# Patient Record
Sex: FEMALE | Race: WHITE | NOT HISPANIC OR LATINO | Employment: OTHER | URBAN - METROPOLITAN AREA
[De-identification: names, ages, dates, MRNs, and addresses within clinical notes are randomized per-mention and may not be internally consistent; named-entity substitution may affect disease eponyms.]

---

## 2019-08-09 RX ORDER — BENZONATATE 100 MG/1
200 CAPSULE ORAL
COMMUNITY
Start: 2016-11-16 | End: 2021-03-22

## 2019-08-09 RX ORDER — PANTOPRAZOLE SODIUM 40 MG/1
40 TABLET, DELAYED RELEASE ORAL DAILY
COMMUNITY
End: 2021-03-22

## 2019-08-09 RX ORDER — AMIODARONE HYDROCHLORIDE 200 MG/1
200 TABLET ORAL DAILY
COMMUNITY
End: 2020-01-03

## 2019-08-09 RX ORDER — LISINOPRIL AND HYDROCHLOROTHIAZIDE 20; 12.5 MG/1; MG/1
1 TABLET ORAL DAILY
COMMUNITY
End: 2021-03-22

## 2019-08-09 RX ORDER — DILTIAZEM HYDROCHLORIDE 120 MG/1
120 CAPSULE, COATED, EXTENDED RELEASE ORAL DAILY
COMMUNITY
End: 2020-01-03 | Stop reason: SDUPTHER

## 2019-08-09 RX ORDER — ALBUTEROL SULFATE 90 UG/1
2 AEROSOL, METERED RESPIRATORY (INHALATION)
COMMUNITY
Start: 2016-11-16 | End: 2021-03-22

## 2019-08-12 ENCOUNTER — OFFICE VISIT (OUTPATIENT)
Dept: CARDIOLOGY | Facility: CLINIC | Age: 79
End: 2019-08-12

## 2019-08-12 VITALS
HEIGHT: 63 IN | HEART RATE: 124 BPM | SYSTOLIC BLOOD PRESSURE: 129 MMHG | DIASTOLIC BLOOD PRESSURE: 85 MMHG | WEIGHT: 215 LBS | BODY MASS INDEX: 38.09 KG/M2

## 2019-08-12 DIAGNOSIS — I10 ESSENTIAL HYPERTENSION: ICD-10-CM

## 2019-08-12 DIAGNOSIS — I48.4 ATYPICAL ATRIAL FLUTTER (HCC): Primary | ICD-10-CM

## 2019-08-12 PROCEDURE — 93000 ELECTROCARDIOGRAM COMPLETE: CPT | Performed by: INTERNAL MEDICINE

## 2019-08-12 PROCEDURE — 99203 OFFICE O/P NEW LOW 30 MIN: CPT | Performed by: INTERNAL MEDICINE

## 2019-08-12 RX ORDER — FUROSEMIDE 20 MG/1
TABLET ORAL
COMMUNITY
Start: 2019-08-05 | End: 2021-03-22

## 2019-08-14 PROBLEM — I48.4 ATYPICAL ATRIAL FLUTTER: Status: ACTIVE | Noted: 2019-08-14

## 2019-08-14 PROBLEM — I10 ESSENTIAL HYPERTENSION: Status: ACTIVE | Noted: 2019-08-14

## 2019-08-14 NOTE — PROGRESS NOTES
Subjective:     Encounter Date:08/12/2019      Patient ID: Laure Leung is a 79 y.o. female.    Chief Complaint:  Chief Complaint   Patient presents with   • Follow-up   • Atrial Fibrillation       HPI:  Patient presents for evaluation of atypical atrial flutter.    Ms Leung is a 78 yo with a past medical history significant for HTN.  There was no history of HLD or previous heart disease.  She presented to [facility] in 6/19 with complaints of dyspnea which had been ongoing for several weeks.  The also noted that her stamina and exercise tolerance had diminished.  She denied any chest pain, PND, orthopnea or palpitatins.  In the ER she was found to be in atrial flutter with 2:1 conduction and had an elevated BNP of 1100.  She was admitted and placed on metoprolol, amiodarone and apixoban.  She was diuresed and an echo showed an EF of 55%, mild-moderate RV enlargement, mild-moderate MR with severely dilated LA, moderate TR and mild AoV sclerosis.    After discharge she developed ankle edema which she attributed to the metoprolol which she stopped..  Her edema improved but she continues to have marked fatigue and dyspnea daily.      The following portions of the patient's history were reviewed and updated as appropriate: allergies, current medications, past family history, past medical history, past social history, past surgical history and problem list.    Problem List:  Patient Active Problem List   Diagnosis   • Atypical atrial flutter (CMS/HCC)   • Essential hypertension       Past Medical History:  Past Medical History:   Diagnosis Date   • Atrial fibrillation (CMS/HCC)    • CHF (congestive heart failure) (CMS/HCC)    • Hypertension        Past Surgical History:  Past Surgical History:   Procedure Laterality Date   • CATARACT EXTRACTION         Social History:  Social History     Socioeconomic History   • Marital status:      Spouse name: Not on file   • Number of children: Not on file   •  "Years of education: Not on file   • Highest education level: Not on file   Tobacco Use   • Smoking status: Former Smoker   • Smokeless tobacco: Never Used   Substance and Sexual Activity   • Alcohol use: No     Frequency: Never       Allergies:  No Known Allergies    Immunizations:    There is no immunization history on file for this patient.    ROS:  Review of Systems   Constitution: Positive for weakness and malaise/fatigue.   HENT: Negative.    Eyes: Negative.    Cardiovascular: Positive for dyspnea on exertion and leg swelling. Negative for chest pain, orthopnea, palpitations and syncope.   Respiratory: Positive for shortness of breath.    Endocrine: Negative.    Hematologic/Lymphatic: Negative.    Skin: Negative.    Musculoskeletal: Negative.    Gastrointestinal: Negative.    Genitourinary: Negative.    Neurological: Negative for focal weakness.   Psychiatric/Behavioral: Negative.    Allergic/Immunologic: Negative.           Objective:         /85   Pulse (!) 124   Ht 160 cm (63\")   Wt 97.5 kg (215 lb)   BMI 38.09 kg/m²     Physical Exam   Constitutional: She is oriented to person, place, and time. She appears well-developed and well-nourished. No distress.   HENT:   Head: Normocephalic and atraumatic.   Eyes: Conjunctivae and EOM are normal. Pupils are equal, round, and reactive to light. No scleral icterus.   Neck: Normal range of motion. No thyromegaly present.   Cardiovascular: Regular rhythm.   Murmur (1/6 PIO) heard.  tachycardic   Pulmonary/Chest: Effort normal and breath sounds normal.   Abdominal: Soft. Bowel sounds are normal.   Musculoskeletal: Normal range of motion.   Neurological: She is alert and oriented to person, place, and time.   Skin: Skin is warm and dry.   Psychiatric: She has a normal mood and affect.       In-Office Procedure(s):    ECG 12 Lead  Date/Time: 8/12/2019 2:10 PM  Performed by: Antonio Senior MD  Authorized by: Antonio Senior MD   Comparison: compared " with previous ECG from 6/3/2019  Comparison to previous ECG: Atrial flutter with 2:1 conduction              ASCVD RIsk Score::  The ASCVD Risk score (Buckymarga RUBIO Jr., et al., 2013) failed to calculate for the following reasons:    Cannot find a previous HDL lab    Cannot find a previous total cholesterol lab    Recent Radiology:  Imaging Results (most recent)     None          Lab Review:   No results found for any previous visit.                Assessment:          Diagnosis Plan   1. Atypical atrial flutter (CMS/HCC)     2. Essential hypertension            Plan:      1. Atypical atrial flutter - rate uncontrolled, no attempt at restoration of NSR, at risk for developing heart failure.  Discussed doing cardioversion and then ablation to include pulmonary vein isolation and ablation of her atypical left atrial flutter with the associated risks and benefits.      Level of Care:                 Antonio Senior MD  08/14/19  .

## 2019-08-15 ENCOUNTER — TELEPHONE (OUTPATIENT)
Dept: CARDIOLOGY | Facility: CLINIC | Age: 79
End: 2019-08-15

## 2019-09-09 ENCOUNTER — OFFICE VISIT (OUTPATIENT)
Dept: CARDIOLOGY | Facility: CLINIC | Age: 79
End: 2019-09-09

## 2019-09-09 VITALS
BODY MASS INDEX: 38.27 KG/M2 | DIASTOLIC BLOOD PRESSURE: 79 MMHG | SYSTOLIC BLOOD PRESSURE: 155 MMHG | HEIGHT: 63 IN | WEIGHT: 216 LBS | HEART RATE: 74 BPM

## 2019-09-09 DIAGNOSIS — I48.4 ATYPICAL ATRIAL FLUTTER (HCC): Primary | ICD-10-CM

## 2019-09-09 DIAGNOSIS — I10 ESSENTIAL HYPERTENSION: ICD-10-CM

## 2019-09-09 PROCEDURE — 99213 OFFICE O/P EST LOW 20 MIN: CPT | Performed by: INTERNAL MEDICINE

## 2019-09-09 PROCEDURE — 93000 ELECTROCARDIOGRAM COMPLETE: CPT | Performed by: INTERNAL MEDICINE

## 2019-09-10 NOTE — PROGRESS NOTES
Subjective:     Encounter Date:09/09/2019      Patient ID: Laure Leung is a 79 y.o. female.    Chief Complaint:  Chief Complaint   Patient presents with   • Follow-up       HPI:  Patient presents for followup after cardioversion.    Ms Leung is a 78 yo with a past medical history significant for HTN who developed atypical atrial flutter.    She underwent cardioversion a few weeks ago and has felt well since.  She denies any symptoms of palpitations, chest pain, PND, orthopnea or peripheral edema.    The following portions of the patient's history were reviewed and updated as appropriate: allergies, current medications, past family history, past medical history, past social history, past surgical history and problem list.    Problem List:  Patient Active Problem List   Diagnosis   • Atypical atrial flutter (CMS/HCC)   • Essential hypertension       Past Medical History:  Past Medical History:   Diagnosis Date   • Atrial fibrillation (CMS/HCC)    • CHF (congestive heart failure) (CMS/HCC)    • Hypertension        Past Surgical History:  Past Surgical History:   Procedure Laterality Date   • CATARACT EXTRACTION         Social History:  Social History     Socioeconomic History   • Marital status:      Spouse name: Not on file   • Number of children: Not on file   • Years of education: Not on file   • Highest education level: Not on file   Tobacco Use   • Smoking status: Former Smoker   • Smokeless tobacco: Never Used   Substance and Sexual Activity   • Alcohol use: No     Frequency: Never       Allergies:  No Known Allergies    Immunizations:    There is no immunization history on file for this patient.    ROS:  Review of Systems   Constitution: Negative for weakness and malaise/fatigue.   HENT: Negative.    Eyes: Negative.    Cardiovascular: Negative for chest pain, dyspnea on exertion, irregular heartbeat, leg swelling, near-syncope, orthopnea, palpitations and paroxysmal nocturnal dyspnea.   Respiratory:  "Negative for shortness of breath.    Endocrine: Negative.    Hematologic/Lymphatic: Negative.    Skin: Negative.    Musculoskeletal: Negative.    Gastrointestinal: Negative.    Genitourinary: Negative.    Neurological: Negative.    Psychiatric/Behavioral: Negative.    Allergic/Immunologic: Negative.           Objective:         /79   Pulse 74   Ht 160 cm (63\")   Wt 98 kg (216 lb)   BMI 38.26 kg/m²     Physical Exam   Constitutional: She is oriented to person, place, and time. She appears well-developed and well-nourished. No distress.   HENT:   Head: Normocephalic and atraumatic.   Eyes: Conjunctivae and EOM are normal. Pupils are equal, round, and reactive to light. No scleral icterus.   Neck: Normal range of motion. No thyromegaly present.   Cardiovascular: Normal rate, regular rhythm and normal heart sounds.   Pulmonary/Chest: Effort normal and breath sounds normal.   Abdominal: Soft. Bowel sounds are normal.   Musculoskeletal: Normal range of motion.   Neurological: She is alert and oriented to person, place, and time.   Skin: Skin is warm and dry.   Psychiatric: She has a normal mood and affect.       In-Office Procedure(s):    ECG 12 Lead  Date/Time: 9/9/2019 12:51 PM  Performed by: Antonio Senior MD  Authorized by: Antonio Senior MD   Comparison: compared with previous ECG   Comparison to previous ECG: Atrial flutter with 2:1 conduction, LAD  Rhythm: sinus rhythm  Rate: normal  Conduction: 1st degree AV block  QRS axis: normal    Clinical impression: abnormal EKG            ASCVD RIsk Score::  The ASCVD Risk score (Bucky RAMIRO Jr., et al., 2013) failed to calculate for the following reasons:    Cannot find a previous HDL lab    Cannot find a previous total cholesterol lab    Recent Radiology:  Imaging Results (most recent)     None          Lab Review:   No results found for any previous visit.                Assessment:          Diagnosis Plan   1. Atypical atrial flutter (CMS/HCC)     2. " Essential hypertension            Plan:      1. Atypical atrial flutter - doing well post cardioversion in NSR, will continue apixaban and diltiazem.  Will defer ablation for now, consider ablation if it recurs.  2. HTN - reasonable control    RTC 6 months      Level of Care:                 Antonio Senior MD  09/10/19  .

## 2020-01-03 DIAGNOSIS — I48.4 ATYPICAL ATRIAL FLUTTER (HCC): Primary | ICD-10-CM

## 2020-01-03 RX ORDER — DILTIAZEM HYDROCHLORIDE 120 MG/1
120 CAPSULE, COATED, EXTENDED RELEASE ORAL DAILY
Qty: 90 CAPSULE | Refills: 3 | Status: SHIPPED | OUTPATIENT
Start: 2020-01-03 | End: 2021-01-05 | Stop reason: SDUPTHER

## 2020-03-09 ENCOUNTER — OFFICE VISIT (OUTPATIENT)
Dept: CARDIOLOGY | Facility: CLINIC | Age: 80
End: 2020-03-09

## 2020-03-09 DIAGNOSIS — I48.4 ATYPICAL ATRIAL FLUTTER (HCC): Primary | ICD-10-CM

## 2020-03-09 DIAGNOSIS — I10 ESSENTIAL HYPERTENSION: ICD-10-CM

## 2020-03-09 PROCEDURE — 99212 OFFICE O/P EST SF 10 MIN: CPT | Performed by: INTERNAL MEDICINE

## 2020-03-09 NOTE — PROGRESS NOTES
Subjective:     Encounter Date:03/09/2020      Patient ID: Laure Leung is a 80 y.o. female.    Chief Complaint:  Followup atrial flutter    HPI:  Patient presents for routine followup of her atypical atrial flutter.  Ms Leung is a 80 yo with a past medical history significant for HTN who developed atypical atrial flutter.  She presented to [facility] in 6/19 with complaints of dyspnea which had been ongoing for several weeks.  The also noted that her stamina and exercise tolerance had diminished.  She denied any chest pain, PND, orthopnea or palpitatins.  In the ER she was found to be in atrial flutter with 2:1 conduction and had an elevated BNP of 1100.  She was admitted and placed on metoprolol, amiodarone and apixoban.  She was diuresed and an echo showed an EF of 55%, mild-moderate RV enlargement, mild-moderate MR with severely dilated LA, moderate TR and mild AoV sclerosis.     She underwent cardioversion in 2019 and was taken off of amiodarone. Since then she has felt well.  She denies any symptoms of palpitations, chest pain, PND, orthopnea or peripheral edema.      The following portions of the patient's history were reviewed and updated as appropriate: allergies, current medications, past family history, past medical history, past social history, past surgical history and problem list.    Problem List:  Patient Active Problem List   Diagnosis   • Atypical atrial flutter (CMS/HCC)   • Essential hypertension       Past Medical History:  Past Medical History:   Diagnosis Date   • Atrial fibrillation (CMS/HCC)    • CHF (congestive heart failure) (CMS/HCC)    • Hypertension        Past Surgical History:  Past Surgical History:   Procedure Laterality Date   • CATARACT EXTRACTION         Social History:  Social History     Socioeconomic History   • Marital status:      Spouse name: Not on file   • Number of children: Not on file   • Years of education: Not on file   • Highest education  level: Not on file   Tobacco Use   • Smoking status: Former Smoker   • Smokeless tobacco: Never Used   Substance and Sexual Activity   • Alcohol use: No     Frequency: Never       Allergies:  No Known Allergies    Immunizations:    There is no immunization history on file for this patient.    ROS:  Review of Systems   Constitution: Negative for malaise/fatigue.   Cardiovascular: Negative for chest pain, dyspnea on exertion, irregular heartbeat, leg swelling, near-syncope, orthopnea, palpitations, paroxysmal nocturnal dyspnea and syncope.   Respiratory: Negative for shortness of breath.    Musculoskeletal: Positive for arthritis.   All other systems reviewed and are negative.         Objective:         There were no vitals taken for this visit.    Physical Exam   Constitutional: She is oriented to person, place, and time. She appears well-developed and well-nourished. No distress.   HENT:   Head: Normocephalic and atraumatic.   Eyes: Pupils are equal, round, and reactive to light. Conjunctivae and EOM are normal. No scleral icterus.   Neck: Normal range of motion. No thyromegaly present.   Cardiovascular: Normal rate, regular rhythm and normal heart sounds.   Pulmonary/Chest: Effort normal and breath sounds normal.   Abdominal: Soft. Bowel sounds are normal.   Musculoskeletal: Normal range of motion.   Neurological: She is alert and oriented to person, place, and time.   Skin: Skin is warm and dry.   Psychiatric: She has a normal mood and affect.       In-Office Procedure(s):  Procedures    ASCVD RIsk Score::  The ASCVD Risk score (Bucky RAMIRO Jr., et al., 2013) failed to calculate for the following reasons:    Cannot find a previous HDL lab    Cannot find a previous total cholesterol lab    Recent Radiology:  Imaging Results (Most Recent)     None          Lab Review:   No visits with results within 2 Month(s) from this visit.   Latest known visit with results is:   No results found for any previous visit.                 Assessment:          Diagnosis Plan   1. Atypical atrial flutter (CMS/HCC)     2. Essential hypertension            Plan:      1. Atypical atrial flutter - no recurrence since cardioversion, will continue apixaban  2. HTN - controlled  3. Osteoarthritis of the knees - scheduled to receive injections, ok to stop apixaban for injections    RTC 1 year      Level of Care:                 Antonio Senior MD  03/09/20  .

## 2020-03-10 VITALS
DIASTOLIC BLOOD PRESSURE: 66 MMHG | SYSTOLIC BLOOD PRESSURE: 138 MMHG | WEIGHT: 221 LBS | BODY MASS INDEX: 39.16 KG/M2 | HEIGHT: 63 IN | HEART RATE: 80 BPM

## 2020-04-23 ENCOUNTER — LAB REQUISITION (OUTPATIENT)
Dept: LAB | Facility: HOSPITAL | Age: 80
End: 2020-04-23

## 2020-04-23 DIAGNOSIS — Z00.00 ENCOUNTER FOR GENERAL ADULT MEDICAL EXAMINATION WITHOUT ABNORMAL FINDINGS: ICD-10-CM

## 2020-04-23 LAB
ALBUMIN SERPL-MCNC: 3.1 G/DL (ref 3.5–5.2)
ALBUMIN/GLOB SERPL: 0.9 G/DL
ALP SERPL-CCNC: 80 U/L (ref 39–117)
ALT SERPL W P-5'-P-CCNC: 35 U/L (ref 1–33)
ANION GAP SERPL CALCULATED.3IONS-SCNC: 12 MMOL/L (ref 5–15)
AST SERPL-CCNC: 42 U/L (ref 1–32)
BILIRUB SERPL-MCNC: 0.6 MG/DL (ref 0.2–1.2)
BUN BLD-MCNC: 13 MG/DL (ref 8–23)
BUN/CREAT SERPL: 14.3 (ref 7–25)
CALCIUM SPEC-SCNC: 9.9 MG/DL (ref 8.6–10.5)
CHLORIDE SERPL-SCNC: 102 MMOL/L (ref 98–107)
CO2 SERPL-SCNC: 27 MMOL/L (ref 22–29)
CREAT BLD-MCNC: 0.91 MG/DL (ref 0.57–1)
DEPRECATED RDW RBC AUTO: 47.7 FL (ref 37–54)
ERYTHROCYTE [DISTWIDTH] IN BLOOD BY AUTOMATED COUNT: 14.4 % (ref 12.3–15.4)
GFR SERPL CREATININE-BSD FRML MDRD: 60 ML/MIN/1.73
GLOBULIN UR ELPH-MCNC: 3.5 GM/DL
GLUCOSE BLD-MCNC: 146 MG/DL (ref 65–99)
HCT VFR BLD AUTO: 29.4 % (ref 34–46.6)
HGB BLD-MCNC: 10.1 G/DL (ref 12–15.9)
MAGNESIUM SERPL-MCNC: 2.1 MG/DL (ref 1.6–2.4)
MCH RBC QN AUTO: 32.1 PG (ref 26.6–33)
MCHC RBC AUTO-ENTMCNC: 34.3 G/DL (ref 31.5–35.7)
MCV RBC AUTO: 93.6 FL (ref 79–97)
PHOSPHATE SERPL-MCNC: 4.6 MG/DL (ref 2.5–4.5)
PLATELET # BLD AUTO: 453 10*3/MM3 (ref 140–450)
PMV BLD AUTO: 7.6 FL (ref 6–12)
POTASSIUM BLD-SCNC: 4.4 MMOL/L (ref 3.5–5.2)
PROT SERPL-MCNC: 6.6 G/DL (ref 6–8.5)
RBC # BLD AUTO: 3.15 10*6/MM3 (ref 3.77–5.28)
SODIUM BLD-SCNC: 141 MMOL/L (ref 136–145)
WBC NRBC COR # BLD: 4.5 10*3/MM3 (ref 3.4–10.8)

## 2020-04-23 PROCEDURE — 80053 COMPREHEN METABOLIC PANEL: CPT | Performed by: PHYSICAL MEDICINE & REHABILITATION

## 2020-04-23 PROCEDURE — 84100 ASSAY OF PHOSPHORUS: CPT | Performed by: PHYSICAL MEDICINE & REHABILITATION

## 2020-04-23 PROCEDURE — 85027 COMPLETE CBC AUTOMATED: CPT | Performed by: PHYSICAL MEDICINE & REHABILITATION

## 2020-04-23 PROCEDURE — 83735 ASSAY OF MAGNESIUM: CPT | Performed by: PHYSICAL MEDICINE & REHABILITATION

## 2020-04-27 ENCOUNTER — LAB REQUISITION (OUTPATIENT)
Dept: LAB | Facility: HOSPITAL | Age: 80
End: 2020-04-27

## 2020-04-27 DIAGNOSIS — Z00.00 ENCOUNTER FOR GENERAL ADULT MEDICAL EXAMINATION WITHOUT ABNORMAL FINDINGS: ICD-10-CM

## 2020-04-27 LAB
ANION GAP SERPL CALCULATED.3IONS-SCNC: 10 MMOL/L (ref 5–15)
BUN BLD-MCNC: 15 MG/DL (ref 8–23)
BUN/CREAT SERPL: 17.2 (ref 7–25)
CALCIUM SPEC-SCNC: 10 MG/DL (ref 8.6–10.5)
CHLORIDE SERPL-SCNC: 103 MMOL/L (ref 98–107)
CO2 SERPL-SCNC: 27 MMOL/L (ref 22–29)
CREAT BLD-MCNC: 0.87 MG/DL (ref 0.57–1)
DEPRECATED RDW RBC AUTO: 49.4 FL (ref 37–54)
ERYTHROCYTE [DISTWIDTH] IN BLOOD BY AUTOMATED COUNT: 14.8 % (ref 12.3–15.4)
GFR SERPL CREATININE-BSD FRML MDRD: 63 ML/MIN/1.73
GLUCOSE BLD-MCNC: 158 MG/DL (ref 65–99)
HCT VFR BLD AUTO: 29.4 % (ref 34–46.6)
HGB BLD-MCNC: 10.3 G/DL (ref 12–15.9)
MCH RBC QN AUTO: 33.4 PG (ref 26.6–33)
MCHC RBC AUTO-ENTMCNC: 35.2 G/DL (ref 31.5–35.7)
MCV RBC AUTO: 94.7 FL (ref 79–97)
PLATELET # BLD AUTO: 413 10*3/MM3 (ref 140–450)
PMV BLD AUTO: 8 FL (ref 6–12)
POTASSIUM BLD-SCNC: 3.8 MMOL/L (ref 3.5–5.2)
RBC # BLD AUTO: 3.1 10*6/MM3 (ref 3.77–5.28)
SODIUM BLD-SCNC: 140 MMOL/L (ref 136–145)
WBC NRBC COR # BLD: 4.8 10*3/MM3 (ref 3.4–10.8)

## 2020-04-27 PROCEDURE — 80048 BASIC METABOLIC PNL TOTAL CA: CPT | Performed by: PHYSICAL MEDICINE & REHABILITATION

## 2020-04-27 PROCEDURE — 85027 COMPLETE CBC AUTOMATED: CPT | Performed by: PHYSICAL MEDICINE & REHABILITATION

## 2020-04-30 ENCOUNTER — LAB REQUISITION (OUTPATIENT)
Dept: LAB | Facility: HOSPITAL | Age: 80
End: 2020-04-30

## 2020-04-30 DIAGNOSIS — Z00.00 ENCOUNTER FOR GENERAL ADULT MEDICAL EXAMINATION WITHOUT ABNORMAL FINDINGS: ICD-10-CM

## 2020-04-30 LAB
ANION GAP SERPL CALCULATED.3IONS-SCNC: 13 MMOL/L (ref 5–15)
BASOPHILS # BLD MANUAL: 0.08 10*3/MM3 (ref 0–0.2)
BASOPHILS NFR BLD AUTO: 2 % (ref 0–1.5)
BUN BLD-MCNC: 13 MG/DL (ref 8–23)
BUN/CREAT SERPL: 14.6 (ref 7–25)
CALCIUM SPEC-SCNC: 9.9 MG/DL (ref 8.6–10.5)
CHLORIDE SERPL-SCNC: 102 MMOL/L (ref 98–107)
CO2 SERPL-SCNC: 26 MMOL/L (ref 22–29)
CREAT BLD-MCNC: 0.89 MG/DL (ref 0.57–1)
DEPRECATED RDW RBC AUTO: 51.6 FL (ref 37–54)
EOSINOPHIL # BLD MANUAL: 0.16 10*3/MM3 (ref 0–0.4)
EOSINOPHIL NFR BLD MANUAL: 4 % (ref 0.3–6.2)
ERYTHROCYTE [DISTWIDTH] IN BLOOD BY AUTOMATED COUNT: 15.7 % (ref 12.3–15.4)
GFR SERPL CREATININE-BSD FRML MDRD: 61 ML/MIN/1.73
GLUCOSE BLD-MCNC: 157 MG/DL (ref 65–99)
HCT VFR BLD AUTO: 30.3 % (ref 34–46.6)
HGB BLD-MCNC: 10.9 G/DL (ref 12–15.9)
LYMPHOCYTES # BLD MANUAL: 1.44 10*3/MM3 (ref 0.7–3.1)
LYMPHOCYTES NFR BLD MANUAL: 36 % (ref 19.6–45.3)
LYMPHOCYTES NFR BLD MANUAL: 7 % (ref 5–12)
MCH RBC QN AUTO: 33.8 PG (ref 26.6–33)
MCHC RBC AUTO-ENTMCNC: 36.1 G/DL (ref 31.5–35.7)
MCV RBC AUTO: 93.8 FL (ref 79–97)
MONOCYTES # BLD AUTO: 0.28 10*3/MM3 (ref 0.1–0.9)
NEUTROPHILS # BLD AUTO: 2.04 10*3/MM3 (ref 1.7–7)
NEUTROPHILS NFR BLD MANUAL: 51 % (ref 42.7–76)
PLAT MORPH BLD: NORMAL
PLATELET # BLD AUTO: 409 10*3/MM3 (ref 140–450)
PMV BLD AUTO: 8 FL (ref 6–12)
POTASSIUM BLD-SCNC: 4.2 MMOL/L (ref 3.5–5.2)
RBC # BLD AUTO: 3.23 10*6/MM3 (ref 3.77–5.28)
RBC MORPH BLD: NORMAL
SCAN SLIDE: NORMAL
SODIUM BLD-SCNC: 141 MMOL/L (ref 136–145)
TSH SERPL DL<=0.05 MIU/L-ACNC: 0.03 UIU/ML (ref 0.27–4.2)
WBC MORPH BLD: NORMAL
WBC NRBC COR # BLD: 4 10*3/MM3 (ref 3.4–10.8)

## 2020-04-30 PROCEDURE — 84443 ASSAY THYROID STIM HORMONE: CPT | Performed by: PHYSICAL MEDICINE & REHABILITATION

## 2020-04-30 PROCEDURE — 80048 BASIC METABOLIC PNL TOTAL CA: CPT | Performed by: PHYSICAL MEDICINE & REHABILITATION

## 2020-04-30 PROCEDURE — 85025 COMPLETE CBC W/AUTO DIFF WBC: CPT | Performed by: PHYSICAL MEDICINE & REHABILITATION

## 2020-05-04 ENCOUNTER — LAB REQUISITION (OUTPATIENT)
Dept: LAB | Facility: HOSPITAL | Age: 80
End: 2020-05-04

## 2020-05-04 DIAGNOSIS — Z00.00 ENCOUNTER FOR GENERAL ADULT MEDICAL EXAMINATION WITHOUT ABNORMAL FINDINGS: ICD-10-CM

## 2020-05-04 LAB
ANION GAP SERPL CALCULATED.3IONS-SCNC: 14 MMOL/L (ref 5–15)
BUN BLD-MCNC: 15 MG/DL (ref 8–23)
BUN/CREAT SERPL: 15.8 (ref 7–25)
CALCIUM SPEC-SCNC: 9.9 MG/DL (ref 8.6–10.5)
CHLORIDE SERPL-SCNC: 101 MMOL/L (ref 98–107)
CO2 SERPL-SCNC: 27 MMOL/L (ref 22–29)
CREAT BLD-MCNC: 0.95 MG/DL (ref 0.57–1)
DEPRECATED RDW RBC AUTO: 52.9 FL (ref 37–54)
ERYTHROCYTE [DISTWIDTH] IN BLOOD BY AUTOMATED COUNT: 16.1 % (ref 12.3–15.4)
GFR SERPL CREATININE-BSD FRML MDRD: 57 ML/MIN/1.73
GLUCOSE BLD-MCNC: 132 MG/DL (ref 65–99)
HCT VFR BLD AUTO: 29.7 % (ref 34–46.6)
HGB BLD-MCNC: 10.7 G/DL (ref 12–15.9)
MCH RBC QN AUTO: 33.8 PG (ref 26.6–33)
MCHC RBC AUTO-ENTMCNC: 36 G/DL (ref 31.5–35.7)
MCV RBC AUTO: 93.9 FL (ref 79–97)
PLATELET # BLD AUTO: 396 10*3/MM3 (ref 140–450)
PMV BLD AUTO: 8 FL (ref 6–12)
POTASSIUM BLD-SCNC: 3.8 MMOL/L (ref 3.5–5.2)
RBC # BLD AUTO: 3.16 10*6/MM3 (ref 3.77–5.28)
SODIUM BLD-SCNC: 142 MMOL/L (ref 136–145)
WBC NRBC COR # BLD: 4.3 10*3/MM3 (ref 3.4–10.8)

## 2020-05-04 PROCEDURE — 85027 COMPLETE CBC AUTOMATED: CPT

## 2020-05-04 PROCEDURE — 80048 BASIC METABOLIC PNL TOTAL CA: CPT

## 2020-06-10 ENCOUNTER — TELEPHONE (OUTPATIENT)
Dept: CARDIOLOGY | Facility: CLINIC | Age: 80
End: 2020-06-10

## 2020-06-10 NOTE — TELEPHONE ENCOUNTER
I spoke with pt. She was at [facility] April 2020 then went to [facility], she is currently home. I informed her I would request records then f/u with her.   Records req'd from [facility]. RTRN

## 2020-06-10 NOTE — TELEPHONE ENCOUNTER
----- Message from Fozia Cristin sent at 6/10/2020 11:11 AM EDT -----  Regarding: RUDDY PT  Contact: [phone]  PT HAD CV-19 AND WAS ADMITTED TO [facility] AND THEN TRANSFERRED TO [facility]. SHE HAS BEEN D/C'd. SHE WAS DX WITH HYPERTHYROIDISM DURING ADMISSION. SHE WAS DISCHARGED ON 12.5MG QD ATENOLOL. SHE HAS AN APPT WITH ENDO DR FOWLER ON 6/29. SHE STATES HER HR HAS BEEN IN THE 70s - 90s. THEY DIDN'T GIVE HER MUCH DIRECTION ON DISCHARGE ABOUT MEDICATIONS, OR INTERACTIONS WITH HER DILTIAZEM. ALSO, DILTIAZEM WENT FROM 180MG TO 120MG, SO SHE WASN'T SURE ABOUT THIS EITHER. I ASKED HER TO PREPARE A GOOD D/C MED LIST FOR WHEN YOU CALL HER, SO YOU CAN DOCUMENT ANY CHANGES. ALSO, SHE FELT LIKE MAYBE SHE SHOULD SEE DR. FOX SOONER THAN MARCH JUST TO MAKE SURE SHE IS DOING WELL, BUT SHE WANTED YOUR ADVICE. THANK YOU!

## 2020-06-18 NOTE — TELEPHONE ENCOUNTER
Reviewed records from [facility]. Medication changes made due to low BP & abn thyroid function. She is following up with PCP & Endocrinology within the 1-2 weeks. I recommended she monitor BP & HR over the next 1-2 weeks and communicate that to the office. She voiced understanding & feels her BP & HR have been doing well. RTRN

## 2020-08-03 ENCOUNTER — TELEPHONE (OUTPATIENT)
Dept: CARDIOLOGY | Facility: CLINIC | Age: 80
End: 2020-08-03

## 2020-08-03 NOTE — TELEPHONE ENCOUNTER
I spoke with pt. She currently has diltiazem which is the generic. She pays $19/90 day which sound right for this med.   She voiced understanding. She mentioned that Eliquis may be too expensive with her hitting the donut hole. I advised to her contact the office if we need to discuss alternatives. RTRN

## 2020-08-03 NOTE — TELEPHONE ENCOUNTER
DR Senior PT    Can you please call PT.  She wants a generic called in for diltiazem sent to her mail war[facility].    She said when she first was prescribe this from the hospital it was a generic with a different mg now DR Senior changed the mg and it cost her $18/month and she wants it to be the generic so it will be cheaper.

## 2021-01-05 DIAGNOSIS — I48.4 ATYPICAL ATRIAL FLUTTER (HCC): ICD-10-CM

## 2021-01-05 NOTE — TELEPHONE ENCOUNTER
DR Senior PT    PT Needs refill on   Eliquis 5 mg take 1 tab BID for 90 days  Diltiazem 120 mg 1 tab daily for 90 days sent to [facility]

## 2021-01-06 RX ORDER — DILTIAZEM HYDROCHLORIDE 120 MG/1
120 CAPSULE, COATED, EXTENDED RELEASE ORAL DAILY
Qty: 90 CAPSULE | Refills: 1 | Status: ON HOLD | OUTPATIENT
Start: 2021-01-06 | End: 2021-06-23

## 2021-03-22 ENCOUNTER — OFFICE VISIT (OUTPATIENT)
Dept: CARDIOLOGY | Facility: CLINIC | Age: 81
End: 2021-03-22

## 2021-03-22 DIAGNOSIS — I48.4 ATYPICAL ATRIAL FLUTTER (HCC): ICD-10-CM

## 2021-03-22 DIAGNOSIS — I10 ESSENTIAL HYPERTENSION: ICD-10-CM

## 2021-03-22 DIAGNOSIS — I48.92 ATRIAL FLUTTER WITH RAPID VENTRICULAR RESPONSE (HCC): Primary | ICD-10-CM

## 2021-03-22 PROCEDURE — 93000 ELECTROCARDIOGRAM COMPLETE: CPT | Performed by: INTERNAL MEDICINE

## 2021-03-22 PROCEDURE — 99213 OFFICE O/P EST LOW 20 MIN: CPT | Performed by: INTERNAL MEDICINE

## 2021-03-22 NOTE — PROGRESS NOTES
Subjective:     Encounter Date:03/22/2021      Patient ID: Laure Leung is a 81 y.o. female.    Chief Complaint:  Followup atrial flutter    HPI:  Patient presents for routine followup of her atypical atrial flutter.  Ms Leung is a 80 yo with a past medical history significant for HTN who developed atypical atrial flutter.  She presented to [facility] in 6/19 with complaints of dyspnea which had been ongoing for several weeks.  The also noted that her stamina and exercise tolerance had diminished.  She denied any chest pain, PND, orthopnea or palpitatins.  In the ER she was found to be in atrial flutter with 2:1 conduction and had an elevated BNP of 1100.  She was admitted and placed on metoprolol, amiodarone and apixoban.  She was diuresed and an echo showed an EF of 55%, mild-moderate RV enlargement, mild-moderate MR with severely dilated LA, moderate TR and mild AoV sclerosis.     She underwent cardioversion in 2019 and was taken off of amiodarone.     Since she was last seen she has been doing well without symptoms of palpitations, dizziness, chest pain or dyspnea.      The following portions of the patient's history were reviewed and updated as appropriate: allergies, current medications, past family history, past medical history, past social history, past surgical history and problem list.    Problem List:  Patient Active Problem List   Diagnosis   • Atypical atrial flutter (CMS/HCC)   • Essential hypertension       Past Medical History:  Past Medical History:   Diagnosis Date   • Atrial fibrillation (CMS/HCC)    • CHF (congestive heart failure) (CMS/HCC)    • Hypertension        Past Surgical History:  Past Surgical History:   Procedure Laterality Date   • CATARACT EXTRACTION         Social History:  Social History     Socioeconomic History   • Marital status:      Spouse name: Not on file   • Number of children: Not on file   • Years of education: Not on file   • Highest education  level: Not on file   Tobacco Use   • Smoking status: Former Smoker   • Smokeless tobacco: Never Used   Substance and Sexual Activity   • Alcohol use: No       Allergies:  No Known Allergies    Immunizations:    There is no immunization history on file for this patient.    ROS:  Review of Systems   Constitutional: Negative for malaise/fatigue.   Cardiovascular: Negative for chest pain, dyspnea on exertion, irregular heartbeat, leg swelling, near-syncope, orthopnea, palpitations, paroxysmal nocturnal dyspnea and syncope.   Respiratory: Negative for shortness of breath.    All other systems reviewed and are negative.         Objective:         There were no vitals taken for this visit.    Constitutional:       General: Not in acute distress.     Appearance: Well-developed.   Eyes:      General: No scleral icterus.     Conjunctiva/sclera: Conjunctivae normal.      Pupils: Pupils are equal, round, and reactive to light.   HENT:      Head: Normocephalic and atraumatic.   Neck:      Thyroid: No thyromegaly.   Pulmonary:      Effort: Pulmonary effort is normal.      Breath sounds: Normal breath sounds.   Cardiovascular:      Normal rate. Regular rhythm.   Abdominal:      General: Bowel sounds are normal.      Palpations: Abdomen is soft.   Musculoskeletal: Normal range of motion.      Cervical back: Normal range of motion. Skin:     General: Skin is warm and dry.   Neurological:      Mental Status: Alert and oriented to person, place, and time.         In-Office Procedure(s):    ECG 12 Lead    Date/Time: 3/22/2021 10:50 AM  Performed by: Antonio Senior MD  Authorized by: Antonio Senior MD   Comparison: compared with previous ECG from 4/13/2020  Comparison to previous ECG: NSR with 1st degree AVB  Rhythm: sinus rhythm  Conduction: 1st degree AV block  Other findings: left ventricular hypertrophy            ASCVD RIsk Score::  The ASCVD Risk score ([place] DC Jr., et al., 2013) failed to calculate for the following  reasons:    The 2013 ASCVD risk score is only valid for ages 40 to 79    Recent Radiology:  Imaging Results (Most Recent)     None          Lab Review:   No visits with results within 2 Month(s) from this visit.   Latest known visit with results is:   Lab Requisition on 05/04/2020   Component Date Value   • Glucose 05/04/2020 132*   • BUN 05/04/2020 15    • Creatinine 05/04/2020 0.95    • Sodium 05/04/2020 142    • Potassium 05/04/2020 3.8    • Chloride 05/04/2020 101    • CO2 05/04/2020 27.0    • Calcium 05/04/2020 9.9    • eGFR Non African Amer 05/04/2020 57*   • BUN/Creatinine Ratio 05/04/2020 15.8    • Anion Gap 05/04/2020 14.0    • WBC 05/04/2020 4.30    • RBC 05/04/2020 3.16*   • Hemoglobin 05/04/2020 10.7*   • Hematocrit 05/04/2020 29.7*   • MCV 05/04/2020 93.9    • MCH 05/04/2020 33.8*   • MCHC 05/04/2020 36.0*   • RDW 05/04/2020 16.1*   • RDW-SD 05/04/2020 52.9    • MPV 05/04/2020 8.0    • Platelets 05/04/2020 396                 Assessment:          Diagnosis Plan   1. Atrial flutter with rapid ventricular response (CMS/HCC)  ECG 12 Lead   2. Atypical atrial flutter (CMS/HCC)     3. Essential hypertension            Plan:      1. Atypical atrial flutter - no recurrence since cardioversion, off of amio, continue apixaban  2. HTN - controlled on diltiazem, continue same    RTC 1 year      Level of Care:                 Antonio Senior MD  03/22/21  .

## 2021-05-24 ENCOUNTER — TELEPHONE (OUTPATIENT)
Dept: CARDIOLOGY | Facility: CLINIC | Age: 81
End: 2021-05-24

## 2021-05-24 NOTE — TELEPHONE ENCOUNTER
AGD  Pt called stating that today she feels her Ht rate has been elevated 100 and 135. There haven't been any med changes. I instructed pt to keep a log BP and give us a call tmrw afternoon     Pt CB# [phone]

## 2021-05-25 NOTE — TELEPHONE ENCOUNTER
Spoke to the patient, she is taking her heart rate by means of electronic devices, she does have afib. Asked if she has been keeping herself hydrated in this heat. Pt admits that she does not, she has been outside working in her garden the past couple of days. Asked the patient to drink lots of water & get herself hydrated and call me in the morning with her heart rate & to stay inside tonight and rest. If I have not heard back from Dr. Senior then I will try to get a hold of him by phone.

## 2021-05-25 NOTE — TELEPHONE ENCOUNTER
Patient took her BP before and after eating this morning. Below are the results    Before eatin/97 - Pulse: 132  After eatin/89 - Pulse: 136    Patient was very anxious to get an answer about what to do, being that she called yesterday and has still received no response. I told the patient I will send the message over with a high priority and somebody will be following up with her sometime today. I also informed the patient that Frannie and Dr. Kolb are currently in clinic.     Thanks,   Rob

## 2021-05-26 NOTE — TELEPHONE ENCOUNTER
Patient called back this morning, she hydrated herself yesterday after speaking with me, she drank 4 bottles of water & her heart rate this morning is still elevated, upon waking it was 125 & an hour later it was 135. She is only taking Eliquis bid and Diltiazem  mg qd. Please advise.

## 2021-05-27 ENCOUNTER — HOSPITAL ENCOUNTER (EMERGENCY)
Facility: HOSPITAL | Age: 81
Discharge: HOME | End: 2021-05-27
Attending: EMERGENCY MEDICINE | Admitting: EMERGENCY MEDICINE

## 2021-05-27 ENCOUNTER — OFFICE VISIT (OUTPATIENT)
Dept: CARDIOLOGY | Facility: CLINIC | Age: 81
End: 2021-05-27

## 2021-05-27 ENCOUNTER — APPOINTMENT (OUTPATIENT)
Dept: GENERAL RADIOLOGY | Facility: HOSPITAL | Age: 81
End: 2021-05-27

## 2021-05-27 VITALS
HEART RATE: 140 BPM | DIASTOLIC BLOOD PRESSURE: 80 MMHG | WEIGHT: 202 LBS | BODY MASS INDEX: 35.79 KG/M2 | HEIGHT: 63 IN | SYSTOLIC BLOOD PRESSURE: 148 MMHG

## 2021-05-27 VITALS
WEIGHT: 201.72 LBS | TEMPERATURE: 97.9 F | HEIGHT: 63 IN | BODY MASS INDEX: 35.74 KG/M2 | DIASTOLIC BLOOD PRESSURE: 72 MMHG | OXYGEN SATURATION: 98 % | SYSTOLIC BLOOD PRESSURE: 154 MMHG | RESPIRATION RATE: 13 BRPM | HEART RATE: 93 BPM

## 2021-05-27 DIAGNOSIS — I48.92 ATRIAL FLUTTER, UNSPECIFIED TYPE (HCC): Primary | ICD-10-CM

## 2021-05-27 DIAGNOSIS — I48.4 ATYPICAL ATRIAL FLUTTER (HCC): Primary | ICD-10-CM

## 2021-05-27 DIAGNOSIS — I10 ESSENTIAL HYPERTENSION: ICD-10-CM

## 2021-05-27 LAB
ALBUMIN SERPL-MCNC: 4.3 G/DL (ref 3.5–5.2)
ALBUMIN/GLOB SERPL: 2.3 G/DL
ALP SERPL-CCNC: 94 U/L (ref 39–117)
ALT SERPL W P-5'-P-CCNC: 21 U/L (ref 1–33)
ANION GAP SERPL CALCULATED.3IONS-SCNC: 13 MMOL/L (ref 5–15)
APTT PPP: 27.1 SECONDS (ref 24–31)
AST SERPL-CCNC: 20 U/L (ref 1–32)
BACTERIA UR QL AUTO: ABNORMAL /HPF
BASOPHILS # BLD AUTO: 0 10*3/MM3 (ref 0–0.2)
BASOPHILS NFR BLD AUTO: 0.5 % (ref 0–1.5)
BILIRUB SERPL-MCNC: 0.7 MG/DL (ref 0–1.2)
BILIRUB UR QL STRIP: NEGATIVE
BUN SERPL-MCNC: 22 MG/DL (ref 8–23)
BUN/CREAT SERPL: 30.6 (ref 7–25)
CALCIUM SPEC-SCNC: 9.7 MG/DL (ref 8.6–10.5)
CHLORIDE SERPL-SCNC: 106 MMOL/L (ref 98–107)
CLARITY UR: CLEAR
CO2 SERPL-SCNC: 22 MMOL/L (ref 22–29)
COLOR UR: YELLOW
CREAT SERPL-MCNC: 0.72 MG/DL (ref 0.57–1)
DEPRECATED RDW RBC AUTO: 47.3 FL (ref 37–54)
EOSINOPHIL # BLD AUTO: 0.1 10*3/MM3 (ref 0–0.4)
EOSINOPHIL NFR BLD AUTO: 1.1 % (ref 0.3–6.2)
ERYTHROCYTE [DISTWIDTH] IN BLOOD BY AUTOMATED COUNT: 14.6 % (ref 12.3–15.4)
GFR SERPL CREATININE-BSD FRML MDRD: 78 ML/MIN/1.73
GLOBULIN UR ELPH-MCNC: 1.9 GM/DL
GLUCOSE SERPL-MCNC: 175 MG/DL (ref 65–99)
GLUCOSE UR STRIP-MCNC: NEGATIVE MG/DL
HCT VFR BLD AUTO: 37.8 % (ref 34–46.6)
HGB BLD-MCNC: 12.7 G/DL (ref 12–15.9)
HGB UR QL STRIP.AUTO: NEGATIVE
HOLD SPECIMEN: NORMAL
HOLD SPECIMEN: NORMAL
HYALINE CASTS UR QL AUTO: ABNORMAL /LPF
INR PPP: 1.04 (ref 0.93–1.1)
KETONES UR QL STRIP: NEGATIVE
LEUKOCYTE ESTERASE UR QL STRIP.AUTO: ABNORMAL
LYMPHOCYTES # BLD AUTO: 1.5 10*3/MM3 (ref 0.7–3.1)
LYMPHOCYTES NFR BLD AUTO: 22.7 % (ref 19.6–45.3)
MAGNESIUM SERPL-MCNC: 1.8 MG/DL (ref 1.6–2.4)
MCH RBC QN AUTO: 31.3 PG (ref 26.6–33)
MCHC RBC AUTO-ENTMCNC: 33.5 G/DL (ref 31.5–35.7)
MCV RBC AUTO: 93.7 FL (ref 79–97)
MONOCYTES # BLD AUTO: 0.7 10*3/MM3 (ref 0.1–0.9)
MONOCYTES NFR BLD AUTO: 10.5 % (ref 5–12)
NEUTROPHILS NFR BLD AUTO: 4.4 10*3/MM3 (ref 1.7–7)
NEUTROPHILS NFR BLD AUTO: 65.2 % (ref 42.7–76)
NITRITE UR QL STRIP: NEGATIVE
NRBC BLD AUTO-RTO: 0 /100 WBC (ref 0–0.2)
NT-PROBNP SERPL-MCNC: 2764 PG/ML (ref 0–1800)
PH UR STRIP.AUTO: <=5 [PH] (ref 5–8)
PLATELET # BLD AUTO: 363 10*3/MM3 (ref 140–450)
PMV BLD AUTO: 8.8 FL (ref 6–12)
POTASSIUM SERPL-SCNC: 4.6 MMOL/L (ref 3.5–5.2)
PROT SERPL-MCNC: 6.2 G/DL (ref 6–8.5)
PROT UR QL STRIP: NEGATIVE
PROTHROMBIN TIME: 11.4 SECONDS (ref 9.6–11.7)
RBC # BLD AUTO: 4.04 10*6/MM3 (ref 3.77–5.28)
RBC # UR: ABNORMAL /HPF
REF LAB TEST METHOD: ABNORMAL
SODIUM SERPL-SCNC: 141 MMOL/L (ref 136–145)
SP GR UR STRIP: 1.02 (ref 1–1.03)
SQUAMOUS #/AREA URNS HPF: ABNORMAL /HPF
TROPONIN T SERPL-MCNC: <0.01 NG/ML (ref 0–0.03)
TROPONIN T SERPL-MCNC: <0.01 NG/ML (ref 0–0.03)
TSH SERPL DL<=0.05 MIU/L-ACNC: 0.75 UIU/ML (ref 0.27–4.2)
UROBILINOGEN UR QL STRIP: ABNORMAL
WBC # BLD AUTO: 6.7 10*3/MM3 (ref 3.4–10.8)
WBC UR QL AUTO: ABNORMAL /HPF
WHOLE BLOOD HOLD SPECIMEN: NORMAL

## 2021-05-27 PROCEDURE — 99283 EMERGENCY DEPT VISIT LOW MDM: CPT

## 2021-05-27 PROCEDURE — 93005 ELECTROCARDIOGRAM TRACING: CPT | Performed by: NURSE PRACTITIONER

## 2021-05-27 PROCEDURE — 99213 OFFICE O/P EST LOW 20 MIN: CPT | Performed by: INTERNAL MEDICINE

## 2021-05-27 PROCEDURE — 83880 ASSAY OF NATRIURETIC PEPTIDE: CPT | Performed by: EMERGENCY MEDICINE

## 2021-05-27 PROCEDURE — 83735 ASSAY OF MAGNESIUM: CPT | Performed by: EMERGENCY MEDICINE

## 2021-05-27 PROCEDURE — 93005 ELECTROCARDIOGRAM TRACING: CPT | Performed by: EMERGENCY MEDICINE

## 2021-05-27 PROCEDURE — 81001 URINALYSIS AUTO W/SCOPE: CPT | Performed by: EMERGENCY MEDICINE

## 2021-05-27 PROCEDURE — 84443 ASSAY THYROID STIM HORMONE: CPT | Performed by: EMERGENCY MEDICINE

## 2021-05-27 PROCEDURE — 71045 X-RAY EXAM CHEST 1 VIEW: CPT

## 2021-05-27 PROCEDURE — 93000 ELECTROCARDIOGRAM COMPLETE: CPT | Performed by: INTERNAL MEDICINE

## 2021-05-27 PROCEDURE — 85730 THROMBOPLASTIN TIME PARTIAL: CPT | Performed by: EMERGENCY MEDICINE

## 2021-05-27 PROCEDURE — 85025 COMPLETE CBC W/AUTO DIFF WBC: CPT | Performed by: EMERGENCY MEDICINE

## 2021-05-27 PROCEDURE — 85610 PROTHROMBIN TIME: CPT | Performed by: EMERGENCY MEDICINE

## 2021-05-27 PROCEDURE — 96374 THER/PROPH/DIAG INJ IV PUSH: CPT

## 2021-05-27 PROCEDURE — 80053 COMPREHEN METABOLIC PANEL: CPT | Performed by: EMERGENCY MEDICINE

## 2021-05-27 PROCEDURE — 84484 ASSAY OF TROPONIN QUANT: CPT | Performed by: EMERGENCY MEDICINE

## 2021-05-27 RX ORDER — SODIUM CHLORIDE 0.9 % (FLUSH) 0.9 %
10 SYRINGE (ML) INJECTION AS NEEDED
Status: DISCONTINUED | OUTPATIENT
Start: 2021-05-27 | End: 2021-05-27 | Stop reason: HOSPADM

## 2021-05-27 RX ORDER — FUROSEMIDE 40 MG/1
40 TABLET ORAL DAILY
Qty: 30 TABLET | Refills: 0 | Status: SHIPPED | OUTPATIENT
Start: 2021-05-27 | End: 2021-06-14 | Stop reason: SDUPTHER

## 2021-05-27 RX ORDER — MULTIVIT WITH MINERALS/LUTEIN
1000 TABLET ORAL DAILY
COMMUNITY
End: 2022-09-28

## 2021-05-27 RX ORDER — METOPROLOL TARTRATE 5 MG/5ML
2.5 INJECTION INTRAVENOUS ONCE
Status: COMPLETED | OUTPATIENT
Start: 2021-05-27 | End: 2021-05-27

## 2021-05-27 RX ORDER — POTASSIUM CHLORIDE 750 MG/1
20 TABLET, FILM COATED, EXTENDED RELEASE ORAL DAILY
Qty: 30 TABLET | Refills: 0 | Status: SHIPPED | OUTPATIENT
Start: 2021-05-27 | End: 2021-06-14 | Stop reason: SDUPTHER

## 2021-05-27 RX ADMIN — METOPROLOL TARTRATE 12.5 MG: 25 TABLET, FILM COATED ORAL at 16:24

## 2021-05-27 RX ADMIN — METOPROLOL TARTRATE 2.5 MG: 5 INJECTION INTRAVENOUS at 13:48

## 2021-05-27 NOTE — PROGRESS NOTES
Subjective:     Encounter Date:05/27/2021      Patient ID: Laure Leung is a 81 y.o. female.    Chief Complaint:  Chief Complaint   Patient presents with   • Rapid Heart Rate       HPI:   Ms Leung is a 80 yo with a past medical history significant for HTN who developed atypical atrial flutter.  She presented to [facility] in 6/19 with complaints of dyspnea which had been ongoing for several weeks.  The also noted that her stamina and exercise tolerance had diminished.  She denied any chest pain, PND, orthopnea or palpitatins.  In the ER she was found to be in atrial flutter with 2:1 conduction and had an elevated BNP of 1100.  She was admitted and placed on metoprolol, amiodarone and apixoban.  She was diuresed and an echo showed an EF of 55%, mild-moderate RV enlargement, mild-moderate MR with severely dilated LA, moderate TR and mild AoV sclerosis.     She underwent cardioversion in 2019 and was taken off of amiodarone.      She presents today with complaints of a rapid pulse rate around 140bpm for the past several days.  She has not had any chest pain, dyspnea, dizziness or syncope.         The following portions of the patient's history were reviewed and updated as appropriate: allergies, current medications, past family history, past medical history, past social history, past surgical history and problem list.    Problem List:  Patient Active Problem List   Diagnosis   • Atypical atrial flutter (CMS/HCC)   • Essential hypertension       Past Medical History:  Past Medical History:   Diagnosis Date   • Atrial fibrillation (CMS/HCC)    • CHF (congestive heart failure) (CMS/HCC)    • Hypertension        Past Surgical History:  Past Surgical History:   Procedure Laterality Date   • CATARACT EXTRACTION         Social History:  Social History     Socioeconomic History   • Marital status:      Spouse name: Not on file   • Number of children: Not on file   • Years of education: Not on file   •  "Highest education level: Not on file   Tobacco Use   • Smoking status: Former Smoker   • Smokeless tobacco: Never Used   Substance and Sexual Activity   • Alcohol use: No       Allergies:  No Known Allergies    Immunizations:    There is no immunization history on file for this patient.    ROS:  Review of Systems   Constitutional: Negative for malaise/fatigue.   Cardiovascular: Positive for palpitations. Negative for chest pain, dyspnea on exertion, irregular heartbeat, leg swelling, near-syncope, orthopnea, paroxysmal nocturnal dyspnea and syncope.   Respiratory: Negative for shortness of breath.    All other systems reviewed and are negative.         Objective:         /80   Pulse (!) 140   Ht 160 cm (63\")   Wt 91.6 kg (202 lb)   BMI 35.78 kg/m²     Constitutional:       General: Not in acute distress.     Appearance: Well-developed.   Eyes:      General: No scleral icterus.     Conjunctiva/sclera: Conjunctivae normal.      Pupils: Pupils are equal, round, and reactive to light.   HENT:      Head: Normocephalic and atraumatic.   Neck:      Thyroid: No thyromegaly.   Pulmonary:      Effort: Pulmonary effort is normal.      Breath sounds: Normal breath sounds.   Cardiovascular:      Tachycardia present. Regular rhythm.   Abdominal:      General: Bowel sounds are normal.      Palpations: Abdomen is soft.   Musculoskeletal: Normal range of motion.      Cervical back: Normal range of motion. Skin:     General: Skin is warm and dry.   Neurological:      Mental Status: Alert and oriented to person, place, and time.         In-Office Procedure(s):    ECG 12 Lead    Date/Time: 5/27/2021 10:45 AM  Performed by: Antonio Senior MD  Authorized by: Antonio Senior MD   Comparison: compared with previous ECG from 3/22/2021  Comparison to previous ECG: NSR with 1st degree aVB, LVHvoltage  Rhythm: supraventricular tachycardia    Clinical impression: abnormal EKG            ASCVD RIsk Score::  The ASCVD Risk " score (Bucky RUBIO Jr., et al., 2013) failed to calculate for the following reasons:    The 2013 ASCVD risk score is only valid for ages 40 to 79    Recent Radiology:  Imaging Results (Most Recent)     None          Lab Review:   No visits with results within 2 Month(s) from this visit.   Latest known visit with results is:   Lab Requisition on 05/04/2020   Component Date Value   • Glucose 05/04/2020 132*   • BUN 05/04/2020 15    • Creatinine 05/04/2020 0.95    • Sodium 05/04/2020 142    • Potassium 05/04/2020 3.8    • Chloride 05/04/2020 101    • CO2 05/04/2020 27.0    • Calcium 05/04/2020 9.9    • eGFR Non African Amer 05/04/2020 57*   • BUN/Creatinine Ratio 05/04/2020 15.8    • Anion Gap 05/04/2020 14.0    • WBC 05/04/2020 4.30    • RBC 05/04/2020 3.16*   • Hemoglobin 05/04/2020 10.7*   • Hematocrit 05/04/2020 29.7*   • MCV 05/04/2020 93.9    • MCH 05/04/2020 33.8*   • MCHC 05/04/2020 36.0*   • RDW 05/04/2020 16.1*   • RDW-SD 05/04/2020 52.9    • MPV 05/04/2020 8.0    • Platelets 05/04/2020 396                 Assessment:          Diagnosis Plan   1. Atypical atrial flutter (CMS/HCC)     2. Essential hypertension            Plan:      1. SVT - difficult to be certain of the mechanism, ectopic atrial tachycardia vs atypical flutter.  She is being sent to the ER.  2. HTN - controlled on diltiazem, continue same                 Level of Care:                 Antonio Senior MD  05/27/21  .

## 2021-05-30 LAB
QT INTERVAL: 339 MS
QT INTERVAL: 376 MS

## 2021-06-03 ENCOUNTER — OFFICE VISIT (OUTPATIENT)
Dept: CARDIOLOGY | Facility: CLINIC | Age: 81
End: 2021-06-03

## 2021-06-03 VITALS
HEIGHT: 63 IN | DIASTOLIC BLOOD PRESSURE: 72 MMHG | HEART RATE: 64 BPM | BODY MASS INDEX: 35.26 KG/M2 | SYSTOLIC BLOOD PRESSURE: 168 MMHG | WEIGHT: 199 LBS

## 2021-06-03 DIAGNOSIS — I48.92 ATRIAL FLUTTER WITH CONTROLLED RESPONSE (HCC): Primary | ICD-10-CM

## 2021-06-03 DIAGNOSIS — I10 ESSENTIAL HYPERTENSION: ICD-10-CM

## 2021-06-03 DIAGNOSIS — I48.4 ATYPICAL ATRIAL FLUTTER (HCC): ICD-10-CM

## 2021-06-03 PROCEDURE — 93000 ELECTROCARDIOGRAM COMPLETE: CPT | Performed by: INTERNAL MEDICINE

## 2021-06-03 PROCEDURE — 99213 OFFICE O/P EST LOW 20 MIN: CPT | Performed by: INTERNAL MEDICINE

## 2021-06-03 NOTE — PROGRESS NOTES
Subjective:     Encounter Date:06/03/2021      Patient ID: Laure Leung is a 81 y.o. female.    Chief Complaint:  Chief Complaint   Patient presents with   • Follow-up       HPI:   Ms Leung is a 80 yo with a past medical history significant for HTN who developed atypical atrial flutter in 6/19. At that time she presented to [facility] with complaints of dyspnea which had been ongoing for several weeks.  She also noted that her stamina and exercise tolerance had diminished.  She denied any chest pain, PND, orthopnea or palpitatins.  In the ER she was found to be in atrial flutter with 2:1 conduction and had an elevated BNP of 1100.  She was admitted and placed on metoprolol, amiodarone and apixoban.  She was diuresed and an echo showed an EF of 55%, mild-moderate RV enlargement, mild-moderate MR with severely dilated LA, moderate TR and mild AoV sclerosis.     She underwent cardioversion in 2019 and was taken off of amiodarone.      On 5/27 she presented with a several day history of palpitations and dyspnea and was found to be back in flutter.  She was seen in the ER where she was given metoprolol to control her rate and discharged.  Since then, she has done well without any palpitations or recurrent tachycardia.      The following portions of the patient's history were reviewed and updated as appropriate: allergies, current medications, past family history, past medical history, past social history, past surgical history and problem list.    Problem List:  Patient Active Problem List   Diagnosis   • Atypical atrial flutter (CMS/HCC)   • Essential hypertension   • Palpitations       Past Medical History:  Past Medical History:   Diagnosis Date   • Atrial fibrillation (CMS/HCC)    • CHF (congestive heart failure) (CMS/HCC)    • Hypertension        Past Surgical History:  Past Surgical History:   Procedure Laterality Date   • CATARACT EXTRACTION         Social History:  Social History     Socioeconomic  "History   • Marital status:      Spouse name: Not on file   • Number of children: Not on file   • Years of education: Not on file   • Highest education level: Not on file   Tobacco Use   • Smoking status: Former Smoker   • Smokeless tobacco: Never Used   Substance and Sexual Activity   • Alcohol use: No       Allergies:  No Known Allergies    Immunizations:    There is no immunization history on file for this patient.    ROS:  Review of Systems   Constitutional: Negative for malaise/fatigue.   Cardiovascular: Positive for dyspnea on exertion and irregular heartbeat. Negative for chest pain, leg swelling, near-syncope, orthopnea, palpitations, paroxysmal nocturnal dyspnea and syncope.   Respiratory: Negative for shortness of breath.    All other systems reviewed and are negative.         Objective:         /72   Pulse 64   Ht 160 cm (63\")   Wt 90.3 kg (199 lb)   BMI 35.25 kg/m²     Constitutional:       General: Not in acute distress.     Appearance: Well-developed.   Eyes:      General: No scleral icterus.     Conjunctiva/sclera: Conjunctivae normal.      Pupils: Pupils are equal, round, and reactive to light.   HENT:      Head: Normocephalic and atraumatic.   Neck:      Thyroid: No thyromegaly.   Pulmonary:      Effort: Pulmonary effort is normal.      Breath sounds: Normal breath sounds.   Cardiovascular:      Normal rate. Regular rhythm.   Abdominal:      General: Bowel sounds are normal.      Palpations: Abdomen is soft.   Musculoskeletal: Normal range of motion.      Cervical back: Normal range of motion. Skin:     General: Skin is warm and dry.   Neurological:      Mental Status: Alert and oriented to person, place, and time.         In-Office Procedure(s):    ECG 12 Lead    Date/Time: 6/3/2021 10:08 AM  Performed by: Antonio Senior MD  Authorized by: Antonio Senior MD   Comparison: compared with previous ECG from 5/27/2021  Comparison to previous ECG: Atrial flutter with " RVR  Rhythm: atrial flutter    Clinical impression: abnormal EKG            ASCVD RIsk Score::  The ASCVD Risk score ([place] RUBIO Jr., et al., 2013) failed to calculate for the following reasons:    The 2013 ASCVD risk score is only valid for ages 40 to 79    Recent Radiology:  Imaging Results (Most Recent)     None          Lab Review:   Admission on 05/27/2021, Discharged on 05/27/2021   Component Date Value   • QT Interval 05/27/2021 339    • Extra Tube 05/27/2021 hold for add-on    • Extra Tube 05/27/2021 done    • Extra Tube 05/27/2021 Hold for add-ons.    • Glucose 05/27/2021 175*   • BUN 05/27/2021 22    • Creatinine 05/27/2021 0.72    • Sodium 05/27/2021 141    • Potassium 05/27/2021 4.6    • Chloride 05/27/2021 106    • CO2 05/27/2021 22.0    • Calcium 05/27/2021 9.7    • Total Protein 05/27/2021 6.2    • Albumin 05/27/2021 4.30    • ALT (SGPT) 05/27/2021 21    • AST (SGOT) 05/27/2021 20    • Alkaline Phosphatase 05/27/2021 94    • Total Bilirubin 05/27/2021 0.7    • eGFR Non  Amer 05/27/2021 78    • Globulin 05/27/2021 1.9    • A/G Ratio 05/27/2021 2.3    • BUN/Creatinine Ratio 05/27/2021 30.6*   • Anion Gap 05/27/2021 13.0    • Protime 05/27/2021 11.4    • INR 05/27/2021 1.04    • PTT 05/27/2021 27.1    • Color, UA 05/27/2021 Yellow    • Appearance, UA 05/27/2021 Clear    • pH, UA 05/27/2021 <=5.0    • Specific Gravity, UA 05/27/2021 1.017    • Glucose, UA 05/27/2021 Negative    • Ketones, UA 05/27/2021 Negative    • Bilirubin, UA 05/27/2021 Negative    • Blood, UA 05/27/2021 Negative    • Protein, UA 05/27/2021 Negative    • Leuk Esterase, UA 05/27/2021 Trace*   • Nitrite, UA 05/27/2021 Negative    • Urobilinogen, UA 05/27/2021 0.2 E.U./dL    • Troponin T 05/27/2021 <0.010    • Troponin T 05/27/2021 <0.010    • Magnesium 05/27/2021 1.8    • TSH 05/27/2021 0.750    • proBNP 05/27/2021 2,764.0*   • WBC 05/27/2021 6.70    • RBC 05/27/2021 4.04    • Hemoglobin 05/27/2021 12.7    • Hematocrit 05/27/2021  37.8    • MCV 05/27/2021 93.7    • MCH 05/27/2021 31.3    • MCHC 05/27/2021 33.5    • RDW 05/27/2021 14.6    • RDW-SD 05/27/2021 47.3    • MPV 05/27/2021 8.8    • Platelets 05/27/2021 363    • Neutrophil % 05/27/2021 65.2    • Lymphocyte % 05/27/2021 22.7    • Monocyte % 05/27/2021 10.5    • Eosinophil % 05/27/2021 1.1    • Basophil % 05/27/2021 0.5    • Neutrophils, Absolute 05/27/2021 4.40    • Lymphocytes, Absolute 05/27/2021 1.50    • Monocytes, Absolute 05/27/2021 0.70    • Eosinophils, Absolute 05/27/2021 0.10    • Basophils, Absolute 05/27/2021 0.00    • nRBC 05/27/2021 0.0    • RBC, UA 05/27/2021 0-2*   • WBC, UA 05/27/2021 0-2*   • Bacteria, UA 05/27/2021 None Seen    • Squamous Epithelial Cell* 05/27/2021 None Seen    • Hyaline Casts, UA 05/27/2021 None Seen    • Methodology 05/27/2021 Automated Microscopy    • QT Interval 05/27/2021 376                 Assessment:          Diagnosis Plan   1. Atrial flutter with controlled response (CMS/HCC)  ECG 12 Lead   2. Atypical atrial flutter (CMS/HCC)     3. Essential hypertension            Plan:      1. Atypical atrial flutter - rate controlled on apixaban.  Discussed indications, risks and benefits of ablation  2. HTN - controlled on diltiazem and metoprolol,  continue same         Level of Care:                 Antonio Senior MD  06/03/21  .

## 2021-06-07 ENCOUNTER — TELEPHONE (OUTPATIENT)
Dept: CARDIOLOGY | Facility: CLINIC | Age: 81
End: 2021-06-07

## 2021-06-07 NOTE — TELEPHONE ENCOUNTER
Patient called in on 06/07 to request an update on when she is having her heart cath done.     I could not locate an order for any upcoming procedure so I did not give the patient the scheduling number for the hospital.     Please adviseRob

## 2021-06-09 DIAGNOSIS — R00.2 PALPITATIONS: ICD-10-CM

## 2021-06-09 DIAGNOSIS — I48.4 ATYPICAL ATRIAL FLUTTER (HCC): Primary | ICD-10-CM

## 2021-06-09 DIAGNOSIS — I48.4 ATYPICAL ATRIAL FLUTTER (HCC): ICD-10-CM

## 2021-06-09 DIAGNOSIS — Z01.818 OTHER SPECIFIED PRE-OPERATIVE EXAMINATION: Primary | ICD-10-CM

## 2021-06-09 DIAGNOSIS — Z01.818 PREOP TESTING: Primary | ICD-10-CM

## 2021-06-09 NOTE — TELEPHONE ENCOUNTER
Spoke with patient, aflutter ablation ordered by Dr. Senior has been scheduled on 06/23/2021 at [facility].

## 2021-06-14 DIAGNOSIS — I48.4 ATYPICAL ATRIAL FLUTTER (HCC): Primary | ICD-10-CM

## 2021-06-14 DIAGNOSIS — I10 ESSENTIAL HYPERTENSION: Primary | ICD-10-CM

## 2021-06-14 RX ORDER — FUROSEMIDE 40 MG/1
40 TABLET ORAL DAILY
Qty: 30 TABLET | Refills: 5 | Status: SHIPPED | OUTPATIENT
Start: 2021-06-14 | End: 2022-01-03 | Stop reason: SDUPTHER

## 2021-06-14 RX ORDER — POTASSIUM CHLORIDE 750 MG/1
20 TABLET, FILM COATED, EXTENDED RELEASE ORAL DAILY
Qty: 60 TABLET | Refills: 5 | Status: SHIPPED | OUTPATIENT
Start: 2021-06-14 | End: 2021-07-19 | Stop reason: SDUPTHER

## 2021-06-14 NOTE — TELEPHONE ENCOUNTER
Spoke with the patient, she is out of Potassium not Furosemide, will send in new rx's for both to [facility] pharmacy. Dr. Degroot has agreed to sign off on these rxs due to Dr. Senior being out today.

## 2021-06-14 NOTE — TELEPHONE ENCOUNTER
Patient is out of water pills and has 14 potassium pills left. She wants to know if she finish the potassium without the water pills or not.     Please adviseRob

## 2021-06-21 ENCOUNTER — LAB (OUTPATIENT)
Dept: LAB | Facility: HOSPITAL | Age: 81
End: 2021-06-21

## 2021-06-21 DIAGNOSIS — Z01.818 PREOP TESTING: ICD-10-CM

## 2021-06-21 DIAGNOSIS — I48.4 ATYPICAL ATRIAL FLUTTER (HCC): ICD-10-CM

## 2021-06-21 DIAGNOSIS — Z01.818 OTHER SPECIFIED PRE-OPERATIVE EXAMINATION: ICD-10-CM

## 2021-06-21 LAB
ALBUMIN SERPL-MCNC: 4.3 G/DL (ref 3.5–5.2)
ALBUMIN/GLOB SERPL: 2.4 G/DL
ALP SERPL-CCNC: 89 U/L (ref 39–117)
ALT SERPL W P-5'-P-CCNC: 21 U/L (ref 1–33)
ANION GAP SERPL CALCULATED.3IONS-SCNC: 12.4 MMOL/L (ref 5–15)
AST SERPL-CCNC: 17 U/L (ref 1–32)
BASOPHILS # BLD AUTO: 0.11 10*3/MM3 (ref 0–0.2)
BASOPHILS NFR BLD AUTO: 1.9 % (ref 0–1.5)
BILIRUB SERPL-MCNC: 1 MG/DL (ref 0–1.2)
BUN SERPL-MCNC: 23 MG/DL (ref 8–23)
BUN/CREAT SERPL: 25.8 (ref 7–25)
CALCIUM SPEC-SCNC: 9.3 MG/DL (ref 8.6–10.5)
CHLORIDE SERPL-SCNC: 104 MMOL/L (ref 98–107)
CO2 SERPL-SCNC: 22.6 MMOL/L (ref 22–29)
CREAT SERPL-MCNC: 0.89 MG/DL (ref 0.57–1)
DEPRECATED RDW RBC AUTO: 46.4 FL (ref 37–54)
EOSINOPHIL # BLD AUTO: 0.11 10*3/MM3 (ref 0–0.4)
EOSINOPHIL NFR BLD AUTO: 1.9 % (ref 0.3–6.2)
ERYTHROCYTE [DISTWIDTH] IN BLOOD BY AUTOMATED COUNT: 13.1 % (ref 12.3–15.4)
GFR SERPL CREATININE-BSD FRML MDRD: 61 ML/MIN/1.73
GLOBULIN UR ELPH-MCNC: 1.8 GM/DL
GLUCOSE SERPL-MCNC: 250 MG/DL (ref 65–99)
HCT VFR BLD AUTO: 37.7 % (ref 34–46.6)
HGB BLD-MCNC: 12.5 G/DL (ref 12–15.9)
IMM GRANULOCYTES # BLD AUTO: 0.02 10*3/MM3 (ref 0–0.05)
IMM GRANULOCYTES NFR BLD AUTO: 0.4 % (ref 0–0.5)
INR PPP: 1.05 (ref 0.93–1.1)
LYMPHOCYTES # BLD AUTO: 1.3 10*3/MM3 (ref 0.7–3.1)
LYMPHOCYTES NFR BLD AUTO: 22.8 % (ref 19.6–45.3)
MCH RBC QN AUTO: 32.1 PG (ref 26.6–33)
MCHC RBC AUTO-ENTMCNC: 33.2 G/DL (ref 31.5–35.7)
MCV RBC AUTO: 96.9 FL (ref 79–97)
MONOCYTES # BLD AUTO: 0.76 10*3/MM3 (ref 0.1–0.9)
MONOCYTES NFR BLD AUTO: 13.4 % (ref 5–12)
NEUTROPHILS NFR BLD AUTO: 3.39 10*3/MM3 (ref 1.7–7)
NEUTROPHILS NFR BLD AUTO: 59.6 % (ref 42.7–76)
NRBC BLD AUTO-RTO: 0.2 /100 WBC (ref 0–0.2)
PLATELET # BLD AUTO: 382 10*3/MM3 (ref 140–450)
PMV BLD AUTO: 10.6 FL (ref 6–12)
POTASSIUM SERPL-SCNC: 4.9 MMOL/L (ref 3.5–5.2)
PROT SERPL-MCNC: 6.1 G/DL (ref 6–8.5)
PROTHROMBIN TIME: 11.6 SECONDS (ref 9.6–11.7)
RBC # BLD AUTO: 3.89 10*6/MM3 (ref 3.77–5.28)
SARS-COV-2 ORF1AB RESP QL NAA+PROBE: NOT DETECTED
SODIUM SERPL-SCNC: 139 MMOL/L (ref 136–145)
WBC # BLD AUTO: 5.69 10*3/MM3 (ref 3.4–10.8)

## 2021-06-21 PROCEDURE — U0004 COV-19 TEST NON-CDC HGH THRU: HCPCS

## 2021-06-21 PROCEDURE — 85610 PROTHROMBIN TIME: CPT

## 2021-06-21 PROCEDURE — 85025 COMPLETE CBC W/AUTO DIFF WBC: CPT

## 2021-06-21 PROCEDURE — 36415 COLL VENOUS BLD VENIPUNCTURE: CPT

## 2021-06-21 PROCEDURE — C9803 HOPD COVID-19 SPEC COLLECT: HCPCS

## 2021-06-21 PROCEDURE — 80053 COMPREHEN METABOLIC PANEL: CPT

## 2021-06-22 ENCOUNTER — ANESTHESIA EVENT (OUTPATIENT)
Dept: CARDIOLOGY | Facility: HOSPITAL | Age: 81
End: 2021-06-22

## 2021-06-23 ENCOUNTER — HOSPITAL ENCOUNTER (OUTPATIENT)
Dept: CARDIOLOGY | Facility: HOSPITAL | Age: 81
Discharge: HOME | End: 2021-06-23

## 2021-06-23 ENCOUNTER — ANESTHESIA (OUTPATIENT)
Dept: CARDIOLOGY | Facility: HOSPITAL | Age: 81
End: 2021-06-23

## 2021-06-23 ENCOUNTER — HOSPITAL ENCOUNTER (OUTPATIENT)
Facility: HOSPITAL | Age: 81
Discharge: HOME | End: 2021-06-24
Attending: INTERNAL MEDICINE | Admitting: INTERNAL MEDICINE

## 2021-06-23 VITALS
SYSTOLIC BLOOD PRESSURE: 174 MMHG | BODY MASS INDEX: 34.55 KG/M2 | HEIGHT: 63 IN | WEIGHT: 195 LBS | DIASTOLIC BLOOD PRESSURE: 73 MMHG | HEART RATE: 98 BPM

## 2021-06-23 DIAGNOSIS — R00.2 PALPITATIONS: ICD-10-CM

## 2021-06-23 DIAGNOSIS — I48.4 ATYPICAL ATRIAL FLUTTER (HCC): ICD-10-CM

## 2021-06-23 LAB
BH CV ECHO MEAS - BSA(HAYCOCK): 2 M^2
BH CV ECHO MEAS - BSA: 1.9 M^2
BH CV ECHO MEAS - BZI_BMI: 34.5 KILOGRAMS/M^2
BH CV ECHO MEAS - BZI_METRIC_HEIGHT: 160 CM
BH CV ECHO MEAS - BZI_METRIC_WEIGHT: 88.5 KG
GLUCOSE BLDC GLUCOMTR-MCNC: 201 MG/DL (ref 70–130)
MAXIMAL PREDICTED HEART RATE: 140 BPM
QT INTERVAL: 394 MS
QT INTERVAL: 413 MS
STRESS TARGET HR: 119 BPM

## 2021-06-23 PROCEDURE — C1894 INTRO/SHEATH, NON-LASER: HCPCS | Performed by: INTERNAL MEDICINE

## 2021-06-23 PROCEDURE — C1766 INTRO/SHEATH,STRBLE,NON-PEEL: HCPCS | Performed by: INTERNAL MEDICINE

## 2021-06-23 PROCEDURE — C1893 INTRO/SHEATH, FIXED,NON-PEEL: HCPCS | Performed by: INTERNAL MEDICINE

## 2021-06-23 PROCEDURE — C1759 CATH, INTRA ECHOCARDIOGRAPHY: HCPCS | Performed by: INTERNAL MEDICINE

## 2021-06-23 PROCEDURE — 93655 ICAR CATH ABLTJ DSCRT ARRHYT: CPT | Performed by: INTERNAL MEDICINE

## 2021-06-23 PROCEDURE — A9270 NON-COVERED ITEM OR SERVICE: HCPCS | Performed by: INTERNAL MEDICINE

## 2021-06-23 PROCEDURE — G0378 HOSPITAL OBSERVATION PER HR: HCPCS

## 2021-06-23 PROCEDURE — 93005 ELECTROCARDIOGRAM TRACING: CPT | Performed by: INTERNAL MEDICINE

## 2021-06-23 PROCEDURE — 93325 DOPPLER ECHO COLOR FLOW MAPG: CPT | Performed by: INTERNAL MEDICINE

## 2021-06-23 PROCEDURE — 63710000001 FUROSEMIDE 40 MG TABLET: Performed by: INTERNAL MEDICINE

## 2021-06-23 PROCEDURE — 63710000001 POTASSIUM CHLORIDE 10 MEQ TABLET CONTROLLED-RELEASE: Performed by: INTERNAL MEDICINE

## 2021-06-23 PROCEDURE — 25010000002 DEXAMETHASONE PER 1 MG: Performed by: NURSE ANESTHETIST, CERTIFIED REGISTERED

## 2021-06-23 PROCEDURE — 63710000001 APIXABAN 5 MG TABLET: Performed by: INTERNAL MEDICINE

## 2021-06-23 PROCEDURE — 25010000002 HEPARIN (PORCINE) PER 1000 UNITS: Performed by: INTERNAL MEDICINE

## 2021-06-23 PROCEDURE — 63710000001 METOPROLOL TARTRATE 25 MG TABLET: Performed by: INTERNAL MEDICINE

## 2021-06-23 PROCEDURE — 63710000001 HYDROCODONE-ACETAMINOPHEN 7.5-325 MG TABLET: Performed by: NURSE ANESTHETIST, CERTIFIED REGISTERED

## 2021-06-23 PROCEDURE — 25010000002 PROPOFOL 10 MG/ML EMULSION: Performed by: NURSE ANESTHETIST, CERTIFIED REGISTERED

## 2021-06-23 PROCEDURE — A9270 NON-COVERED ITEM OR SERVICE: HCPCS | Performed by: NURSE ANESTHETIST, CERTIFIED REGISTERED

## 2021-06-23 PROCEDURE — 93613 INTRACARDIAC EPHYS 3D MAPG: CPT | Performed by: INTERNAL MEDICINE

## 2021-06-23 PROCEDURE — 93312 ECHO TRANSESOPHAGEAL: CPT

## 2021-06-23 PROCEDURE — 25010000002 ONDANSETRON PER 1 MG: Performed by: ANESTHESIOLOGY

## 2021-06-23 PROCEDURE — C1731 CATH, EP, 20 OR MORE ELEC: HCPCS | Performed by: INTERNAL MEDICINE

## 2021-06-23 PROCEDURE — 93653 COMPRE EP EVAL TX SVT: CPT | Performed by: INTERNAL MEDICINE

## 2021-06-23 PROCEDURE — 25010000002 FENTANYL CITRATE (PF) 50 MCG/ML SOLUTION: Performed by: NURSE ANESTHETIST, CERTIFIED REGISTERED

## 2021-06-23 PROCEDURE — C1730 CATH, EP, 19 OR FEW ELECT: HCPCS | Performed by: INTERNAL MEDICINE

## 2021-06-23 PROCEDURE — 93312 ECHO TRANSESOPHAGEAL: CPT | Performed by: INTERNAL MEDICINE

## 2021-06-23 PROCEDURE — C1732 CATH, EP, DIAG/ABL, 3D/VECT: HCPCS | Performed by: INTERNAL MEDICINE

## 2021-06-23 PROCEDURE — 93325 DOPPLER ECHO COLOR FLOW MAPG: CPT

## 2021-06-23 PROCEDURE — 93662 INTRACARDIAC ECG (ICE): CPT | Performed by: INTERNAL MEDICINE

## 2021-06-23 PROCEDURE — 25010000002 NEOSTIGMINE 5 MG/10ML SOLUTION: Performed by: ANESTHESIOLOGY

## 2021-06-23 PROCEDURE — 93010 ELECTROCARDIOGRAM REPORT: CPT | Performed by: INTERNAL MEDICINE

## 2021-06-23 PROCEDURE — 93320 DOPPLER ECHO COMPLETE: CPT

## 2021-06-23 PROCEDURE — 82962 GLUCOSE BLOOD TEST: CPT

## 2021-06-23 PROCEDURE — C2630 CATH, EP, COOL-TIP: HCPCS | Performed by: INTERNAL MEDICINE

## 2021-06-23 PROCEDURE — 25010000002 PROTAMINE SULFATE PER 10 MG: Performed by: INTERNAL MEDICINE

## 2021-06-23 PROCEDURE — 85347 COAGULATION TIME ACTIVATED: CPT

## 2021-06-23 PROCEDURE — 93320 DOPPLER ECHO COMPLETE: CPT | Performed by: INTERNAL MEDICINE

## 2021-06-23 PROCEDURE — 25010000002 DIPHENHYDRAMINE PER 50 MG: Performed by: NURSE ANESTHETIST, CERTIFIED REGISTERED

## 2021-06-23 RX ORDER — IBUPROFEN 600 MG/1
600 TABLET ORAL ONCE AS NEEDED
Status: DISCONTINUED | OUTPATIENT
Start: 2021-06-23 | End: 2021-06-24 | Stop reason: HOSPADM

## 2021-06-23 RX ORDER — SODIUM CHLORIDE 0.9 % (FLUSH) 0.9 %
3 SYRINGE (ML) INJECTION EVERY 12 HOURS SCHEDULED
Status: DISCONTINUED | OUTPATIENT
Start: 2021-06-23 | End: 2021-06-24 | Stop reason: HOSPADM

## 2021-06-23 RX ORDER — MIDAZOLAM HYDROCHLORIDE 1 MG/ML
0.5 INJECTION INTRAMUSCULAR; INTRAVENOUS
Status: DISCONTINUED | OUTPATIENT
Start: 2021-06-23 | End: 2021-06-23

## 2021-06-23 RX ORDER — FENTANYL CITRATE 50 UG/ML
50 INJECTION, SOLUTION INTRAMUSCULAR; INTRAVENOUS
Status: DISCONTINUED | OUTPATIENT
Start: 2021-06-23 | End: 2021-06-23

## 2021-06-23 RX ORDER — OXYCODONE HYDROCHLORIDE AND ACETAMINOPHEN 5; 325 MG/1; MG/1
1 TABLET ORAL EVERY 4 HOURS PRN
Status: DISCONTINUED | OUTPATIENT
Start: 2021-06-23 | End: 2021-06-24 | Stop reason: HOSPADM

## 2021-06-23 RX ORDER — DIPHENHYDRAMINE HYDROCHLORIDE 50 MG/ML
12.5 INJECTION INTRAMUSCULAR; INTRAVENOUS
Status: DISCONTINUED | OUTPATIENT
Start: 2021-06-23 | End: 2021-06-24 | Stop reason: HOSPADM

## 2021-06-23 RX ORDER — DEXAMETHASONE SODIUM PHOSPHATE 10 MG/ML
INJECTION INTRAMUSCULAR; INTRAVENOUS AS NEEDED
Status: DISCONTINUED | OUTPATIENT
Start: 2021-06-23 | End: 2021-06-23 | Stop reason: SURG

## 2021-06-23 RX ORDER — DIPHENHYDRAMINE HCL 25 MG
25 CAPSULE ORAL
Status: DISCONTINUED | OUTPATIENT
Start: 2021-06-23 | End: 2021-06-24 | Stop reason: HOSPADM

## 2021-06-23 RX ORDER — LABETALOL HYDROCHLORIDE 5 MG/ML
5 INJECTION, SOLUTION INTRAVENOUS
Status: DISCONTINUED | OUTPATIENT
Start: 2021-06-23 | End: 2021-06-24 | Stop reason: HOSPADM

## 2021-06-23 RX ORDER — ONDANSETRON 2 MG/ML
4 INJECTION INTRAMUSCULAR; INTRAVENOUS ONCE AS NEEDED
Status: DISCONTINUED | OUTPATIENT
Start: 2021-06-23 | End: 2021-06-24 | Stop reason: HOSPADM

## 2021-06-23 RX ORDER — SODIUM CHLORIDE 0.9 % (FLUSH) 0.9 %
10 SYRINGE (ML) INJECTION AS NEEDED
Status: DISCONTINUED | OUTPATIENT
Start: 2021-06-23 | End: 2021-06-23 | Stop reason: HOSPADM

## 2021-06-23 RX ORDER — EPHEDRINE SULFATE 50 MG/ML
5 INJECTION, SOLUTION INTRAVENOUS ONCE AS NEEDED
Status: DISCONTINUED | OUTPATIENT
Start: 2021-06-23 | End: 2021-06-24 | Stop reason: HOSPADM

## 2021-06-23 RX ORDER — ONDANSETRON 2 MG/ML
INJECTION INTRAMUSCULAR; INTRAVENOUS AS NEEDED
Status: DISCONTINUED | OUTPATIENT
Start: 2021-06-23 | End: 2021-06-23 | Stop reason: SURG

## 2021-06-23 RX ORDER — SODIUM CHLORIDE, SODIUM LACTATE, POTASSIUM CHLORIDE, CALCIUM CHLORIDE 600; 310; 30; 20 MG/100ML; MG/100ML; MG/100ML; MG/100ML
9 INJECTION, SOLUTION INTRAVENOUS CONTINUOUS
Status: DISCONTINUED | OUTPATIENT
Start: 2021-06-23 | End: 2021-06-23

## 2021-06-23 RX ORDER — SODIUM CHLORIDE 0.9 % (FLUSH) 0.9 %
10 SYRINGE (ML) INJECTION AS NEEDED
Status: DISCONTINUED | OUTPATIENT
Start: 2021-06-23 | End: 2021-06-24 | Stop reason: HOSPADM

## 2021-06-23 RX ORDER — NALOXONE HCL 0.4 MG/ML
0.2 VIAL (ML) INJECTION AS NEEDED
Status: DISCONTINUED | OUTPATIENT
Start: 2021-06-23 | End: 2021-06-24 | Stop reason: HOSPADM

## 2021-06-23 RX ORDER — ACETAMINOPHEN 650 MG/1
650 SUPPOSITORY RECTAL EVERY 4 HOURS PRN
Status: DISCONTINUED | OUTPATIENT
Start: 2021-06-23 | End: 2021-06-24 | Stop reason: HOSPADM

## 2021-06-23 RX ORDER — ONDANSETRON 2 MG/ML
4 INJECTION INTRAMUSCULAR; INTRAVENOUS EVERY 6 HOURS PRN
Status: DISCONTINUED | OUTPATIENT
Start: 2021-06-23 | End: 2021-06-24 | Stop reason: HOSPADM

## 2021-06-23 RX ORDER — GLYCOPYRROLATE 0.2 MG/ML
INJECTION INTRAMUSCULAR; INTRAVENOUS AS NEEDED
Status: DISCONTINUED | OUTPATIENT
Start: 2021-06-23 | End: 2021-06-23 | Stop reason: SURG

## 2021-06-23 RX ORDER — POTASSIUM CHLORIDE 750 MG/1
20 TABLET, FILM COATED, EXTENDED RELEASE ORAL DAILY
Refills: 5 | Status: DISCONTINUED | OUTPATIENT
Start: 2021-06-23 | End: 2021-06-24 | Stop reason: HOSPADM

## 2021-06-23 RX ORDER — ROCURONIUM BROMIDE 10 MG/ML
INJECTION, SOLUTION INTRAVENOUS AS NEEDED
Status: DISCONTINUED | OUTPATIENT
Start: 2021-06-23 | End: 2021-06-23 | Stop reason: SURG

## 2021-06-23 RX ORDER — HYDROCODONE BITARTRATE AND ACETAMINOPHEN 7.5; 325 MG/1; MG/1
1 TABLET ORAL ONCE AS NEEDED
Status: COMPLETED | OUTPATIENT
Start: 2021-06-23 | End: 2021-06-23

## 2021-06-23 RX ORDER — HEPARIN SODIUM 1000 [USP'U]/ML
INJECTION, SOLUTION INTRAVENOUS; SUBCUTANEOUS AS NEEDED
Status: DISCONTINUED | OUTPATIENT
Start: 2021-06-23 | End: 2021-06-23 | Stop reason: HOSPADM

## 2021-06-23 RX ORDER — ACETAMINOPHEN 325 MG/1
650 TABLET ORAL EVERY 4 HOURS PRN
Status: DISCONTINUED | OUTPATIENT
Start: 2021-06-23 | End: 2021-06-24 | Stop reason: HOSPADM

## 2021-06-23 RX ORDER — FENTANYL CITRATE 50 UG/ML
INJECTION, SOLUTION INTRAMUSCULAR; INTRAVENOUS AS NEEDED
Status: DISCONTINUED | OUTPATIENT
Start: 2021-06-23 | End: 2021-06-23 | Stop reason: SURG

## 2021-06-23 RX ORDER — PROPOFOL 10 MG/ML
VIAL (ML) INTRAVENOUS AS NEEDED
Status: DISCONTINUED | OUTPATIENT
Start: 2021-06-23 | End: 2021-06-23 | Stop reason: SURG

## 2021-06-23 RX ORDER — SODIUM CHLORIDE 0.9 % (FLUSH) 0.9 %
3-10 SYRINGE (ML) INJECTION AS NEEDED
Status: DISCONTINUED | OUTPATIENT
Start: 2021-06-23 | End: 2021-06-24 | Stop reason: HOSPADM

## 2021-06-23 RX ORDER — HYDRALAZINE HYDROCHLORIDE 20 MG/ML
5 INJECTION INTRAMUSCULAR; INTRAVENOUS
Status: DISCONTINUED | OUTPATIENT
Start: 2021-06-23 | End: 2021-06-24 | Stop reason: HOSPADM

## 2021-06-23 RX ORDER — FUROSEMIDE 40 MG/1
40 TABLET ORAL DAILY
Status: DISCONTINUED | OUTPATIENT
Start: 2021-06-23 | End: 2021-06-24 | Stop reason: HOSPADM

## 2021-06-23 RX ORDER — NEOSTIGMINE METHYLSULFATE 0.5 MG/ML
INJECTION, SOLUTION INTRAVENOUS AS NEEDED
Status: DISCONTINUED | OUTPATIENT
Start: 2021-06-23 | End: 2021-06-23 | Stop reason: SURG

## 2021-06-23 RX ORDER — FLUMAZENIL 0.1 MG/ML
0.2 INJECTION INTRAVENOUS AS NEEDED
Status: DISCONTINUED | OUTPATIENT
Start: 2021-06-23 | End: 2021-06-24 | Stop reason: HOSPADM

## 2021-06-23 RX ORDER — FAMOTIDINE 10 MG/ML
20 INJECTION, SOLUTION INTRAVENOUS ONCE
Status: COMPLETED | OUTPATIENT
Start: 2021-06-23 | End: 2021-06-23

## 2021-06-23 RX ORDER — HEPARIN SODIUM 10000 [USP'U]/100ML
INJECTION, SOLUTION INTRAVENOUS CONTINUOUS PRN
Status: DISCONTINUED | OUTPATIENT
Start: 2021-06-23 | End: 2021-06-23 | Stop reason: HOSPADM

## 2021-06-23 RX ORDER — SODIUM CHLORIDE 9 MG/ML
75 INJECTION, SOLUTION INTRAVENOUS CONTINUOUS
Status: DISCONTINUED | OUTPATIENT
Start: 2021-06-23 | End: 2021-06-23

## 2021-06-23 RX ORDER — LIDOCAINE HYDROCHLORIDE 10 MG/ML
0.5 INJECTION, SOLUTION EPIDURAL; INFILTRATION; INTRACAUDAL; PERINEURAL ONCE AS NEEDED
Status: DISCONTINUED | OUTPATIENT
Start: 2021-06-23 | End: 2021-06-23

## 2021-06-23 RX ORDER — PROTAMINE SULFATE 10 MG/ML
INJECTION, SOLUTION INTRAVENOUS AS NEEDED
Status: DISCONTINUED | OUTPATIENT
Start: 2021-06-23 | End: 2021-06-23 | Stop reason: HOSPADM

## 2021-06-23 RX ADMIN — POTASSIUM CHLORIDE 20 MEQ: 750 TABLET, EXTENDED RELEASE ORAL at 17:50

## 2021-06-23 RX ADMIN — PROPOFOL 150 MG: 10 INJECTION, EMULSION INTRAVENOUS at 14:13

## 2021-06-23 RX ADMIN — NEOSTIGMINE METHYLSULFATE 4.5 MG: 0.5 INJECTION INTRAVENOUS at 15:56

## 2021-06-23 RX ADMIN — HYDROCODONE BITARTRATE AND ACETAMINOPHEN 1 TABLET: 7.5; 325 TABLET ORAL at 17:50

## 2021-06-23 RX ADMIN — FUROSEMIDE 40 MG: 40 TABLET ORAL at 17:49

## 2021-06-23 RX ADMIN — FENTANYL CITRATE 100 MCG: 50 INJECTION INTRAMUSCULAR; INTRAVENOUS at 14:11

## 2021-06-23 RX ADMIN — SODIUM CHLORIDE, PRESERVATIVE FREE 3 ML: 5 INJECTION INTRAVENOUS at 19:43

## 2021-06-23 RX ADMIN — SODIUM CHLORIDE 75 ML/HR: 9 INJECTION, SOLUTION INTRAVENOUS at 11:02

## 2021-06-23 RX ADMIN — METOPROLOL TARTRATE 12.5 MG: 25 TABLET, FILM COATED ORAL at 21:33

## 2021-06-23 RX ADMIN — DEXAMETHASONE SODIUM PHOSPHATE 8 MG: 10 INJECTION INTRAMUSCULAR; INTRAVENOUS at 14:20

## 2021-06-23 RX ADMIN — FAMOTIDINE 20 MG: 10 INJECTION INTRAVENOUS at 11:15

## 2021-06-23 RX ADMIN — ROCURONIUM BROMIDE 40 MG: 50 INJECTION INTRAVENOUS at 14:13

## 2021-06-23 RX ADMIN — APIXABAN 5 MG: 5 TABLET, FILM COATED ORAL at 21:33

## 2021-06-23 RX ADMIN — DIPHENHYDRAMINE HYDROCHLORIDE 12.5 MG: 50 INJECTION, SOLUTION INTRAMUSCULAR; INTRAVENOUS at 21:55

## 2021-06-23 RX ADMIN — GLYCOPYRROLATE 0.8 MG: 0.2 INJECTION INTRAMUSCULAR; INTRAVENOUS at 15:56

## 2021-06-23 RX ADMIN — ONDANSETRON 4 MG: 2 INJECTION INTRAMUSCULAR; INTRAVENOUS at 15:56

## 2021-06-23 NOTE — ANESTHESIA PREPROCEDURE EVALUATION
Anesthesia Evaluation     Patient summary reviewed and Nursing notes reviewed   no history of anesthetic complications:  NPO Solid Status: > 8 hours  NPO Liquid Status: > 8 hours           Airway   Mallampati: II  Dental    (+) edentulous    Pulmonary - normal exam   (+) a smoker Former,   Cardiovascular - normal exam    (+) hypertension 2 medications or greater, dysrhythmias Atrial Fib, Atrial Flutter, CHF ,       Neuro/Psych- negative ROS  GI/Hepatic/Renal/Endo    (+) obesity,       Musculoskeletal     Abdominal    Substance History      OB/GYN          Other                        Anesthesia Plan    ASA 3     general     intravenous induction     Anesthetic plan, all risks, benefits, and alternatives have been provided, discussed and informed consent has been obtained with: patient.

## 2021-06-23 NOTE — PLAN OF CARE
Goal Outcome Evaluation:  Plan of Care Reviewed With: patient           Outcome Summary: S/p ablation. SR on tele, all other VSS, on RA. Bilat groin sites with stopcocks in place, d/c & bedrest per orders. Pain treated per MAR. Ambulate when able. WCTM.

## 2021-06-23 NOTE — H&P
Patient Care Team:  Jr Martell MD as PCP - General (Family Medicine)    Chief complaint Presents for flutter ablation    Subjective     History of Present Illness   Ms Leung is a 82 yo with a past medical history significant for HTN who developed atypical atrial flutter in 6/19. At that time she presented to [facility] with complaints of dyspnea which had been ongoing for several weeks.  She also noted that her stamina and exercise tolerance had diminished.  She denied any chest pain, PND, orthopnea or palpitatins.  In the ER she was found to be in atrial flutter with 2:1 conduction and had an elevated BNP of 1100.  She was admitted and placed on metoprolol, amiodarone and apixoban.  She was diuresed and an echo showed an EF of 55%, mild-moderate RV enlargement, mild-moderate MR with severely dilated LA, moderate TR and mild AoV sclerosis.     She underwent cardioversion in 2019 and was taken off of amiodarone.      On 5/27 she presented with a several day history of palpitations and dyspnea and was found to be back in flutter.  She was seen in the ER where she was given metoprolol to control her rate and discharged.  Since then, she has done well without any palpitations or recurrent tachycardia.          Review of Systems   Constitutional: Positive for fatigue.   Respiratory: Positive for shortness of breath.    Cardiovascular: Positive for palpitations. Negative for chest pain and leg swelling.   All other systems reviewed and are negative.         Past Medical History:   Diagnosis Date   • Atrial fibrillation (CMS/HCC)    • CHF (congestive heart failure) (CMS/HCC)    • Hypertension        Objective      Vital Signs       Physical Exam  Constitutional:       Appearance: Normal appearance.   HENT:      Head: Normocephalic and atraumatic.      Nose: Nose normal.      Mouth/Throat:      Mouth: Mucous membranes are moist.   Eyes:      Extraocular Movements: Extraocular movements intact.       Pupils: Pupils are equal, round, and reactive to light.   Cardiovascular:      Rate and Rhythm: Regular rhythm.   Pulmonary:      Effort: Pulmonary effort is normal.      Breath sounds: Normal breath sounds.   Abdominal:      Palpations: Abdomen is soft.   Musculoskeletal:         General: Normal range of motion.      Cervical back: Neck supple.   Skin:     General: Skin is warm and dry.   Neurological:      General: No focal deficit present.      Mental Status: She is alert and oriented to person, place, and time.   Psychiatric:         Mood and Affect: Mood normal.         Results Review:    I reviewed the patient's new clinical results.      Assessment/Plan       Atypical atrial flutter (CMS/HCC)    Palpitations      Assessment & Plan   1. Atypical atrial flutter - rate controlled on apixaban.  Discussed indications, risks and benefits of ablation  2. HTN - controlled on diltiazem and metoprolol,  continue same          I discussed the patients findings and my recommendations with patient    Antonio Senior MD  06/23/21  05:24 EDT

## 2021-06-23 NOTE — ANESTHESIA PROCEDURE NOTES
Airway  Urgency: elective    Date/Time: 6/23/2021 2:18 PM  Airway not difficult    General Information and Staff    Patient location during procedure: OR  Anesthesiologist: Krish Pineda MD  CRNA: Tomas Florian CRNA    Indications and Patient Condition  Indications for airway management: airway protection    Preoxygenated: yes  Mask difficulty assessment: 1 - vent by mask    Final Airway Details  Final airway type: endotracheal airway      Successful airway: ETT  Cuffed: yes   Successful intubation technique: direct laryngoscopy  Endotracheal tube insertion site: oral  Blade: Seals  Blade size: 2  ETT size (mm): 7.0  Cormack-Lehane Classification: grade I - full view of glottis  Placement verified by: chest auscultation and capnometry   Measured from: lips  ETT/EBT  to teeth (cm): 22  Number of attempts at approach: 1  Assessment: lips, teeth, and gum same as pre-op and atraumatic intubation    Additional Comments  Pre 02 100%, SIVI, DL x1, atraumatic intubation, BLBS, Positive ETC02.

## 2021-06-24 VITALS
TEMPERATURE: 97.7 F | SYSTOLIC BLOOD PRESSURE: 141 MMHG | HEART RATE: 78 BPM | WEIGHT: 195 LBS | BODY MASS INDEX: 34.55 KG/M2 | OXYGEN SATURATION: 98 % | DIASTOLIC BLOOD PRESSURE: 71 MMHG | RESPIRATION RATE: 16 BRPM | HEIGHT: 63 IN

## 2021-06-24 LAB
ACT BLD: 224 SECONDS (ref 82–152)
ACT BLD: 246 SECONDS (ref 82–152)
ANION GAP SERPL CALCULATED.3IONS-SCNC: 11.5 MMOL/L (ref 5–15)
BUN SERPL-MCNC: 24 MG/DL (ref 8–23)
BUN/CREAT SERPL: 24.5 (ref 7–25)
CALCIUM SPEC-SCNC: 8.5 MG/DL (ref 8.6–10.5)
CHLORIDE SERPL-SCNC: 104 MMOL/L (ref 98–107)
CO2 SERPL-SCNC: 24.5 MMOL/L (ref 22–29)
CREAT SERPL-MCNC: 0.98 MG/DL (ref 0.57–1)
DEPRECATED RDW RBC AUTO: 47.9 FL (ref 37–54)
ERYTHROCYTE [DISTWIDTH] IN BLOOD BY AUTOMATED COUNT: 13.3 % (ref 12.3–15.4)
GFR SERPL CREATININE-BSD FRML MDRD: 55 ML/MIN/1.73
GLUCOSE BLDC GLUCOMTR-MCNC: 188 MG/DL (ref 70–130)
GLUCOSE BLDC GLUCOMTR-MCNC: 265 MG/DL (ref 70–130)
GLUCOSE SERPL-MCNC: 213 MG/DL (ref 65–99)
HCT VFR BLD AUTO: 34 % (ref 34–46.6)
HGB BLD-MCNC: 11.3 G/DL (ref 12–15.9)
MCH RBC QN AUTO: 32.6 PG (ref 26.6–33)
MCHC RBC AUTO-ENTMCNC: 33.2 G/DL (ref 31.5–35.7)
MCV RBC AUTO: 98 FL (ref 79–97)
PLATELET # BLD AUTO: 304 10*3/MM3 (ref 140–450)
PMV BLD AUTO: 10.2 FL (ref 6–12)
POTASSIUM SERPL-SCNC: 4.6 MMOL/L (ref 3.5–5.2)
QT INTERVAL: 397 MS
RBC # BLD AUTO: 3.47 10*6/MM3 (ref 3.77–5.28)
SODIUM SERPL-SCNC: 140 MMOL/L (ref 136–145)
WBC # BLD AUTO: 7.6 10*3/MM3 (ref 3.4–10.8)

## 2021-06-24 PROCEDURE — 63710000001 FUROSEMIDE 40 MG TABLET: Performed by: INTERNAL MEDICINE

## 2021-06-24 PROCEDURE — 82962 GLUCOSE BLOOD TEST: CPT

## 2021-06-24 PROCEDURE — A9270 NON-COVERED ITEM OR SERVICE: HCPCS | Performed by: INTERNAL MEDICINE

## 2021-06-24 PROCEDURE — 63710000001 METOPROLOL TARTRATE 25 MG TABLET: Performed by: INTERNAL MEDICINE

## 2021-06-24 PROCEDURE — 80048 BASIC METABOLIC PNL TOTAL CA: CPT | Performed by: INTERNAL MEDICINE

## 2021-06-24 PROCEDURE — 63710000001 POTASSIUM CHLORIDE 10 MEQ TABLET CONTROLLED-RELEASE: Performed by: INTERNAL MEDICINE

## 2021-06-24 PROCEDURE — 63710000001 APIXABAN 5 MG TABLET: Performed by: INTERNAL MEDICINE

## 2021-06-24 PROCEDURE — 99217 PR OBSERVATION CARE DISCHARGE MANAGEMENT: CPT | Performed by: INTERNAL MEDICINE

## 2021-06-24 PROCEDURE — 85027 COMPLETE CBC AUTOMATED: CPT | Performed by: INTERNAL MEDICINE

## 2021-06-24 PROCEDURE — 93005 ELECTROCARDIOGRAM TRACING: CPT | Performed by: INTERNAL MEDICINE

## 2021-06-24 PROCEDURE — G0378 HOSPITAL OBSERVATION PER HR: HCPCS

## 2021-06-24 RX ORDER — DILTIAZEM HYDROCHLORIDE 120 MG/1
120 TABLET, FILM COATED ORAL DAILY
COMMUNITY
End: 2021-06-25

## 2021-06-24 RX ADMIN — APIXABAN 5 MG: 5 TABLET, FILM COATED ORAL at 09:13

## 2021-06-24 RX ADMIN — POTASSIUM CHLORIDE 20 MEQ: 750 TABLET, EXTENDED RELEASE ORAL at 09:13

## 2021-06-24 RX ADMIN — METOPROLOL TARTRATE 12.5 MG: 25 TABLET, FILM COATED ORAL at 09:12

## 2021-06-24 RX ADMIN — FUROSEMIDE 40 MG: 40 TABLET ORAL at 09:13

## 2021-06-24 RX ADMIN — SODIUM CHLORIDE, PRESERVATIVE FREE 3 ML: 5 INJECTION INTRAVENOUS at 09:13

## 2021-06-24 NOTE — DISCHARGE SUMMARY
Laure Leung  1940  [id number]        Cardiology discharge Summary    Date of Admission: 6/23/2021  Date of Discharge:  6/24/2021    Primary Discharge Diagnoses: Atypical flutter        PCP  Patient Care Team:  Jr Martell MD as PCP - General (Family Medicine)    Consults:   Consults     No orders found for last 30 day(s).          Operations and Procedures Performed:  Procedure(s):  Ablation atrial flutter  3D MAPPING INSYTE EP     Adult Transesophageal Echo (SEBASTIAN) W/ Cont if Necessary Per Protocol    Result Date: 6/23/2021  Narrative: · Left ventricular ejection fraction appears to be 56 - 60%. · The left atrial cavity is severely dilated. · The left atrial appendage was visualized through multiple planes. No evidence of a left atrial appendage thrombus was present. · Moderate mitral valve regurgitation is present. · Mild tricuspid valve regurgitation is present. Estimated right ventricular systolic pressure from tricuspid regurgitation is normal (<35 mmHg). · There is moderate plaque in the descending aorta present.      XR Chest 1 View    Result Date: 5/27/2021  Narrative: DATE OF EXAM: 5/27/2021 2:16 PM  PROCEDURE: XR CHEST 1 VW-  INDICATIONS: soa  COMPARISON: No Comparisons Available  TECHNIQUE: Single radiographic AP view of the chest was obtained.  FINDINGS: The heart looks enlarged. The lungs seem relatively clear. There are no pleural effusions.      Impression: 1.Cardiomegaly is suggested.  Electronically Signed By-Dom Goodwin MD On:5/27/2021 2:27 PM This report was finalized on [id number] by  Dom Goodwin MD.    EP/CRM Study    Result Date: 6/23/2021  Narrative: Preop Dx: Atypical atrial flutter Post Op Dx: Alia-mitral flutter        Typical right atrial flutter Procedure: 1. Comprehensive EP study with ablation of atypical atrial flutter 3. Ablation of discrete mechanism of arrhythmia distinct from primary arrhythmia 4. Intracardiac echo 5. 3-D electroanatomic mapping         Type of Anesthesia - general Description: After informed consent was obtained the patient was brought to the EP lab in the fasting state.  After being put to sleep a transesophageal echo was done which showed no left atrial thrombus.  He was then prepped and draped in the usual sterile fashion.  One 9Fr and one 7Fr sheath were placed in the right femoral vein.  A steerable decapolar catheter was placed in the coronary sinus.  An intracardiac echo catheter was advanced into the right heart.  Two short 8Fr sheaths were placed in the right femoral vein.  The patient was given a 10,000 unit bolus of heparin intravenously.  The short 8Fr sheath was exchanged over an 035 guide wire for a medium curl Agilis sheath.  A BRK1 transeptal needle was advanced into the sheath and under echo and fluoroscopic guidance a trans-septal puncture was performed without difficulty.  The trans-septal sheath was connected to a slow continuous infusion of heparinized saline. The patient was in atrial flutter at the onset of the procedure with a cycle length of 260ms.  Entrainment pacing was performed confirming that the flutter was rosy-mitral. The grid catheter was advanced into the left atrium.  The Ensite Precision 3-Dimensional electroanatomic mapping system was utilized to create a geometry of the left atrium, left atrial appendage and all 4 pulmonary veins.  An area of complex fractionated electrograms was identified in the anterior left atrium near the mitral anulus.  The activation map was also consistent with rosy-mitral flutter. A tacti-cath irrigated ablation catheter was positioned at the site of complex fractionated electrograms and RF delivered at 25W which terminated the flutter.  A line was drawn between the anteroseptal mitral anulus to a scar in the anterior left atrium near the right superior pulmonary vein.  Burst atrial pacing and atrial extra-stimulus testing was done which failed to initiate atrial flutter. Because of  the patient had a second documented atrial flutter consistent with typical atrial flutter, we proceeded to ablate the CTI.  The catheters were pulled back into the right atrium and we ablated the cavotricuspid isthmus.  Ablation lesion were delivered between the tricuspid anulus and the inferior vena cava until bidirectional conduction block was achieved and confirmed using differential pacing. The diagnostic EP study was then performed.  The sinus cycle length was 900ms, AH 157ms, HV 43ms, QRS 90ms and QT 420ms.  The corrected sinus node recovery time was 400ms and the AV block cycle length was 440ms. The patient remained hemodynamically stable throughout the procedure.  Heparin was administered throughout the procedure to maintain an ACT of approximately 350 seconds. EBL- 5cc Complications - none Conclusions: 1. Alia-mitral atrial flutter ablated with a line between the anterior mitral anulus and an anterior scar in the LA 2. Successful ablation of the cavotricuspid isthmus 3. Normal SA node function 4. Normal AV node conduction 5. Normal His Purkinje conduction      Allergies:  has No Known Allergies.  Discharge Medications:    apixaban, 5 mg, Oral, Q12H  furosemide, 40 mg, Oral, Daily  metoprolol tartrate, 12.5 mg, Oral, BID  potassium chloride, 20 mEq, Oral, Daily  sodium chloride, 3 mL, Intravenous, Q12H  sodium chloride, 3 mL, Intravenous, Q12H  sodium chloride, 3 mL, Intravenous, Q12H        History of Present Illness:  See HPI in H&P    Hospital Course  Patient underwent uncomplicated flutter ablation, doing well sitting up, room air today, no antiarrhythmics started by EP.  Otherwise no chest pain shortness of breath presyncope, telemetry demonstrates sinus rhythm overnight with first-degree AV block, no heart failure signs or symptoms, no angina, stable for discharge today, groin stable and soft.    Last Lab Results:   Lab Results (most recent)     Procedure Component Value Units Date/Time    Basic  Metabolic Panel [[id number]]  (Abnormal) Collected: 06/24/21 0353    Specimen: Blood Updated: 06/24/21 0554     Glucose 213 mg/dL      BUN 24 mg/dL      Creatinine 0.98 mg/dL      Sodium 140 mmol/L      Potassium 4.6 mmol/L      Chloride 104 mmol/L      CO2 24.5 mmol/L      Calcium 8.5 mg/dL      eGFR Non African Amer 55 mL/min/1.73      BUN/Creatinine Ratio 24.5     Anion Gap 11.5 mmol/L     Narrative:      GFR Normal >60  Chronic Kidney Disease <60  Kidney Failure <15      POC Glucose Once [[id number]]  (Abnormal) Collected: 06/24/21 0541    Specimen: Blood Updated: 06/24/21 0542     Glucose 188 mg/dL     CBC (No Diff) [[id number]]  (Abnormal) Collected: 06/24/21 0353    Specimen: Blood Updated: 06/24/21 0507     WBC 7.60 10*3/mm3      RBC 3.47 10*6/mm3      Hemoglobin 11.3 g/dL      Hematocrit 34.0 %      MCV 98.0 fL      MCH 32.6 pg      MCHC 33.2 g/dL      RDW 13.3 %      RDW-SD 47.9 fl      MPV 10.2 fL      Platelets 304 10*3/mm3     POC Glucose Once [[id number]]  (Abnormal) Collected: 06/23/21 2005    Specimen: Blood Updated: 06/23/21 2006     Glucose 201 mg/dL         Imaging Results (Most Recent)     None          PROCEDURES  Procedure(s):  Ablation atrial flutter  3D MAPPING INSYTE EP    Condition on Discharge: Stable and ambulatory    Physical Exam at Discharge  Vital Signs  Temp:  [97.4 °F (36.3 °C)-98.3 °F (36.8 °C)] 98.3 °F (36.8 °C)  Heart Rate:  [75-98] 85  Resp:  [14-20] 16  BP: (123-174)/(56-78) 136/56    Physical Exam:  Physical Exam   Constitutional: Patient appears well-developed and well-nourished and in no acute distress   HEENT:   Head: Normocephalic and atraumatic.   Eyes:  Pupils are equal, round, and reactive to light. EOM are intact. Sclerae are anicteric and noninjected.  Mouth and Throat: Patient has moist mucous membranes. Oropharynx is clear of any erythema or exudate.     Neck: Neck supple. No JVD present. No thyromegaly present. No lymphadenopathy present.  Cardiovascular: Regular rate,  regular rhythm, S1 normal and S2 normal.  Exam reveals no gallop and no friction rub.  No murmur heard.  Pulmonary/Chest: Lungs are clear to auscultation bilaterally. No respiratory distress. No wheezes. No rhonchi. No rales.   Abdominal: Soft. Bowel sounds are normal. No distension and no mass. There is no hepatosplenomegaly. There is no tenderness.   Musculoskeletal: Normal muscle tone  Extremities: No edema. Pulses are palpable in all 4 extremities.  Neurological: Patient is alert and oriented to person, place, and time. Cranial nerves II-XII are grossly intact with no focal deficits.  Skin: Skin is warm. No rash noted. Nails show no clubbing.  No cyanosis or erythema.    Discharge Disposition  Home with appropriate, follow-up to EP    Discharge Diet:      Dietary Orders (From admission, onward)     Start     Ordered    06/23/21 1725  Diet Regular  Diet Effective Now     Question:  Diet Texture / Consistency  Answer:  Regular    06/23/21 1724                Activity at Discharge:  No heavy lifting for 10 days  Otherwise as tolerated    Pre-discharge education  Restrictions, medicines, follow-up      Follow-up Appointments  Future Appointments   Date Time Provider Department Center   7/26/2021 10:15 AM DeamAntonio MD [facility]         Test Results Pending at Discharge       Ketan Alonso MD  06/24/21  09:44 EDT    Greater than 30 minutes on the discharge on totality of care, patient encounter, nursing coordination of care, med rec

## 2021-06-25 DIAGNOSIS — I48.4 ATYPICAL ATRIAL FLUTTER (HCC): Primary | ICD-10-CM

## 2021-06-25 NOTE — TELEPHONE ENCOUNTER
Per Dr. Senior, she is to d/c the Diltiazem and continue the Metoprolol, rx sent in for the patient.

## 2021-06-25 NOTE — TELEPHONE ENCOUNTER
SUDHIR  Pt called BC she was DC'd from [facility] yesterday and needs clarification on continued medications please    1. metoprolol tartrate (LOPRESSOR) 25 MG     2. dilTIAZem (CARDIZEM) 120 MG tablet    If so she needs refills of metoprolol asap sent to [facility] # [id number] please     Pt CB# [phone]

## 2021-06-25 NOTE — ANESTHESIA POSTPROCEDURE EVALUATION
Patient: Laure Leung    Procedure Summary     Date: 06/23/21 Room / Location: [facility] /  [facility]    Anesthesia Start: 1408 Anesthesia Stop: 1623    Procedures:       Ablation atrial flutter (N/A )      3D MAPPING INSYTE EP (N/A ) Diagnosis:       Atypical atrial flutter (CMS/HCC)      Palpitations    Providers: Antonio Senior MD Provider: Nahomy Johnson MD    Anesthesia Type: general ASA Status: 3          Anesthesia Type: general    Vitals  Vitals Value Taken Time   /60 06/23/21 1645   Temp     Pulse 77 06/23/21 1645   Resp 16 06/23/21 1645   SpO2 99 % 06/23/21 1645           Anesthesia Post Evaluation

## 2021-07-19 DIAGNOSIS — I10 ESSENTIAL HYPERTENSION: ICD-10-CM

## 2021-07-19 DIAGNOSIS — I48.4 ATYPICAL ATRIAL FLUTTER (HCC): ICD-10-CM

## 2021-07-19 RX ORDER — POTASSIUM CHLORIDE 750 MG/1
20 TABLET, FILM COATED, EXTENDED RELEASE ORAL DAILY
Qty: 60 TABLET | Refills: 5 | Status: SHIPPED | OUTPATIENT
Start: 2021-07-19 | End: 2022-01-03 | Stop reason: SDUPTHER

## 2021-07-19 NOTE — TELEPHONE ENCOUNTER
Done   Cheek Interpolation Flap Text: A decision was made to reconstruct the defect utilizing an interpolation axial flap and a staged reconstruction.  A telfa template was made of the defect.  This telfa template was then used to outline the Cheek Interpolation flap.  The donor area for the pedicle flap was then injected with anesthesia.  The flap was excised through the skin and subcutaneous tissue down to the layer of the underlying musculature.  The interpolation flap was carefully excised within this deep plane to maintain its blood supply.  The edges of the donor site were undermined.   The donor site was closed in a primary fashion.  The pedicle was then rotated into position and sutured.  Once the tube was sutured into place, adequate blood supply was confirmed with blanching and refill.  The pedicle was then wrapped with xeroform gauze and dressed appropriately with a telfa and gauze bandage to ensure continued blood supply and protect the attached pedicle.

## 2021-07-19 NOTE — TELEPHONE ENCOUNTER
CPA  PT CALLED ASKING FOR REFILLS SENT TO [facility] #[id number] PLEASE   *metoprolol tartrate (LOPRESSOR) 25 MG tablet  *potassium chloride 10 MEQ CR tablet    FYI PT HAS BEEN HAVING TERRIBLE LEG CRAMPS       PT CB# [phone]

## 2021-07-26 ENCOUNTER — OFFICE VISIT (OUTPATIENT)
Dept: CARDIOLOGY | Facility: CLINIC | Age: 81
End: 2021-07-26

## 2021-07-26 VITALS
HEART RATE: 67 BPM | WEIGHT: 205.8 LBS | SYSTOLIC BLOOD PRESSURE: 162 MMHG | HEIGHT: 63 IN | DIASTOLIC BLOOD PRESSURE: 78 MMHG | RESPIRATION RATE: 18 BRPM | BODY MASS INDEX: 36.46 KG/M2

## 2021-07-26 DIAGNOSIS — I48.4 ATYPICAL ATRIAL FLUTTER (HCC): ICD-10-CM

## 2021-07-26 PROCEDURE — 99212 OFFICE O/P EST SF 10 MIN: CPT | Performed by: INTERNAL MEDICINE

## 2021-07-26 PROCEDURE — 93000 ELECTROCARDIOGRAM COMPLETE: CPT | Performed by: INTERNAL MEDICINE

## 2021-07-26 NOTE — PROGRESS NOTES
Subjective:     Encounter Date:2021      Patient ID: Laure Leung is a 81 y.o. female.    Chief Complaint:  No chief complaint on file.      HPI:   Ms Leung is a 80 yo with a past medical history significant for HTN who developed atypical atrial flutter in . At that time she presented to [facility] with complaints of dyspnea which had been ongoing for several weeks.  She also noted that her stamina and exercise tolerance had diminished.  She denied any chest pain, PND, orthopnea or palpitatins.  In the ER she was found to be in atrial flutter with 2:1 conduction and had an elevated BNP of 1100.  She was admitted and placed on metoprolol, amiodarone and apixoban.  She was diuresed and an echo showed an EF of 55%, mild-moderate RV enlargement, mild-moderate MR with severely dilated LA, moderate TR and mild AoV sclerosis.     She underwent cardioversion in  and was taken off of amiodarone.      On  she presented with a several day history of palpitations and dyspnea and was found to be back in flutter.  She was seen in the ER where she was given metoprolol to control her rate and discharged.  Since then, she has done well without any palpitations or recurrent tachycardia.    She was taken for ablation  and had the followin. Alia-mitral atrial flutter ablated with a line between the anterior mitral anulus and an anterior scar in the LA  2. Successful ablation of the cavotricuspid isthmus  3. Normal SA node function  4. Normal AV node conduction  5. Normal His Purkinje conduction    Her pre-ablation SEBASTIAN sh owed:  · Left ventricular ejection fraction appears to be 56 - 60%.  · The left atrial cavity is severely dilated.  · The left atrial appendage was visualized through multiple planes. No evidence of a left atrial appendage thrombus was present.  · Moderate mitral valve regurgitation is present.  · Mild tricuspid valve regurgitation is present. Estimated right ventricular  systolic pressure from tricuspid regurgitation is normal (<35 mmHg).  There is moderate plaque in the descending aorta present.     Since then, she has done well with the exception of having one episode of tachy palpitations lasting about 10-15 seconds.      The following portions of the patient's history were reviewed and updated as appropriate: allergies, current medications, past family history, past medical history, past social history, past surgical history and problem list.    Problem List:  Patient Active Problem List   Diagnosis   • Atypical atrial flutter (CMS/HCC)   • Essential hypertension   • Palpitations       Past Medical History:  Past Medical History:   Diagnosis Date   • Atrial fibrillation (CMS/HCC)    • CHF (congestive heart failure) (CMS/HCC)    • Hypertension        Past Surgical History:  Past Surgical History:   Procedure Laterality Date   • CARDIAC ELECTROPHYSIOLOGY PROCEDURE N/A 6/23/2021    Procedure: Ablation atrial flutter;  Surgeon: Antonio Senior MD;  Location: [facility] INVASIVE LOCATION;  Service: Cardiovascular;  Laterality: N/A;   • CATARACT EXTRACTION         Social History:  Social History     Socioeconomic History   • Marital status:      Spouse name: Not on file   • Number of children: Not on file   • Years of education: Not on file   • Highest education level: Not on file   Tobacco Use   • Smoking status: Former Smoker   • Smokeless tobacco: Never Used   Substance and Sexual Activity   • Alcohol use: No   • Drug use: Never   • Sexual activity: Defer       Allergies:  No Known Allergies    Immunizations:    There is no immunization history on file for this patient.    ROS:  Review of Systems   Constitutional: Negative for malaise/fatigue.   Cardiovascular: Positive for irregular heartbeat and palpitations. Negative for chest pain, dyspnea on exertion, leg swelling, near-syncope, orthopnea, paroxysmal nocturnal dyspnea and syncope.   Respiratory: Negative for  shortness of breath.    All other systems reviewed and are negative.         Objective:         There were no vitals taken for this visit.    Constitutional:       General: Not in acute distress.     Appearance: Well-developed.   Eyes:      General: No scleral icterus.     Conjunctiva/sclera: Conjunctivae normal.      Pupils: Pupils are equal, round, and reactive to light.   HENT:      Head: Normocephalic and atraumatic.   Neck:      Thyroid: No thyromegaly.   Pulmonary:      Effort: Pulmonary effort is normal.      Breath sounds: Normal breath sounds.   Cardiovascular:      Normal rate. Regular rhythm.      Murmurs: There is a grade 2/6 midsystolic murmur.   Abdominal:      General: Bowel sounds are normal.      Palpations: Abdomen is soft.   Musculoskeletal: Normal range of motion.      Cervical back: Normal range of motion. Skin:     General: Skin is warm and dry.   Neurological:      Mental Status: Alert and oriented to person, place, and time.         In-Office Procedure(s):    ECG 12 Lead    Date/Time: 7/26/2021 10:11 AM  Performed by: Antonio Senior MD  Authorized by: Antonio Senior MD   Comparison: compared with previous ECG from 6/24/2021  Comparison to previous ECG: NSR, LVH  Rhythm comments: ectopic atrial rhythm              ASCVD RIsk Score::  The ASCVD Risk score (Walford RAMIRO Jr., et al., 2013) failed to calculate for the following reasons:    The 2013 ASCVD risk score is only valid for ages 40 to 79    Recent Radiology:  Imaging Results (Most Recent)     None          Lab Review:   Admission on 06/23/2021, Discharged on 06/24/2021   Component Date Value   • QT Interval 06/23/2021 394    • QT Interval 06/23/2021 413    • Glucose 06/23/2021 201*   • WBC 06/24/2021 7.60    • RBC 06/24/2021 3.47*   • Hemoglobin 06/24/2021 11.3*   • Hematocrit 06/24/2021 34.0    • MCV 06/24/2021 98.0*   • MCH 06/24/2021 32.6    • MCHC 06/24/2021 33.2    • RDW 06/24/2021 13.3    • RDW-SD 06/24/2021 47.9    • MPV  06/24/2021 10.2    • Platelets 06/24/2021 304    • Glucose 06/24/2021 213*   • BUN 06/24/2021 24*   • Creatinine 06/24/2021 0.98    • Sodium 06/24/2021 140    • Potassium 06/24/2021 4.6    • Chloride 06/24/2021 104    • CO2 06/24/2021 24.5    • Calcium 06/24/2021 8.5*   • eGFR Non  Amer 06/24/2021 55*   • BUN/Creatinine Ratio 06/24/2021 24.5    • Anion Gap 06/24/2021 11.5    • QT Interval 06/24/2021 397    • Glucose 06/24/2021 188*   • Glucose 06/24/2021 265*   • Activated Clotting Time  06/23/2021 224*   • Activated Clotting Time  06/23/2021 246*   Hospital Outpatient Visit on 06/23/2021   Component Date Value   • BSA 06/23/2021 1.9    • BH CV ECHO NAHED - BZI_BMI 06/23/2021 34.5    •  CV ECHO NAHED - BSA(HA* 06/23/2021 2.0    •  CV ECHO NAHED - BZI_ME* 06/23/2021 88.5    •  CV ECHO NAHED - BZI_ME* 06/23/2021 160.0    • Target HR (85%) 06/23/2021 119    • Max. Pred. HR (100%) 06/23/2021 140    Lab on 06/21/2021   Component Date Value   • Glucose 06/21/2021 250*   • BUN 06/21/2021 23    • Creatinine 06/21/2021 0.89    • Sodium 06/21/2021 139    • Potassium 06/21/2021 4.9    • Chloride 06/21/2021 104    • CO2 06/21/2021 22.6    • Calcium 06/21/2021 9.3    • Total Protein 06/21/2021 6.1    • Albumin 06/21/2021 4.30    • ALT (SGPT) 06/21/2021 21    • AST (SGOT) 06/21/2021 17    • Alkaline Phosphatase 06/21/2021 89    • Total Bilirubin 06/21/2021 1.0    • eGFR Non African Amer 06/21/2021 61    • Globulin 06/21/2021 1.8    • A/G Ratio 06/21/2021 2.4    • BUN/Creatinine Ratio 06/21/2021 25.8*   • Anion Gap 06/21/2021 12.4    • Protime 06/21/2021 11.6    • INR 06/21/2021 1.05    • WBC 06/21/2021 5.69    • RBC 06/21/2021 3.89    • Hemoglobin 06/21/2021 12.5    • Hematocrit 06/21/2021 37.7    • MCV 06/21/2021 96.9    • MCH 06/21/2021 32.1    • MCHC 06/21/2021 33.2    • RDW 06/21/2021 13.1    • RDW-SD 06/21/2021 46.4    • MPV 06/21/2021 10.6    • Platelets 06/21/2021 382    • Neutrophil % 06/21/2021 59.6    •  Lymphocyte % 06/21/2021 22.8    • Monocyte % 06/21/2021 13.4*   • Eosinophil % 06/21/2021 1.9    • Basophil % 06/21/2021 1.9*   • Immature Grans % 06/21/2021 0.4    • Neutrophils, Absolute 06/21/2021 3.39    • Lymphocytes, Absolute 06/21/2021 1.30    • Monocytes, Absolute 06/21/2021 0.76    • Eosinophils, Absolute 06/21/2021 0.11    • Basophils, Absolute 06/21/2021 0.11    • Immature Grans, Absolute 06/21/2021 0.02    • nRBC 06/21/2021 0.2    • COVID19 06/21/2021 Not Detected    Admission on 05/27/2021, Discharged on 05/27/2021   Component Date Value   • QT Interval 05/27/2021 339    • Extra Tube 05/27/2021 hold for add-on    • Extra Tube 05/27/2021 done    • Extra Tube 05/27/2021 Hold for add-ons.    • Glucose 05/27/2021 175*   • BUN 05/27/2021 22    • Creatinine 05/27/2021 0.72    • Sodium 05/27/2021 141    • Potassium 05/27/2021 4.6    • Chloride 05/27/2021 106    • CO2 05/27/2021 22.0    • Calcium 05/27/2021 9.7    • Total Protein 05/27/2021 6.2    • Albumin 05/27/2021 4.30    • ALT (SGPT) 05/27/2021 21    • AST (SGOT) 05/27/2021 20    • Alkaline Phosphatase 05/27/2021 94    • Total Bilirubin 05/27/2021 0.7    • eGFR Non  Amer 05/27/2021 78    • Globulin 05/27/2021 1.9    • A/G Ratio 05/27/2021 2.3    • BUN/Creatinine Ratio 05/27/2021 30.6*   • Anion Gap 05/27/2021 13.0    • Protime 05/27/2021 11.4    • INR 05/27/2021 1.04    • PTT 05/27/2021 27.1    • Color, UA 05/27/2021 Yellow    • Appearance, UA 05/27/2021 Clear    • pH, UA 05/27/2021 <=5.0    • Specific Gravity, UA 05/27/2021 1.017    • Glucose, UA 05/27/2021 Negative    • Ketones, UA 05/27/2021 Negative    • Bilirubin, UA 05/27/2021 Negative    • Blood, UA 05/27/2021 Negative    • Protein, UA 05/27/2021 Negative    • Leuk Esterase, UA 05/27/2021 Trace*   • Nitrite, UA 05/27/2021 Negative    • Urobilinogen, UA 05/27/2021 0.2 E.U./dL    • Troponin T 05/27/2021 <0.010    • Troponin T 05/27/2021 <0.010    • Magnesium 05/27/2021 1.8    • TSH 05/27/2021  0.750    • proBNP 05/27/2021 2,764.0*   • WBC 05/27/2021 6.70    • RBC 05/27/2021 4.04    • Hemoglobin 05/27/2021 12.7    • Hematocrit 05/27/2021 37.8    • MCV 05/27/2021 93.7    • MCH 05/27/2021 31.3    • MCHC 05/27/2021 33.5    • RDW 05/27/2021 14.6    • RDW-SD 05/27/2021 47.3    • MPV 05/27/2021 8.8    • Platelets 05/27/2021 363    • Neutrophil % 05/27/2021 65.2    • Lymphocyte % 05/27/2021 22.7    • Monocyte % 05/27/2021 10.5    • Eosinophil % 05/27/2021 1.1    • Basophil % 05/27/2021 0.5    • Neutrophils, Absolute 05/27/2021 4.40    • Lymphocytes, Absolute 05/27/2021 1.50    • Monocytes, Absolute 05/27/2021 0.70    • Eosinophils, Absolute 05/27/2021 0.10    • Basophils, Absolute 05/27/2021 0.00    • nRBC 05/27/2021 0.0    • RBC, UA 05/27/2021 0-2*   • WBC, UA 05/27/2021 0-2*   • Bacteria, UA 05/27/2021 None Seen    • Squamous Epithelial Cell* 05/27/2021 None Seen    • Hyaline Casts, UA 05/27/2021 None Seen    • Methodology 05/27/2021 Automated Microscopy    • QT Interval 05/27/2021 376                 Assessment:         No diagnosis found.       Plan:      1. Atypical atrial flutter - s/p ablation, continue apixaban, consider event monitor for recurrent palpitations  2. HTN - controlled on metoprolol,  continue same    RTC 6 months         Level of Care:                 Antonio Senior MD  07/26/21  .

## 2021-11-08 ENCOUNTER — TELEPHONE (OUTPATIENT)
Dept: CARDIOLOGY | Facility: CLINIC | Age: 81
End: 2021-11-08

## 2021-11-08 NOTE — TELEPHONE ENCOUNTER
Deam pt    Patient might be having knee surgery and has read that patient's taking Eliquis could develop blood clots. Would like to know if this is true? Would also like to know if something else can be called into pharmacy, states Eliquis is going to cost her $300.00 this month.      CB# [phone]

## 2021-12-07 ENCOUNTER — TELEPHONE (OUTPATIENT)
Dept: CARDIOLOGY | Facility: CLINIC | Age: 81
End: 2021-12-07

## 2021-12-07 DIAGNOSIS — I48.4 ATYPICAL ATRIAL FLUTTER (HCC): ICD-10-CM

## 2021-12-07 NOTE — TELEPHONE ENCOUNTER
PT needs 1 month supply of Eliquis 5 mg BID sent to [facility] until she can order new mail order for the new year.

## 2022-01-03 ENCOUNTER — TELEPHONE (OUTPATIENT)
Dept: CARDIOLOGY | Facility: CLINIC | Age: 82
End: 2022-01-03

## 2022-01-03 DIAGNOSIS — I48.4 ATYPICAL ATRIAL FLUTTER: ICD-10-CM

## 2022-01-03 DIAGNOSIS — I10 ESSENTIAL HYPERTENSION: ICD-10-CM

## 2022-01-03 RX ORDER — FUROSEMIDE 40 MG/1
40 TABLET ORAL DAILY
Qty: 90 TABLET | Refills: 0 | Status: SHIPPED | OUTPATIENT
Start: 2022-01-03 | End: 2022-03-30 | Stop reason: SDUPTHER

## 2022-01-03 RX ORDER — POTASSIUM CHLORIDE 750 MG/1
10 TABLET, FILM COATED, EXTENDED RELEASE ORAL 2 TIMES DAILY
Qty: 180 TABLET | Refills: 0 | Status: SHIPPED | OUTPATIENT
Start: 2022-01-03 | End: 2022-03-30 | Stop reason: SDUPTHER

## 2022-01-03 NOTE — TELEPHONE ENCOUNTER
PT needs new prescription on Eliquis 5mg BID sent to [facility].  PT needs refill on Potassium 10 MEQ BID  Metoprolol 25 mg 0.5 BID  Furosemide 40 mg 1 tab daily all sent to [facility].

## 2022-01-05 ENCOUNTER — TELEPHONE (OUTPATIENT)
Dept: CARDIOLOGY | Facility: CLINIC | Age: 82
End: 2022-01-05

## 2022-03-28 ENCOUNTER — OFFICE VISIT (OUTPATIENT)
Dept: CARDIOLOGY | Facility: CLINIC | Age: 82
End: 2022-03-28

## 2022-03-28 VITALS
DIASTOLIC BLOOD PRESSURE: 76 MMHG | SYSTOLIC BLOOD PRESSURE: 121 MMHG | WEIGHT: 215 LBS | HEART RATE: 74 BPM | BODY MASS INDEX: 38.09 KG/M2 | HEIGHT: 63 IN

## 2022-03-28 DIAGNOSIS — I10 ESSENTIAL HYPERTENSION: ICD-10-CM

## 2022-03-28 DIAGNOSIS — I48.4 ATYPICAL ATRIAL FLUTTER: Primary | ICD-10-CM

## 2022-03-28 PROCEDURE — 99212 OFFICE O/P EST SF 10 MIN: CPT | Performed by: INTERNAL MEDICINE

## 2022-03-28 PROCEDURE — 93000 ELECTROCARDIOGRAM COMPLETE: CPT | Performed by: INTERNAL MEDICINE

## 2022-03-28 NOTE — PROGRESS NOTES
Subjective:     Encounter Date:2022      Patient ID: Laure Leung is a 82 y.o. female.    Chief Complaint:  Chief Complaint   Patient presents with   • Atrial Flutter       HPI:   Ms Leung is a 82 yo with a past medical history significant for HTN who developed atypical atrial flutter in . At that time she presented to [facility] with complaints of dyspnea which had been ongoing for several weeks.  She also noted that her stamina and exercise tolerance had diminished.  She denied any chest pain, PND, orthopnea or palpitatins.  In the ER she was found to be in atrial flutter with 2:1 conduction and had an elevated BNP of 1100.  She was admitted and placed on metoprolol, amiodarone and apixoban.  She was diuresed and an echo showed an EF of 55%, mild-moderate RV enlargement, mild-moderate MR with severely dilated LA, moderate TR and mild AoV sclerosis.     She underwent cardioversion in  and was taken off of amiodarone.      On  she presented with a several day history of palpitations and dyspnea and was found to be back in flutter.  She was seen in the ER where she was given metoprolol to control her rate and discharged.  Since then, she has done well without any palpitations or recurrent tachycardia.     She was taken for ablation  and had the followin. Alia-mitral atrial flutter ablated with a line between the anterior mitral anulus and an anterior scar in the LA  2. Successful ablation of the cavotricuspid isthmus  3. Normal SA node function  4. Normal AV node conduction  5. Normal His Purkinje conduction     Her pre-ablation SEBASTIAN sh owed:  · Left ventricular ejection fraction appears to be 56 - 60%.  · The left atrial cavity is severely dilated.  · The left atrial appendage was visualized through multiple planes. No evidence of a left atrial appendage thrombus was present.  · Moderate mitral valve regurgitation is present.  · Mild tricuspid valve regurgitation is  present. Estimated right ventricular systolic pressure from tricuspid regurgitation is normal (<35 mmHg).  There is moderate plaque in the descending aorta present.      Since she was last seen, she has continued to do well without symptoms of palpitations, chest pain or dyspnea.         The following portions of the patient's history were reviewed and updated as appropriate: allergies, current medications, past family history, past medical history, past social history, past surgical history and problem list.    Problem List:  Patient Active Problem List   Diagnosis   • Atypical atrial flutter (HCC)   • Essential hypertension   • Palpitations       Active Med List:    Current Outpatient Medications:   •  apixaban (ELIQUIS) 5 MG tablet tablet, Take 1 tablet by mouth Every 12 (Twelve) Hours., Disp: 180 tablet, Rfl: 0  •  ascorbic acid (VITAMIN C) 1000 MG tablet, Take 1,000 mg by mouth Daily., Disp: , Rfl:   •  B Complex Vitamins (VITAMIN B COMPLEX PO), Take  by mouth., Disp: , Rfl:   •  furosemide (LASIX) 40 MG tablet, Take 1 tablet by mouth Daily., Disp: 90 tablet, Rfl: 0  •  metoprolol tartrate (LOPRESSOR) 25 MG tablet, Take 0.5 tablets by mouth 2 (Two) Times a Day., Disp: 90 tablet, Rfl: 0  •  potassium chloride 10 MEQ CR tablet, Take 1 tablet by mouth 2 (Two) Times a Day., Disp: 180 tablet, Rfl: 0  •  SELENIUM PO, Take  by mouth., Disp: , Rfl:   •  Vitamin D, Cholecalciferol, (CHOLECALCIFEROL) 10 MCG (400 UNIT) tablet, Take 400 Units by mouth Daily., Disp: , Rfl:      Past Medical History:  Past Medical History:   Diagnosis Date   • Atrial fibrillation (HCC)    • CHF (congestive heart failure) (HCC)    • Hypertension        Past Surgical History:  Past Surgical History:   Procedure Laterality Date   • CARDIAC ELECTROPHYSIOLOGY PROCEDURE N/A 6/23/2021    Procedure: Ablation atrial flutter;  Surgeon: Antonio Senior MD;  Location: [facility] INVASIVE LOCATION;  Service: Cardiovascular;  Laterality: N/A;   •  "CATARACT EXTRACTION         Social History:  Social History     Socioeconomic History   • Marital status:    Tobacco Use   • Smoking status: Former Smoker   • Smokeless tobacco: Never Used   Substance and Sexual Activity   • Alcohol use: No   • Drug use: Never   • Sexual activity: Defer       Allergies:  No Known Allergies    Immunizations:    There is no immunization history on file for this patient.       Objective:         Review of Systems   Constitutional: Negative for fatigue.   Respiratory: Negative for shortness of breath.    Cardiovascular: Negative for chest pain, palpitations and leg swelling.   All other systems reviewed and are negative.       /76   Pulse 74   Ht 160 cm (63\")   Wt 97.5 kg (215 lb)   BMI 38.09 kg/m²     Constitutional:       General: Not in acute distress.     Appearance: Well-developed.   Eyes:      General: No scleral icterus.     Conjunctiva/sclera: Conjunctivae normal.      Pupils: Pupils are equal, round, and reactive to light.   HENT:      Head: Normocephalic and atraumatic.   Neck:      Thyroid: No thyromegaly.   Pulmonary:      Effort: Pulmonary effort is normal.      Breath sounds: Normal breath sounds.   Cardiovascular:      Normal rate. Regular rhythm.   Abdominal:      General: Bowel sounds are normal.      Palpations: Abdomen is soft.   Musculoskeletal: Normal range of motion.      Cervical back: Normal range of motion. Skin:     General: Skin is warm and dry.   Neurological:      Mental Status: Alert and oriented to person, place, and time.         In-Office Procedure(s):    ECG 12 Lead    Date/Time: 3/28/2022 1:14 PM  Performed by: Antonio Senior MD  Authorized by: Antonio Senior MD   Comparison: compared with previous ECG from 7/20/2021  Comparison to previous ECG: NSR, normal  Rhythm: sinus rhythm  Conduction: 1st degree AV block    Clinical impression: abnormal EKG          ECG 12 Lead    (Results Pending)        ASCVD RIsk Score::  The " ASCVD Risk score (Buckymarga RUBIO Jr., et al., 2013) failed to calculate for the following reasons:    The 2013 ASCVD risk score is only valid for ages 40 to 79    Recent Radiology:          Lab Review:       Recent labs reviewed and interpreted for clinical significance and application          Assessment:          Diagnosis Plan   1. Atypical atrial flutter (HCC)  ECG 12 Lead   2. Essential hypertension            Plan:      1. Atypical atrial flutter - s/p ablation without recurrence, continue apixaban    2. HTN - controlled on metoprolol,  continue same     RTC 6 months         Level of Care:                 Antonio Senior MD  03/28/22

## 2022-03-30 ENCOUNTER — TELEPHONE (OUTPATIENT)
Dept: CARDIOLOGY | Facility: CLINIC | Age: 82
End: 2022-03-30

## 2022-03-30 DIAGNOSIS — I10 ESSENTIAL HYPERTENSION: ICD-10-CM

## 2022-03-30 DIAGNOSIS — I48.4 ATYPICAL ATRIAL FLUTTER: ICD-10-CM

## 2022-03-30 RX ORDER — FUROSEMIDE 40 MG/1
40 TABLET ORAL DAILY
Qty: 90 TABLET | Refills: 1 | Status: SHIPPED | OUTPATIENT
Start: 2022-03-30 | End: 2022-10-13 | Stop reason: SDUPTHER

## 2022-03-30 RX ORDER — POTASSIUM CHLORIDE 750 MG/1
10 TABLET, FILM COATED, EXTENDED RELEASE ORAL 2 TIMES DAILY
Qty: 180 TABLET | Refills: 1 | Status: SHIPPED | OUTPATIENT
Start: 2022-03-30 | End: 2022-11-07 | Stop reason: SDUPTHER

## 2022-03-30 NOTE — TELEPHONE ENCOUNTER
3/30/22-Dr Senior  Pt: Laure Leung  : 1940  Phone: [phone]  Reason for Call:  Pt. Needs refills on:  1. Eliquis, 5mg #180  2.Furosemide 40mg # 90 1 tab po qd  3. Metoprolol tartrate 25mg #180  4. Potassium Chloride 10 MEQ CR Tablet#180     Please send to [facility]   Phone: [phone]

## 2022-04-20 ENCOUNTER — OFFICE VISIT (OUTPATIENT)
Dept: FAMILY MEDICINE CLINIC | Facility: CLINIC | Age: 82
End: 2022-04-20

## 2022-04-20 VITALS
BODY MASS INDEX: 37.67 KG/M2 | OXYGEN SATURATION: 96 % | DIASTOLIC BLOOD PRESSURE: 68 MMHG | WEIGHT: 212.6 LBS | HEIGHT: 63 IN | HEART RATE: 82 BPM | SYSTOLIC BLOOD PRESSURE: 150 MMHG

## 2022-04-20 DIAGNOSIS — M19.049 HAND ARTHRITIS: ICD-10-CM

## 2022-04-20 DIAGNOSIS — M25.512 CHRONIC PAIN OF BOTH SHOULDERS: ICD-10-CM

## 2022-04-20 DIAGNOSIS — I50.22 CHRONIC SYSTOLIC (CONGESTIVE) HEART FAILURE: ICD-10-CM

## 2022-04-20 DIAGNOSIS — M25.561 CHRONIC PAIN OF BOTH KNEES: ICD-10-CM

## 2022-04-20 DIAGNOSIS — M25.511 CHRONIC PAIN OF BOTH SHOULDERS: ICD-10-CM

## 2022-04-20 DIAGNOSIS — Z00.00 PREVENTATIVE HEALTH CARE: ICD-10-CM

## 2022-04-20 DIAGNOSIS — I10 ESSENTIAL HYPERTENSION: ICD-10-CM

## 2022-04-20 DIAGNOSIS — M54.2 CERVICALGIA: Primary | ICD-10-CM

## 2022-04-20 DIAGNOSIS — G89.29 CHRONIC PAIN OF BOTH KNEES: ICD-10-CM

## 2022-04-20 DIAGNOSIS — R73.09 ABNORMAL GLUCOSE: ICD-10-CM

## 2022-04-20 DIAGNOSIS — M25.562 CHRONIC PAIN OF BOTH KNEES: ICD-10-CM

## 2022-04-20 DIAGNOSIS — G89.29 CHRONIC PAIN OF BOTH SHOULDERS: ICD-10-CM

## 2022-04-20 DIAGNOSIS — R26.0 ATAXIC GAIT: ICD-10-CM

## 2022-04-20 DIAGNOSIS — I48.4 ATYPICAL ATRIAL FLUTTER: ICD-10-CM

## 2022-04-20 LAB
BILIRUB BLD-MCNC: NEGATIVE MG/DL
CLARITY, POC: CLEAR
COLOR UR: YELLOW
GLUCOSE UR STRIP-MCNC: NEGATIVE MG/DL
HBA1C MFR BLD: 8.3 % (ref 3.5–5.6)
KETONES UR QL: NEGATIVE
LEUKOCYTE EST, POC: NEGATIVE
NITRITE UR-MCNC: NEGATIVE MG/ML
PH UR: 5 [PH] (ref 5–8)
PROT UR STRIP-MCNC: ABNORMAL MG/DL
RBC # UR STRIP: NEGATIVE /UL
SP GR UR: 1.03 (ref 1–1.03)
UROBILINOGEN UR QL: NORMAL

## 2022-04-20 PROCEDURE — 81002 URINALYSIS NONAUTO W/O SCOPE: CPT | Performed by: NURSE PRACTITIONER

## 2022-04-20 PROCEDURE — 99387 INIT PM E/M NEW PAT 65+ YRS: CPT | Performed by: NURSE PRACTITIONER

## 2022-04-20 PROCEDURE — 80053 COMPREHEN METABOLIC PANEL: CPT | Performed by: NURSE PRACTITIONER

## 2022-04-20 PROCEDURE — 85027 COMPLETE CBC AUTOMATED: CPT | Performed by: NURSE PRACTITIONER

## 2022-04-20 PROCEDURE — 84443 ASSAY THYROID STIM HORMONE: CPT | Performed by: NURSE PRACTITIONER

## 2022-04-20 PROCEDURE — 36415 COLL VENOUS BLD VENIPUNCTURE: CPT | Performed by: NURSE PRACTITIONER

## 2022-04-20 PROCEDURE — 82306 VITAMIN D 25 HYDROXY: CPT | Performed by: NURSE PRACTITIONER

## 2022-04-20 PROCEDURE — 86431 RHEUMATOID FACTOR QUANT: CPT | Performed by: NURSE PRACTITIONER

## 2022-04-20 PROCEDURE — 3008F BODY MASS INDEX DOCD: CPT | Performed by: NURSE PRACTITIONER

## 2022-04-20 PROCEDURE — 83036 HEMOGLOBIN GLYCOSYLATED A1C: CPT | Performed by: NURSE PRACTITIONER

## 2022-04-20 PROCEDURE — 2014F MENTAL STATUS ASSESS: CPT | Performed by: NURSE PRACTITIONER

## 2022-04-20 PROCEDURE — 80061 LIPID PANEL: CPT | Performed by: NURSE PRACTITIONER

## 2022-04-20 PROCEDURE — 84550 ASSAY OF BLOOD/URIC ACID: CPT | Performed by: NURSE PRACTITIONER

## 2022-04-20 RX ORDER — GABAPENTIN 100 MG/1
100 CAPSULE ORAL NIGHTLY PRN
Qty: 30 CAPSULE | Refills: 0 | Status: SHIPPED | OUTPATIENT
Start: 2022-04-20 | End: 2023-01-03

## 2022-04-20 RX ORDER — MULTIPLE VITAMINS W/ MINERALS TAB 9MG-400MCG
1 TAB ORAL DAILY
COMMUNITY

## 2022-04-20 RX ORDER — GABAPENTIN 100 MG/1
100 CAPSULE ORAL NIGHTLY PRN
Qty: 30 CAPSULE | Refills: 0 | Status: CANCELLED | OUTPATIENT
Start: 2022-04-20

## 2022-04-20 RX ORDER — GABAPENTIN 100 MG/1
100 CAPSULE ORAL NIGHTLY PRN
Qty: 30 CAPSULE | Refills: 0 | Status: SHIPPED | OUTPATIENT
Start: 2022-04-20 | End: 2022-04-20

## 2022-04-20 NOTE — PROGRESS NOTES
"Chief Complaint  Establish Care, Shoulder Pain (Bilateral, reports pain when she tries to lift arms and has limited range), Knee Pain (Doesn't hurt while sitting, only while walking.  Pt would like a referral to discuss knee surgery.  She has seen Dr. Estrada for it but doesn't want to again.  She has also seen Dr. Young for knee injection.), Skin Problem (Pt reports that she has a skin condition on face that she uses cream for but can never completely heal it, reports she was told it's rosacic acne), and Back Pain (Mostly at night-early morning)    Subjective          Laure Leung presents to [facility] for   History of Present Illness       New patient presents today to establish care.  She has past medical history of atrial fibrillation, CHF and hypertension.  She is here to discuss chronic pain and a skin problem.    Atrial fibrillation, on Eliquis and metoprolol, had ablation in past, follows with Dr. Senior  -SEBASTIAN 2021 showed normal EF and moderate mitral regurgitation    CHF, on furosemide and potassium.  She denies swelling of the extremities, shortness of air    Bilateral shoulder pain, both crack and pop, unable to lift over 90 degrees. Has hand arthritis and difficulty with grasp.     Chronic neck pain/stiffness, xrays done per Dr. Lara and Dr. Estrada last year. She was told they were normal, she denies radiation of pain into the upper extremities    L>R knee pain, Received knee injections w/ some improvement per Dr. Young, then went to ortho clinic in [place] had 3 injections and got Covid19, hasn't been back. Uses walker for mobility, uses \"absorbing Jr\" which is a horse lintement to both knees. Had xrays at  in [place]      Taking a liver supplement for \"liver spots\" on her arms        The following portions of the patient's history were reviewed and updated as appropriate: allergies, current medications, past family history, past medical history, past social history, " past surgical history and problem list.    Past Medical History:   Diagnosis Date   • Atrial fibrillation (HCC)    • CHF (congestive heart failure) (HCC)    • Hypertension      Past Surgical History:   Procedure Laterality Date   • CARDIAC ELECTROPHYSIOLOGY PROCEDURE N/A 2021    Procedure: Ablation atrial flutter;  Surgeon: Antonio Senior MD;  Location: [facility] INVASIVE LOCATION;  Service: Cardiovascular;  Laterality: N/A;   • CATARACT EXTRACTION       Family History   Problem Relation Age of Onset   • No Known Problems Mother    • No Known Problems Father    • Atrial fibrillation Sister    • Diabetes Sister    • No Known Problems Maternal Aunt    • No Known Problems Maternal Uncle    • No Known Problems Paternal Aunt    • No Known Problems Paternal Uncle    • No Known Problems Maternal Grandmother    • No Known Problems Maternal Grandfather    • No Known Problems Paternal Grandmother    • No Known Problems Paternal Grandfather    • Heart attack Neg Hx    • Heart disease Neg Hx    • Heart failure Neg Hx    • Hyperlipidemia Neg Hx    • Hypertension Neg Hx      Social History     Tobacco Use   • Smoking status: Former Smoker     Packs/day: 0.50     Years: 12.00     Pack years: 6.00     Types: Cigarettes     Start date:      Quit date:      Years since quittin.3   • Smokeless tobacco: Never Used   Substance Use Topics   • Alcohol use: No       Current Outpatient Medications:   •  apixaban (ELIQUIS) 5 MG tablet tablet, Take 1 tablet by mouth Every 12 (Twelve) Hours., Disp: 180 tablet, Rfl: 1  •  ascorbic acid (VITAMIN C) 1000 MG tablet, Take 1,000 mg by mouth Daily., Disp: , Rfl:   •  B Complex Vitamins (VITAMIN B COMPLEX PO), Take  by mouth., Disp: , Rfl:   •  furosemide (LASIX) 40 MG tablet, Take 1 tablet by mouth Daily., Disp: 90 tablet, Rfl: 1  •  gabapentin (NEURONTIN) 100 MG capsule, Take 1 capsule by mouth At Night As Needed (pain)., Disp: 30 capsule, Rfl: 0  •  metoprolol tartrate  "(LOPRESSOR) 25 MG tablet, Take 0.5 tablets by mouth 2 (Two) Times a Day., Disp: 90 tablet, Rfl: 1  •  Multiple Vitamins-Minerals (ZINC PO), Take  by mouth., Disp: , Rfl:   •  multivitamin with minerals (MULTIVITAMIN ADULT PO), Take 1 tablet by mouth Daily. Liver support, otc, Disp: , Rfl:   •  potassium chloride 10 MEQ CR tablet, Take 1 tablet by mouth 2 (Two) Times a Day., Disp: 180 tablet, Rfl: 1  •  Probiotic Product (PROBIOTIC PO), Take  by mouth., Disp: , Rfl:   •  SELENIUM PO, Take  by mouth., Disp: , Rfl:   •  Vitamin D, Cholecalciferol, (CHOLECALCIFEROL) 10 MCG (400 UNIT) tablet, Take 400 Units by mouth Daily., Disp: , Rfl:     Objective   Vital Signs:   /68 (BP Location: Left arm, Patient Position: Sitting, Cuff Size: Adult)   Pulse 82   Ht 160 cm (62.99\")   Wt 96.4 kg (212 lb 9.6 oz)   SpO2 96%   BMI 37.67 kg/m²       Physical Exam  Vitals and nursing note reviewed.   Constitutional:       General: She is not in acute distress.     Appearance: She is well-developed. She is obese. She is not diaphoretic.   HENT:      Head: Normocephalic.      Nose: Nose normal.      Mouth/Throat:      Mouth: Mucous membranes are moist.   Eyes:      Conjunctiva/sclera: Conjunctivae normal.      Pupils: Pupils are equal, round, and reactive to light.   Neck:      Thyroid: No thyromegaly.      Vascular: No JVD.   Cardiovascular:      Rate and Rhythm: Normal rate and regular rhythm.      Heart sounds: Murmur (GIV/VI PIO) heard.   Pulmonary:      Effort: Pulmonary effort is normal. No respiratory distress.      Breath sounds: Normal breath sounds. No wheezing or rhonchi.   Abdominal:      General: Bowel sounds are normal. There is no distension.      Palpations: Abdomen is soft.      Tenderness: There is no abdominal tenderness.   Musculoskeletal:         General: Tenderness ( B knee OA L>R mod avila rom, B shoulders severe avila rom, crepitus. neck ache good rom. ) and deformity (B hands with poor grasp) present. No " swelling.      Cervical back: Normal range of motion and neck supple. No rigidity or tenderness.   Lymphadenopathy:      Cervical: No cervical adenopathy.   Skin:     General: Skin is warm and dry.   Neurological:      Mental Status: She is alert and oriented to person, place, and time.      Sensory: No sensory deficit.      Motor: Weakness present.      Coordination: Coordination abnormal.      Gait: Gait abnormal (using walker for mobility).   Psychiatric:         Behavior: Behavior normal.         Thought Content: Thought content normal.         Judgment: Judgment normal.          Result Review :     Office Visit on 04/20/2022   Component Date Value Ref Range Status   • Color 04/20/2022 Yellow  Yellow, Straw, Dark Yellow, Siri Final   • Clarity, UA 04/20/2022 Clear  Clear Final   • Glucose, UA 04/20/2022 Negative  Negative, 1000 mg/dL (3+) mg/dL Final   • Bilirubin 04/20/2022 Negative  Negative Final   • Ketones, UA 04/20/2022 Negative  Negative Final   • Specific Gravity  04/20/2022 1.030  1.005 - 1.030 Final   • Blood, UA 04/20/2022 Negative (A) Negative Final   • pH, Urine 04/20/2022 5.0  5.0 - 8.0 Final   • Protein, POC 04/20/2022 Trace (A) Negative mg/dL Final   • Urobilinogen, UA 04/20/2022 Normal  Normal Final   • Leukocytes 04/20/2022 Negative  Negative Final   • Nitrite, UA 04/20/2022 Negative  Negative Final                       Assessment and Plan    Diagnoses and all orders for this visit:    1. Cervicalgia (Primary)  Comments:  request Dr. Estrada/[facility] records/xrays.  Recommend trial of gabapentin nightly as needed for pain of all joints/neck  Orders:  -     Ambulatory Referral to Physical Therapy Ortho, Evaluate and treat; Soft Tissue Mobilizaton; Strengthening, ROM, Stretching; Full weight bearing (as tolerated)  -     Vitamin D 25 Hydroxy    2. Chronic pain of both shoulders  Comments:  referral to PT, referral to ortho for shoulder and knee eval. rec tylenol ES and glucos/chondroitin  daily  Orders:  -     Ambulatory Referral to Physical Therapy Ortho, Evaluate and treat; Soft Tissue Mobilizaton; Strengthening, ROM, Stretching; Full weight bearing (as tolerated)  -     Ambulatory Referral to Orthopedic Surgery  -     Vitamin D 25 Hydroxy    3. Chronic pain of both knees  Comments:  request Dr. Young records and xrays,  xrays from [place]. will place referral to ortho for knee and shoulder eval  Orders:  -     Ambulatory Referral to Physical Therapy Ortho, Evaluate and treat; Soft Tissue Mobilizaton; Strengthening, ROM, Stretching; Full weight bearing (as tolerated)  -     Ambulatory Referral to Orthopedic Surgery  -     Vitamin D 25 Hydroxy    4. Essential hypertension  Comments:  bp slightly elev, cont metop and f/u with Dr. Senior  Orders:  -     Lipid Panel  -     Comprehensive Metabolic Panel  -     CBC (No Diff)  -     TSH  -     Vitamin D 25 Hydroxy    5. Chronic systolic (congestive) heart failure (HCC)  Comments:  cont with Dr. Senior    6. Hand arthritis  Comments:  check WILNER and uric acid, no nsaids d/t eliquis.   Orders:  -     Vitamin D 25 Hydroxy  -     Uric Acid  -     Rheumatoid Factor    7. Atypical atrial flutter (HCC)  Comments:  cont eliquis, f/u with Dr. Senior    8. Ataxic gait  Comments:  Referral to PT for eval and treat  Orders:  -     Ambulatory Referral to Physical Therapy Ortho, Evaluate and treat; Soft Tissue Mobilizaton; Strengthening, ROM, Stretching; Full weight bearing (as tolerated)    9. Abnormal glucose  -     Hemoglobin A1c    10. Body mass index (BMI) 37.0-37.9, adult   -     Vitamin D 25 Hydroxy    11. Preventative health care  Comments:  Labs today as ordered, will notify results  Will discuss DEXA scan at next appointment  Orders:  -     POC Urinalysis Dipstick    Other orders  -     Discontinue: gabapentin (NEURONTIN) 100 MG capsule; Take 1 capsule by mouth At Night As Needed (pain).  Dispense: 30 capsule; Refill: 0  -     gabapentin (NEURONTIN) 100 MG  capsule; Take 1 capsule by mouth At Night As Needed (pain).  Dispense: 30 capsule; Refill: 0        I spent 40 minutes caring for Laure Leung on this date of service. This time includes time spent by me in the following activities: preparing for the visit, reviewing tests, performing a medically appropriate examination and/or evaluation , counseling and educating the patient/family/caregiver, ordering medications, tests, or procedures and documenting information in the medical record        Follow Up     Return in about 3 months (around 7/20/2022) for Recheck.  Patient was given instructions and counseling regarding her condition or for health maintenance advice. Please see specific information pulled into the AVS if appropriate.        Part of this note may be an electronic transcription/translation of spoken language to printed text using the Dragon Dictation System

## 2022-04-20 NOTE — TELEPHONE ENCOUNTER
Patient has come back by the office and her medication was sent to mail order and not the [facility] in [place]. Please resend.

## 2022-04-21 LAB
25(OH)D3 SERPL-MCNC: 42.2 NG/ML (ref 30–100)
ALBUMIN SERPL-MCNC: 4.8 G/DL (ref 3.5–5.2)
ALBUMIN/GLOB SERPL: 2.2 G/DL
ALP SERPL-CCNC: 83 U/L (ref 39–117)
ALT SERPL W P-5'-P-CCNC: 25 U/L (ref 1–33)
ANION GAP SERPL CALCULATED.3IONS-SCNC: 14.4 MMOL/L (ref 5–15)
AST SERPL-CCNC: 23 U/L (ref 1–32)
BILIRUB SERPL-MCNC: 1 MG/DL (ref 0–1.2)
BUN SERPL-MCNC: 27 MG/DL (ref 8–23)
BUN/CREAT SERPL: 31.4 (ref 7–25)
CALCIUM SPEC-SCNC: 10.4 MG/DL (ref 8.6–10.5)
CHLORIDE SERPL-SCNC: 104 MMOL/L (ref 98–107)
CHOLEST SERPL-MCNC: 233 MG/DL (ref 0–200)
CHROMATIN AB SERPL-ACNC: <10 IU/ML (ref 0–14)
CO2 SERPL-SCNC: 22.6 MMOL/L (ref 22–29)
CREAT SERPL-MCNC: 0.86 MG/DL (ref 0.57–1)
DEPRECATED RDW RBC AUTO: 43.5 FL (ref 37–54)
EGFRCR SERPLBLD CKD-EPI 2021: 68 ML/MIN/1.73
ERYTHROCYTE [DISTWIDTH] IN BLOOD BY AUTOMATED COUNT: 12.8 % (ref 12.3–15.4)
GLOBULIN UR ELPH-MCNC: 2.2 GM/DL
GLUCOSE SERPL-MCNC: 254 MG/DL (ref 65–99)
HCT VFR BLD AUTO: 36.6 % (ref 34–46.6)
HDLC SERPL-MCNC: 48 MG/DL (ref 40–60)
HGB BLD-MCNC: 12.4 G/DL (ref 12–15.9)
LDLC SERPL CALC-MCNC: 153 MG/DL (ref 0–100)
LDLC/HDLC SERPL: 3.13 {RATIO}
MCH RBC QN AUTO: 32.1 PG (ref 26.6–33)
MCHC RBC AUTO-ENTMCNC: 33.9 G/DL (ref 31.5–35.7)
MCV RBC AUTO: 94.8 FL (ref 79–97)
PLATELET # BLD AUTO: 342 10*3/MM3 (ref 140–450)
PMV BLD AUTO: 11 FL (ref 6–12)
POTASSIUM SERPL-SCNC: 4.9 MMOL/L (ref 3.5–5.2)
PROT SERPL-MCNC: 7 G/DL (ref 6–8.5)
RBC # BLD AUTO: 3.86 10*6/MM3 (ref 3.77–5.28)
SODIUM SERPL-SCNC: 141 MMOL/L (ref 136–145)
TRIGL SERPL-MCNC: 175 MG/DL (ref 0–150)
TSH SERPL DL<=0.05 MIU/L-ACNC: 1.36 UIU/ML (ref 0.27–4.2)
URATE SERPL-MCNC: 7.2 MG/DL (ref 2.4–5.7)
VLDLC SERPL-MCNC: 32 MG/DL (ref 5–40)
WBC NRBC COR # BLD: 7.35 10*3/MM3 (ref 3.4–10.8)

## 2022-04-22 RX ORDER — ALLOPURINOL 300 MG/1
300 TABLET ORAL DAILY
Qty: 90 TABLET | Refills: 1 | Status: SHIPPED | OUTPATIENT
Start: 2022-04-22 | End: 2022-10-17 | Stop reason: SDUPTHER

## 2022-04-28 ENCOUNTER — TELEPHONE (OUTPATIENT)
Dept: FAMILY MEDICINE CLINIC | Facility: CLINIC | Age: 82
End: 2022-04-28

## 2022-04-28 RX ORDER — METFORMIN HYDROCHLORIDE 500 MG/1
500 TABLET, EXTENDED RELEASE ORAL 2 TIMES DAILY
Qty: 60 TABLET | Refills: 0 | Status: SHIPPED | OUTPATIENT
Start: 2022-04-28 | End: 2022-05-31 | Stop reason: SDUPTHER

## 2022-04-28 NOTE — TELEPHONE ENCOUNTER
PATIENT IS CALLING IN ASKING FOR A CALL BACK.  SHE WAS INFORMED IF THE MEDICATION metFORMIN (GLUCOPHAGE) 500 MG tablet CAUSED DIARRHEA TO CALL IN.  SHE STATES THAT SHE IS HAVING  CHRONIC     [phone]    [facility] [address] [place] [address] - [address] [facility] [address] - [phone] [facility] [phone] FX

## 2022-04-28 NOTE — TELEPHONE ENCOUNTER
I sent an extended release metformin to pharmacy, start taking this instead, usually side of diarrhea is much better or does not happen with this. Let me know in a week.

## 2022-04-29 ENCOUNTER — TREATMENT (OUTPATIENT)
Dept: PHYSICAL THERAPY | Facility: CLINIC | Age: 82
End: 2022-04-29

## 2022-04-29 DIAGNOSIS — G89.29 CHRONIC PAIN OF BOTH SHOULDERS: Primary | ICD-10-CM

## 2022-04-29 DIAGNOSIS — R26.9 GAIT DISTURBANCE: ICD-10-CM

## 2022-04-29 DIAGNOSIS — M25.511 CHRONIC PAIN OF BOTH SHOULDERS: Primary | ICD-10-CM

## 2022-04-29 DIAGNOSIS — M17.0 OSTEOARTHRITIS OF BOTH KNEES, UNSPECIFIED OSTEOARTHRITIS TYPE: ICD-10-CM

## 2022-04-29 DIAGNOSIS — M25.561 CHRONIC PAIN OF BOTH KNEES: ICD-10-CM

## 2022-04-29 DIAGNOSIS — M25.512 CHRONIC PAIN OF BOTH SHOULDERS: Primary | ICD-10-CM

## 2022-04-29 DIAGNOSIS — G89.29 CHRONIC PAIN OF BOTH KNEES: ICD-10-CM

## 2022-04-29 DIAGNOSIS — M54.2 PAIN, NECK: ICD-10-CM

## 2022-04-29 DIAGNOSIS — M25.562 CHRONIC PAIN OF BOTH KNEES: ICD-10-CM

## 2022-04-29 PROCEDURE — 97163 PT EVAL HIGH COMPLEX 45 MIN: CPT | Performed by: PHYSICAL THERAPIST

## 2022-05-05 ENCOUNTER — TREATMENT (OUTPATIENT)
Dept: PHYSICAL THERAPY | Facility: CLINIC | Age: 82
End: 2022-05-05

## 2022-05-05 DIAGNOSIS — G89.29 CHRONIC PAIN OF BOTH SHOULDERS: ICD-10-CM

## 2022-05-05 DIAGNOSIS — M25.512 CHRONIC PAIN OF BOTH SHOULDERS: ICD-10-CM

## 2022-05-05 DIAGNOSIS — M25.561 CHRONIC PAIN OF BOTH KNEES: ICD-10-CM

## 2022-05-05 DIAGNOSIS — G89.29 CHRONIC PAIN OF BOTH KNEES: ICD-10-CM

## 2022-05-05 DIAGNOSIS — R26.9 GAIT DISTURBANCE: ICD-10-CM

## 2022-05-05 DIAGNOSIS — M17.0 OSTEOARTHRITIS OF BOTH KNEES, UNSPECIFIED OSTEOARTHRITIS TYPE: ICD-10-CM

## 2022-05-05 DIAGNOSIS — M54.2 PAIN, NECK: Primary | ICD-10-CM

## 2022-05-05 DIAGNOSIS — M25.511 CHRONIC PAIN OF BOTH SHOULDERS: ICD-10-CM

## 2022-05-05 DIAGNOSIS — M25.562 CHRONIC PAIN OF BOTH KNEES: ICD-10-CM

## 2022-05-05 PROCEDURE — 97110 THERAPEUTIC EXERCISES: CPT | Performed by: PHYSICAL THERAPIST

## 2022-05-05 PROCEDURE — 97530 THERAPEUTIC ACTIVITIES: CPT | Performed by: PHYSICAL THERAPIST

## 2022-05-05 NOTE — PROGRESS NOTES
Physical Therapy Daily Progress Note    VISIT#: 2    Subjective   Laure Leung reports: Feels about the same as last time. Hurts in both legs throughout both legs. Is not sure why she has to go to the doctor next week if we are going to work on the legs here too. Is not sure why she was sent for both the legs and her shoulder either.       Objective     See Exercise, Manual, and Modality Logs for complete treatment.     Patient Education: circuit training for endurance improvements     Assessment/Plan  Pt issued initial HEP for endurance training of LEs in seated position as she stated she could not tolerate supine positioning and was not able to stand for very long. Max education given throughout session on importance of strength and endurance training to improve pain levels and function. Pt with good understanding however will likely require additional motivation regarding this.   Will assess tolerance to today's session tomorrow. Pt stating she is concerned about tomorrows session d/t feeling like she won't be able to walk after this.     Progress per Plan of Care        Timed:         Manual Therapy:         mins  41646;     Therapeutic Exercise:    27     mins  19661;     Neuromuscular Diego:        mins  86191;    Therapeutic Activity:     12     mins  75173;     Gait Training:           mins  35592;     Ultrasound:          mins  19737;    Ionto                                   mins   21598  Self Care                            mins   51157  Canalith Repos                   mins  08846    Un-Timed:  Electrical Stimulation:         mins  32382 ( );  Traction          mins 93466  Dry Needle                 ______ mins DRYNDL  Low Eval          Mins  88533  Mod Eval          Mins  53723  High Eval                            Mins  09748  Re-Eval                               mins  60134    Timed Treatment:   39   mins   Total Treatment:     39   mins    Keturah Martino, PT    Physical Therapist

## 2022-05-08 ENCOUNTER — HOSPITAL ENCOUNTER (OUTPATIENT)
Facility: HOSPITAL | Age: 82
Setting detail: OBSERVATION
LOS: 1 days | Discharge: HOME | End: 2022-05-11
Attending: EMERGENCY MEDICINE | Admitting: FAMILY MEDICINE

## 2022-05-08 ENCOUNTER — APPOINTMENT (OUTPATIENT)
Dept: GENERAL RADIOLOGY | Facility: HOSPITAL | Age: 82
End: 2022-05-08

## 2022-05-08 DIAGNOSIS — I48.91 ATRIAL FIBRILLATION WITH RVR: Primary | ICD-10-CM

## 2022-05-08 DIAGNOSIS — E83.42 HYPOMAGNESEMIA: ICD-10-CM

## 2022-05-08 LAB
ALBUMIN SERPL-MCNC: 4.3 G/DL (ref 3.5–5.2)
ALBUMIN/GLOB SERPL: 1.8 G/DL
ALP SERPL-CCNC: 84 U/L (ref 39–117)
ALT SERPL W P-5'-P-CCNC: 15 U/L (ref 1–33)
ANION GAP SERPL CALCULATED.3IONS-SCNC: 15 MMOL/L (ref 5–15)
AST SERPL-CCNC: 18 U/L (ref 1–32)
BASOPHILS # BLD AUTO: 0.1 10*3/MM3 (ref 0–0.2)
BASOPHILS NFR BLD AUTO: 1.2 % (ref 0–1.5)
BILIRUB SERPL-MCNC: 0.8 MG/DL (ref 0–1.2)
BUN SERPL-MCNC: 25 MG/DL (ref 8–23)
BUN/CREAT SERPL: 24.8 (ref 7–25)
CALCIUM SPEC-SCNC: 9.5 MG/DL (ref 8.6–10.5)
CHLORIDE SERPL-SCNC: 100 MMOL/L (ref 98–107)
CO2 SERPL-SCNC: 22 MMOL/L (ref 22–29)
CREAT SERPL-MCNC: 1.01 MG/DL (ref 0.57–1)
DEPRECATED RDW RBC AUTO: 45.5 FL (ref 37–54)
EGFRCR SERPLBLD CKD-EPI 2021: 56 ML/MIN/1.73
EOSINOPHIL # BLD AUTO: 0.1 10*3/MM3 (ref 0–0.4)
EOSINOPHIL NFR BLD AUTO: 1 % (ref 0.3–6.2)
ERYTHROCYTE [DISTWIDTH] IN BLOOD BY AUTOMATED COUNT: 13.9 % (ref 12.3–15.4)
GLOBULIN UR ELPH-MCNC: 2.4 GM/DL
GLUCOSE SERPL-MCNC: 190 MG/DL (ref 65–99)
HCT VFR BLD AUTO: 36.9 % (ref 34–46.6)
HGB BLD-MCNC: 12.7 G/DL (ref 12–15.9)
HOLD SPECIMEN: NORMAL
LYMPHOCYTES # BLD AUTO: 1 10*3/MM3 (ref 0.7–3.1)
LYMPHOCYTES NFR BLD AUTO: 12.3 % (ref 19.6–45.3)
MAGNESIUM SERPL-MCNC: 1.6 MG/DL (ref 1.6–2.4)
MCH RBC QN AUTO: 31.6 PG (ref 26.6–33)
MCHC RBC AUTO-ENTMCNC: 34.4 G/DL (ref 31.5–35.7)
MCV RBC AUTO: 92 FL (ref 79–97)
MONOCYTES # BLD AUTO: 1.2 10*3/MM3 (ref 0.1–0.9)
MONOCYTES NFR BLD AUTO: 14.8 % (ref 5–12)
NEUTROPHILS NFR BLD AUTO: 5.9 10*3/MM3 (ref 1.7–7)
NEUTROPHILS NFR BLD AUTO: 70.7 % (ref 42.7–76)
NRBC BLD AUTO-RTO: 0.1 /100 WBC (ref 0–0.2)
PHOSPHATE SERPL-MCNC: 2.9 MG/DL (ref 2.5–4.5)
PLATELET # BLD AUTO: 283 10*3/MM3 (ref 140–450)
PMV BLD AUTO: 8.2 FL (ref 6–12)
POTASSIUM SERPL-SCNC: 4.3 MMOL/L (ref 3.5–5.2)
PROT SERPL-MCNC: 6.7 G/DL (ref 6–8.5)
QT INTERVAL: 278 MS
QT INTERVAL: 296 MS
QT INTERVAL: 335 MS
QT INTERVAL: 341 MS
RBC # BLD AUTO: 4.01 10*6/MM3 (ref 3.77–5.28)
SARS-COV-2 RNA PNL SPEC NAA+PROBE: NOT DETECTED
SODIUM SERPL-SCNC: 137 MMOL/L (ref 136–145)
TROPONIN T SERPL-MCNC: <0.01 NG/ML (ref 0–0.03)
WBC NRBC COR # BLD: 8.4 10*3/MM3 (ref 3.4–10.8)
WHOLE BLOOD HOLD SPECIMEN: NORMAL
WHOLE BLOOD HOLD SPECIMEN: NORMAL

## 2022-05-08 PROCEDURE — 71045 X-RAY EXAM CHEST 1 VIEW: CPT

## 2022-05-08 PROCEDURE — 93005 ELECTROCARDIOGRAM TRACING: CPT

## 2022-05-08 PROCEDURE — 25010000002 ADENOSINE PER 6 MG: Performed by: EMERGENCY MEDICINE

## 2022-05-08 PROCEDURE — 84100 ASSAY OF PHOSPHORUS: CPT | Performed by: EMERGENCY MEDICINE

## 2022-05-08 PROCEDURE — 96366 THER/PROPH/DIAG IV INF ADDON: CPT

## 2022-05-08 PROCEDURE — 93005 ELECTROCARDIOGRAM TRACING: CPT | Performed by: EMERGENCY MEDICINE

## 2022-05-08 PROCEDURE — 96376 TX/PRO/DX INJ SAME DRUG ADON: CPT

## 2022-05-08 PROCEDURE — 25010000002 ADENOSINE PER 6 MG

## 2022-05-08 PROCEDURE — 99285 EMERGENCY DEPT VISIT HI MDM: CPT

## 2022-05-08 PROCEDURE — 87635 SARS-COV-2 COVID-19 AMP PRB: CPT | Performed by: EMERGENCY MEDICINE

## 2022-05-08 PROCEDURE — 84484 ASSAY OF TROPONIN QUANT: CPT | Performed by: EMERGENCY MEDICINE

## 2022-05-08 PROCEDURE — 96367 TX/PROPH/DG ADDL SEQ IV INF: CPT

## 2022-05-08 PROCEDURE — 96375 TX/PRO/DX INJ NEW DRUG ADDON: CPT

## 2022-05-08 PROCEDURE — 25010000002 MAGNESIUM SULFATE 2 GM/50ML SOLUTION: Performed by: EMERGENCY MEDICINE

## 2022-05-08 PROCEDURE — 83735 ASSAY OF MAGNESIUM: CPT | Performed by: EMERGENCY MEDICINE

## 2022-05-08 PROCEDURE — 85025 COMPLETE CBC W/AUTO DIFF WBC: CPT | Performed by: EMERGENCY MEDICINE

## 2022-05-08 PROCEDURE — 80053 COMPREHEN METABOLIC PANEL: CPT | Performed by: EMERGENCY MEDICINE

## 2022-05-08 PROCEDURE — 96365 THER/PROPH/DIAG IV INF INIT: CPT

## 2022-05-08 PROCEDURE — C9803 HOPD COVID-19 SPEC COLLECT: HCPCS

## 2022-05-08 RX ORDER — ADENOSINE 3 MG/ML
INJECTION, SOLUTION INTRAVENOUS
Status: COMPLETED
Start: 2022-05-08 | End: 2022-05-08

## 2022-05-08 RX ORDER — ADENOSINE 3 MG/ML
6 INJECTION, SOLUTION INTRAVENOUS ONCE
Status: COMPLETED | OUTPATIENT
Start: 2022-05-08 | End: 2022-05-08

## 2022-05-08 RX ORDER — DILTIAZEM HYDROCHLORIDE 5 MG/ML
20 INJECTION INTRAVENOUS ONCE
Status: COMPLETED | OUTPATIENT
Start: 2022-05-08 | End: 2022-05-08

## 2022-05-08 RX ORDER — ADENOSINE 3 MG/ML
12 INJECTION, SOLUTION INTRAVENOUS ONCE
Status: COMPLETED | OUTPATIENT
Start: 2022-05-08 | End: 2022-05-08

## 2022-05-08 RX ORDER — DILTIAZEM HCL/D5W 125 MG/125
5-15 PLASTIC BAG, INJECTION (ML) INTRAVENOUS
Status: DISCONTINUED | OUTPATIENT
Start: 2022-05-08 | End: 2022-05-08

## 2022-05-08 RX ORDER — DILTIAZEM HCL/D5W 125 MG/125
5-15 PLASTIC BAG, INJECTION (ML) INTRAVENOUS
Status: DISCONTINUED | OUTPATIENT
Start: 2022-05-08 | End: 2022-05-11 | Stop reason: HOSPADM

## 2022-05-08 RX ORDER — MAGNESIUM SULFATE HEPTAHYDRATE 40 MG/ML
2 INJECTION, SOLUTION INTRAVENOUS ONCE
Status: COMPLETED | OUTPATIENT
Start: 2022-05-08 | End: 2022-05-08

## 2022-05-08 RX ADMIN — ADENOSINE 6 MG: 3 INJECTION INTRAVENOUS at 18:17

## 2022-05-08 RX ADMIN — DILTIAZEM HYDROCHLORIDE 20 MG: 5 INJECTION INTRAVENOUS at 18:31

## 2022-05-08 RX ADMIN — MAGNESIUM SULFATE HEPTAHYDRATE 2 G: 2 INJECTION, SOLUTION INTRAVENOUS at 18:55

## 2022-05-08 RX ADMIN — ADENOSINE 6 MG: 3 INJECTION, SOLUTION INTRAVENOUS at 18:17

## 2022-05-08 RX ADMIN — Medication 5 MG/HR: at 18:30

## 2022-05-08 RX ADMIN — SODIUM CHLORIDE 1000 ML: 9 INJECTION, SOLUTION INTRAVENOUS at 18:11

## 2022-05-08 RX ADMIN — Medication 5 MG/HR: at 20:56

## 2022-05-08 RX ADMIN — ADENOSINE 12 MG: 3 INJECTION INTRAVENOUS at 18:22

## 2022-05-08 NOTE — ED PROVIDER NOTES
Subjective   History of Present Illness  History Provided By: Patient    Chief Complaint: Heart racing  Onset: Around 9 AM this morning  Timing: Constant  Location: Chest  Quality: Feels like there is a horse running  Severity: Moderate  Modifying Factors: None    Other: 82-year-old female prior medical history of CAITLIN. omid who is anticoagulated and status post ablation presents for tachycardia.  Missed a dose of her metoprolol last night as she thought she took before she went to bed but when she got up this morning it was still sitting on the counter.  Did take this morning's dose but this did not help.    Review of Systems   Respiratory: Negative for shortness of breath.    Cardiovascular: Positive for palpitations. Negative for chest pain.   Gastrointestinal: Negative for abdominal pain, diarrhea, nausea and vomiting.   All other systems reviewed and are negative.      Past Medical History:   Diagnosis Date   • Atrial fibrillation (HCC)    • CHF (congestive heart failure) (HCC)    • Hypertension        No Known Allergies    Past Surgical History:   Procedure Laterality Date   • CARDIAC ELECTROPHYSIOLOGY PROCEDURE N/A 6/23/2021    Procedure: Ablation atrial flutter;  Surgeon: Antonio Senior MD;  Location: [facility] INVASIVE LOCATION;  Service: Cardiovascular;  Laterality: N/A;   • CATARACT EXTRACTION         Family History   Problem Relation Age of Onset   • No Known Problems Mother    • No Known Problems Father    • Atrial fibrillation Sister    • Diabetes Sister    • No Known Problems Maternal Aunt    • No Known Problems Maternal Uncle    • No Known Problems Paternal Aunt    • No Known Problems Paternal Uncle    • No Known Problems Maternal Grandmother    • No Known Problems Maternal Grandfather    • No Known Problems Paternal Grandmother    • No Known Problems Paternal Grandfather    • Heart attack Neg Hx    • Heart disease Neg Hx    • Heart failure Neg Hx    • Hyperlipidemia Neg Hx    • Hypertension Neg  Hx        Social History     Socioeconomic History   • Marital status:    Tobacco Use   • Smoking status: Former Smoker     Packs/day: 0.50     Years: 12.00     Pack years: 6.00     Types: Cigarettes     Start date:      Quit date:      Years since quittin.3   • Smokeless tobacco: Never Used   Substance and Sexual Activity   • Alcohol use: No   • Drug use: Never   • Sexual activity: Defer           Objective   Physical Exam  Constitutional:  No acute distress.  Head:  Atraumatic.  Normocephalic.   Eyes:  No scleral icterus. Normal conjunctiva  ENT:  Moist mucosa.  No nasal discharge present.  Cardiovascular:  Well perfused.  Equal pulses.  Tachycardic rate with regular rhythm.  Normal capillary refill.    Pulmonary/Chest:  No respiratory distress.  Airway patent.  No tachypnea.  No accessory muscle usage.    Abdominal:  Non-distended. Non-tender.   Extremities:  No peripheral edema.  No Deformity  Skin:  Warm, dry  Neurological:  Alert, awake, and appropriate.  Normal speech.      Procedures           ED Course                                                 [facility]  EKG # 2  Signed and interpreted by the EP.  Time Interpreted:  650PM  Rate: 93  Rhythm: NSR  Axis: WNL  Intervals:1ST DEGREE AV BLOCK  ST Segments: No acute ischemic changes      EKG # 1  Signed and interpreted by the EP.  Time Interpreted:  610pm  Rate: 169  Rhythm: svt  Axis: wnl  Intervals: wnl  ST Segments: Likely rate dependent changes     Comparison: SVT is new from prior    Cardiac monitor interpretation: Time 6:15 PM.  Rate 180.  Tachycardic regular rate consistent with SVT present.  Cardiac monitoring was placed given patient history of palpitations and arrhythmias.    Critical Care Time     The high probability of sudden, clinically significant deterioration in the patient's condition required the highest level of my preparedness to intervene urgently.  The services I provided to this patient were to treat and/or prevent  clinically significant deterioration that could result in: hemodynamic collapse or death . Services included the following: chart data review, reviewing nurses notes an/or old charts, documentation time, consultant collaboration regarding findings and treatment options, vital sign assessments and ordering, interpreting and reviewing diagnostic studies/lab test.  Aggregate critical care time was 45 minutes , which includes only time during which I was engaged in work directly related to the patient's care, as described above, whether at the bedside or elsewhere in the Emergency Department. It did not include time spent performing other reported procedures or the services of residents, students, nurses or physician assistants.    Patient initially in SVT at a rate of 180 on monitor.  Given adenosine x2 and then patient appeared to convert into A. fib with RVR.  Initially given Cardizem bolus and started on drip patient converted into what appeared to be normal sinus rhythm briefly and then back into rate controlled A. fib.  After Cardizem stopped patient returned to A. fib with RVR.  Cardizem drip restarted and patient admitted to hospital.  Accepted by Dr. Jesus.  Magnesium also replaced.    Final diagnoses:   Atrial fibrillation with RVR ([facility])   Hypomagnesemia       ED Disposition  ED Disposition     ED Disposition   Decision to Admit    Condition   --    Comment   Level of Care: Progressive Care [20]   Admitting Physician: TRANG JESUS [[id number]]   Attending Physician: TRANG JESUS [[id number]]               No follow-up provider specified.       Medication List      No changes were made to your prescriptions during this visit.          Jos Zamora MD  05/09/22 4179

## 2022-05-09 ENCOUNTER — APPOINTMENT (OUTPATIENT)
Dept: CARDIOLOGY | Facility: HOSPITAL | Age: 82
End: 2022-05-09

## 2022-05-09 PROBLEM — M10.9 GOUT: Status: ACTIVE | Noted: 2022-05-09

## 2022-05-09 PROBLEM — E11.9 DM (DIABETES MELLITUS), TYPE 2: Status: ACTIVE | Noted: 2022-05-09

## 2022-05-09 PROBLEM — Z98.890 HX OF ATRIOVENTRICULAR NODE ABLATION: Status: ACTIVE | Noted: 2022-05-09

## 2022-05-09 PROBLEM — I48.91 ATRIAL FIBRILLATION WITH RVR: Status: ACTIVE | Noted: 2022-05-09

## 2022-05-09 LAB
ANION GAP SERPL CALCULATED.3IONS-SCNC: 12 MMOL/L (ref 5–15)
BASOPHILS # BLD AUTO: 0.2 10*3/MM3 (ref 0–0.2)
BASOPHILS NFR BLD AUTO: 2.3 % (ref 0–1.5)
BH CV ECHO MEAS - ACS: 2.08 CM
BH CV ECHO MEAS - AI P1/2T: 510.9 MSEC
BH CV ECHO MEAS - AO MAX PG: 15.2 MMHG
BH CV ECHO MEAS - AO MEAN PG: 8.5 MMHG
BH CV ECHO MEAS - AO ROOT DIAM: 3.4 CM
BH CV ECHO MEAS - AO V2 MAX: 194.7 CM/SEC
BH CV ECHO MEAS - AO V2 VTI: 44.2 CM
BH CV ECHO MEAS - AVA(I,D): 2.34 CM2
BH CV ECHO MEAS - EDV(CUBED): 95.5 ML
BH CV ECHO MEAS - EDV(MOD-SP4): 62.6 ML
BH CV ECHO MEAS - EF(MOD-BP): 62 %
BH CV ECHO MEAS - EF(MOD-SP4): 62.2 %
BH CV ECHO MEAS - ESV(CUBED): 35.5 ML
BH CV ECHO MEAS - ESV(MOD-SP4): 23.6 ML
BH CV ECHO MEAS - FS: 28.1 %
BH CV ECHO MEAS - IVS/LVPW: 0.91 CM
BH CV ECHO MEAS - IVSD: 1.12 CM
BH CV ECHO MEAS - LA DIMENSION(2D): 4.2 CM
BH CV ECHO MEAS - LV DIASTOLIC VOL/BSA (35-75): 31.5 CM2
BH CV ECHO MEAS - LV MASS(C)D: 197.9 GRAMS
BH CV ECHO MEAS - LV MAX PG: 8.3 MMHG
BH CV ECHO MEAS - LV MEAN PG: 4.9 MMHG
BH CV ECHO MEAS - LV SYSTOLIC VOL/BSA (12-30): 11.9 CM2
BH CV ECHO MEAS - LV V1 MAX: 143.7 CM/SEC
BH CV ECHO MEAS - LV V1 VTI: 35.1 CM
BH CV ECHO MEAS - LVIDD: 4.6 CM
BH CV ECHO MEAS - LVIDS: 3.3 CM
BH CV ECHO MEAS - LVOT AREA: 3 CM2
BH CV ECHO MEAS - LVOT DIAM: 1.94 CM
BH CV ECHO MEAS - LVPWD: 1.24 CM
BH CV ECHO MEAS - MV A MAX VEL: 69.9 CM/SEC
BH CV ECHO MEAS - MV DEC SLOPE: 759.7 CM/SEC2
BH CV ECHO MEAS - MV DEC TIME: 0.2 MSEC
BH CV ECHO MEAS - MV E MAX VEL: 149.2 CM/SEC
BH CV ECHO MEAS - MV E/A: 2.13
BH CV ECHO MEAS - MV MAX PG: 13.7 MMHG
BH CV ECHO MEAS - MV MEAN PG: 3.9 MMHG
BH CV ECHO MEAS - MV V2 VTI: 46.7 CM
BH CV ECHO MEAS - MVA(VTI): 2.22 CM2
BH CV ECHO MEAS - PA V2 MAX: 123.2 CM/SEC
BH CV ECHO MEAS - QP/QS: 0.84
BH CV ECHO MEAS - RV MAX PG: 2.7 MMHG
BH CV ECHO MEAS - RV V1 MAX: 82.8 CM/SEC
BH CV ECHO MEAS - RV V1 VTI: 16.7 CM
BH CV ECHO MEAS - RVDD: 2.7 CM
BH CV ECHO MEAS - RVOT DIAM: 2.6 CM
BH CV ECHO MEAS - SI(MOD-SP4): 19.6 ML/M2
BH CV ECHO MEAS - SV(LVOT): 103.5 ML
BH CV ECHO MEAS - SV(MOD-SP4): 38.9 ML
BH CV ECHO MEAS - SV(RVOT): 87.4 ML
BH CV ECHO MEAS - TR MAX PG: 34 MMHG
BH CV ECHO MEAS - TR MAX VEL: 287.4 CM/SEC
BUN SERPL-MCNC: 21 MG/DL (ref 8–23)
BUN/CREAT SERPL: 23.3 (ref 7–25)
CALCIUM SPEC-SCNC: 9.4 MG/DL (ref 8.6–10.5)
CHLORIDE SERPL-SCNC: 104 MMOL/L (ref 98–107)
CO2 SERPL-SCNC: 25 MMOL/L (ref 22–29)
CREAT SERPL-MCNC: 0.9 MG/DL (ref 0.57–1)
DEPRECATED RDW RBC AUTO: 46.8 FL (ref 37–54)
EGFRCR SERPLBLD CKD-EPI 2021: 64.4 ML/MIN/1.73
EOSINOPHIL # BLD AUTO: 0.1 10*3/MM3 (ref 0–0.4)
EOSINOPHIL NFR BLD AUTO: 1.9 % (ref 0.3–6.2)
ERYTHROCYTE [DISTWIDTH] IN BLOOD BY AUTOMATED COUNT: 14.2 % (ref 12.3–15.4)
GLUCOSE BLDC GLUCOMTR-MCNC: 239 MG/DL (ref 70–105)
GLUCOSE BLDC GLUCOMTR-MCNC: 267 MG/DL (ref 70–105)
GLUCOSE SERPL-MCNC: 195 MG/DL (ref 65–99)
HCT VFR BLD AUTO: 32.4 % (ref 34–46.6)
HGB BLD-MCNC: 11.4 G/DL (ref 12–15.9)
IRON 24H UR-MRATE: 38 MCG/DL (ref 37–145)
IRON SATN MFR SERPL: 13 % (ref 20–50)
LV EF 2D ECHO EST: 60 %
LYMPHOCYTES # BLD AUTO: 1.4 10*3/MM3 (ref 0.7–3.1)
LYMPHOCYTES NFR BLD AUTO: 19.2 % (ref 19.6–45.3)
MAXIMAL PREDICTED HEART RATE: 139 BPM
MCH RBC QN AUTO: 32.5 PG (ref 26.6–33)
MCHC RBC AUTO-ENTMCNC: 35.2 G/DL (ref 31.5–35.7)
MCV RBC AUTO: 92.3 FL (ref 79–97)
MONOCYTES # BLD AUTO: 1 10*3/MM3 (ref 0.1–0.9)
MONOCYTES NFR BLD AUTO: 14.4 % (ref 5–12)
NEUTROPHILS NFR BLD AUTO: 4.4 10*3/MM3 (ref 1.7–7)
NEUTROPHILS NFR BLD AUTO: 62.2 % (ref 42.7–76)
NRBC BLD AUTO-RTO: 0.2 /100 WBC (ref 0–0.2)
NT-PROBNP SERPL-MCNC: 63.9 PG/ML (ref 0–1800)
PLATELET # BLD AUTO: 254 10*3/MM3 (ref 140–450)
PMV BLD AUTO: 8.5 FL (ref 6–12)
POTASSIUM SERPL-SCNC: 4.7 MMOL/L (ref 3.5–5.2)
RBC # BLD AUTO: 3.51 10*6/MM3 (ref 3.77–5.28)
SODIUM SERPL-SCNC: 141 MMOL/L (ref 136–145)
STRESS TARGET HR: 118 BPM
TIBC SERPL-MCNC: 294 MCG/DL (ref 298–536)
TRANSFERRIN SERPL-MCNC: 197 MG/DL (ref 200–360)
TSH SERPL DL<=0.05 MIU/L-ACNC: 0.87 UIU/ML (ref 0.27–4.2)
VIT B12 BLD-MCNC: 1356 PG/ML (ref 211–946)
WBC NRBC COR # BLD: 7.1 10*3/MM3 (ref 3.4–10.8)

## 2022-05-09 PROCEDURE — 96366 THER/PROPH/DIAG IV INF ADDON: CPT

## 2022-05-09 PROCEDURE — G0378 HOSPITAL OBSERVATION PER HR: HCPCS

## 2022-05-09 PROCEDURE — 82962 GLUCOSE BLOOD TEST: CPT

## 2022-05-09 PROCEDURE — 83880 ASSAY OF NATRIURETIC PEPTIDE: CPT | Performed by: FAMILY MEDICINE

## 2022-05-09 PROCEDURE — 84443 ASSAY THYROID STIM HORMONE: CPT | Performed by: FAMILY MEDICINE

## 2022-05-09 PROCEDURE — 80048 BASIC METABOLIC PNL TOTAL CA: CPT | Performed by: FAMILY MEDICINE

## 2022-05-09 PROCEDURE — G0108 DIAB MANAGE TRN  PER INDIV: HCPCS

## 2022-05-09 PROCEDURE — 82607 VITAMIN B-12: CPT | Performed by: FAMILY MEDICINE

## 2022-05-09 PROCEDURE — 99220 PR INITIAL OBSERVATION CARE/DAY 70 MINUTES: CPT | Performed by: FAMILY MEDICINE

## 2022-05-09 PROCEDURE — 93306 TTE W/DOPPLER COMPLETE: CPT | Performed by: INTERNAL MEDICINE

## 2022-05-09 PROCEDURE — 99214 OFFICE O/P EST MOD 30 MIN: CPT | Performed by: INTERNAL MEDICINE

## 2022-05-09 PROCEDURE — 84466 ASSAY OF TRANSFERRIN: CPT | Performed by: FAMILY MEDICINE

## 2022-05-09 PROCEDURE — 83540 ASSAY OF IRON: CPT | Performed by: FAMILY MEDICINE

## 2022-05-09 PROCEDURE — 63710000001 INSULIN LISPRO (HUMAN) PER 5 UNITS: Performed by: FAMILY MEDICINE

## 2022-05-09 PROCEDURE — 85025 COMPLETE CBC W/AUTO DIFF WBC: CPT | Performed by: FAMILY MEDICINE

## 2022-05-09 PROCEDURE — 93306 TTE W/DOPPLER COMPLETE: CPT

## 2022-05-09 RX ORDER — GABAPENTIN 100 MG/1
100 CAPSULE ORAL NIGHTLY PRN
Status: DISCONTINUED | OUTPATIENT
Start: 2022-05-09 | End: 2022-05-11 | Stop reason: HOSPADM

## 2022-05-09 RX ORDER — INSULIN LISPRO 100 [IU]/ML
0-7 INJECTION, SOLUTION INTRAVENOUS; SUBCUTANEOUS AS NEEDED
Status: DISCONTINUED | OUTPATIENT
Start: 2022-05-09 | End: 2022-05-11 | Stop reason: HOSPADM

## 2022-05-09 RX ORDER — CHOLECALCIFEROL (VITAMIN D3) 125 MCG
5 CAPSULE ORAL NIGHTLY PRN
Status: DISCONTINUED | OUTPATIENT
Start: 2022-05-09 | End: 2022-05-11 | Stop reason: HOSPADM

## 2022-05-09 RX ORDER — ASCORBIC ACID 500 MG
1000 TABLET ORAL DAILY
Status: DISCONTINUED | OUTPATIENT
Start: 2022-05-09 | End: 2022-05-11 | Stop reason: HOSPADM

## 2022-05-09 RX ORDER — ACETAMINOPHEN 325 MG/1
650 TABLET ORAL EVERY 4 HOURS PRN
Status: DISCONTINUED | OUTPATIENT
Start: 2022-05-09 | End: 2022-05-11 | Stop reason: HOSPADM

## 2022-05-09 RX ORDER — SODIUM CHLORIDE 0.9 % (FLUSH) 0.9 %
10 SYRINGE (ML) INJECTION AS NEEDED
Status: DISCONTINUED | OUTPATIENT
Start: 2022-05-09 | End: 2022-05-11 | Stop reason: HOSPADM

## 2022-05-09 RX ORDER — ALLOPURINOL 300 MG/1
300 TABLET ORAL DAILY
Status: DISCONTINUED | OUTPATIENT
Start: 2022-05-09 | End: 2022-05-11 | Stop reason: HOSPADM

## 2022-05-09 RX ORDER — OLANZAPINE 10 MG/2ML
1 INJECTION, POWDER, LYOPHILIZED, FOR SOLUTION INTRAMUSCULAR
Status: DISCONTINUED | OUTPATIENT
Start: 2022-05-09 | End: 2022-05-11 | Stop reason: HOSPADM

## 2022-05-09 RX ORDER — ALUMINA, MAGNESIA, AND SIMETHICONE 2400; 2400; 240 MG/30ML; MG/30ML; MG/30ML
15 SUSPENSION ORAL EVERY 6 HOURS PRN
Status: DISCONTINUED | OUTPATIENT
Start: 2022-05-09 | End: 2022-05-11 | Stop reason: HOSPADM

## 2022-05-09 RX ORDER — METFORMIN HYDROCHLORIDE 500 MG/1
500 TABLET, EXTENDED RELEASE ORAL 2 TIMES DAILY
Status: DISCONTINUED | OUTPATIENT
Start: 2022-05-09 | End: 2022-05-09

## 2022-05-09 RX ORDER — ACETAMINOPHEN 160 MG/5ML
650 SOLUTION ORAL EVERY 4 HOURS PRN
Status: DISCONTINUED | OUTPATIENT
Start: 2022-05-09 | End: 2022-05-11 | Stop reason: HOSPADM

## 2022-05-09 RX ORDER — ACETAMINOPHEN 650 MG/1
650 SUPPOSITORY RECTAL EVERY 4 HOURS PRN
Status: DISCONTINUED | OUTPATIENT
Start: 2022-05-09 | End: 2022-05-11 | Stop reason: HOSPADM

## 2022-05-09 RX ORDER — POTASSIUM CHLORIDE 750 MG/1
10 TABLET, FILM COATED, EXTENDED RELEASE ORAL 2 TIMES DAILY
Refills: 1 | Status: DISCONTINUED | OUTPATIENT
Start: 2022-05-09 | End: 2022-05-09

## 2022-05-09 RX ORDER — NITROGLYCERIN 0.4 MG/1
0.4 TABLET SUBLINGUAL
Status: DISCONTINUED | OUTPATIENT
Start: 2022-05-09 | End: 2022-05-11 | Stop reason: HOSPADM

## 2022-05-09 RX ORDER — FUROSEMIDE 40 MG/1
40 TABLET ORAL DAILY
Status: DISCONTINUED | OUTPATIENT
Start: 2022-05-09 | End: 2022-05-11 | Stop reason: HOSPADM

## 2022-05-09 RX ORDER — ONDANSETRON 4 MG/1
4 TABLET, FILM COATED ORAL EVERY 6 HOURS PRN
Status: DISCONTINUED | OUTPATIENT
Start: 2022-05-09 | End: 2022-05-11 | Stop reason: HOSPADM

## 2022-05-09 RX ORDER — NICOTINE POLACRILEX 4 MG
15 LOZENGE BUCCAL
Status: DISCONTINUED | OUTPATIENT
Start: 2022-05-09 | End: 2022-05-11 | Stop reason: HOSPADM

## 2022-05-09 RX ORDER — DEXTROSE MONOHYDRATE 25 G/50ML
25 INJECTION, SOLUTION INTRAVENOUS
Status: DISCONTINUED | OUTPATIENT
Start: 2022-05-09 | End: 2022-05-11 | Stop reason: HOSPADM

## 2022-05-09 RX ORDER — SODIUM CHLORIDE 0.9 % (FLUSH) 0.9 %
10 SYRINGE (ML) INJECTION EVERY 12 HOURS SCHEDULED
Status: DISCONTINUED | OUTPATIENT
Start: 2022-05-09 | End: 2022-05-11 | Stop reason: HOSPADM

## 2022-05-09 RX ORDER — HYDROCODONE BITARTRATE AND ACETAMINOPHEN 5; 325 MG/1; MG/1
1 TABLET ORAL EVERY 4 HOURS PRN
Status: DISCONTINUED | OUTPATIENT
Start: 2022-05-09 | End: 2022-05-11 | Stop reason: HOSPADM

## 2022-05-09 RX ORDER — INSULIN LISPRO 100 [IU]/ML
0-7 INJECTION, SOLUTION INTRAVENOUS; SUBCUTANEOUS
Status: DISCONTINUED | OUTPATIENT
Start: 2022-05-09 | End: 2022-05-11 | Stop reason: HOSPADM

## 2022-05-09 RX ORDER — ONDANSETRON 2 MG/ML
4 INJECTION INTRAMUSCULAR; INTRAVENOUS EVERY 6 HOURS PRN
Status: DISCONTINUED | OUTPATIENT
Start: 2022-05-09 | End: 2022-05-11 | Stop reason: HOSPADM

## 2022-05-09 RX ADMIN — INSULIN LISPRO 3 UNITS: 100 INJECTION, SOLUTION INTRAVENOUS; SUBCUTANEOUS at 13:31

## 2022-05-09 RX ADMIN — Medication 10 ML: at 03:24

## 2022-05-09 RX ADMIN — FUROSEMIDE 40 MG: 40 TABLET ORAL at 10:56

## 2022-05-09 RX ADMIN — Medication 10 ML: at 21:27

## 2022-05-09 RX ADMIN — APIXABAN 5 MG: 5 TABLET, FILM COATED ORAL at 21:27

## 2022-05-09 RX ADMIN — Medication 5 MG: at 22:51

## 2022-05-09 RX ADMIN — METOPROLOL TARTRATE 12.5 MG: 25 TABLET, FILM COATED ORAL at 21:27

## 2022-05-09 RX ADMIN — METOPROLOL TARTRATE 12.5 MG: 25 TABLET, FILM COATED ORAL at 10:56

## 2022-05-09 RX ADMIN — OXYCODONE HYDROCHLORIDE AND ACETAMINOPHEN 1000 MG: 500 TABLET ORAL at 10:56

## 2022-05-09 RX ADMIN — ALLOPURINOL 300 MG: 300 TABLET ORAL at 10:57

## 2022-05-09 RX ADMIN — Medication 10 ML: at 10:57

## 2022-05-09 RX ADMIN — INSULIN LISPRO 3 UNITS: 100 INJECTION, SOLUTION INTRAVENOUS; SUBCUTANEOUS at 18:03

## 2022-05-09 RX ADMIN — Medication 5 MG/HR: at 10:57

## 2022-05-09 RX ADMIN — APIXABAN 5 MG: 5 TABLET, FILM COATED ORAL at 10:56

## 2022-05-09 NOTE — NURSING NOTE
Pt received from ED in Afib on Cardizem drip 5mg/hr,converted to SR around 1am an ddoing well.HR well controlled this AM   Alert and oriented to person, place and time

## 2022-05-09 NOTE — PLAN OF CARE
Goal Outcome Evaluation:     Problem: Fall Injury Risk  Goal: Absence of Fall and Fall-Related Injury  Outcome: Ongoing, Progressing  Intervention: Promote Injury-Free Environment  Recent Flowsheet Documentation  Taken 5/9/2022 0200 by Darby Manzo, RN  Safety Promotion/Fall Prevention: safety round/check completed  Taken 5/9/2022 0000 by Darby Manzo, RN  Safety Promotion/Fall Prevention: safety round/check completed     Problem: Adult Inpatient Plan of Care  Goal: Plan of Care Review  Outcome: Ongoing, Progressing     Problem: Adult Inpatient Plan of Care  Goal: Readiness for Transition of Care  Outcome: Ongoing, Progressing  Intervention: Mutually Develop Transition Plan  Recent Flowsheet Documentation  Taken 5/8/2022 2347 by Darby Manzo, RN  Transportation Anticipated: health plan transportation  Patient/Family Anticipated Services at Transition: none  Patient/Family Anticipates Transition to: home  Taken 5/8/2022 2341 by Darby Manzo, RN  Equipment Currently Used at Home: walker, william

## 2022-05-09 NOTE — CASE MANAGEMENT/SOCIAL WORK
Discharge Planning Assessment   [facility]     Patient Name: Laure Leung  MRN: [id number]  Today's Date: 5/9/2022    Admit Date: 5/8/2022     Discharge Needs Assessment     Row Name 05/09/22 1413       Living Environment    People in Home alone    Current Living Arrangements home    Primary Care Provided by self    Provides Primary Care For no one    Family Caregiver if Needed none    Able to Return to Prior Arrangements yes       Resource/Environmental Concerns    Resource/Environmental Concerns none    Transportation Concerns none       Transition Planning    Patient/Family Anticipates Transition to home    Patient/Family Anticipated Services at Transition none    Transportation Anticipated health plan transportation       Discharge Needs Assessment    Readmission Within the Last 30 Days no previous admission in last 30 days    Equipment Currently Used at Home walker, rolling    Concerns to be Addressed discharge planning    Anticipated Changes Related to Illness none    Equipment Needed After Discharge none               Discharge Plan     Row Name 05/09/22 1415       Plan    Plan Anticipate Routine Home, Transportation [facility]IA [facility] [phone]    Patient/Family in Agreement with Plan yes    Plan Comments Met with Patient at bedside Lives [place] alone, IADL's. Uses [facility] for transportation, patient stated that you have to schedule 24 hours in advance but called and they provide hospital discharge transportation. PCP and Pharmacy verified, able to afford medications. D/C barrier: Cardizem gtt, Cardiology following.                     Demographic Summary     Row Name 05/09/22 1412       General Information    Admission Type observation    Arrived From emergency department    Required Notices Provided Observation Status Notice    Referral Source admission list    Reason for Consult discharge planning    Preferred Language English               Functional Status     Row Name  05/09/22 1413       Functional Status    Usual Activity Tolerance good    Current Activity Tolerance good       Functional Status, IADL    Medications independent    Meal Preparation independent    Housekeeping independent    Laundry independent    Shopping independent       Mental Status    General Appearance WDL WDL       Mental Status Summary    Recent Changes in Mental Status/Cognitive Functioning no changes                      Patient Forms     Row Name 05/09/22 1406       Patient Forms    Important Message from Medicare ([facility]) --  Schuler 5/9/22    Patient Observation Letter Delivered    Delivered to Patient    Method of delivery In person              Met with patient at bedside wearing mask and goggles, Spent less than 15 minutes in room at greater than 6 feet distance.       Madiha Boston, RN

## 2022-05-09 NOTE — CONSULTS
[facility]   CONSULT NOTE    Referring Provider: Dr. Lal    Patient Care Team:  Sarita Esparza APRN as PCP - General (Nurse Practitioner)  Nadeen, Antonio Ramires MD as Consulting Physician (Cardiac Electrophysiology)  Napoleon Estrada MD as Surgeon (Orthopedic Surgery)    Reason for Consultation: intermittent SVT/atrial fibrillation    Chief complaint: racing heart    History of present illness:  Laure Leung is a 82 y.o. female with past medical history of  Atrial fibrillation, CHF, hypertension, and aflutter ablation who presented to the ED with complaints of a racing heart. Patient states on Saturday she felt like her heart was beating fast throughout the day. During the course of the day, her heart rate did not get better so she called EMS. Patient does have a history of atrial ablation in 2021 with Dr. Senior. Per ER records, she was give adenosine x 2 and started on a cardizem drip. She has intermittently converted to sinus rhythm and Afib with RVR.  Patient denies chest pain, dizziness, palpitations, or lower extremity edema. She reports being recently diagnosed with diabetes and gout.    Labs reviewed, troponin and BNP negative, Cr 0.9, cholesterol 233, [id number]  CXR probable cardiomegaly, EKGs reviewed, Echo mild aortic stenosis and mitral regurg, EF 60%    Review of Systems   Cardiovascular: Positive for palpitations. Negative for chest pain, dyspnea on exertion, irregular heartbeat and leg swelling.   Respiratory: Positive for shortness of breath. Negative for cough.    Gastrointestinal: Negative for nausea.   Neurological: Negative for dizziness and light-headedness.   All other systems reviewed and are negative.      History  Past Medical History:   Diagnosis Date   • Atrial fibrillation (HCC)    • CHF (congestive heart failure) (HCC)    • Hypertension        Past Surgical History:   Procedure Laterality Date   • CARDIAC ELECTROPHYSIOLOGY PROCEDURE N/A 6/23/2021     Procedure: Ablation atrial flutter;  Surgeon: Antonio Senior MD;  Location: [facility] INVASIVE LOCATION;  Service: Cardiovascular;  Laterality: N/A;   • CATARACT EXTRACTION         Family History   Problem Relation Age of Onset   • No Known Problems Mother    • No Known Problems Father    • Atrial fibrillation Sister    • Diabetes Sister    • No Known Problems Maternal Aunt    • No Known Problems Maternal Uncle    • No Known Problems Paternal Aunt    • No Known Problems Paternal Uncle    • No Known Problems Maternal Grandmother    • No Known Problems Maternal Grandfather    • No Known Problems Paternal Grandmother    • No Known Problems Paternal Grandfather    • Heart attack Neg Hx    • Heart disease Neg Hx    • Heart failure Neg Hx    • Hyperlipidemia Neg Hx    • Hypertension Neg Hx        Social History     Tobacco Use   • Smoking status: Former Smoker     Packs/day: 0.50     Years: 12.00     Pack years: 6.00     Types: Cigarettes     Start date:      Quit date:      Years since quittin.3   • Smokeless tobacco: Never Used   Substance Use Topics   • Alcohol use: No   • Drug use: Never        Medications Prior to Admission   Medication Sig Dispense Refill Last Dose   • allopurinol (Zyloprim) 300 MG tablet Take 1 tablet by mouth Daily. 90 tablet 1    • apixaban (ELIQUIS) 5 MG tablet tablet Take 1 tablet by mouth Every 12 (Twelve) Hours. 180 tablet 1    • ascorbic acid (VITAMIN C) 1000 MG tablet Take 1,000 mg by mouth Daily.      • B Complex Vitamins (VITAMIN B COMPLEX PO) Take  by mouth.      • furosemide (LASIX) 40 MG tablet Take 1 tablet by mouth Daily. 90 tablet 1    • gabapentin (NEURONTIN) 100 MG capsule Take 1 capsule by mouth At Night As Needed (pain). 30 capsule 0    • metFORMIN ER (Glucophage XR) 500 MG 24 hr tablet Take 1 tablet by mouth 2 (Two) Times a Day. 60 tablet 0    • metoprolol tartrate (LOPRESSOR) 25 MG tablet Take 0.5 tablets by mouth 2 (Two) Times a Day. 90 tablet 1    •  "Multiple Vitamins-Minerals (ZINC PO) Take  by mouth.      • multivitamin with minerals (MULTIVITAMIN ADULT PO) Take 1 tablet by mouth Daily. Liver support, otc      • potassium chloride 10 MEQ CR tablet Take 1 tablet by mouth 2 (Two) Times a Day. 180 tablet 1    • Probiotic Product (PROBIOTIC PO) Take  by mouth.      • SELENIUM PO Take  by mouth.      • Vitamin D, Cholecalciferol, (CHOLECALCIFEROL) 10 MCG (400 UNIT) tablet Take 400 Units by mouth Daily.            Patient has no known allergies.    Scheduled Meds:  allopurinol, 300 mg, Oral, Daily  apixaban, 5 mg, Oral, Q12H  ascorbic acid, 1,000 mg, Oral, Daily  furosemide, 40 mg, Oral, Daily  insulin lispro, 0-7 Units, Subcutaneous, TID AC  metoprolol tartrate, 12.5 mg, Oral, BID  sodium chloride, 10 mL, Intravenous, Q12H        Continuous Infusions:  dilTIAZem, 5-15 mg/hr, Last Rate: 5 mg/hr (05/09/22 1327)        PRN Meds:  •  acetaminophen **OR** acetaminophen **OR** acetaminophen  •  aluminum-magnesium hydroxide-simethicone  •  dextrose  •  dextrose  •  gabapentin  •  glucagon (human recombinant)  •  HYDROcodone-acetaminophen  •  insulin lispro **AND** insulin lispro  •  melatonin  •  nitroglycerin  •  ondansetron **OR** ondansetron  •  sodium chloride      VITAL SIGNS  Vitals:    05/09/22 0554 05/09/22 0912 05/09/22 1056 05/09/22 1105   BP:  124/48  142/55   BP Location:       Patient Position:       Pulse:   83 80   Resp:    16   Temp:    97.6 °F (36.4 °C)   TempSrc:       SpO2:    96%   Weight: 94.5 kg (208 lb 5.4 oz) 94.3 kg (208 lb)     Height:  162.6 cm (64\")         Flowsheet Rows    Flowsheet Row First Filed Value   Admission Height 160 cm (63\") Documented at 05/08/2022 1753   Admission Weight 96.2 kg (212 lb) Documented at 05/08/2022 1753           TELEMETRY: sinus rhythm    Physical Exam:  Vitals reviewed.   Constitutional:       General: Awake.      Appearance: Not in distress.   Pulmonary:      Effort: Pulmonary effort is normal.      Breath " sounds: Normal breath sounds.   Cardiovascular:      Normal rate. Regular rhythm. Normal S1. Normal S2.      Murmurs: There is no murmur.   Pulses:     Intact distal pulses.   Edema:     Peripheral edema absent.   Abdominal:      General: Bowel sounds are normal.      Palpations: Abdomen is soft.   Skin:     General: Skin is warm and dry.   Neurological:      General: No focal deficit present.      Mental Status: Alert and oriented to person, place and time.   Psychiatric:         Behavior: Behavior is cooperative.            LAB RESULTS (LAST 7 DAYS)    CBC  Results from last 7 days   Lab Units 05/09/22  0246 05/08/22  1806   WBC 10*3/mm3 7.10 8.40   RBC 10*6/mm3 3.51* 4.01   HEMOGLOBIN g/dL 11.4* 12.7   HEMATOCRIT % 32.4* 36.9   MCV fL 92.3 92.0   PLATELETS 10*3/mm3 254 283       BMP  Results from last 7 days   Lab Units 05/09/22  0246 05/08/22  1806   SODIUM mmol/L 141 137   POTASSIUM mmol/L 4.7 4.3   CHLORIDE mmol/L 104 100   CO2 mmol/L 25.0 22.0   BUN mg/dL 21 25*   CREATININE mg/dL 0.90 1.01*   GLUCOSE mg/dL 195* 190*   MAGNESIUM mg/dL  --  1.6   PHOSPHORUS mg/dL  --  2.9       CMP   Results from last 7 days   Lab Units 05/09/22  0246 05/08/22  1806   SODIUM mmol/L 141 137   POTASSIUM mmol/L 4.7 4.3   CHLORIDE mmol/L 104 100   CO2 mmol/L 25.0 22.0   BUN mg/dL 21 25*   CREATININE mg/dL 0.90 1.01*   GLUCOSE mg/dL 195* 190*   ALBUMIN g/dL  --  4.30   BILIRUBIN mg/dL  --  0.8   ALK PHOS U/L  --  84   AST (SGOT) U/L  --  18   ALT (SGPT) U/L  --  15       ProBNP        TROPONIN  Results from last 7 days   Lab Units 05/08/22  1806   TROPONIN T ng/mL <0.010       Creatinine Clearance  Estimated Creatinine Clearance: 54.6 mL/min (by C-G formula based on SCr of 0.9 mg/dL).      Radiology  XR Chest 1 View    Result Date: 5/8/2022  Probable cardiomegaly without acute cardiopulmonary abnormality.  Electronically Signed By-Luis Wilcox MD On:5/8/2022 6:47 PM This report was finalized on [id number] by  Luis Wilcox,  MD.      EKG      I personally viewed and interpreted the patient's EKG/Telemetry data: Atrial fibrillation  ECHOCARDIOGRAM:  Results for orders placed during the hospital encounter of 05/08/22    Adult Transthoracic Echo Complete W/ Cont if Necessary Per Protocol    Interpretation Summary  · Estimated left ventricular EF = 60% Left ventricular systolic function is normal.    Indications  Heart failure    Technically satisfactory study.  Mitral valve is structurally normal.  Mitral annular calcification.  Mild mitral regurgitation is present.  Tricuspid valve is structurally normal.  Aortic valve is thickened with mild aortic valve stenosis.  Pulmonic valve could not be well visualized.  No evidence for tricuspid or aortic regurgitation is seen by Doppler study.  Left atrium is enlarged.  Right atrium is normal in size.  Left ventricle is normal in size and contractility with ejection fraction of 60%.  Concentric left ventricle hypertrophy.  Right ventricle is normal in size.  Atrial septum is intact.  Aorta is normal.  No pericardial effusion or intracardiac thrombus is seen.    Impression  Mild mitral annular calcification.  Mild aortic valve stenosis.  Mild mitral regurgitation.  Left atrium is enlarged.  Left ventricle is hypertrophic.  Left ventricular diastolic dysfunction.  Left ventricular contractility is normal with ejection fraction of 60%.      STRESS MYOVIEW:    CARDIAC CATHETERIZATION:    OTHER:      Laure Leung is a 82 y.o. female with past medical history of  Atrial fibrillation, CHF, hypertension, and aflutter ablation who presented to the ED with complaints of a racing heart. Patient states on Saturday she felt like her heart was beating fast throughout the day. During the course of the day, her heart rate did not get better so she called EMS. Patient does have a history of atrial ablation in 2021 with Dr. Senior. Per ER records, she was give adenosine x 2 and started on a cardizem drip. She has  intermittently converted to sinus rhythm and Afib with RVR.  Patient denies chest pain, dizziness, palpitations, or lower extremity edema. She reports being recently diagnosed with diabetes and gout.    Labs reviewed, troponin and BNP negative, Cr 0.9, cholesterol 233, [id number]  CXR probable cardiomegaly, EKGs reviewed, Echo mild aortic stenosis and mitral regurg, EF 60%      Assessment/Plan       Essential hypertension    Palpitations    Atrial fibrillation with RVR (HCC)    Hx of atrioventricular node ablation    DM (diabetes mellitus), type 2 (HCC)    Gout      PLAN  Consider starting PO Cardizem  May need EP study  Continue anticoagulation    I discussed the patients findings and my recommendations with patient and nurse.  Further recommendations per Dr. Soler.    Yamile Rodriguez, MIRZA  05/09/22  14:28 EDT   Electronically signed by MIRZA Young, 05/09/22, 2:29 PM EDT.      ]]]]]]]]]]]]]]]]]]]  Impression  ==========  - Atrial fibrillation with RVR.  Past history of atrial fibrillation.  Status post cardioversion 2019.  Status post ablation 2011 by Dr. Senior    Echocardiogram 5/8/2022 revealed  Mild mitral annular calcification.  Mild aortic valve stenosis.  Mild mitral regurgitation.  Left atrium is enlarged.  Left ventricle is hypertrophic.  Left ventricular diastolic dysfunction.  Left ventricular contractility is normal with ejection fraction of 60%.    - Diabetes gout hypertension    - Family history of atrial fibrillation    - No known allergies    The patient was seen around 6:15 PM today.  Chart was reviewed in entirety.  Agree with the assessment and plan as documented by nurse practitioner Yamile Rodriguez.  Majority of the MDM was performed by me.  Patient who probably had atrial fibrillation since 2019.  Patient had cardioversion and was subsequently had ablation for atrial fibrillation by Dr. Senior.  Patient presented with palpitations and was noted to have atrial fibrillation with RVR.  Patient  was started on intravenous Cardizem  Rate has slowed down and appears to be in sinus rhythm.    Patient is anticoagulated and is on Eliquis.    Continue metoprolol 12.5 mg twice a day.  Consider adding Cardizem CD.  Further plan will depend on patient's progress.  Electronically signed by Malik Soler MD, 05/09/22, 6:20 PM EDT.

## 2022-05-09 NOTE — H&P
[facility] Medicine Services      Patient Name: Laure Leung  : 1940  MRN: [id number]  Primary Care Physician:  Sarita Esparza APRN  Date of admission: 2022      Subjective      Chief Complaint: Palpitations    History of Present Illness: Laure Leung is a 82 y.o. female with history of atrial fibrillation and cardiomyopathy who presented to [facility] on 2022 complaining of palpitations.  Patient reported feeling her heart was racing this morning and realized that she missed her evening dose of beta-blocker.  Patient took her morning dose but noted persistence of symptoms which prompted evaluation in the emergency department.  Patient on anticoagulation with history of ablation.  Patient denies chest pain or shortness of breath.  No syncopal episode.  Some generalized weakness but no lightheadedness.    In the emergency department patient with heart rate increasing up into the 180s but otherwise hemodynamically stable.  Initial appearance of SVT patient was given adenosine x2 and per ED documentation then changed to A. fib with RVR.  Patient started on IV Cardizem and EKG showing signs of atrial fibrillation rate controlled at that time.  Patient went back into RVR requiring continued infusion.  Patient mated to hospitalist service for additional work-up and management.      Review of Systems   Constitutional: Negative for chills and fever.   HENT: Negative for hoarse voice and stridor.    Eyes: Negative for double vision and photophobia.   Cardiovascular: Positive for palpitations. Negative for chest pain.   Respiratory: Negative for cough and shortness of breath.    Musculoskeletal: Negative for falls and stiffness.   Gastrointestinal: Negative for nausea and vomiting.   Genitourinary: Negative for dysuria and flank pain.   Neurological: Positive for weakness. Negative for dizziness.   Psychiatric/Behavioral: Negative for altered mental status. The patient  is not nervous/anxious.        Personal History     Past Medical History:   Diagnosis Date   • Atrial fibrillation (HCC)    • CHF (congestive heart failure) (HCC)    • Hypertension        Past Surgical History:   Procedure Laterality Date   • CARDIAC ELECTROPHYSIOLOGY PROCEDURE N/A 6/23/2021    Procedure: Ablation atrial flutter;  Surgeon: Antonio Senior MD;  Location: [facility] INVASIVE LOCATION;  Service: Cardiovascular;  Laterality: N/A;   • CATARACT EXTRACTION         Family History: family history includes Atrial fibrillation in her sister; Diabetes in her sister; No Known Problems in her father, maternal aunt, maternal grandfather, maternal grandmother, maternal uncle, mother, paternal aunt, paternal grandfather, paternal grandmother, and paternal uncle. Otherwise pertinent FHx was reviewed and not pertinent to current issue.    Social History:  reports that she quit smoking about 49 years ago. Her smoking use included cigarettes. She started smoking about 61 years ago. She has a 6.00 pack-year smoking history. She has never used smokeless tobacco. She reports that she does not drink alcohol and does not use drugs.    Home Medications:  Prior to Admission Medications     Prescriptions Last Dose Informant Patient Reported? Taking?    allopurinol (Zyloprim) 300 MG tablet   No No    Take 1 tablet by mouth Daily.    apixaban (ELIQUIS) 5 MG tablet tablet   No No    Take 1 tablet by mouth Every 12 (Twelve) Hours.    ascorbic acid (VITAMIN C) 1000 MG tablet   Yes No    Take 1,000 mg by mouth Daily.    B Complex Vitamins (VITAMIN B COMPLEX PO)   Yes No    Take  by mouth.    furosemide (LASIX) 40 MG tablet   No No    Take 1 tablet by mouth Daily.    gabapentin (NEURONTIN) 100 MG capsule   No No    Take 1 capsule by mouth At Night As Needed (pain).    metFORMIN ER (Glucophage XR) 500 MG 24 hr tablet   No No    Take 1 tablet by mouth 2 (Two) Times a Day.    metoprolol tartrate (LOPRESSOR) 25 MG tablet   No No     Take 0.5 tablets by mouth 2 (Two) Times a Day.    Multiple Vitamins-Minerals (ZINC PO)   Yes No    Take  by mouth.    multivitamin with minerals (MULTIVITAMIN ADULT PO)   Yes No    Take 1 tablet by mouth Daily. Liver support, otc    potassium chloride 10 MEQ CR tablet   No No    Take 1 tablet by mouth 2 (Two) Times a Day.    Probiotic Product (PROBIOTIC PO)   Yes No    Take  by mouth.    SELENIUM PO   Yes No    Take  by mouth.    Vitamin D, Cholecalciferol, (CHOLECALCIFEROL) 10 MCG (400 UNIT) tablet   Yes No    Take 400 Units by mouth Daily.            Allergies:  No Known Allergies    Objective      Vitals:   Temp:  [97.4 °F (36.3 °C)-98.5 °F (36.9 °C)] 97.9 °F (36.6 °C)  Heart Rate:  [] 83  Resp:  [12-28] 22  BP: (106-163)/(48-84) 124/48    Physical Exam  Constitutional:       Appearance: Normal appearance.      Comments: Morbidly obese elderly female lying in bed breathing comfortably on room air no acute distress.   HENT:      Head: Normocephalic and atraumatic.      Right Ear: External ear normal.      Left Ear: External ear normal.      Nose: Nose normal.      Mouth/Throat:      Mouth: Mucous membranes are moist.      Pharynx: Oropharynx is clear.   Eyes:      Extraocular Movements: Extraocular movements intact.      Conjunctiva/sclera: Conjunctivae normal.   Cardiovascular:      Rate and Rhythm: Normal rate. Rhythm irregular.   Pulmonary:      Effort: Pulmonary effort is normal.      Breath sounds: Normal breath sounds.   Abdominal:      General: There is no distension.      Palpations: Abdomen is soft.      Tenderness: There is no abdominal tenderness.   Musculoskeletal:         General: No signs of injury. Normal range of motion.      Cervical back: Normal range of motion and neck supple.   Skin:     General: Skin is warm and dry.   Neurological:      General: No focal deficit present.      Mental Status: She is alert.      Cranial Nerves: No cranial nerve deficit.      Motor: No weakness.    Psychiatric:         Mood and Affect: Mood normal.         Behavior: Behavior normal.          Result Review    Result Review:  I have personally reviewed the results from the time of this admission to 5/9/2022 11:43 EDT and agree with these findings:  [x]  Laboratory  [x]  Microbiology  [x]  Radiology  [x]  EKG/Telemetry   []  Cardiology/Vascular   []  Pathology  []  Old records  []  Other:  Most notable findings include: Anemia, low iron saturation      Assessment/Plan        Active Hospital Problems:  Active Hospital Problems    Diagnosis    • Atrial fibrillation with RVR (HCC)      Plan:     Palpitations-initially appearing to be SVT in nature transition to A. fib with RVR, patient hemodynamically stable and not requiring calcium channel blocker infusion  -Cardiology consulted  -Cardiac monitoring  -continue anticoagulation  -Wean Cardizem  -Continue beta-blocker  -EKG reviewed    Anemia-mild in nature likely inflammatory  -Iron labs  -B12 level appropriate    Cardiomyopathy-patient last echo in 2021 showing normal ejection fraction likely some component of diastolic  -Check BNP  -Echo ordered    Type 2 diabetes-aim to keep blood sugars less than 200  -Hold metformin  -Sliding scale    Gout-chronic in nature  -resume home medication    Morbid obesity-BMI of 35  -Lifestyle medications      DVT prophylaxis:  Medical and mechanical DVT prophylaxis orders are present.    CODE STATUS:    Level Of Support Discussed With: Patient  Code Status (Patient has no pulse and is not breathing): CPR (Attempt to Resuscitate)  Medical Interventions (Patient has pulse or is breathing): Full Support    Admission Status:  I believe this patient meets observation status.    I discussed the patient's findings and my recommendations with patient and nursing staff.    This patient has been examined wearing appropriate Personal Protective Equipment and discussed with hospital infection control department. 05/09/22      Signature:  Electronically signed by Kolton Lal MD, 05/09/22, 12:00 PM EDT.

## 2022-05-09 NOTE — H&P
[facility] Medicine Services      Patient Name: Laure Leung  : 1940  MRN: [id number]  Primary Care Physician:  Sarita Esparza APRN  Date of admission: 2022      Subjective      Chief Complaint: Palpitations    History of Present Illness: Laure Leung is a 82 y.o. female who presented to [facility] on 2022 complaining of palpitations.  Patient has a extensive medical history of A. fib who is anticoagulated.  Patient recently is status post ablation.  She is presenting here today for palpable Tatian's and tachycardia.  States she missed her her dose of metoprolol last night.  Patient denies any abdominal pain, chest pain, headache, double vision, vision changes, near syncope, syncope, nausea vomiting diarrhea.      Review of Systems   Reason unable to perform ROS: All pertinent review of systems were documented in HPI.   All other systems reviewed and are negative.      Personal History     Past Medical History:   Diagnosis Date   • Atrial fibrillation (HCC)    • CHF (congestive heart failure) (HCC)    • Hypertension        Past Surgical History:   Procedure Laterality Date   • CARDIAC ELECTROPHYSIOLOGY PROCEDURE N/A 2021    Procedure: Ablation atrial flutter;  Surgeon: Antonio Senior MD;  Location: [facility] INVASIVE LOCATION;  Service: Cardiovascular;  Laterality: N/A;   • CATARACT EXTRACTION         Family History: family history includes Atrial fibrillation in her sister; Diabetes in her sister; No Known Problems in her father, maternal aunt, maternal grandfather, maternal grandmother, maternal uncle, mother, paternal aunt, paternal grandfather, paternal grandmother, and paternal uncle. Otherwise pertinent FHx was reviewed and not pertinent to current issue.    Social History:  reports that she quit smoking about 49 years ago. Her smoking use included cigarettes. She started smoking about 61 years ago. She has a 6.00 pack-year smoking history.  She has never used smokeless tobacco. She reports that she does not drink alcohol and does not use drugs.    Home Medications:  Prior to Admission Medications     Prescriptions Last Dose Informant Patient Reported? Taking?    allopurinol (Zyloprim) 300 MG tablet   No No    Take 1 tablet by mouth Daily.    apixaban (ELIQUIS) 5 MG tablet tablet   No No    Take 1 tablet by mouth Every 12 (Twelve) Hours.    ascorbic acid (VITAMIN C) 1000 MG tablet   Yes No    Take 1,000 mg by mouth Daily.    B Complex Vitamins (VITAMIN B COMPLEX PO)   Yes No    Take  by mouth.    furosemide (LASIX) 40 MG tablet   No No    Take 1 tablet by mouth Daily.    gabapentin (NEURONTIN) 100 MG capsule   No No    Take 1 capsule by mouth At Night As Needed (pain).    metFORMIN ER (Glucophage XR) 500 MG 24 hr tablet   No No    Take 1 tablet by mouth 2 (Two) Times a Day.    metoprolol tartrate (LOPRESSOR) 25 MG tablet   No No    Take 0.5 tablets by mouth 2 (Two) Times a Day.    Multiple Vitamins-Minerals (ZINC PO)   Yes No    Take  by mouth.    multivitamin with minerals (MULTIVITAMIN ADULT PO)   Yes No    Take 1 tablet by mouth Daily. Liver support, otc    potassium chloride 10 MEQ CR tablet   No No    Take 1 tablet by mouth 2 (Two) Times a Day.    Probiotic Product (PROBIOTIC PO)   Yes No    Take  by mouth.    SELENIUM PO   Yes No    Take  by mouth.    Vitamin D, Cholecalciferol, (CHOLECALCIFEROL) 10 MCG (400 UNIT) tablet   Yes No    Take 400 Units by mouth Daily.            Allergies:  No Known Allergies    Objective      Vitals:   Temp:  [98 °F (36.7 °C)-98.5 °F (36.9 °C)] 98.5 °F (36.9 °C)  Heart Rate:  [] 94  Resp:  [12-28] 16  BP: (106-163)/(53-84) 106/64    Physical Exam  Constitutional:       Appearance: Normal appearance. She is normal weight.   HENT:      Head: Normocephalic and atraumatic.      Nose: Nose normal.      Mouth/Throat:      Mouth: Mucous membranes are moist.      Pharynx: Oropharynx is clear.   Eyes:      Extraocular  Movements: Extraocular movements intact.      Conjunctiva/sclera: Conjunctivae normal.      Pupils: Pupils are equal, round, and reactive to light.   Cardiovascular:      Rate and Rhythm: Tachycardia present.      Heart sounds: No murmur heard.    No friction rub. No gallop.   Pulmonary:      Effort: Pulmonary effort is normal.      Breath sounds: Normal breath sounds.   Abdominal:      General: Bowel sounds are normal. There is no distension.      Palpations: Abdomen is soft. There is no mass.      Tenderness: There is no abdominal tenderness. There is no guarding or rebound.      Hernia: No hernia is present.      Comments: Extreme central adiposity noted   Musculoskeletal:         General: Normal range of motion.      Cervical back: Normal range of motion and neck supple.   Skin:     General: Skin is warm and dry.   Neurological:      General: No focal deficit present.      Mental Status: She is alert and oriented to person, place, and time. Mental status is at baseline.   Psychiatric:         Mood and Affect: Mood normal.         Behavior: Behavior normal.         Judgment: Judgment normal.         Result Review    Result Review:  I have personally reviewed the results from the time of this admission to 5/9/2022 01:17 EDT and agree with these findings:  [x]  Laboratory  [x]  Microbiology  [x]  Radiology  [x]  EKG/Telemetry   [x]  Cardiology/Vascular   []  Pathology  []  Old records  []  Other:  Most notable findings include: EKG noted atrial fibrillation    Assessment/Plan        Active Hospital Problems:  There are no active hospital problems to display for this patient.    Plan:     SVT  Vital signs per unit policy  Admit patient to telemetry  Patient recently had a cardiac ablation on 7/26/2021  Will consult electrophysiologist appreciate all recommendations  Clinically patient has no pathological abnormalities, no dizziness no syncope no head ache  Place patient on telemetry  CIP's and EKG x3 every 6 hours  "apart  Patient's initial EKG showed SVTs  Her following 2 EKGs showed normal sinus and no ST changes  We will continue home dose of metoprolol and will continue home dose of Eliquis 5 mg daily  Daily weights and strict I's and O's  Daily chest x-rays    Diabetes mellitus type 2  Accu-Cheks AC at bedtime  Mild sliding scale started  At home medications started  A1c is 8.3    HAYES  Patient's BUN/creatinine is 25 and 1  Which is greater than a ratio of 20:1  Patient is prerenal and dehydrated we will rehydrate patient    Hypertension  Continue home dose of 25 mg daily 2 times a day    CHF patient takes 40 mg once daily continue this        DVT prophylaxis:  No DVT prophylaxis order currently exists.    CODE STATUS:       Admission Status:  I believe this patient meets *** status.    I discussed the patient's findings and my recommendations with {discussed with:23400::\"patient\"}.    This patient has been {covidexamtype:93477} and discussed with {coviddiscussion:50620}. 05/09/22      Signature: ***  "

## 2022-05-09 NOTE — CONSULTS
"Diabetes Education  Assessment/Teaching    Patient Name:  Laure Leung  YOB: 1940  MRN: [id number]  Admit Date:  5/8/2022      Assessment Date:  5/9/2022  Flowsheet Row Most Recent Value   General Information     Referral From: MD order, A1c  [MD consult newly diagnosed and on 4/20/2022 A1c was 8.3%.]   Height 162.6 cm (64\")   Height Method Stated   Weight 94.3 kg (208 lb)   Weight Method Bed scale   Pregnancy Assessment    Diabetes History    What type of diabetes do you have? Type 2   Length of Diabetes Diagnosis Newly diagnosed <6 months  [Patient stated she was diagnosed 3 weeks ago.]   Current DM knowledge poor   Have you had diabetes education/teaching in the past? no   Do you test your blood sugar at home? no   Have you had high blood sugar? (>140mg/dl) yes   How often do you have high blood sugar? unknown   When was your last high blood sugar? Admission blood sugar 190   Education Preferences    What areas of diabetes would you like to learn about? diabetes complications, diet information, testing my blood sugar at home, understanding diabetes   Nutrition Information    Assessment Topics    Healthy Eating - Assessment Needs education   Problem Solving - Assessment Needs education   Reducing Risk - Assessment Needs education   Monitoring - Assessment Needs education   DM Goals    Healthy Eating - Goal Today   Problem Solving - Goal Today   Reducing Risk - Goal Today   Monitoring - Goal Today          Flowsheet Row Most Recent Value   DM Education Needs    Meter Meter provided   Meter Type One Touch  [One Touch Verio Reflect glucometer given to patient with patient doing return demonstration using the lancing device, inserting the test strip into the meter, and applying blood to the test strip.]   Frequency of Testing Daily  [Log book given with discussion on checking blood sugar daily varying the times before meals and at bedtime.]   Medication Oral  [At home patient stated that she takes " Metformin  mg BID.]   Problem Solving Hyperglycemia, Signs, Symptoms, Treatment   Reducing Risks A1C testing, Lipids, Blood pressure, Eye exam, Dental exam, Foot care, Immunizations, Cardiovascular, Neuropathy, Retinopathy  [On 4/20/2022 A1c was 8.3%.]   Healthy Eating Basic meal plan provided   Discharge Plan Home   Motivation Moderate   Teaching Method Explanation, Discussion, Demonstration, Handouts, Teach back   Patient Response Demonstrates adequately, Verbalized understanding            Other Comments:  A1c info sheet given with discussion on A1c target and healthy blood sugar range. First Steps book to managing diabetes given to patient with discussion on diabetes and diagnosis. Discussed how diabetes puts you at higher risk for heart attack, stroke, kidney failure, blindness, and neuropathy.  Also discussed the importance of yearly eye exams, dental exams, regular follow-up with PCP to check blood pressure, lipids, kidney function, liver function, and to keep up-to-date on vaccinations. Foot care discussed with checking feet daily and wearing shoes when out of bed. Handouts given with discussion on 1 serving of carbs being = 15 grams, being allowed 3 servings  of carbs per meal, counting carbs, reading food labels, and meal planning. Patient stated that she was on the regular Metformin but it caused severe diarrhea and she was switched to the extended release.  Prescriptions started in discharge orders for lancets, test strips, and alcohol wipes. Patient has no further questions or concerns related to diabetes at this time.        Electronically signed by:  Ximena Kearney RN  05/09/22 16:31 EDT

## 2022-05-09 NOTE — PLAN OF CARE
Goal Outcome Evaluation:         Pleasant and cooperative with care. Cardizem drip continues at 5mc. Patient back and forth between a-fib and sinus rhythm. Rate controlled. Cardiology consulted.

## 2022-05-10 LAB
ANION GAP SERPL CALCULATED.3IONS-SCNC: 10 MMOL/L (ref 5–15)
BASOPHILS # BLD MANUAL: 0.15 10*3/MM3 (ref 0–0.2)
BASOPHILS NFR BLD MANUAL: 3 % (ref 0–1.5)
BUN SERPL-MCNC: 25 MG/DL (ref 8–23)
BUN/CREAT SERPL: 27.2 (ref 7–25)
CALCIUM SPEC-SCNC: 9 MG/DL (ref 8.6–10.5)
CHLORIDE SERPL-SCNC: 105 MMOL/L (ref 98–107)
CO2 SERPL-SCNC: 25 MMOL/L (ref 22–29)
CREAT SERPL-MCNC: 0.92 MG/DL (ref 0.57–1)
DEPRECATED RDW RBC AUTO: 45.9 FL (ref 37–54)
EGFRCR SERPLBLD CKD-EPI 2021: 62.7 ML/MIN/1.73
ERYTHROCYTE [DISTWIDTH] IN BLOOD BY AUTOMATED COUNT: 14.1 % (ref 12.3–15.4)
GLUCOSE BLDC GLUCOMTR-MCNC: 181 MG/DL (ref 70–105)
GLUCOSE BLDC GLUCOMTR-MCNC: 211 MG/DL (ref 70–105)
GLUCOSE BLDC GLUCOMTR-MCNC: 231 MG/DL (ref 70–105)
GLUCOSE BLDC GLUCOMTR-MCNC: 332 MG/DL (ref 70–105)
GLUCOSE SERPL-MCNC: 173 MG/DL (ref 65–99)
HCT VFR BLD AUTO: 30.6 % (ref 34–46.6)
HGB BLD-MCNC: 10.5 G/DL (ref 12–15.9)
LYMPHOCYTES # BLD MANUAL: 1.8 10*3/MM3 (ref 0.7–3.1)
LYMPHOCYTES NFR BLD MANUAL: 10 % (ref 5–12)
MAGNESIUM SERPL-MCNC: 2 MG/DL (ref 1.6–2.4)
MCH RBC QN AUTO: 31.7 PG (ref 26.6–33)
MCHC RBC AUTO-ENTMCNC: 34.3 G/DL (ref 31.5–35.7)
MCV RBC AUTO: 92.6 FL (ref 79–97)
MONOCYTES # BLD: 0.5 10*3/MM3 (ref 0.1–0.9)
NEUTROPHILS # BLD AUTO: 2.55 10*3/MM3 (ref 1.7–7)
NEUTROPHILS NFR BLD MANUAL: 35 % (ref 42.7–76)
NEUTS BAND NFR BLD MANUAL: 16 % (ref 0–5)
NRBC SPEC MANUAL: 1 /100 WBC (ref 0–0.2)
PLAT MORPH BLD: NORMAL
PLATELET # BLD AUTO: 249 10*3/MM3 (ref 140–450)
PMV BLD AUTO: 8.1 FL (ref 6–12)
POTASSIUM SERPL-SCNC: 3.8 MMOL/L (ref 3.5–5.2)
QT INTERVAL: 407 MS
RBC # BLD AUTO: 3.31 10*6/MM3 (ref 3.77–5.28)
RBC MORPH BLD: NORMAL
SCAN SLIDE: NORMAL
SODIUM SERPL-SCNC: 140 MMOL/L (ref 136–145)
TSH SERPL DL<=0.05 MIU/L-ACNC: 1.79 UIU/ML (ref 0.27–4.2)
VARIANT LYMPHS NFR BLD MANUAL: 3 % (ref 0–5)
VARIANT LYMPHS NFR BLD MANUAL: 33 % (ref 19.6–45.3)
WBC MORPH BLD: NORMAL
WBC NRBC COR # BLD: 5 10*3/MM3 (ref 3.4–10.8)

## 2022-05-10 PROCEDURE — 93005 ELECTROCARDIOGRAM TRACING: CPT | Performed by: INTERNAL MEDICINE

## 2022-05-10 PROCEDURE — 99226 PR SBSQ OBSERVATION CARE/DAY 35 MINUTES: CPT | Performed by: FAMILY MEDICINE

## 2022-05-10 PROCEDURE — 97165 OT EVAL LOW COMPLEX 30 MIN: CPT

## 2022-05-10 PROCEDURE — 85007 BL SMEAR W/DIFF WBC COUNT: CPT | Performed by: FAMILY MEDICINE

## 2022-05-10 PROCEDURE — 85025 COMPLETE CBC W/AUTO DIFF WBC: CPT | Performed by: FAMILY MEDICINE

## 2022-05-10 PROCEDURE — 80048 BASIC METABOLIC PNL TOTAL CA: CPT | Performed by: FAMILY MEDICINE

## 2022-05-10 PROCEDURE — 63710000001 INSULIN LISPRO (HUMAN) PER 5 UNITS: Performed by: FAMILY MEDICINE

## 2022-05-10 PROCEDURE — G0378 HOSPITAL OBSERVATION PER HR: HCPCS

## 2022-05-10 PROCEDURE — 99214 OFFICE O/P EST MOD 30 MIN: CPT | Performed by: INTERNAL MEDICINE

## 2022-05-10 PROCEDURE — 97161 PT EVAL LOW COMPLEX 20 MIN: CPT

## 2022-05-10 PROCEDURE — 82962 GLUCOSE BLOOD TEST: CPT

## 2022-05-10 PROCEDURE — 83735 ASSAY OF MAGNESIUM: CPT | Performed by: INTERNAL MEDICINE

## 2022-05-10 PROCEDURE — 84443 ASSAY THYROID STIM HORMONE: CPT | Performed by: INTERNAL MEDICINE

## 2022-05-10 PROCEDURE — 96366 THER/PROPH/DIAG IV INF ADDON: CPT

## 2022-05-10 RX ORDER — DILTIAZEM HYDROCHLORIDE 240 MG/1
240 CAPSULE, COATED, EXTENDED RELEASE ORAL
Status: DISCONTINUED | OUTPATIENT
Start: 2022-05-10 | End: 2022-05-11 | Stop reason: HOSPADM

## 2022-05-10 RX ADMIN — INSULIN LISPRO 3 UNITS: 100 INJECTION, SOLUTION INTRAVENOUS; SUBCUTANEOUS at 18:01

## 2022-05-10 RX ADMIN — APIXABAN 5 MG: 5 TABLET, FILM COATED ORAL at 08:14

## 2022-05-10 RX ADMIN — OXYCODONE HYDROCHLORIDE AND ACETAMINOPHEN 1000 MG: 500 TABLET ORAL at 08:15

## 2022-05-10 RX ADMIN — ALLOPURINOL 300 MG: 300 TABLET ORAL at 08:14

## 2022-05-10 RX ADMIN — METOPROLOL TARTRATE 12.5 MG: 25 TABLET, FILM COATED ORAL at 08:15

## 2022-05-10 RX ADMIN — INSULIN LISPRO 2 UNITS: 100 INJECTION, SOLUTION INTRAVENOUS; SUBCUTANEOUS at 08:13

## 2022-05-10 RX ADMIN — INSULIN LISPRO 5 UNITS: 100 INJECTION, SOLUTION INTRAVENOUS; SUBCUTANEOUS at 12:02

## 2022-05-10 RX ADMIN — METOPROLOL TARTRATE 12.5 MG: 25 TABLET, FILM COATED ORAL at 20:05

## 2022-05-10 RX ADMIN — DILTIAZEM HYDROCHLORIDE 240 MG: 240 CAPSULE, COATED, EXTENDED RELEASE ORAL at 09:28

## 2022-05-10 RX ADMIN — Medication 10 ML: at 08:15

## 2022-05-10 RX ADMIN — FUROSEMIDE 40 MG: 40 TABLET ORAL at 08:14

## 2022-05-10 RX ADMIN — APIXABAN 5 MG: 5 TABLET, FILM COATED ORAL at 20:05

## 2022-05-10 RX ADMIN — Medication 10 ML: at 20:05

## 2022-05-10 NOTE — PROGRESS NOTES
[facility] Medicine Services Daily Progress Note    Patient Name: Laure Leung  : 1940  MRN: [id number]  Primary Care Physician:  Sarita Esparza APRN  Date of admission: 2022      Subjective      Chief Complaint: Palpitation      Patient reports overall feeling well today.  Heart rate improving.  Medications being adjusted by cardiology.  No shortness of breath or chest pain.    Review of Systems   All other systems reviewed and are negative.        Objective      Vitals:   Temp:  [97.6 °F (36.4 °C)-99.2 °F (37.3 °C)] 97.6 °F (36.4 °C)  Heart Rate:  [59-74] 68  Resp:  [16-26] 20  BP: (116-153)/(36-68) 148/68    Physical Exam     General: Obese elderly female lying in bed breathing comfortably on room air no acute distress  HEENT: NC/AT, EOMI, mucosa moist  Heart: Irregular, rate controlled  Chest: Normal work of breathing moving air well no wheezing  Abdominal: Soft. NT/ND.   Musculoskeletal: Normal ROM.  No cyanosis. No calf tenderness.  Neurological: AAOx3, no focal deficits  Skin: Skin is warm and dry. No rash  Psychiatric: Normal mood and affect.         Result Review    Result Review:  I have personally reviewed the results from the time of this admission to 5/10/2022 13:23 EDT and agree with these findings:  [x]  Laboratory  [x]  Microbiology  [x]  Radiology  [x]  EKG/Telemetry   []  Cardiology/Vascular   []  Pathology  []  Old records  []  Other:  Most notable findings include: Anemia          [unfilled]     Brief Patient Summary:    Laure Leung is a 82 y.o. female with history of atrial fibrillation and cardiomyopathy who presented to [facility] on 2022 complaining of palpitations.  Patient reported feeling her heart was racing this morning and realized that she missed her evening dose of beta-blocker.  Patient took her morning dose but noted persistence of symptoms which prompted evaluation in the emergency department.  Patient on  anticoagulation with history of ablation.  Patient denies chest pain or shortness of breath.  No syncopal episode.  Some generalized weakness but no lightheadedness.     In the emergency department patient with heart rate increasing up into the 180s but otherwise hemodynamically stable.  Initial appearance of SVT patient was given adenosine x2 and per ED documentation then changed to A. fib with RVR.  Patient started on IV Cardizem and EKG showing signs of atrial fibrillation rate controlled at that time.  Patient went back into RVR requiring continued infusion.  Patient mated to hospitalist service for additional work-up and management.    allopurinol, 300 mg, Oral, Daily  apixaban, 5 mg, Oral, Q12H  ascorbic acid, 1,000 mg, Oral, Daily  dilTIAZem CD, 240 mg, Oral, Q24H  furosemide, 40 mg, Oral, Daily  insulin lispro, 0-7 Units, Subcutaneous, TID AC  metoprolol tartrate, 12.5 mg, Oral, BID  sodium chloride, 10 mL, Intravenous, Q12H       dilTIAZem, 5-15 mg/hr, Last Rate: Stopped (05/10/22 0915)         Active Hospital Problems:  Active Hospital Problems    Diagnosis    • Atrial fibrillation with RVR (HCC)    • Hx of atrioventricular node ablation    • DM (diabetes mellitus), type 2 (HCC)    • Gout    • Palpitations      Added automatically from request for surgery [id number]     • Essential hypertension      Plan:   Palpitations-initially appearing to be SVT in nature transition to A. fib with RVR, patient hemodynamically stable weaning Cardizem infusion  -Cardiology consulted  -Cardiac monitoring  -continue anticoagulation  -Transitioning to oral Cardizem  mg daily  -Continue beta-blocker  -EKG reviewed  -Cardioversion discussed by cardiology we will continue to monitor     Anemia-mild in nature likely inflammatory  -Iron labs  -B12 level appropriate     Cardiomyopathy-patient last echo in 2021 showing normal ejection fraction likely some component of diastolic patient appearing euvolemic  -BNP normal  -Echo  ordered showing EF of 60% with concentric hypertrophy consistent with diastolic dysfunction     Type 2 diabetes-aim to keep blood sugars less than 200  -Hold metformin  -Sliding scale     Gout-chronic in nature  -resume home medication     Morbid obesity-BMI of 35  -Lifestyle medications    DVT prophylaxis:  Medical and mechanical DVT prophylaxis orders are present.    CODE STATUS:    Level Of Support Discussed With: Patient  Code Status (Patient has no pulse and is not breathing): CPR (Attempt to Resuscitate)  Medical Interventions (Patient has pulse or is breathing): Full Support      Disposition:  I expect patient to be discharged in 24 to 48 hours.    This patient has been examined wearing appropriate Personal Protective Equipment and discussed with hospital infection control department. 05/10/22      Electronically signed by Kolton Lal MD, 05/10/22, 13:23 EDT.  [facility] Hospitalist Team

## 2022-05-10 NOTE — PLAN OF CARE
Goal Outcome Evaluation:  Plan of Care Reviewed With: patient        Progress: no change  Outcome Evaluation: Patient alert and oriented, patient was on cardizem gtt @2.5, HR 58-59, stopped gtt at this time, patient up and down, not resting well, melatonin given, VSS, will continue to monitor.

## 2022-05-10 NOTE — THERAPY EVALUATION
Patient Name: Laure Leung  : 1940    MRN: [id number]                              Today's Date: 5/10/2022       Admit Date: 2022    Visit Dx:     ICD-10-CM ICD-9-CM   1. Atrial fibrillation with RVR (HCC)  I48.91 427.31   2. Hypomagnesemia  E83.42 275.2     Patient Active Problem List   Diagnosis   • Atypical atrial flutter (HCC)   • Essential hypertension   • Palpitations   • Atrial fibrillation with RVR (HCC)   • Hx of atrioventricular node ablation   • DM (diabetes mellitus), type 2 (HCC)   • Gout     Past Medical History:   Diagnosis Date   • Atrial fibrillation (HCC)    • CHF (congestive heart failure) (HCC)    • Hypertension      Past Surgical History:   Procedure Laterality Date   • CARDIAC ELECTROPHYSIOLOGY PROCEDURE N/A 2021    Procedure: Ablation atrial flutter;  Surgeon: Antonio Senior MD;  Location: [facility] INVASIVE LOCATION;  Service: Cardiovascular;  Laterality: N/A;   • CATARACT EXTRACTION        General Information     Row Name 05/10/22 110          Physical Therapy Time and Intention    Document Type evaluation  -CM     Mode of Treatment physical therapy  -CM     Row Name 05/10/22 110          General Information    Patient Profile Reviewed yes  -CM     Prior Level of Function independent:;all household mobility;gait;w/c or scooter;driving;shopping;ADL's  uses rw around home; only goes to stores which offer scooter, as too hard to walk through them.; has appointment w/ orthopedist to talk about arthritis and possible shoulder and/or knee replacements (dr. montoya)  -CM     Existing Precautions/Restrictions cardiac  -CM     Barriers to Rehab previous functional deficit  -CM     Row Name 05/10/22 110          Living Environment    People in Home alone;other (see comments)  reports her sister sometimes helps a little but is not consistent; asked sister to bring her to hospital, but sister did not come for over an hour; pt finally called EMS to transport  -CM     Row Name  05/10/22 1104          Home Main Entrance    Number of Stairs, Main Entrance one  has ramp up to deck of mobile home, then one 6 inch step to enter home; reports she is able to manage step, but is having more difficulty due to arthritis and stiffness  -CM     Row Name 05/10/22 1104          Stairs Within Home, Primary    Number of Stairs, Within Home, Primary none  -CM     Row Name 05/10/22 1104          Cognition    Orientation Status (Cognition) oriented x 4  -CM     Row Name 05/10/22 1104          Safety Issues, Functional Mobility    Impairments Affecting Function (Mobility) range of motion (ROM)  -CM           User Key  (r) = Recorded By, (t) = Taken By, (c) = Cosigned By    Initials Name Provider Type    Rina Chauhan, PT Physical Therapist               Mobility     Row Name 05/10/22 1107          Bed Mobility    Bed Mobility bed mobility (all) activities  -CM     All Activities, [place] (Bed Mobility) not tested  -CM     Comment, (Bed Mobility) up in chair when PT arrived; pt reports she sleeps in bed sometimes, sometimes in lift chair; has difficulty getting comfortable due to back pain  -CM     Row Name 05/10/22 1107          Sit-Stand Transfer    Sit-Stand [place] (Transfers) modified independence  -CM     Assistive Device (Sit-Stand Transfers) walker, front-wheeled  -CM     Row Name 05/10/22 1107          Gait/Stairs (Locomotion)    [place] Level (Gait) modified independence  -CM     Assistive Device (Gait) walker, front-wheeled  -CM     Distance in Feet (Gait) 40 ft in room; slow, antalgic gait w/ wide base of support; forward flexed pattern. No loss of balance. Vital signs wnl w/ amb.  -CM           User Key  (r) = Recorded By, (t) = Taken By, (c) = Cosigned By    Initials Name Provider Type    Rina Chauhan, PT Physical Therapist               Obj/Interventions     Row Name 05/10/22 1109          Range of Motion Comprehensive    General Range of Motion upper extremity  range of motion deficits identified  -CM     Comment, General Range of Motion has difficulty w/ finger to thumb approximation on L hand; pt reports she has begun dropping things due to this (OT notified of this)  -CM     Row Name 05/10/22 1109          Strength Comprehensive (MMT)    General Manual Muscle Testing (MMT) Assessment no strength deficits identified  -CM     Comment, General Manual Muscle Testing (MMT) Assessment BLEs wfl.  -CM     Row Name 05/10/22 1109          Balance    Balance Assessment sitting static balance;standing static balance  -CM     Static Sitting Balance independent  -CM     Position, Sitting Balance sitting in chair  -CM     Static Standing Balance modified independence  -CM     Position/Device Used, Standing Balance supported;walker, rolling  -CM     Row Name 05/10/22 1109          Sensory Assessment (Somatosensory)    Sensory Assessment (Somatosensory) sensation intact  -CM           User Key  (r) = Recorded By, (t) = Taken By, (c) = Cosigned By    Initials Name Provider Type    Rina Chauhan, PT Physical Therapist               Goals/Plan    No documentation.                Clinical Impression     Row Name 05/10/22 1110          Pain    Pretreatment Pain Rating 5/10  -CM     Posttreatment Pain Rating 5/10  -CM     Pain Location - Side/Orientation Bilateral  -CM     Pain Location - back;shoulder;knee  -CM     Pre/Posttreatment Pain Comment also c/o stiffness, which she said is sometimes worse than pain  -CM     Pain Intervention(s) Repositioned;Emotional support;Therapeutic presence;Ambulation/increased activity;Distraction  -CM     Row Name 05/10/22 1110          Plan of Care Review    Plan of Care Reviewed With patient  -CM     Outcome Evaluation 83 yo female adm 5/8/22 for SVT, which then converted to a fib w/ rvr. Also dx w/ hypomagnesemia. Started on cardizem drip, now changing to oral cardizem. Hx of a fib and cardiomyopathy. Pt lives alone in a single level home. She has  a ramp up to the top of her deck, but then still has one additional stair to ascend to enter her home. Reports thsi is becoming difficult due to worsening arthritis. Pt has appointment to speak to orthopedic sx regarding possible joint replacements. She has also been having increased back pain. Pt uses rw for household distances. She uses a scooter in stores to complete her shopping. She does drive. She does not have a very strong support system. Today, pt presents w/ normal strength in BLEs but has pain w/ movement, especially in her shoulders. She comes to standing independently at her rw, and she can ambulate 40 ft in her room today w/ rw. No need for skilled PT at this time. Reocmmend home when medically stable. OT to address difficulty w/ . Will sign off.  -     Row Name 05/10/22 1116          Therapy Assessment/Plan (PT)    Criteria for Skilled Interventions Met (PT) no;no problems identified which require skilled intervention  at baseline level for mobility  -     Therapy Frequency (PT) evaluation only  -CM     Row Name 05/10/22 1116          Vital Signs    Recovery Time VSS  -CM     Row Name 05/10/22 1116          Positioning and Restraints    Pre-Treatment Position sitting in chair/recliner  -CM     Post Treatment Position chair  -CM     In Chair notified nsg;sitting;call light within reach;encouraged to call for assist  not marked as falls risk  -           User Key  (r) = Recorded By, (t) = Taken By, (c) = Cosigned By    Initials Name Provider Type    Rina Chauhan, PT Physical Therapist               Outcome Measures    No documentation.                              Physical Therapy Education                 Title: PT OT SLP Therapies (Done)     Topic: Physical Therapy (Done)     Point: Mobility training (Done)     Learning Progress Summary           Patient Acceptance, E,TB, VU,DU by  at 5/10/2022 1117                               User Key     Initials Effective Dates Name Provider  Type Discipline    CM 06/16/21 -  Rina Fang, PT Physical Therapist PT              PT Recommendation and Plan     Plan of Care Reviewed With: patient  Outcome Evaluation: 81 yo female adm 5/8/22 for SVT, which then converted to a fib w/ rvr. Also dx w/ hypomagnesemia. Started on cardizem drip, now changing to oral cardizem. Hx of a fib and cardiomyopathy. Pt lives alone in a single level home. She has a ramp up to the top of her deck, but then still has one additional stair to ascend to enter her home. Reports thsi is becoming difficult due to worsening arthritis. Pt has appointment to speak to orthopedic sx regarding possible joint replacements. She has also been having increased back pain. Pt uses rw for household distances. She uses a scooter in stores to complete her shopping. She does drive. She does not have a very strong support system. Today, pt presents w/ normal strength in BLEs but has pain w/ movement, especially in her shoulders. She comes to standing independently at her rw, and she can ambulate 40 ft in her room today w/ rw. No need for skilled PT at this time. Reocmmend home when medically stable. OT to address difficulty w/ . Will sign off.     Time Calculation:    PT Charges     Row Name 05/10/22 1117             Time Calculation    Start Time 0912  -CM      Stop Time 0934  -CM      Time Calculation (min) 22 min  -CM      PT Received On 05/10/22  -CM              Time Calculation- PT    Total Timed Code Minutes- PT 0 minute(s)  -CM            User Key  (r) = Recorded By, (t) = Taken By, (c) = Cosigned By    Initials Name Provider Type    CM Rina Fang, PT Physical Therapist              Therapy Charges for Today     Code Description Service Date Service Provider Modifiers Qty    [id number] HC PT EVAL LOW COMPLEXITY 4 5/10/2022 Rina Fang, PT GP 1               Rina Fang PT  5/10/2022

## 2022-05-10 NOTE — PLAN OF CARE
Goal Outcome Evaluation:  Plan of Care Reviewed With: patient           Outcome Evaluation: 81 yo female adm 5/8/22 for SVT, which then converted to a fib w/ rvr. Also dx w/ hypomagnesemia. Started on cardizem drip, now changing to oral cardizem. Hx of a fib and cardiomyopathy. Pt lives alone in a single level home. She has a ramp up to the top of her deck, but then still has one additional stair to ascend to enter her home. Reports thsi is becoming difficult due to worsening arthritis. Pt has appointment to speak to orthopedic sx regarding possible joint replacements. She has also been having increased back pain. Pt uses rw for household distances. She uses a scooter in stores to complete her shopping. She does drive. She does not have a very strong support system. Today, pt presents w/ normal strength in BLEs but has pain w/ movement, especially in her shoulders. She comes to standing independently at her rw, and she can ambulate 40 ft in her room today w/ rw. No need for skilled PT at this time. Reocmmend home when medically stable. OT to address difficulty w/ . Will sign off.

## 2022-05-10 NOTE — PLAN OF CARE
Goal Outcome Evaluation:      Pleasant and cooperative with care. IV Cardizem stopped and patient put on PO. Continuing to monitor.

## 2022-05-10 NOTE — PROGRESS NOTES
Referring Provider: Kolton Lal,*    Reason for follow-up:  Atrial fibrillation     Patient Care Team:  Sarita Esparza APRN as PCP - General (Nurse Practitioner)  Antonio Senior MD as Consulting Physician (Cardiac Electrophysiology)  Napoleon Estrada MD as Surgeon (Orthopedic Surgery)    Subjective .  Feeling okay     ROS    Since I have last seen, the patient has been without any chest discomfort ,shortness of breath, palpitations, dizziness or syncope.  Denies having any headache ,abdominal pain ,nausea, vomiting , diarrhea constipation, loss of weight or loss of appetite.  Denies having any excessive bruising ,hematuria or blood in the stool.    Review of all systems negative except as indicated    History  Past Medical History:   Diagnosis Date   • Atrial fibrillation (HCC)    • CHF (congestive heart failure) (HCC)    • Hypertension        Past Surgical History:   Procedure Laterality Date   • CARDIAC ELECTROPHYSIOLOGY PROCEDURE N/A 6/23/2021    Procedure: Ablation atrial flutter;  Surgeon: Antonio Senior MD;  Location: [facility] INVASIVE LOCATION;  Service: Cardiovascular;  Laterality: N/A;   • CATARACT EXTRACTION         Family History   Problem Relation Age of Onset   • No Known Problems Mother    • No Known Problems Father    • Atrial fibrillation Sister    • Diabetes Sister    • No Known Problems Maternal Aunt    • No Known Problems Maternal Uncle    • No Known Problems Paternal Aunt    • No Known Problems Paternal Uncle    • No Known Problems Maternal Grandmother    • No Known Problems Maternal Grandfather    • No Known Problems Paternal Grandmother    • No Known Problems Paternal Grandfather    • Heart attack Neg Hx    • Heart disease Neg Hx    • Heart failure Neg Hx    • Hyperlipidemia Neg Hx    • Hypertension Neg Hx        Social History     Tobacco Use   • Smoking status: Former Smoker     Packs/day: 0.50     Years: 12.00     Pack years: 6.00     Types: Cigarettes      Start date:      Quit date: 1973     Years since quittin.3   • Smokeless tobacco: Never Used   Substance Use Topics   • Alcohol use: No   • Drug use: Never        Medications Prior to Admission   Medication Sig Dispense Refill Last Dose   • allopurinol (Zyloprim) 300 MG tablet Take 1 tablet by mouth Daily. 90 tablet 1 2022 at Unknown time   • apixaban (ELIQUIS) 5 MG tablet tablet Take 1 tablet by mouth Every 12 (Twelve) Hours. 180 tablet 1 2022 at Unknown time   • ascorbic acid (VITAMIN C) 1000 MG tablet Take 1,000 mg by mouth Daily.   2022 at Unknown time   • B Complex Vitamins (VITAMIN B COMPLEX PO) Take  by mouth.   Past Week at Unknown time   • furosemide (LASIX) 40 MG tablet Take 1 tablet by mouth Daily. 90 tablet 1 2022 at Unknown time   • gabapentin (NEURONTIN) 100 MG capsule Take 1 capsule by mouth At Night As Needed (pain). 30 capsule 0 Past Week at Unknown time   • metFORMIN ER (Glucophage XR) 500 MG 24 hr tablet Take 1 tablet by mouth 2 (Two) Times a Day. 60 tablet 0 2022 at Unknown time   • metoprolol tartrate (LOPRESSOR) 25 MG tablet Take 0.5 tablets by mouth 2 (Two) Times a Day. 90 tablet 1 2022 at Unknown time   • Multiple Vitamins-Minerals (ZINC PO) Take  by mouth.   Past Week at Unknown time   • multivitamin with minerals tablet tablet Take 1 tablet by mouth Daily. Liver support, otc   Past Week at Unknown time   • potassium chloride 10 MEQ CR tablet Take 1 tablet by mouth 2 (Two) Times a Day. 180 tablet 1 2022 at Unknown time   • Probiotic Product (PROBIOTIC PO) Take  by mouth.   Past Week at Unknown time   • SELENIUM PO Take  by mouth.   Past Week at Unknown time   • Vitamin D, Cholecalciferol, (CHOLECALCIFEROL) 10 MCG (400 UNIT) tablet Take 400 Units by mouth Daily.   Past Week at Unknown time       Allergies  Patient has no known allergies.    Scheduled Meds:allopurinol, 300 mg, Oral, Daily  apixaban, 5 mg, Oral, Q12H  ascorbic acid, 1,000 mg, Oral,  "Daily  furosemide, 40 mg, Oral, Daily  insulin lispro, 0-7 Units, Subcutaneous, TID AC  metoprolol tartrate, 12.5 mg, Oral, BID  sodium chloride, 10 mL, Intravenous, Q12H      Continuous Infusions:dilTIAZem, 5-15 mg/hr, Last Rate: 2.5 mg/hr (05/10/22 0553)      PRN Meds:.•  acetaminophen **OR** acetaminophen **OR** acetaminophen  •  aluminum-magnesium hydroxide-simethicone  •  dextrose  •  dextrose  •  gabapentin  •  glucagon (human recombinant)  •  HYDROcodone-acetaminophen  •  insulin lispro **AND** insulin lispro  •  melatonin  •  nitroglycerin  •  ondansetron **OR** ondansetron  •  sodium chloride    Objective     VITAL SIGNS  Vitals:    05/10/22 0319 05/10/22 0350 05/10/22 0500 05/10/22 0518   BP:  153/52  124/54   BP Location:    Left arm   Patient Position:    Lying   Pulse: 59 69  65   Resp:    24   Temp:    97.6 °F (36.4 °C)   TempSrc:    Oral   SpO2: 96%   97%   Weight:   90 kg (198 lb 6.6 oz)    Height:           Flowsheet Rows    Flowsheet Row First Filed Value   Admission Height 160 cm (63\") Documented at 05/08/2022 1753   Admission Weight 96.2 kg (212 lb) Documented at 05/08/2022 1753            Intake/Output Summary (Last 24 hours) at 5/10/2022 0640  Last data filed at 5/10/2022 0518  Gross per 24 hour   Intake 1200 ml   Output 1000 ml   Net 200 ml        TELEMETRY: Atrial fibrillation with controlled ventricular response.    Physical Exam:  The patient is alert, oriented and in no distress.  Vital signs as noted above.  Head and neck revealed no carotid bruits or jugular venous distention.  No thyromegaly or lymphadenopathy is present  Lungs clear.  No wheezing.  Breath sounds are normal bilaterally.  Heart normal first and second heart sounds.  No murmur. No precordial rub is present.  No gallop is present.  Abdomen soft and nontender.  No organomegaly is present.  Extremities with good peripheral pulses without any pedal edema.  Skin warm and dry.  Musculoskeletal system is grossly normal  CNS " grossly normal      Results Review:   I reviewed the patient's new clinical results.  Lab Results (last 24 hours)     Procedure Component Value Units Date/Time    Manual Differential [[id number]]  (Abnormal) Collected: 05/10/22 0420    Specimen: Blood Updated: 05/10/22 0619     Neutrophil % 35.0 %      Lymphocyte % 33.0 %      Monocyte % 10.0 %      Basophil % 3.0 %      Bands %  16.0 %      Atypical Lymphocyte % 3.0 %      Neutrophils Absolute 2.55 10*3/mm3      Lymphocytes Absolute 1.80 10*3/mm3      Monocytes Absolute 0.50 10*3/mm3      Basophils Absolute 0.15 10*3/mm3      nRBC 1.0 /100 WBC      RBC Morphology Normal     WBC Morphology Normal     Platelet Morphology Normal    CBC & Differential [[id number]]  (Abnormal) Collected: 05/10/22 0420    Specimen: Blood Updated: 05/10/22 0619    Narrative:      The following orders were created for panel order CBC & Differential.  Procedure                               Abnormality         Status                     ---------                               -----------         ------                     CBC Auto Differential[[id number]]        Abnormal            Final result               Scan Slide[[id number]]                                       Final result                 Please view results for these tests on the individual orders.    Scan Slide [[id number]] Collected: 05/10/22 0420    Specimen: Blood Updated: 05/10/22 0619     Scan Slide --     Comment: See Manual Differential Results       CBC Auto Differential [[id number]]  (Abnormal) Collected: 05/10/22 0420    Specimen: Blood Updated: 05/10/22 0619     WBC 5.00 10*3/mm3      RBC 3.31 10*6/mm3      Hemoglobin 10.5 g/dL      Hematocrit 30.6 %      MCV 92.6 fL      MCH 31.7 pg      MCHC 34.3 g/dL      RDW 14.1 %      RDW-SD 45.9 fl      MPV 8.1 fL      Platelets 249 10*3/mm3     Narrative:      The previously reported component NRBC is no longer being reported. Previous result was 0.2 /100 WBC (Reference Range: 0.0-0.2  /100 WBC) on 5/10/2022 at 0522 EDT.    TSH [[id number]]  (Normal) Collected: 05/10/22 0420    Specimen: Blood Updated: 05/10/22 0531     TSH 1.790 uIU/mL     Magnesium [[id number]]  (Normal) Collected: 05/10/22 0420    Specimen: Blood Updated: 05/10/22 0528     Magnesium 2.0 mg/dL     Basic Metabolic Panel [[id number]]  (Abnormal) Collected: 05/10/22 0420    Specimen: Blood Updated: 05/10/22 0525     Glucose 173 mg/dL      BUN 25 mg/dL      Creatinine 0.92 mg/dL      Sodium 140 mmol/L      Potassium 3.8 mmol/L      Chloride 105 mmol/L      CO2 25.0 mmol/L      Calcium 9.0 mg/dL      BUN/Creatinine Ratio 27.2     Anion Gap 10.0 mmol/L      eGFR 62.7 mL/min/1.73      Comment: National Kidney Foundation and American Society of Nephrology (ASN) Task Force recommended calculation based on the Chronic Kidney Disease Epidemiology Collaboration (CKD-EPI) equation refit without adjustment for race.       Narrative:      GFR Normal >60  Chronic Kidney Disease <60  Kidney Failure <15      POC Glucose Once [[id number]]  (Abnormal) Collected: 05/09/22 2004    Specimen: Blood Updated: 05/09/22 2005     Glucose 267 mg/dL      Comment: Serial Number: [id number]:  [id number]       POC Glucose Once [[id number]]  (Abnormal) Collected: 05/09/22 1230    Specimen: Blood Updated: 05/09/22 1233     Glucose 239 mg/dL      Comment: Serial Number: [id number]:  [id number]       TSH [[id number]]  (Normal) Collected: 05/09/22 0246    Specimen: Blood Updated: 05/09/22 1229     TSH 0.868 uIU/mL     BNP [[id number]]  (Normal) Collected: 05/09/22 0246    Specimen: Blood Updated: 05/09/22 1210     proBNP 63.9 pg/mL     Narrative:      Among patients with dyspnea, NT-proBNP is highly sensitive for the detection of acute congestive heart failure. In addition NT-proBNP of <300 pg/ml effectively rules out acute congestive heart failure with 99% negative predictive value.    Results may be falsely decreased if patient taking Biotin.      Vitamin B12  [[id number]]  (Abnormal) Collected: 05/09/22 0246    Specimen: Blood Updated: 05/09/22 1121     Vitamin B-12 1,356 pg/mL     Narrative:      Results may be falsely increased if patient taking Biotin.            Imaging Results (Last 24 Hours)     ** No results found for the last 24 hours. **      LAB RESULTS (LAST 7 DAYS)    CBC  Results from last 7 days   Lab Units 05/10/22  0420 05/09/22  0246 05/08/22  1806   WBC 10*3/mm3 5.00 7.10 8.40   RBC 10*6/mm3 3.31* 3.51* 4.01   HEMOGLOBIN g/dL 10.5* 11.4* 12.7   HEMATOCRIT % 30.6* 32.4* 36.9   MCV fL 92.6 92.3 92.0   PLATELETS 10*3/mm3 249 254 283       BMP  Results from last 7 days   Lab Units 05/10/22  0420 05/09/22  0246 05/08/22  1806   SODIUM mmol/L 140 141 137   POTASSIUM mmol/L 3.8 4.7 4.3   CHLORIDE mmol/L 105 104 100   CO2 mmol/L 25.0 25.0 22.0   BUN mg/dL 25* 21 25*   CREATININE mg/dL 0.92 0.90 1.01*   GLUCOSE mg/dL 173* 195* 190*   MAGNESIUM mg/dL 2.0  --  1.6   PHOSPHORUS mg/dL  --   --  2.9       CMP   Results from last 7 days   Lab Units 05/10/22  0420 05/09/22  0246 05/08/22  1806   SODIUM mmol/L 140 141 137   POTASSIUM mmol/L 3.8 4.7 4.3   CHLORIDE mmol/L 105 104 100   CO2 mmol/L 25.0 25.0 22.0   BUN mg/dL 25* 21 25*   CREATININE mg/dL 0.92 0.90 1.01*   GLUCOSE mg/dL 173* 195* 190*   ALBUMIN g/dL  --   --  4.30   BILIRUBIN mg/dL  --   --  0.8   ALK PHOS U/L  --   --  84   AST (SGOT) U/L  --   --  18   ALT (SGPT) U/L  --   --  15         BNP        TROPONIN  Results from last 7 days   Lab Units 05/08/22  1806   TROPONIN T ng/mL <0.010       CoAg        Creatinine Clearance  Estimated Creatinine Clearance: 52.1 mL/min (by C-G formula based on SCr of 0.92 mg/dL).    ABG        Radiology  XR Chest 1 View    Result Date: 5/8/2022  Probable cardiomegaly without acute cardiopulmonary abnormality.  Electronically Signed By-Luis Wilcox MD On:5/8/2022 6:47 PM This report was finalized on [id number] by  Luis Wilcox,  MD.          EKG                              I personally viewed and interpreted the patient's EKG/Telemetry data:    ECHOCARDIOGRAM:    Results for orders placed during the hospital encounter of 05/08/22    Adult Transthoracic Echo Complete W/ Cont if Necessary Per Protocol    Interpretation Summary  · Estimated left ventricular EF = 60% Left ventricular systolic function is normal.    Indications  Heart failure    Technically satisfactory study.  Mitral valve is structurally normal.  Mitral annular calcification.  Mild mitral regurgitation is present.  Tricuspid valve is structurally normal.  Aortic valve is thickened with mild aortic valve stenosis.  Pulmonic valve could not be well visualized.  No evidence for tricuspid or aortic regurgitation is seen by Doppler study.  Left atrium is enlarged.  Right atrium is normal in size.  Left ventricle is normal in size and contractility with ejection fraction of 60%.  Concentric left ventricle hypertrophy.  Right ventricle is normal in size.  Atrial septum is intact.  Aorta is normal.  No pericardial effusion or intracardiac thrombus is seen.    Impression  Mild mitral annular calcification.  Mild aortic valve stenosis.  Mild mitral regurgitation.  Left atrium is enlarged.  Left ventricle is hypertrophic.  Left ventricular diastolic dysfunction.  Left ventricular contractility is normal with ejection fraction of 60%.          STRESS MYOVIEW:    Cardiolite (Tc-99m Sestamibi) stress test    CARDIAC CATHETERIZATION:            OTHER:         Assessment/Plan     Active Problems:    Essential hypertension    Palpitations    Atrial fibrillation with RVR (HCC)    Hx of atrioventricular node ablation    DM (diabetes mellitus), type 2 (HCC)    Gout      Laure Leung is a 82 y.o. female with past medical history of  Atrial fibrillation, CHF, hypertension, and aflutter ablation who presented to the ED with complaints of a racing heart. Patient states on Saturday she felt like  her heart was beating fast throughout the day. During the course of the day, her heart rate did not get better so she called EMS. Patient does have a history of atrial ablation in 2021 with Dr. Senior. Per ER records, she was give adenosine x 2 and started on a cardizem drip. She has intermittently converted to sinus rhythm and Afib with RVR.  Patient denies chest pain, dizziness, palpitations, or lower extremity edema. She reports being recently diagnosed with diabetes and gout.     Labs reviewed, troponin and BNP negative, Cr 0.9, cholesterol 233, [id number]  CXR probable cardiomegaly, EKGs reviewed, Echo mild aortic stenosis and mitral regurg, EF 60%      ]]]]]]]]]]]]]]]]]]]  Impression  ==========  - Palpitations    - Atrial fibrillation with RVR.  Past history of atrial fibrillation.  Status post cardioversion 2019.  Status post ablation 2011 by Dr. Senior     Echocardiogram 5/8/2022 revealed  Mild mitral annular calcification.  Mild aortic valve stenosis.  Mild mitral regurgitation.  Left atrium is enlarged.  Left ventricle is hypertrophic.  Left ventricular diastolic dysfunction.  Left ventricular contractility is normal with ejection fraction of 60%.     - Diabetes gout hypertension     - Family history of atrial fibrillation     - No known allergies  ============  Plan  ================  History of atrial fibrillation.  Patient had cardioversion and was subsequently had ablation for atrial fibrillation by Dr. Senior.  Patient presented with palpitations and was noted to have atrial fibrillation with RVR.  Patient was started on intravenous Cardizem  Rate has slowed down and appears to be in sinus rhythm although still having intermittent atrial fibrillation.  Change IV Cardizem to p.o. Cardizem CD to 40 mg a day.  Patient is on metoprolol 12.5 mg twice a day    Anticoagulation status reviewed.  Patient is on Eliquis 5 mg twice a day    Echocardiogram 5/8/2022-as above     Repeat EKG    Consideration for cardioversion  his resident patient had outpatient depending on patient's progress and patient's preference.    Medications were reviewed and updated.  Patient is on allopurinol Eliquis diltiazem CD Lasix 40 mg a day metoprolol.    Follow-up labs ordered.    Further plan will depend on patient's progress.  ]]]]]]]]]]]]]]]]]]]        Malik Soler MD  05/10/22  06:40 EDT

## 2022-05-10 NOTE — CASE MANAGEMENT/SOCIAL WORK
Continued Stay Note  OMAYRA Benjamin     Patient Name: Laure Leung  MRN: [id number]  Today's Date: 5/10/2022    Admit Date: 5/8/2022     Discharge Plan     Row Name 05/10/22 1235       Plan    Plan Anticipate routine home    Plan Comments Transportaion on d/c will need to be arranged through [facility], # [phone]. Cardiology following.              Phone communication or documentation only - no physical contact with patient or family.      Autumn Hu RN

## 2022-05-10 NOTE — THERAPY EVALUATION
Patient Name: Laure Leung  : 1940    MRN: [id number]                              Today's Date: 5/10/2022       Admit Date: 2022    Visit Dx:     ICD-10-CM ICD-9-CM   1. Atrial fibrillation with RVR (HCC)  I48.91 427.31   2. Hypomagnesemia  E83.42 275.2     Patient Active Problem List   Diagnosis   • Atypical atrial flutter (HCC)   • Essential hypertension   • Palpitations   • Atrial fibrillation with RVR (HCC)   • Hx of atrioventricular node ablation   • DM (diabetes mellitus), type 2 (HCC)   • Gout     Past Medical History:   Diagnosis Date   • Atrial fibrillation (HCC)    • CHF (congestive heart failure) (HCC)    • Hypertension      Past Surgical History:   Procedure Laterality Date   • CARDIAC ELECTROPHYSIOLOGY PROCEDURE N/A 2021    Procedure: Ablation atrial flutter;  Surgeon: Antonio Senior MD;  Location: [facility] INVASIVE LOCATION;  Service: Cardiovascular;  Laterality: N/A;   • CATARACT EXTRACTION        General Information     Row Name 05/10/22 [email or web address]          OT Time and Intention    Document Type evaluation  -LS     Mode of Treatment occupational therapy  -     Row Name 05/10/22 [facility]5          General Information    Patient Profile Reviewed yes  -LS     Prior Level of Function independent:;ADL's;shopping;driving;all household mobility;community mobility  -     Existing Precautions/Restrictions cardiac  -     Row Name 05/10/22 1405          Living Environment    People in Home alone  -     Row Name 05/10/22 1405          Home Main Entrance    Number of Stairs, Main Entrance one  -     Row Name 05/10/22 1405          Cognition    Orientation Status (Cognition) oriented x 4  -     Row Name 05/10/22 1405          Safety Issues, Functional Mobility    Impairments Affecting Function (Mobility) range of motion (ROM)  -           User Key  (r) = Recorded By, (t) = Taken By, (c) = Cosigned By    Initials Name Provider Type    LS Samuel Mcfadden OT Occupational Therapist                  Mobility/ADL's     Row Name 05/10/22 1407          Bed Mobility    Bed Mobility bed mobility (all) activities  -     All Activities, [place] (Bed Mobility) not tested  -     Comment, (Bed Mobility) Up in chair upon OT arrival.  -     Row Name 05/10/22 1407          Transfers    Sit-Stand [place] (Transfers) modified independence  -     Row Name 05/10/22 1407          Sit-Stand Transfer    Assistive Device (Sit-Stand Transfers) walker, front-wheeled  -     Row Name 05/10/22 1407          Activities of Daily Living    BADL Assessment/Intervention other (see comments)  Pt demos ability to alena and doff socks with Mod I. She normally uses sock-aid. Ability to reach feet for donning socks shows ability to reach for all LB self-care independence.  -           User Key  (r) = Recorded By, (t) = Taken By, (c) = Cosigned By    Initials Name Provider Type    Samuel Ribera OT Occupational Therapist               Obj/Interventions     Row Name 05/10/22 1409          Sensory Assessment (Somatosensory)    Sensory Assessment (Somatosensory) bilateral UE  -     Sensory Subjective Reports tingling  -     Sensory Assessment Tingling in B hands when touched. Reports the same in feet. Likely diabetic neuropathy. Educated to visually compensate as this causes issues with dropping items.  -     Row Name 05/10/22 1409          Range of Motion Comprehensive    General Range of Motion bilateral upper extremity ROM WFL  -     Comment, General Range of Motion Functional BUE AROM, pain in shoulders with elevation.  -     Row Name 05/10/22 1409          Strength Comprehensive (MMT)    General Manual Muscle Testing (MMT) Assessment upper extremity strength deficits identified  -     Comment, General Manual Muscle Testing (MMT) Assessment B shoulders 3/5  -           User Key  (r) = Recorded By, (t) = Taken By, (c) = Cosigned By    Initials Name Provider Type    Samuel Ribera, OT  Occupational Therapist               Goals/Plan    No documentation.                Clinical Impression     Row Name 05/10/22 1412          Pain Assessment    Pretreatment Pain Rating 5/10  -LS     Posttreatment Pain Rating 5/10  -LS     Pain Location - Side/Orientation Bilateral  -LS     Pain Location - shoulder;back  -LS     Pain Intervention(s) Repositioned;Ambulation/increased activity;Therapeutic presence;Therapeutic touch  -LS     Row Name 05/10/22 1412          Plan of Care Review    Plan of Care Reviewed With patient  -LS     Outcome Evaluation 81 yo female adm 5/8/22 for SVT, which then converted to a fib w/ rvr. Also dx w/ hypomagnesemia. Started on cardizem drip, now changing to oral cardizem. Hx of a fib and cardiomyopathy. Pt lives alone in a single level home. She has a ramp up to the top of her deck, but then still has one additional stair to ascend to enter her home. Reports this is becoming difficult due to worsening arthritis. She reports she uses a rw at all times, in and out of her home. When shopping she uses a scooter, and she does drive. Patient presents today remaining independent with ADLs. She stands with Mod I using RW, ambulating functional distance in her room with VSS. She does have B shoulder pain with elevation, reporting this is inconsistent. She reports that she has had issues of dropping items of late, and reports tingling in her hands when fingers are touched. With new dx of diabetes, pt likely experiencing mild neuropathy. She was educated on compensatory strategies to minimize dropping of items. No further skilled OT needs while at [facility], will sign off. She should be safe to d/c home once medically stable.  -     Row Name 05/10/22 1412          Therapy Assessment/Plan (OT)    Criteria for Skilled Therapeutic Interventions Met (OT) no;no problems identified which require skilled intervention  -LS     Therapy Frequency (OT) evaluation only  -     Row Name 05/10/22 1412           Therapy Plan Review/Discharge Plan (OT)    Anticipated Discharge Disposition (OT) home  -LS     Row Name 05/10/22 1412          Positioning and Restraints    Pre-Treatment Position sitting in chair/recliner  -LS     Post Treatment Position chair  -LS     In Chair notified nsg;sitting;call light within reach;encouraged to call for assist  -LS           User Key  (r) = Recorded By, (t) = Taken By, (c) = Cosigned By    Initials Name Provider Type    Samuel Ribera OT Occupational Therapist               Outcome Measures    No documentation.                 Occupational Therapy Education                 Title: PT OT SLP Therapies (Done)     Topic: Occupational Therapy (Done)     Point: ADL training (Done)     Description:   Instruct learner(s) on proper safety adaptation and remediation techniques during self care or transfers.   Instruct in proper use of assistive devices.              Learning Progress Summary           Patient Acceptance, E,TB, VU by  at 5/10/2022 1422                   Point: Home exercise program (Done)     Description:   Instruct learner(s) on appropriate technique for monitoring, assisting and/or progressing therapeutic exercises/activities.              Learning Progress Summary           Patient Acceptance, E,TB, VU by  at 5/10/2022 1422                   Point: Precautions (Done)     Description:   Instruct learner(s) on prescribed precautions during self-care and functional transfers.              Learning Progress Summary           Patient Acceptance, E,TB, VU by  at 5/10/2022 1422                               User Key     Initials Effective Dates Name Provider Type [facility] 03/01/22 -  Samuel Mcfadden OT Occupational Therapist OT              OT Recommendation and Plan  Therapy Frequency (OT): evaluation only  Plan of Care Review  Plan of Care Reviewed With: patient  Outcome Evaluation: 83 yo female adm 5/8/22 for SVT, which then converted to a fib w/ rvr. Also dx w/  hypomagnesemia. Started on cardizem drip, now changing to oral cardizem. Hx of a fib and cardiomyopathy. Pt lives alone in a single level home. She has a ramp up to the top of her deck, but then still has one additional stair to ascend to enter her home. Reports this is becoming difficult due to worsening arthritis. She reports she uses a rw at all times, in and out of her home. When shopping she uses a scooter, and she does drive. Patient presents today remaining independent with ADLs. She stands with Mod I using RW, ambulating functional distance in her room with VSS. She does have B shoulder pain with elevation, reporting this is inconsistent. She reports that she has had issues of dropping items of late, and reports tingling in her hands when fingers are touched. With new dx of diabetes, pt likely experiencing mild neuropathy. She was educated on compensatory strategies to minimize dropping of items. No further skilled OT needs while at [facility], will sign off. She should be safe to d/c home once medically stable.     Time Calculation:    Time Calculation- OT     Row Name 05/10/22 1423             Time Calculation- OT    OT Start Time 0958  -LS      OT Stop Time 1026  -      OT Time Calculation (min) 28 min  -LS      OT Received On 05/10/22  -              Untimed Charges    OT Eval/Re-eval Minutes 28  -LS              Total Minutes    Untimed Charges Total Minutes 28  -LS       Total Minutes 28  -LS            User Key  (r) = Recorded By, (t) = Taken By, (c) = Cosigned By    Initials Name Provider Type     Ashley Mcfadden OT Occupational Therapist              Therapy Charges for Today     Code Description Service Date Service Provider Modifiers Qty    [id number]  OT EVAL LOW COMPLEXITY 4 5/10/2022 Ashley Mcfadden OT GO 1               ASHLEY MCFADDEN OT  5/10/2022

## 2022-05-10 NOTE — PLAN OF CARE
Goal Outcome Evaluation:  Plan of Care Reviewed With: patient           Outcome Evaluation: 81 yo female adm 5/8/22 for SVT, which then converted to a fib w/ rvr. Also dx w/ hypomagnesemia. Started on cardizem drip, now changing to oral cardizem. Hx of a fib and cardiomyopathy. Pt lives alone in a single level home. She has a ramp up to the top of her deck, but then still has one additional stair to ascend to enter her home. Reports this is becoming difficult due to worsening arthritis. She reports she uses a rw at all times, in and out of her home. When shopping she uses a scooter, and she does drive. Patient presents today remaining independent with ADLs. She stands with Mod I using RW, ambulating functional distance in her room with VSS. She does have B shoulder pain with elevation, reporting this is inconsistent. She reports that she has had issues of dropping items of late, and reports tingling in her hands when fingers are touched. With new dx of diabetes, pt likely experiencing mild neuropathy. She was educated on compensatory strategies to minimize dropping of items. No further skilled OT needs while at [facility], will sign off. She should be safe to d/c home once medically stable.

## 2022-05-11 ENCOUNTER — READMISSION MANAGEMENT (OUTPATIENT)
Dept: CALL CENTER | Facility: HOSPITAL | Age: 82
End: 2022-05-11

## 2022-05-11 VITALS
OXYGEN SATURATION: 100 % | TEMPERATURE: 97.9 F | WEIGHT: 204.15 LBS | BODY MASS INDEX: 34.85 KG/M2 | HEART RATE: 67 BPM | SYSTOLIC BLOOD PRESSURE: 150 MMHG | DIASTOLIC BLOOD PRESSURE: 54 MMHG | HEIGHT: 64 IN | RESPIRATION RATE: 18 BRPM

## 2022-05-11 LAB
ANION GAP SERPL CALCULATED.3IONS-SCNC: 12 MMOL/L (ref 5–15)
BUN SERPL-MCNC: 28 MG/DL (ref 8–23)
BUN/CREAT SERPL: 35 (ref 7–25)
CALCIUM SPEC-SCNC: 9.3 MG/DL (ref 8.6–10.5)
CHLORIDE SERPL-SCNC: 105 MMOL/L (ref 98–107)
CO2 SERPL-SCNC: 22 MMOL/L (ref 22–29)
CREAT SERPL-MCNC: 0.8 MG/DL (ref 0.57–1)
DEPRECATED RDW RBC AUTO: 46.4 FL (ref 37–54)
EGFRCR SERPLBLD CKD-EPI 2021: 74.1 ML/MIN/1.73
ERYTHROCYTE [DISTWIDTH] IN BLOOD BY AUTOMATED COUNT: 14.2 % (ref 12.3–15.4)
GLUCOSE BLDC GLUCOMTR-MCNC: 188 MG/DL (ref 70–105)
GLUCOSE SERPL-MCNC: 179 MG/DL (ref 65–99)
HCT VFR BLD AUTO: 30.8 % (ref 34–46.6)
HGB BLD-MCNC: 10.4 G/DL (ref 12–15.9)
MAGNESIUM SERPL-MCNC: 2 MG/DL (ref 1.6–2.4)
MCH RBC QN AUTO: 31.5 PG (ref 26.6–33)
MCHC RBC AUTO-ENTMCNC: 33.9 G/DL (ref 31.5–35.7)
MCV RBC AUTO: 93 FL (ref 79–97)
PLATELET # BLD AUTO: 274 10*3/MM3 (ref 140–450)
PMV BLD AUTO: 8.2 FL (ref 6–12)
POTASSIUM SERPL-SCNC: 4 MMOL/L (ref 3.5–5.2)
RBC # BLD AUTO: 3.32 10*6/MM3 (ref 3.77–5.28)
SODIUM SERPL-SCNC: 139 MMOL/L (ref 136–145)
WBC NRBC COR # BLD: 5.4 10*3/MM3 (ref 3.4–10.8)

## 2022-05-11 PROCEDURE — 63710000001 INSULIN LISPRO (HUMAN) PER 5 UNITS: Performed by: FAMILY MEDICINE

## 2022-05-11 PROCEDURE — 80048 BASIC METABOLIC PNL TOTAL CA: CPT | Performed by: INTERNAL MEDICINE

## 2022-05-11 PROCEDURE — 99217 PR OBSERVATION CARE DISCHARGE MANAGEMENT: CPT | Performed by: FAMILY MEDICINE

## 2022-05-11 PROCEDURE — 85027 COMPLETE CBC AUTOMATED: CPT | Performed by: INTERNAL MEDICINE

## 2022-05-11 PROCEDURE — 82962 GLUCOSE BLOOD TEST: CPT

## 2022-05-11 PROCEDURE — G0378 HOSPITAL OBSERVATION PER HR: HCPCS

## 2022-05-11 PROCEDURE — 99213 OFFICE O/P EST LOW 20 MIN: CPT | Performed by: INTERNAL MEDICINE

## 2022-05-11 PROCEDURE — 83735 ASSAY OF MAGNESIUM: CPT | Performed by: INTERNAL MEDICINE

## 2022-05-11 RX ORDER — ISOPROPYL ALCOHOL 700 MG/ML
1 CLOTH TOPICAL DAILY
Qty: 100 EACH | Refills: 2 | Status: SHIPPED | OUTPATIENT
Start: 2022-05-11 | End: 2022-11-07 | Stop reason: SDUPTHER

## 2022-05-11 RX ORDER — LANCETS 30 GAUGE
1 EACH MISCELLANEOUS DAILY
Qty: 100 EACH | Refills: 2 | Status: SHIPPED | OUTPATIENT
Start: 2022-05-11 | End: 2022-11-07 | Stop reason: SDUPTHER

## 2022-05-11 RX ORDER — BLOOD SUGAR DIAGNOSTIC
1 STRIP MISCELLANEOUS DAILY
Qty: 100 EACH | Refills: 2 | Status: SHIPPED | OUTPATIENT
Start: 2022-05-11 | End: 2022-11-07 | Stop reason: SDUPTHER

## 2022-05-11 RX ORDER — DILTIAZEM HYDROCHLORIDE 240 MG/1
240 CAPSULE, COATED, EXTENDED RELEASE ORAL
Qty: 30 CAPSULE | Refills: 0 | Status: SHIPPED | OUTPATIENT
Start: 2022-05-12 | End: 2022-05-13 | Stop reason: SDUPTHER

## 2022-05-11 RX ADMIN — ALLOPURINOL 300 MG: 300 TABLET ORAL at 08:26

## 2022-05-11 RX ADMIN — APIXABAN 5 MG: 5 TABLET, FILM COATED ORAL at 08:26

## 2022-05-11 RX ADMIN — INSULIN LISPRO 2 UNITS: 100 INJECTION, SOLUTION INTRAVENOUS; SUBCUTANEOUS at 08:26

## 2022-05-11 RX ADMIN — DILTIAZEM HYDROCHLORIDE 240 MG: 240 CAPSULE, COATED, EXTENDED RELEASE ORAL at 08:26

## 2022-05-11 RX ADMIN — FUROSEMIDE 40 MG: 40 TABLET ORAL at 08:26

## 2022-05-11 RX ADMIN — OXYCODONE HYDROCHLORIDE AND ACETAMINOPHEN 1000 MG: 500 TABLET ORAL at 08:26

## 2022-05-11 RX ADMIN — Medication 10 ML: at 08:26

## 2022-05-11 RX ADMIN — METOPROLOL TARTRATE 12.5 MG: 25 TABLET, FILM COATED ORAL at 08:26

## 2022-05-11 NOTE — OUTREACH NOTE
Prep Survey    Flowsheet Row Responses   Holiness facility patient discharged from? [facility]   Is LACE score < 7 ? Yes   Emergency Room discharge w/ pulse ox? No   Eligibility TCM   Hospital [facility]   Date of Admission 05/08/22   Date of Discharge 05/11/22   Discharge Disposition Home or Self Care   Discharge diagnosis Hypomagnesemia/Afib with RVR   Does the patient have one of the following disease processes/diagnoses(primary or secondary)? Other   Does the patient have Home health ordered? No   Is there a DME ordered? No   Prep survey completed? Yes          KALEY TUCKER - Registered Nurse

## 2022-05-11 NOTE — PLAN OF CARE
Goal Outcome Evaluation:  Plan of Care Reviewed With: patient        Progress: no change  Outcome Evaluation: Patient alert and oriented, O2@2 liters via nc, VSS, patient has rested well this shift.

## 2022-05-11 NOTE — PROGRESS NOTES
Referring Provider: Kolton Lal,*    Reason for follow-up:  Atrial fibrillation     Patient Care Team:  Sarita Esparza APRN as PCP - General (Nurse Practitioner)  Antonio Senior MD as Consulting Physician (Cardiac Electrophysiology)  Napoleon Estrada MD as Surgeon (Orthopedic Surgery)    Subjective .  Feeling okay     ROS    Since I have last seen, the patient has been without any chest discomfort ,shortness of breath, palpitations, dizziness or syncope.  Denies having any headache ,abdominal pain ,nausea, vomiting , diarrhea constipation, loss of weight or loss of appetite.  Denies having any excessive bruising ,hematuria or blood in the stool.    Review of all systems negative except as indicated    History  Past Medical History:   Diagnosis Date   • Atrial fibrillation (HCC)    • CHF (congestive heart failure) (HCC)    • Hypertension        Past Surgical History:   Procedure Laterality Date   • CARDIAC ELECTROPHYSIOLOGY PROCEDURE N/A 6/23/2021    Procedure: Ablation atrial flutter;  Surgeon: Antonio Senior MD;  Location: [facility] INVASIVE LOCATION;  Service: Cardiovascular;  Laterality: N/A;   • CATARACT EXTRACTION         Family History   Problem Relation Age of Onset   • No Known Problems Mother    • No Known Problems Father    • Atrial fibrillation Sister    • Diabetes Sister    • No Known Problems Maternal Aunt    • No Known Problems Maternal Uncle    • No Known Problems Paternal Aunt    • No Known Problems Paternal Uncle    • No Known Problems Maternal Grandmother    • No Known Problems Maternal Grandfather    • No Known Problems Paternal Grandmother    • No Known Problems Paternal Grandfather    • Heart attack Neg Hx    • Heart disease Neg Hx    • Heart failure Neg Hx    • Hyperlipidemia Neg Hx    • Hypertension Neg Hx        Social History     Tobacco Use   • Smoking status: Former Smoker     Packs/day: 0.50     Years: 12.00     Pack years: 6.00     Types: Cigarettes      Start date:      Quit date: 1973     Years since quittin.3   • Smokeless tobacco: Never Used   Substance Use Topics   • Alcohol use: No   • Drug use: Never        Medications Prior to Admission   Medication Sig Dispense Refill Last Dose   • allopurinol (Zyloprim) 300 MG tablet Take 1 tablet by mouth Daily. 90 tablet 1 2022 at Unknown time   • apixaban (ELIQUIS) 5 MG tablet tablet Take 1 tablet by mouth Every 12 (Twelve) Hours. 180 tablet 1 2022 at Unknown time   • ascorbic acid (VITAMIN C) 1000 MG tablet Take 1,000 mg by mouth Daily.   2022 at Unknown time   • B Complex Vitamins (VITAMIN B COMPLEX PO) Take  by mouth.   Past Week at Unknown time   • furosemide (LASIX) 40 MG tablet Take 1 tablet by mouth Daily. 90 tablet 1 2022 at Unknown time   • gabapentin (NEURONTIN) 100 MG capsule Take 1 capsule by mouth At Night As Needed (pain). 30 capsule 0 Past Week at Unknown time   • metFORMIN ER (Glucophage XR) 500 MG 24 hr tablet Take 1 tablet by mouth 2 (Two) Times a Day. 60 tablet 0 2022 at Unknown time   • metoprolol tartrate (LOPRESSOR) 25 MG tablet Take 0.5 tablets by mouth 2 (Two) Times a Day. 90 tablet 1 2022 at Unknown time   • Multiple Vitamins-Minerals (ZINC PO) Take  by mouth.   Past Week at Unknown time   • multivitamin with minerals tablet tablet Take 1 tablet by mouth Daily. Liver support, otc   Past Week at Unknown time   • potassium chloride 10 MEQ CR tablet Take 1 tablet by mouth 2 (Two) Times a Day. 180 tablet 1 2022 at Unknown time   • Probiotic Product (PROBIOTIC PO) Take  by mouth.   Past Week at Unknown time   • SELENIUM PO Take  by mouth.   Past Week at Unknown time   • Vitamin D, Cholecalciferol, (CHOLECALCIFEROL) 10 MCG (400 UNIT) tablet Take 400 Units by mouth Daily.   Past Week at Unknown time       Allergies  Patient has no known allergies.    Scheduled Meds:allopurinol, 300 mg, Oral, Daily  apixaban, 5 mg, Oral, Q12H  ascorbic acid, 1,000 mg, Oral,  "Daily  dilTIAZem CD, 240 mg, Oral, Q24H  furosemide, 40 mg, Oral, Daily  insulin lispro, 0-7 Units, Subcutaneous, TID AC  metoprolol tartrate, 12.5 mg, Oral, BID  sodium chloride, 10 mL, Intravenous, Q12H      Continuous Infusions:dilTIAZem, 5-15 mg/hr, Last Rate: Stopped (05/10/22 0915)      PRN Meds:.•  acetaminophen **OR** acetaminophen **OR** acetaminophen  •  aluminum-magnesium hydroxide-simethicone  •  dextrose  •  dextrose  •  gabapentin  •  glucagon (human recombinant)  •  HYDROcodone-acetaminophen  •  insulin lispro **AND** insulin lispro  •  melatonin  •  nitroglycerin  •  ondansetron **OR** ondansetron  •  sodium chloride    Objective     VITAL SIGNS  Vitals:    05/10/22 2135 05/11/22 0140 05/11/22 0155 05/11/22 0536   BP: 144/49 (!) 120/35  145/60   BP Location: Left arm Right arm  Right arm   Patient Position: Lying Lying  Lying   Pulse: 64 65  59   Resp: 18 18  18   Temp: 97.9 °F (36.6 °C) 97.9 °F (36.6 °C)  97.6 °F (36.4 °C)   TempSrc: Oral Oral  Oral   SpO2: 92% 97% 97% 100%   Weight:    92.6 kg (204 lb 2.3 oz)   Height:           Flowsheet Rows    Flowsheet Row First Filed Value   Admission Height 160 cm (63\") Documented at 05/08/2022 1753   Admission Weight 96.2 kg (212 lb) Documented at 05/08/2022 1753            Intake/Output Summary (Last 24 hours) at 5/11/2022 0653  Last data filed at 5/11/2022 0536  Gross per 24 hour   Intake 1320 ml   Output 450 ml   Net 870 ml        TELEMETRY: Sinus rhythm    Physical Exam:  The patient is alert, oriented and in no distress.  Vital signs as noted above.  Head and neck revealed no carotid bruits or jugular venous distention.  No thyromegaly or lymphadenopathy is present  Lungs clear.  No wheezing.  Breath sounds are normal bilaterally.  Heart normal first and second heart sounds.  No murmur. No precordial rub is present.  No gallop is present.  Abdomen soft and nontender.  No organomegaly is present.  Extremities with good peripheral pulses without any pedal " edema.  Skin warm and dry.  Musculoskeletal system is grossly normal  CNS grossly normal      Results Review:   I reviewed the patient's new clinical results.  Lab Results (last 24 hours)     Procedure Component Value Units Date/Time    Basic Metabolic Panel [[id number]]  (Abnormal) Collected: 05/11/22 0421    Specimen: Blood Updated: 05/11/22 0528     Glucose 179 mg/dL      BUN 28 mg/dL      Creatinine 0.80 mg/dL      Sodium 139 mmol/L      Potassium 4.0 mmol/L      Comment: Slight hemolysis detected by analyzer. Results may be affected.        Chloride 105 mmol/L      CO2 22.0 mmol/L      Calcium 9.3 mg/dL      BUN/Creatinine Ratio 35.0     Anion Gap 12.0 mmol/L      eGFR 74.1 mL/min/1.73      Comment: National Kidney Foundation and American Society of Nephrology (ASN) Task Force recommended calculation based on the Chronic Kidney Disease Epidemiology Collaboration (CKD-EPI) equation refit without adjustment for race.       Narrative:      GFR Normal >60  Chronic Kidney Disease <60  Kidney Failure <15      Magnesium [[id number]]  (Normal) Collected: 05/11/22 0421    Specimen: Blood Updated: 05/11/22 0528     Magnesium 2.0 mg/dL     CBC (No Diff) [[id number]]  (Abnormal) Collected: 05/11/22 0421    Specimen: Blood Updated: 05/11/22 0508     WBC 5.40 10*3/mm3      RBC 3.32 10*6/mm3      Hemoglobin 10.4 g/dL      Hematocrit 30.8 %      MCV 93.0 fL      MCH 31.5 pg      MCHC 33.9 g/dL      RDW 14.2 %      RDW-SD 46.4 fl      MPV 8.2 fL      Platelets 274 10*3/mm3     POC Glucose Once [[id number]]  (Abnormal) Collected: 05/10/22 2010    Specimen: Blood Updated: 05/10/22 2012     Glucose 211 mg/dL      Comment: Serial Number: [id number]:  [id number]       POC Glucose Once [[id number]]  (Abnormal) Collected: 05/10/22 1643    Specimen: Blood Updated: 05/10/22 1644     Glucose 231 mg/dL      Comment: Serial Number: [id number]:  [id number]       POC Glucose Once [[id number]]  (Abnormal) Collected: 05/10/22 1124     Specimen: Blood Updated: 05/10/22 1125     Glucose 332 mg/dL      Comment: Serial Number: [id number]:  [id number]       POC Glucose Once [[id number]]  (Abnormal) Collected: 05/10/22 0725    Specimen: Blood Updated: 05/10/22 0726     Glucose 181 mg/dL      Comment: Serial Number: [id number]:  [id number]             Imaging Results (Last 24 Hours)     ** No results found for the last 24 hours. **      LAB RESULTS (LAST 7 DAYS)    CBC  Results from last 7 days   Lab Units 05/11/22  0421 05/10/22  0420 05/09/22  0246 05/08/22  1806   WBC 10*3/mm3 5.40 5.00 7.10 8.40   RBC 10*6/mm3 3.32* 3.31* 3.51* 4.01   HEMOGLOBIN g/dL 10.4* 10.5* 11.4* 12.7   HEMATOCRIT % 30.8* 30.6* 32.4* 36.9   MCV fL 93.0 92.6 92.3 92.0   PLATELETS 10*3/mm3 274 249 254 283       BMP  Results from last 7 days   Lab Units 05/11/22  0421 05/10/22  0420 05/09/22  0246 05/08/22  1806   SODIUM mmol/L 139 140 141 137   POTASSIUM mmol/L 4.0 3.8 4.7 4.3   CHLORIDE mmol/L 105 105 104 100   CO2 mmol/L 22.0 25.0 25.0 22.0   BUN mg/dL 28* 25* 21 25*   CREATININE mg/dL 0.80 0.92 0.90 1.01*   GLUCOSE mg/dL 179* 173* 195* 190*   MAGNESIUM mg/dL 2.0 2.0  --  1.6   PHOSPHORUS mg/dL  --   --   --  2.9       CMP   Results from last 7 days   Lab Units 05/11/22  0421 05/10/22  0420 05/09/22  0246 05/08/22  1806   SODIUM mmol/L 139 140 141 137   POTASSIUM mmol/L 4.0 3.8 4.7 4.3   CHLORIDE mmol/L 105 105 104 100   CO2 mmol/L 22.0 25.0 25.0 22.0   BUN mg/dL 28* 25* 21 25*   CREATININE mg/dL 0.80 0.92 0.90 1.01*   GLUCOSE mg/dL 179* 173* 195* 190*   ALBUMIN g/dL  --   --   --  4.30   BILIRUBIN mg/dL  --   --   --  0.8   ALK PHOS U/L  --   --   --  84   AST (SGOT) U/L  --   --   --  18   ALT (SGPT) U/L  --   --   --  15         BNP        TROPONIN  Results from last 7 days   Lab Units 05/08/22  1806   TROPONIN T ng/mL <0.010       CoAg        Creatinine Clearance  Estimated Creatinine Clearance: 60.9 mL/min (by C-G formula based on SCr of 0.8 mg/dL).    ABG         Radiology  No radiology results for the last day        EKG                                  I personally viewed and interpreted the patient's EKG/Telemetry data:    ECHOCARDIOGRAM:    Results for orders placed during the hospital encounter of 05/08/22    Adult Transthoracic Echo Complete W/ Cont if Necessary Per Protocol    Interpretation Summary  · Estimated left ventricular EF = 60% Left ventricular systolic function is normal.    Indications  Heart failure    Technically satisfactory study.  Mitral valve is structurally normal.  Mitral annular calcification.  Mild mitral regurgitation is present.  Tricuspid valve is structurally normal.  Aortic valve is thickened with mild aortic valve stenosis.  Pulmonic valve could not be well visualized.  No evidence for tricuspid or aortic regurgitation is seen by Doppler study.  Left atrium is enlarged.  Right atrium is normal in size.  Left ventricle is normal in size and contractility with ejection fraction of 60%.  Concentric left ventricle hypertrophy.  Right ventricle is normal in size.  Atrial septum is intact.  Aorta is normal.  No pericardial effusion or intracardiac thrombus is seen.    Impression  Mild mitral annular calcification.  Mild aortic valve stenosis.  Mild mitral regurgitation.  Left atrium is enlarged.  Left ventricle is hypertrophic.  Left ventricular diastolic dysfunction.  Left ventricular contractility is normal with ejection fraction of 60%.          STRESS MYOVIEW:    Cardiolite (Tc-99m Sestamibi) stress test    CARDIAC CATHETERIZATION:            OTHER:         Assessment & Plan     Active Problems:    Essential hypertension    Palpitations    Atrial fibrillation with RVR (HCC)    Hx of atrioventricular node ablation    DM (diabetes mellitus), type 2 (HCC)    Gout      Laure Leung is a 82 y.o. female with past medical history of  Atrial fibrillation, CHF, hypertension, and aflutter ablation who presented to the ED with complaints of a  racing heart. Patient states on Saturday she felt like her heart was beating fast throughout the day. During the course of the day, her heart rate did not get better so she called EMS. Patient does have a history of atrial ablation in 2021 with Dr. Senior. Per ER records, she was give adenosine x 2 and started on a cardizem drip. She has intermittently converted to sinus rhythm and Afib with RVR.  Patient denies chest pain, dizziness, palpitations, or lower extremity edema. She reports being recently diagnosed with diabetes and gout.     Labs reviewed, troponin and BNP negative, Cr 0.9, cholesterol 233, STZ195  CXR probable cardiomegaly, EKGs reviewed, Echo mild aortic stenosis and mitral regurg, EF 60%      ]]]]]]]]]]]]]]]]]]]  Impression  ==========  - Palpitations    - Atrial fibrillation with RVR.  Past history of atrial fibrillation.  Status post cardioversion 2019.  Status post ablation 2011 by Dr. Senior     Echocardiogram 5/8/2022 revealed  Mild mitral annular calcification.  Mild aortic valve stenosis.  Mild mitral regurgitation.  Left atrium is enlarged.  Left ventricle is hypertrophic.  Left ventricular diastolic dysfunction.  Left ventricular contractility is normal with ejection fraction of 60%.     - Diabetes gout hypertension     - Family history of atrial fibrillation     - No known allergies  ============  Plan  ================  History of atrial fibrillation.  Patient had cardioversion and was subsequently had ablation for atrial fibrillation by Dr. Senior.  Patient presented with palpitations and was noted to have atrial fibrillation with RVR.  Patient was started on intravenous Cardizem  Patient is in sinus rhythm.  Patient is on p.o. Cardizem  mg a day.  Patient is on metoprolol 12.5 mg twice a day    Anticoagulation status reviewed.  Patient is on Eliquis 5 mg twice a day    Echocardiogram 5/8/2022-as above     EKG 5/10/2022-sinus rhythm.    Medications were reviewed and updated.  Patient is  on allopurinol Eliquis diltiazem CD Lasix 40 mg a day metoprolol.    Okay with the discharge plans.  Follow-up with primary cardiologist Dr. Senior.  Have discussed with attending nurse for coordination of care.    Further plan will depend on patient's progress.  ]]]]]]]]]]]]]]]]]]]        Malik Soler MD  05/11/22  06:53 EDT

## 2022-05-11 NOTE — DISCHARGE SUMMARY
[facility] Medicine Services  DISCHARGE SUMMARY    Patient Name: Laure Leung  : 1940  MRN: [id number]    Date of Admission: 2022  Discharge Diagnosis: Atrial fibrillation with RVR  Date of Discharge: 2022  Primary Care Physician: Sarita Esparza APRN      Presenting Problem:   Hypomagnesemia [E83.42]  Atrial fibrillation with RVR (HCC) [I48.91]    Active and Resolved Hospital Problems:  Active Hospital Problems    Diagnosis POA   • Atrial fibrillation with RVR (HCC) [I48.91] Yes   • Hx of atrioventricular node ablation [Z98.890] Not Applicable   • DM (diabetes mellitus), type 2 (HCC) [E11.9] Unknown   • Gout [M10.9] Unknown   • Palpitations [R00.2] Yes   • Essential hypertension [I10] Yes      Resolved Hospital Problems   No resolved problems to display.     Palpitations-initially appearing to be SVT in nature transition to A. fib with RVR, patient hemodynamically stable weaning Cardizem infusion, transition to oral Cardizem  -Cardiology consulted, cleared for discharge follow-up with patient's own cardiologist  -Cardiac monitoring  -continue anticoagulation  -Transitioning to oral Cardizem  mg daily  -Continue beta-blocker  -EKG reviewed     Anemia-mild in nature likely inflammatory  -Iron labs  -B12 level appropriate     Cardiomyopathy-patient last echo in  showing normal ejection fraction likely some component of diastolic patient appearing euvolemic  -BNP normal  -Echo ordered showing EF of 60% with concentric hypertrophy consistent with diastolic dysfunction     Type 2 diabetes-aim to keep blood sugars less than 200  -Resume home medication  -Sent home with lancets and test supplies     Gout-chronic in nature  -resume home medication     Morbid obesity-BMI of 35  -Lifestyle medications    Hospital Course     Hospital Course:    Laure Leung is a 82 y.o. female with history of atrial fibrillation and cardiomyopathy who presented to [facility] on 5/8/2022 complaining of palpitations.  Patient reported feeling her heart was racing this morning and realized that she missed her evening dose of beta-blocker.  Patient took her morning dose but noted persistence of symptoms which prompted evaluation in the emergency department.  Patient on anticoagulation with history of ablation.  Patient denies chest pain or shortness of breath.  No syncopal episode.  Some generalized weakness but no lightheadedness.     In the emergency department patient with heart rate increasing up into the 180s but otherwise hemodynamically stable.  Initial appearance of SVT patient was given adenosine x2 and per ED documentation then changed to A. fib with RVR.  Patient started on IV Cardizem and EKG showing signs of atrial fibrillation rate controlled at that time.  Patient went back into RVR requiring continued infusion.  Patient mated to hospitalist service for additional work-up and management.    5/10/2022:Patient reports overall feeling well today.  Heart rate improving.  Medications being adjusted by cardiology.  No shortness of breath or chest pain.  Discontinuing Cardizem infusion transitioning to oral.    5/11/2022: Heart rate stable.  Cleared by cardiology for discharge.  Patient asymptomatic no chest pain or shortness of breath no palpitations.  Follow-up organized with primary care and patient's cardiologist.  Patient able to be discharged home in good condition with strict return precautions given.    DISCHARGE Follow Up Recommendations for labs and diagnostics:       Reasons For Change In Medications and Indications for New Medications:    Cardizem  mg daily for atrial fibrillation    Day of Discharge     Vital Signs:  Temp:  [97.6 °F (36.4 °C)-98.2 °F (36.8 °C)] 97.6 °F (36.4 °C)  Heart Rate:  [59-76] 59  Resp:  [18-20] 18  BP: (120-150)/(35-68) 145/60  Flow (L/min):  [2] 2    Physical Exam:  Physical Exam     General: Obese elderly female lying in bed  breathing comfortably on room air no acute distress  HEENT: NC/AT, EOMI, mucosa moist  Heart: Irregular, rate controlled  Chest: Normal work of breathing moving air well no wheezing  Abdominal: Soft. NT/ND.   Musculoskeletal: Normal ROM.  No cyanosis. No calf tenderness.  Neurological: AAOx3, no focal deficits  Skin: Skin is warm and dry. No rash  Psychiatric: Normal mood and affect.      Pertinent  and/or Most Recent Results     LAB RESULTS:      Lab 05/11/22  0421 05/10/22  0420 05/09/22  0246 05/08/22  1806   WBC 5.40 5.00 7.10 8.40   HEMOGLOBIN 10.4* 10.5* 11.4* 12.7   HEMATOCRIT 30.8* 30.6* 32.4* 36.9   PLATELETS 274 249 254 283   NEUTROS ABS  --  2.55 4.40 5.90   LYMPHS ABS  --   --  1.40 1.00   MONOS ABS  --   --  1.00* 1.20*   EOS ABS  --   --  0.10 0.10   MCV 93.0 92.6 92.3 92.0         Lab 05/11/22  0421 05/10/22  0420 05/09/22 0246 05/08/22  1806   SODIUM 139 140 141 137   POTASSIUM 4.0 3.8 4.7 4.3   CHLORIDE 105 105 104 100   CO2 22.0 25.0 25.0 22.0   ANION GAP 12.0 10.0 12.0 15.0   BUN 28* 25* 21 25*   CREATININE 0.80 0.92 0.90 1.01*   EGFR 74.1 62.7 64.4 56.0*   GLUCOSE 179* 173* 195* 190*   CALCIUM 9.3 9.0 9.4 9.5   MAGNESIUM 2.0 2.0  --  1.6   PHOSPHORUS  --   --   --  2.9   TSH  --  1.790 0.868  --          Lab 05/08/22  1806   TOTAL PROTEIN 6.7   ALBUMIN 4.30   GLOBULIN 2.4   ALT (SGPT) 15   AST (SGOT) 18   BILIRUBIN 0.8   ALK PHOS 84         Lab 05/09/22 0246 05/08/22  1806   PROBNP 63.9  --    TROPONIN T  --  <0.010             Lab 05/09/22 0246   IRON 38   IRON SATURATION 13*   TIBC 294*   TRANSFERRIN 197*   VITAMIN B 12 1,356*         Brief Urine Lab Results  (Last result in the past 365 days)      Color   Clarity   Blood   Leuk Est   Nitrite   Protein   CREAT   Urine HCG        04/20/22 1226 Yellow   Clear   Negative   Negative   Negative   Trace               Microbiology Results (last 10 days)     Procedure Component Value - Date/Time    COVID PRE-OP / PRE-PROCEDURE SCREENING ORDER (NO  ISOLATION) - Swab, Nasopharynx [[id number]]  (Normal) Collected: 05/08/22 2228    Lab Status: Final result Specimen: Swab from Nasopharynx Updated: 05/08/22 2252    Narrative:      The following orders were created for panel order COVID PRE-OP / PRE-PROCEDURE SCREENING ORDER (NO ISOLATION) - Swab, Nasopharynx.  Procedure                               Abnormality         Status                     ---------                               -----------         ------                     COVID-19,CEPHEID/YANCI,CO...[[id number]]  Normal              Final result                 Please view results for these tests on the individual orders.    COVID-19,CEPHEID/YANCI,COR/TORI/PAD/LEANNE IN-HOUSE(OR EMERGENT/ADD-ON),NP SWAB IN TRANSPORT MEDIA 3-4 HR TAT, RT-PCR - Swab, Nasopharynx [[id number]]  (Normal) Collected: 05/08/22 2228    Lab Status: Final result Specimen: Swab from Nasopharynx Updated: 05/08/22 2252     COVID19 Not Detected    Narrative:      Fact sheet for providers: https://www.fda.gov/media/094876/download     Fact sheet for patients: https://www.fda.gov/media/745470/download  Fact sheet for providers: https://www.fda.gov/media/918436/download    Fact sheet for patients: https://www.fda.gov/media/346044/download    Test performed by PCR.          XR Chest 1 View    Result Date: 5/8/2022  Impression: Probable cardiomegaly without acute cardiopulmonary abnormality.  Electronically Signed By-Luis Wilcox MD On:5/8/2022 6:47 PM This report was finalized on [id number] by  Luis Wilcox MD.              Results for orders placed during the hospital encounter of 05/08/22    Adult Transthoracic Echo Complete W/ Cont if Necessary Per Protocol    Interpretation Summary  · Estimated left ventricular EF = 60% Left ventricular systolic function is normal.    Indications  Heart failure    Technically satisfactory study.  Mitral valve is structurally normal.  Mitral annular calcification.  Mild mitral regurgitation is  present.  Tricuspid valve is structurally normal.  Aortic valve is thickened with mild aortic valve stenosis.  Pulmonic valve could not be well visualized.  No evidence for tricuspid or aortic regurgitation is seen by Doppler study.  Left atrium is enlarged.  Right atrium is normal in size.  Left ventricle is normal in size and contractility with ejection fraction of 60%.  Concentric left ventricle hypertrophy.  Right ventricle is normal in size.  Atrial septum is intact.  Aorta is normal.  No pericardial effusion or intracardiac thrombus is seen.    Impression  Mild mitral annular calcification.  Mild aortic valve stenosis.  Mild mitral regurgitation.  Left atrium is enlarged.  Left ventricle is hypertrophic.  Left ventricular diastolic dysfunction.  Left ventricular contractility is normal with ejection fraction of 60%.      Labs Pending at Discharge:      Procedures Performed           Consults:   Consults     Date and Time Order Name Status Description    5/9/2022  1:50 AM Inpatient Cardiology Consult Completed     5/9/2022  1:50 AM Inpatient Cardiac Electrophysiology Consult      5/8/2022  9:47 PM Hospitalist (on-call MD unless specified)              Discharge Details        Discharge Medications      New Medications      Instructions Start Date   Alcohol Wipes 70 % misc   1 each, Apply externally, Daily, Dx code: E11.65      dilTIAZem  MG 24 hr capsule  Commonly known as: CARDIZEM CD   240 mg, Oral, Every 24 Hours Scheduled   Start Date: May 12, 2022     OneTouch Delica Plus [id number] misc   1 each, Does not apply, Daily, Dx code: E11.65      OneTouch Verio test strip  Generic drug: glucose blood   1 each, Other, Daily, Use as instructed Dx code: E11.65         Continue These Medications      Instructions Start Date   allopurinol 300 MG tablet  Commonly known as: Zyloprim   300 mg, Oral, Daily      apixaban 5 MG tablet tablet  Commonly known as: ELIQUIS   5 mg, Oral, Every 12 Hours Scheduled       ascorbic acid 1000 MG tablet  Commonly known as: VITAMIN C   1,000 mg, Oral, Daily      furosemide 40 MG tablet  Commonly known as: LASIX   40 mg, Oral, Daily      gabapentin 100 MG capsule  Commonly known as: NEURONTIN   100 mg, Oral, Nightly PRN      metFORMIN  MG 24 hr tablet  Commonly known as: Glucophage XR   500 mg, Oral, 2 Times Daily      metoprolol tartrate 25 MG tablet  Commonly known as: LOPRESSOR   12.5 mg, Oral, 2 Times Daily      multivitamin with minerals tablet tablet   1 tablet, Oral, Daily, Liver support, otc      potassium chloride 10 MEQ CR tablet   10 mEq, Oral, 2 Times Daily      PROBIOTIC PO   Oral      SELENIUM PO   Oral      VITAMIN B COMPLEX PO   Oral      Vitamin D (Cholecalciferol) 10 MCG (400 UNIT) tablet  Commonly known as: CHOLECALCIFEROL   400 Units, Oral, Daily      ZINC PO   Oral             No Known Allergies      Discharge Disposition: Good  Home or Self Care    Diet:  Hospital:  Diet Order   Procedures   • Diet Cardiac; Healthy Heart         Discharge Activity:   Activity Instructions     Activity as Tolerated              CODE STATUS:  Code Status and Medical Interventions:   Ordered at: 05/09/22 0150     Level Of Support Discussed With:    Patient     Code Status (Patient has no pulse and is not breathing):    CPR (Attempt to Resuscitate)     Medical Interventions (Patient has pulse or is breathing):    Full Support         Future Appointments   Date Time Provider Department Center   5/12/2022 10:30 AM Keturah Martino PT [facility]   5/17/2022 10:00 AM Keturah Martino PT [facility]   5/19/2022 10:30 AM Keturah Martino PT [facility]   7/12/2022  9:15 AM NURSE/MA ALMA LEOS [facility]   7/21/2022  1:30 PM Sarita Esparza APRN [facility]   10/24/2022  1:00 PM Antonio Senior MD [facility]       Additional Instructions for the Follow-ups that You Need to Schedule     Discharge Follow-up with PCP   As directed       Currently  Documented PCP:    Sarita Esparza APRN    PCP Phone Number:    [phone]     Follow Up Details: Please follow-up with primary care within a week         Discharge Follow-up with Specified Provider: Please follow-up with your cardiologist in 2 to 4 weeks   As directed      To: Please follow-up with your cardiologist in 2 to 4 weeks               Time spent on Discharge including face to face service:  25 minutes    This patient has been examined wearing appropriate Personal Protective Equipment and discussed with hospital infection control department. 05/11/22      Signature: Electronically signed by Kolton Lal MD, 05/11/22, 9:40 AM EDT.

## 2022-05-11 NOTE — CASE MANAGEMENT/SOCIAL WORK
Continued Stay Note  [facility]     Patient Name: Laure Leung  MRN: [id number]  Today's Date: 5/11/2022    Admit Date: 5/8/2022     Discharge Plan     Row Name 05/11/22 1237       Plan    Plan Comments RN reports patient friend will transport home on d/c if patient left before 12pm. No transportation assistance needed.    Final Discharge Disposition Code 01 - home or self-care    Final Note Home              Phone communication or documentation only - no physical contact with patient or family.    Autumn Hu RN      ==================================================================    Case Management Discharge Note      Final Note: Home         Selected Continued Care - Discharged on 5/11/2022 Admission date: 5/8/2022 - Discharge disposition: Home or Self Care         Transportation Services  Private: Car    Final Discharge Disposition Code: 01 - home or self-care

## 2022-05-12 ENCOUNTER — TELEPHONE (OUTPATIENT)
Dept: CARDIOLOGY | Facility: CLINIC | Age: 82
End: 2022-05-12

## 2022-05-12 ENCOUNTER — TRANSITIONAL CARE MANAGEMENT TELEPHONE ENCOUNTER (OUTPATIENT)
Dept: CALL CENTER | Facility: HOSPITAL | Age: 82
End: 2022-05-12

## 2022-05-12 DIAGNOSIS — I48.91 ATRIAL FIBRILLATION WITH RVR: Primary | ICD-10-CM

## 2022-05-12 NOTE — OUTREACH NOTE
Call Center TCM Note    Flowsheet Row Responses   [facility] patient discharged from? [facility]   Does the patient have one of the following disease processes/diagnoses(primary or secondary)? Other   TCM attempt successful? No   Unsuccessful attempts Attempt 2          Siri Monsalve RN    5/12/2022, 14:58 EDT

## 2022-05-12 NOTE — TELEPHONE ENCOUNTER
Nadeen pt     Patient was admitted into [facility] on 05/08/2022 and was started on Cardizem. Patient was discharged yesterday and was given prescription for Cardizem, today her heart rate was in the 50's. Would like to know if she should continue medication?    # [phone]

## 2022-05-12 NOTE — OUTREACH NOTE
Call Center TCM Note    Flowsheet Row Responses   [facility] patient discharged from? [facility]   Does the patient have one of the following disease processes/diagnoses(primary or secondary)? Other   TCM attempt successful? No   Unsuccessful attempts Attempt 1  [No person listed on verbal release ]          Siri Monsalve RN    5/12/2022, 14:13 EDT

## 2022-05-13 ENCOUNTER — TRANSITIONAL CARE MANAGEMENT TELEPHONE ENCOUNTER (OUTPATIENT)
Dept: CALL CENTER | Facility: HOSPITAL | Age: 82
End: 2022-05-13

## 2022-05-13 RX ORDER — DILTIAZEM HYDROCHLORIDE 180 MG/1
180 CAPSULE, COATED, EXTENDED RELEASE ORAL
Qty: 30 CAPSULE | Refills: 5 | Status: SHIPPED | OUTPATIENT
Start: 2022-05-13 | End: 2022-06-16

## 2022-05-13 NOTE — TELEPHONE ENCOUNTER
Called pt to see what her HR is running today. Pt reports HR at 80 today. Pt reports no dizziness.    Pt advised to call us again if she notices HR dropping back into the 40's and low 50's like it did yesterday.

## 2022-05-13 NOTE — OUTREACH NOTE
Call Center TCM Note    Flowsheet Row Responses   [facility] patient discharged from? [facility]   Does the patient have one of the following disease processes/diagnoses(primary or secondary)? Other   TCM attempt successful? Yes   Call start time 1434   Call end time 1436   Discharge diagnosis Hypomagnesemia/Afib with RVR   Meds reviewed with patient/caregiver? Yes   Is the patient having any side effects they believe may be caused by any medication additions or changes? No   Does the patient have all medications ordered at discharge? Yes   Prescription comments PATIENT STATES HER HEART RATE DROPPED TOO LOW AND SHE WAS TOLD TO CUT THE CARDIZEM IN HALF   Is the patient taking all medications as directed (includes completed medication regime)? Yes   Does the patient have a primary care provider?  Yes   Does the patient have an appointment with their PCP within 7 days of discharge? N/A   Comments regarding PCP PCP APPOINTMENT IS 5/18/22   Has the patient kept scheduled appointments due by today? N/A   Has home health visited the patient within 72 hours of discharge? N/A   Psychosocial issues? No   Did the patient receive a copy of their discharge instructions? Yes   Nursing interventions Reviewed instructions with patient   What is the patient's perception of their health status since discharge? Improving   Is the patient/caregiver able to teach back signs and symptoms related to disease process for when to call PCP? Yes   Is the patient/caregiver able to teach back signs and symptoms related to disease process for when to call 911? Yes   Is the patient/caregiver able to teach back the hierarchy of who to call/visit for symptoms/problems? PCP, Specialist, Home health nurse, Urgent Care, ED, 911 Yes   If the patient is a current smoker, are they able to teach back resources for cessation? Not a smoker   TCM call completed? Yes          Anabel Kwon LPN    5/13/2022, 14:39 EDT

## 2022-05-13 NOTE — TELEPHONE ENCOUNTER
Pt called back to report that she took her Diltiazem about 2 hours ago and her HR is now 66. Wants to make sure it's okay to continue?

## 2022-05-15 LAB — QT INTERVAL: 421 MS

## 2022-05-16 NOTE — TELEPHONE ENCOUNTER
Called pt back to see if this was before meds or after meds.     Pt reports this reading was after Diltiazem 180mg. Sarita

## 2022-05-17 ENCOUNTER — TREATMENT (OUTPATIENT)
Dept: PHYSICAL THERAPY | Facility: CLINIC | Age: 82
End: 2022-05-17

## 2022-05-17 DIAGNOSIS — M17.0 OSTEOARTHRITIS OF BOTH KNEES, UNSPECIFIED OSTEOARTHRITIS TYPE: ICD-10-CM

## 2022-05-17 DIAGNOSIS — M25.561 CHRONIC PAIN OF BOTH KNEES: ICD-10-CM

## 2022-05-17 DIAGNOSIS — G89.29 CHRONIC PAIN OF BOTH KNEES: ICD-10-CM

## 2022-05-17 DIAGNOSIS — M54.2 PAIN, NECK: Primary | ICD-10-CM

## 2022-05-17 DIAGNOSIS — M25.562 CHRONIC PAIN OF BOTH KNEES: ICD-10-CM

## 2022-05-17 PROCEDURE — 97110 THERAPEUTIC EXERCISES: CPT | Performed by: PHYSICAL THERAPIST

## 2022-05-17 PROCEDURE — 97530 THERAPEUTIC ACTIVITIES: CPT | Performed by: PHYSICAL THERAPIST

## 2022-05-17 NOTE — PROGRESS NOTES
Physical Therapy Daily Progress Note and auth update     VISIT#: 3    Subjective   Laure Leung reports: The last time she was here she was not able to walk for 4 days after that. Is now having pain in her right groin area and that is making it hard to raise her leg into her truck.   Was in the hospital for 4 days from 5/8/22 for her afib acting up. Is feeling better now.   Feels like the exercises we did last time were just too much for her to handle, and she doesn't want to work her legs at all today.   Shoulders hurt anytime she moves them.     Objective     See Exercise, Manual, and Modality Logs for complete treatment.   DASH: 77% impairment   Shoulder ROM: flexion to 100 deg (B) with pain, abduction to  80 deg with pain (B)   Patient Education: motion importance in arms, posture, movement in general to help body feel better     Assessment/Plan  Pt able to initiate shoulder motion today, however required max motivation to do so as she did not want to work on her legs d/t fear of them being so sore for multiple days afterwards. Ed regarding importance of therapy to help overall movement as she was commenting that she may wait on further therapy until she is stronger, versus coming into the clinic to work on specific exercises for legs and arms. Will continue to educate pt on this at future sessions.   Issued shoulder motion ex for HEP.   Pt has made minimal progress toward goals as shown below. Will need further rehab to progress in these areas.     Plan Goals: STGs   10 visits     1)  Reduce pain by 2 pts per areas - not met      2)  pnt to complete sit <> stand with use of UE and wo rocking - not met      3)  Complete SLR wo extensor lag and hip abd in gravity resisted positions - not met      4)  Improve reports of sleep quality by 30% or greater - not met      5)  I in home ex program - not met     LTGs  DC     1)  0-4/10 pain levels of cervical, shldrs and knees     2)  Improve Quick DASH score =/> 10  points     3)  Improve NDI score =/> 8 points     4)  Improve OKI score =/> 10 points     5)  I in final HEP     6)  TUG score =/< 20 sec     7)  Jamil score =/> 40 points    Progress per Plan of Care        Timed:         Manual Therapy:         mins  44842;     Therapeutic Exercise:    16     mins  98443;     Neuromuscular Diego:        mins  43833;    Therapeutic Activity:     10     mins  92946;     Gait Training:           mins  02943;     Ultrasound:          mins  80754;    Ionto                                   mins   02410  Self Care                            mins   36394  Canalith Repos                   mins  62143    Un-Timed:  Electrical Stimulation:         mins  31727 ( );  Traction          mins 59346  Dry Needle                 ______ mins DRYNDL  Low Eval          Mins  02070  Mod Eval          Mins  58430  High Eval                            Mins  59533  Re-Eval                               mins  46978    Timed Treatment:   26   mins   Total Treatment:     26   mins    Keturah Martino, PT    Physical Therapist

## 2022-05-18 ENCOUNTER — OFFICE VISIT (OUTPATIENT)
Dept: FAMILY MEDICINE CLINIC | Facility: CLINIC | Age: 82
End: 2022-05-18

## 2022-05-18 VITALS
OXYGEN SATURATION: 96 % | HEIGHT: 64 IN | SYSTOLIC BLOOD PRESSURE: 138 MMHG | DIASTOLIC BLOOD PRESSURE: 52 MMHG | WEIGHT: 206.4 LBS | BODY MASS INDEX: 35.24 KG/M2 | HEART RATE: 68 BPM

## 2022-05-18 DIAGNOSIS — M25.561 CHRONIC PAIN OF BOTH KNEES: ICD-10-CM

## 2022-05-18 DIAGNOSIS — M25.562 CHRONIC PAIN OF BOTH KNEES: ICD-10-CM

## 2022-05-18 DIAGNOSIS — G89.29 CHRONIC PAIN OF BOTH KNEES: ICD-10-CM

## 2022-05-18 DIAGNOSIS — E11.65 TYPE 2 DIABETES MELLITUS WITH HYPERGLYCEMIA, WITHOUT LONG-TERM CURRENT USE OF INSULIN: ICD-10-CM

## 2022-05-18 DIAGNOSIS — R26.0 ATAXIC GAIT: ICD-10-CM

## 2022-05-18 DIAGNOSIS — M1A.09X0 IDIOPATHIC CHRONIC GOUT OF MULTIPLE SITES WITHOUT TOPHUS: ICD-10-CM

## 2022-05-18 DIAGNOSIS — I48.11 LONGSTANDING PERSISTENT ATRIAL FIBRILLATION: Primary | ICD-10-CM

## 2022-05-18 PROCEDURE — 99495 TRANSJ CARE MGMT MOD F2F 14D: CPT | Performed by: NURSE PRACTITIONER

## 2022-05-18 PROCEDURE — 1111F DSCHRG MED/CURRENT MED MERGE: CPT | Performed by: NURSE PRACTITIONER

## 2022-05-18 RX ORDER — ISOPROPYL ALCOHOL 0.75 G/1
SWAB TOPICAL
COMMUNITY
Start: 2022-05-11 | End: 2022-09-20

## 2022-05-18 NOTE — PROGRESS NOTES
Transitional Care Follow Up Visit  Subjective     Laure Leung is a 82 y.o. female who presents for a transitional care management visit.    Within 48 business hours after discharge our office contacted her via telephone to coordinate her care and needs.      I reviewed and discussed the details of that call along with the discharge summary, hospital problems, inpatient lab results, inpatient diagnostic studies, and consultation reports with Laure Real.     Current outpatient and discharge medications have been reconciled for the patient.  Reviewed by: MIRZA Krishnamurthy      Date of TCM Phone Call 5/11/2022   [facility]   Date of Admission 5/8/2022   Date of Discharge 5/11/2022   Discharge Disposition Home or Self Care     Risk for Readmission (LACE) Score: 4 (5/11/2022  6:01 AM)    Chief Complaint   Patient presents with   • Hospital Follow Up Visit     5/8 a-fib       History of Present Illness   Course During Hospital Stay: Patient has a past medical history of atrial fibrillation and follows with Dr. Senior.  She presented to [facility] on 5/8/2022 due to feelings of her heart was racing, she apparently had missed a dose of her beta-blocker the evening prior, found to be in SVT, after x2 adenosine rhythm changed to A. fib with RVR.  She was started on IV Cardizem and rate controlled, Cardizem transition to oral with good rate control.  She was discharged home on 5/11/2022    Echocardiogram showed EF 60%, mild MR with mitral annular calcification.  Aortic valve is thickened with mild aortic valve stenosis.  Concentric left ventricular hypertrophy    Labs:   H&H 10.4/30.8  Iron saturation 13  Transferrin 197  TIBC 294  B12 1356  TSH 1.79  Troponin negative  Magnesium 1.6    Presents today for follow up. Reports HR stable at home.  She is taking metoprolol and diltiazem as directed.  Denies chest pain, shortness of breath or swelling of the extremities    In April her labs revealed an A1c of 8.3,  "patient was started on metformin and tolerating.  H&H stable at that time. Uric acid elevated at 7.2 and patient started on allopurinol, she reports minimal improvement in joint pain since starting allopurinol.     She was referred to physical therapy and has went 3 times but insurance requires a $35 co-pay, and she states she cannot afford this twice every week and reports increased knee pain following therapy.  She has an appointment with Ortho next week.     Vitals:    05/18/22 1106   BP: 138/52   BP Location: Left arm   Patient Position: Sitting   Cuff Size: Adult   Pulse: 68   SpO2: 96%   Weight: 93.6 kg (206 lb 6.4 oz)   Height: 162.6 cm (64.02\")   PainSc: 0-No pain         The following portions of the patient's history were reviewed and updated as appropriate: allergies, current medications, past family history, past medical history, past social history, past surgical history and problem list.    Review of Systems    Objective   Physical Exam  Vitals and nursing note reviewed.   Constitutional:       General: She is not in acute distress.     Appearance: She is well-developed. She is obese. She is not diaphoretic.   HENT:      Head: Normocephalic.      Nose: Nose normal.      Mouth/Throat:      Mouth: Mucous membranes are moist.   Eyes:      Conjunctiva/sclera: Conjunctivae normal.      Pupils: Pupils are equal, round, and reactive to light.   Neck:      Thyroid: No thyromegaly.      Vascular: No JVD.   Cardiovascular:      Rate and Rhythm: Normal rate and regular rhythm.      Heart sounds: Murmur (GIII/VI PIO ) heard.      Comments: RRR on exam  Pulmonary:      Effort: Pulmonary effort is normal. No respiratory distress.      Breath sounds: Normal breath sounds. No wheezing or rhonchi.   Abdominal:      General: Bowel sounds are normal. There is no distension.      Palpations: Abdomen is soft.      Tenderness: There is no abdominal tenderness.   Musculoskeletal:         General: Tenderness ( B knee OA L>R " mod avila rom, B shoulders severe avila rom, crepitus.   ) and deformity (B hands with poor grasp) present. No swelling.      Cervical back: Normal range of motion and neck supple. No rigidity or tenderness.   Lymphadenopathy:      Cervical: No cervical adenopathy.   Skin:     General: Skin is warm and dry.   Neurological:      Mental Status: She is alert and oriented to person, place, and time.      Sensory: No sensory deficit.      Motor: Weakness present.      Coordination: Coordination abnormal.      Gait: Gait abnormal (using walker for mobility).   Psychiatric:         Behavior: Behavior normal.         Thought Content: Thought content normal.         Judgment: Judgment normal.         Assessment & Plan   Diagnoses and all orders for this visit:    1. Longstanding persistent atrial fibrillation (HCC) (Primary)    2. Type 2 diabetes mellitus with hyperglycemia, without long-term current use of insulin (HCC)    3. Ataxic gait    4. Idiopathic chronic gout of multiple sites without tophus    5. Chronic pain of both knees      1.  Continue current medications diltiazem and metoprolol.  Heart rate currently controlled regular todays on exam.  Follow-up with Dr. Senior as directed.   2.  Continue all other medications  3.  Patient is tolerating metformin xr, plan to recheck diabetes and gout labs prior to follow-up appointment in July  4.  Recommend continue with physical therapy if able to manage the co-pay  5.  See ortho next week for bilateral knees and shoulder evaluation    Return for Next scheduled follow up with DM panel, uric acid prior to appt.    EMR Dragon transcription disclaimer:  Part of this note may be an electronic transcription/translation of spoken language to printed text using the Dragon Dictation System.

## 2022-05-25 ENCOUNTER — OFFICE VISIT (OUTPATIENT)
Dept: ORTHOPEDIC SURGERY | Facility: CLINIC | Age: 82
End: 2022-05-25

## 2022-05-25 VITALS
SYSTOLIC BLOOD PRESSURE: 118 MMHG | DIASTOLIC BLOOD PRESSURE: 63 MMHG | WEIGHT: 206 LBS | HEART RATE: 73 BPM | HEIGHT: 64 IN | BODY MASS INDEX: 35.17 KG/M2

## 2022-05-25 DIAGNOSIS — M17.0 PRIMARY OSTEOARTHRITIS OF BOTH KNEES: Primary | ICD-10-CM

## 2022-05-25 DIAGNOSIS — E66.01 MORBID OBESITY: ICD-10-CM

## 2022-05-25 PROCEDURE — 99214 OFFICE O/P EST MOD 30 MIN: CPT | Performed by: PHYSICIAN ASSISTANT

## 2022-05-25 RX ORDER — VITAMIN E (DL,TOCOPHERYL ACET) 180 MG
2 CAPSULE ORAL DAILY
COMMUNITY
End: 2022-09-28

## 2022-05-25 RX ORDER — SENNOSIDES 8.6 MG
650 CAPSULE ORAL EVERY 8 HOURS PRN
COMMUNITY

## 2022-05-25 RX ORDER — MULTIVITAMIN WITH IRON
1 TABLET ORAL DAILY
COMMUNITY
End: 2022-09-28

## 2022-05-25 RX ORDER — PHENOL 1.4 %
1 AEROSOL, SPRAY (ML) MUCOUS MEMBRANE NIGHTLY
COMMUNITY

## 2022-05-31 ENCOUNTER — OFFICE VISIT (OUTPATIENT)
Dept: FAMILY MEDICINE CLINIC | Facility: CLINIC | Age: 82
End: 2022-05-31

## 2022-05-31 VITALS
DIASTOLIC BLOOD PRESSURE: 50 MMHG | BODY MASS INDEX: 35 KG/M2 | WEIGHT: 205 LBS | OXYGEN SATURATION: 91 % | SYSTOLIC BLOOD PRESSURE: 105 MMHG | HEART RATE: 65 BPM | HEIGHT: 64 IN

## 2022-05-31 DIAGNOSIS — G89.29 CHRONIC LEFT SHOULDER PAIN: ICD-10-CM

## 2022-05-31 DIAGNOSIS — M25.561 CHRONIC PAIN OF BOTH KNEES: ICD-10-CM

## 2022-05-31 DIAGNOSIS — E11.65 TYPE 2 DIABETES MELLITUS WITH HYPERGLYCEMIA, WITHOUT LONG-TERM CURRENT USE OF INSULIN: ICD-10-CM

## 2022-05-31 DIAGNOSIS — Z00.00 WELCOME TO MEDICARE PREVENTIVE VISIT: Primary | ICD-10-CM

## 2022-05-31 DIAGNOSIS — G89.29 CHRONIC PAIN OF BOTH KNEES: ICD-10-CM

## 2022-05-31 DIAGNOSIS — M25.512 CHRONIC LEFT SHOULDER PAIN: ICD-10-CM

## 2022-05-31 DIAGNOSIS — M25.562 CHRONIC PAIN OF BOTH KNEES: ICD-10-CM

## 2022-05-31 DIAGNOSIS — R26.0 ATAXIC GAIT: ICD-10-CM

## 2022-05-31 PROCEDURE — G0402 INITIAL PREVENTIVE EXAM: HCPCS | Performed by: NURSE PRACTITIONER

## 2022-05-31 PROCEDURE — 1170F FXNL STATUS ASSESSED: CPT | Performed by: NURSE PRACTITIONER

## 2022-05-31 PROCEDURE — 1159F MED LIST DOCD IN RCRD: CPT | Performed by: NURSE PRACTITIONER

## 2022-05-31 RX ORDER — METFORMIN HYDROCHLORIDE 500 MG/1
500 TABLET, EXTENDED RELEASE ORAL 2 TIMES DAILY
Qty: 180 TABLET | Refills: 1 | Status: SHIPPED | OUTPATIENT
Start: 2022-05-31 | End: 2022-11-07 | Stop reason: SDUPTHER

## 2022-05-31 NOTE — PROGRESS NOTES
The ABCs of the Annual Wellness Visit  Subsequent Medicare Wellness Visit    Chief Complaint   Patient presents with   • Welcome To Medicare     Subjective    History of Present Illness:  Laure Leung is a 82 y.o. female who presents for a Welcome to Medicare Wellness Visit.    The following portions of the patient's history were reviewed and   updated as appropriate: allergies, current medications, past family history, past medical history, past social history, past surgical history and problem list.    Compared to one year ago, the patient feels her physical   health is worse.    Compared to one year ago, the patient feels her mental   health is better.    Recent Hospitalizations:  This patient has had a [facility] admission record on file within the last 365 days.    Current Medical Providers:  Patient Care Team:  Sarita Esparza APRN as PCP - General (Nurse Practitioner)  Nadeen, Antonio Ramires MD as Consulting Physician (Cardiac Electrophysiology)  Napoleon Estrada MD as Surgeon (Orthopedic Surgery)    Outpatient Medications Prior to Visit   Medication Sig Dispense Refill   • acetaminophen (TYLENOL) 650 MG 8 hr tablet Take 650 mg by mouth Every 8 (Eight) Hours As Needed for Mild Pain .     • Alcohol Swabs (B-D SINGLE USE SWABS REGULAR) pads      • apixaban (ELIQUIS) 5 MG tablet tablet Take 1 tablet by mouth Every 12 (Twelve) Hours. 180 tablet 1   • ascorbic acid (VITAMIN C) 1000 MG tablet Take 1,000 mg by mouth Daily.     • B Complex Vitamins (VITAMIN B COMPLEX PO) Take 1 tablet by mouth Daily.     • Biotin w/ Vitamins C & E (Hair/Skin/Nails) 1250-7.5-7.5 MCG-MG-UNT chewable tablet Chew 2 each Daily. gummies     • furosemide (LASIX) 40 MG tablet Take 1 tablet by mouth Daily. 90 tablet 1   • Glucosamine-Chondroit-Vit C-Mn (GLUCOSAMINE CHONDR 1500 COMPLX PO) Take 2 tablets by mouth Daily.     • glucose blood (OneTouch Verio) test strip 1 each by Other route Daily. Use as instructed  Dx code: E11.65  100 each 2   • Isopropyl Alcohol (Alcohol Wipes) 70 % misc Apply 1 each topically Daily. Dx code: E11.65 100 each 2   • Lancets (OneTouch Delica Plus Alyuip81A) misc 1 each Daily. Dx code: E11.65 100 each 2   • Magnesium 250 MG tablet Take 1 tablet by mouth Daily.     • Melatonin 10 MG tablet Take 1 tablet by mouth Every Night.     • metoprolol tartrate (LOPRESSOR) 25 MG tablet Take 0.5 tablets by mouth 2 (Two) Times a Day. 90 tablet 1   • Misc Natural Products (BLOOD SUGAR BALANCE PO) Take 2 tablets by mouth Daily.     • Multiple Vitamins-Minerals (ZINC PO) Take  by mouth.     • multivitamin with minerals tablet tablet Take 1 tablet by mouth Daily. Liver support, otc     • potassium chloride 10 MEQ CR tablet Take 1 tablet by mouth 2 (Two) Times a Day. 180 tablet 1   • Probiotic Product (PROBIOTIC COLON SUPPORT PO) Take 2 capsules by mouth Daily.     • Probiotic Product (PROBIOTIC PO) Take 2 each by mouth 2 (Two) Times a Day.     • SELENIUM PO Take 1 tablet by mouth Daily.     • VITAMIN D, CHOLECALCIFEROL, PO Take 5,000 Units by mouth Daily.     • metFORMIN ER (Glucophage XR) 500 MG 24 hr tablet Take 1 tablet by mouth 2 (Two) Times a Day. 60 tablet 0   • allopurinol (Zyloprim) 300 MG tablet Take 1 tablet by mouth Daily. 90 tablet 1   • dilTIAZem CD (CARDIZEM CD) 180 MG 24 hr capsule Take 1 capsule by mouth Daily for 30 days. 30 capsule 5   • gabapentin (NEURONTIN) 100 MG capsule Take 1 capsule by mouth At Night As Needed (pain). 30 capsule 0     No facility-administered medications prior to visit.       No opioid medication identified on active medication list. I have reviewed chart for other potential  high risk medication/s and harmful drug interactions in the elderly.          Aspirin is not on active medication list.  Aspirin use is contraindicated for this patient due to: current use of Eliquis.      Patient Active Problem List   Diagnosis   • Atypical atrial flutter (HCC)   • Essential hypertension   •  "Palpitations   • Atrial fibrillation with RVR (HCC)   • Hx of atrioventricular node ablation   • DM (diabetes mellitus), type 2 (HCC)   • Gout   • Primary osteoarthritis of both knees   • Morbid obesity (HCC)     Advance Care Planning  Advance Directive is not on file.  ACP discussion was held with the patient during this visit. Patient does not have an advance directive, information provided.          Objective    Vitals:    05/31/22 1041   BP: 105/50   Pulse: 65   SpO2: 91%   Weight: 93 kg (205 lb)   Height: 162.6 cm (64\")     Body mass index is 35.19 kg/m².  Class 2 Severe Obesity (BMI >=35 and <=39.9). Obesity-related health conditions include the following: hypertension, diabetes mellitus and osteoarthritis. Obesity is improving with lifestyle modifications. BMI is is above average; BMI management plan is completed. We discussed low calorie, low carb based diet program, portion control and increasing exercise.    Does the patient have evidence of cognitive impairment? No    Physical Exam  Vitals and nursing note reviewed.   Constitutional:       General: She is not in acute distress.     Appearance: She is well-developed. She is not diaphoretic.   Eyes:      Pupils: Pupils are equal, round, and reactive to light.   Neck:      Thyroid: No thyromegaly.      Vascular: No JVD.   Cardiovascular:      Rate and Rhythm: Normal rate and regular rhythm.      Heart sounds: Normal heart sounds. No murmur heard.  Pulmonary:      Effort: Pulmonary effort is normal. No respiratory distress.      Breath sounds: Normal breath sounds. No wheezing or rhonchi.   Abdominal:      General: Bowel sounds are normal. There is no distension.      Palpations: Abdomen is soft.      Tenderness: There is no abdominal tenderness.   Musculoskeletal:         General: Tenderness present. Normal range of motion.      Cervical back: Normal range of motion and neck supple.   Skin:     General: Skin is warm and dry.   Neurological:      Mental " Status: She is alert and oriented to person, place, and time.      Sensory: No sensory deficit.   Psychiatric:         Behavior: Behavior normal.         Thought Content: Thought content normal.         Judgment: Judgment normal.          Lab Results   Component Value Date    TRIG 175 (H) 2022    HDL 48 2022     (H) 2022    VLDL 32 2022    HGBA1C 8.3 (H) 2022            HEALTH RISK ASSESSMENT    Smoking Status:  Social History     Tobacco Use   Smoking Status Former Smoker   • Packs/day: 0.50   • Years: 12.00   • Pack years: 6.00   • Types: Cigarettes   • Start date:    • Quit date:    • Years since quittin.4   Smokeless Tobacco Never Used     Alcohol Consumption:  Social History     Substance and Sexual Activity   Alcohol Use No     Fall Risk Screen:    STEADI Fall Risk Assessment was completed, and patient is at MODERATE risk for falls. Assessment completed on:2022    Depression Screening:  PHQ-2/PHQ-9 Depression Screening 2022   Little Interest or Pleasure in Doing Things 0-->not at all   Feeling Down, Depressed or Hopeless 1-->several days   Trouble Falling or Staying Asleep, or Sleeping Too Much -   Feeling Tired or Having Little Energy -   Poor Appetite or Overeating -   Feeling Bad about Yourself - or that You are a Failure or Have Let Yourself or Your Family Down -   Trouble Concentrating on Things, Such as Reading the Newspaper or Watching Television -   Moving or Speaking So Slowly that Other People Could Have Noticed? Or the Opposite - Being So Fidgety -   Thoughts that You Would be Better Off Dead or of Hurting Yourself in Some Way -   PHQ-9: Brief Depression Severity Measure Score 1   If You Checked Off Any Problems, How Difficult Have These Problems Made It For You to Do Your Work, Take Care of Things at Home, or Get Along with Other People? -       ATTENTION  What is the year: correct  What is the month of the year: correct  What is the day  of the week?: correct  What is the date?: correct  MEMORY  Repeat address three times, only score third attempt: [address] [place], [place]: 7  HOW MANY ANIMALS DID THE PATIENT NAME  Verbal Fluency -- Animal Names (0-25): 22+  CLOCK DRAWING  Clock Drawing: All Correct  MEMORY RECALL  Tell me what you remember about that name and address we were repeating at the beginnin  ACE TOTAL SCORE  Total ACE Score - <25/30 strongly suggests cognitive impairment; <21/30 almost certainly shows dementia: 29      Health Habits and Functional and Cognitive Screening:  Functional & Cognitive Status 2022   Do you have difficulty preparing food and eating? Yes   Do you have difficulty bathing yourself, getting dressed or grooming yourself? No   Do you have difficulty using the toilet? No   Do you have difficulty moving around from place to place? Yes   Do you have trouble with steps or getting out of a bed or a chair? No   Current Diet Limited Junk Food   Dental Exam Not up to date   Eye Exam Not up to date   Exercise (times per week) 0 times per week   Current Exercises Include No Regular Exercise   Do you need help using the phone?  No   Are you deaf or do you have serious difficulty hearing?  No   Do you need help with transportation? Yes   Do you need help shopping? No   Do you need help preparing meals?  Yes   Do you need help with housework?  Yes   Do you need help with laundry? Yes   Do you need help taking your medications? No   Do you need help managing money? No   Do you ever drive or ride in a car without wearing a seat belt? Yes   Have you felt unusual stress, anger or loneliness in the last month? Yes   Who do you live with? Alone   If you need help, do you have trouble finding someone available to you? Yes   Have you been bothered in the last four weeks by sexual problems? No   Do you have difficulty concentrating, remembering or making decisions? No     ATTENTION  What is the year:  correct  What is the month of the year: correct  What is the day of the week?: correct  What is the date?: correct  MEMORY  Repeat address three times, only score third attempt: [address] [place], [place]: 7  HOW MANY ANIMALS DID THE PATIENT NAME  Verbal Fluency -- Animal Names (0-25): 22+  CLOCK DRAWING  Clock Drawing: All Correct  MEMORY RECALL  Tell me what you remember about that name and address we were repeating at the beginnin  ACE TOTAL SCORE  Total ACE Score - <25/30 strongly suggests cognitive impairment; <21/30 almost certainly shows dementia: 29      Age-appropriate Screening Schedule:  Refer to the list below for future screening recommendations based on patient's age, sex and/or medical conditions. Orders for these recommended tests are listed in the plan section. The patient has been provided with a written plan.    Health Maintenance   Topic Date Due   • URINE MICROALBUMIN  Never done   • DXA SCAN  Never done   • TDAP/TD VACCINES (1 - Tdap) Never done   • ZOSTER VACCINE (1 of 2) Never done   • DIABETIC EYE EXAM  Never done   • INFLUENZA VACCINE  2022   • HEMOGLOBIN A1C  10/20/2022              Assessment & Plan   CMS Preventative Services Quick Reference  Risk Factors Identified During Encounter  Cardiovascular Disease  Chronic Pain   Fall Risk-High or Moderate  Inactivity/Sedentary  Obesity/Overweight   The above risks/problems have been discussed with the patient.  Follow up actions/plans if indicated are seen below in the Assessment/Plan Section.  Pertinent information has been shared with the patient in the After Visit Summary.    Diagnoses and all orders for this visit:    1. Welcome to Medicare preventive visit (Primary)    2. Chronic left shoulder pain    3. Type 2 diabetes mellitus with hyperglycemia, without long-term current use of insulin (HCC)    4. Ataxic gait    5. Chronic pain of both knees    Other orders  -     metFORMIN ER (Glucophage XR) 500 MG 24  hr tablet; Take 1 tablet by mouth 2 (Two) Times a Day.  Dispense: 180 tablet; Refill: 1    -Age appropriate preventative counseling provided, including healthy lifestyle modifications and exercise  -EKG deferred, patient sees Dr. Senior frequently, recent EKG in chart.   -cont home PT and try to stay active, working in yard etc. Rest when needed.   -Continue with Ortho for right> left knee pain and injections  -Continue walker for mobility  -Discussed diabetic diet, limiting carbohydrates, sweets and sugars, continue metformin XR, tolerating well.  -cont all other medications as ordered, no changes    Follow Up:   Return for Next scheduled follow up in July and labs prior or earlier prn.     An After Visit Summary and PPPS were made available to the patient.          I spent 30 minutes caring for Laure Real on this date of service. This time includes time spent by me in the following activities:preparing for the visit, reviewing tests, performing a medically appropriate examination and/or evaluation , counseling and educating the patient/family/caregiver, ordering medications, tests, or procedures and documenting information in the medical record         EMR Dragon transcription disclaimer:  Part of this note may be an electronic transcription/translation of spoken language to printed text using the Dragon Dictation System.

## 2022-06-09 ENCOUNTER — TELEPHONE (OUTPATIENT)
Dept: ORTHOPEDIC SURGERY | Facility: CLINIC | Age: 82
End: 2022-06-09

## 2022-06-09 NOTE — TELEPHONE ENCOUNTER
Provider:WILLIAM BOWIE    Caller: ROMELIA    Relationship to Patient: SELF    Phone Number: [phone]    Reason for Call: PT CALLING IN TO CHECK ON THE PA FOR THE STEROID INJECTIONS IN [place] [facility], PLEASE LOOK INTO THIS AND ADVISE     When was the patient last seen:  5/25/22

## 2022-06-10 ENCOUNTER — TELEPHONE (OUTPATIENT)
Dept: DIABETES SERVICES | Facility: HOSPITAL | Age: 82
End: 2022-06-10

## 2022-06-10 NOTE — TELEPHONE ENCOUNTER
Follow-up call to patient to see how doing with diabetes care at home. Patient stated she is checking her blood sugar with results 117-160. Patient stated she is taking her Metformin as ordered. Patient has already had follow-up with her doctor. Patient has no further questions or concerns related to diabetes at this time.

## 2022-06-16 ENCOUNTER — OFFICE VISIT (OUTPATIENT)
Dept: CARDIOLOGY | Facility: CLINIC | Age: 82
End: 2022-06-16

## 2022-06-16 VITALS
DIASTOLIC BLOOD PRESSURE: 64 MMHG | SYSTOLIC BLOOD PRESSURE: 120 MMHG | BODY MASS INDEX: 34.31 KG/M2 | HEART RATE: 68 BPM | HEIGHT: 64 IN | RESPIRATION RATE: 20 BRPM | WEIGHT: 201 LBS

## 2022-06-16 DIAGNOSIS — I48.92 ATRIAL FLUTTER WITH CONTROLLED RESPONSE: Primary | ICD-10-CM

## 2022-06-16 DIAGNOSIS — I10 ESSENTIAL HYPERTENSION: ICD-10-CM

## 2022-06-16 DIAGNOSIS — I48.4 ATYPICAL ATRIAL FLUTTER: ICD-10-CM

## 2022-06-16 DIAGNOSIS — I48.91 ATRIAL FIBRILLATION WITH RVR: ICD-10-CM

## 2022-06-16 PROCEDURE — 99212 OFFICE O/P EST SF 10 MIN: CPT | Performed by: INTERNAL MEDICINE

## 2022-06-16 PROCEDURE — 93000 ELECTROCARDIOGRAM COMPLETE: CPT | Performed by: INTERNAL MEDICINE

## 2022-06-16 RX ORDER — DILTIAZEM HYDROCHLORIDE 120 MG/1
120 CAPSULE, EXTENDED RELEASE ORAL DAILY
Qty: 30 CAPSULE | Refills: 11 | Status: SHIPPED | OUTPATIENT
Start: 2022-06-16 | End: 2022-09-23 | Stop reason: HOSPADM

## 2022-06-16 NOTE — PROGRESS NOTES
Subjective:     Encounter Date:2022      Patient ID: Laure Leung is a 82 y.o. female.    Chief Complaint:  Chief Complaint   Patient presents with   • Hospital Follow Up Visit   • Atrial Fibrillation       HPI:   Ms Leung is a 81 yo with a past medical history significant for HTN who developed atypical atrial flutter in . At that time she presented to [facility] with complaints of dyspnea which had been ongoing for several weeks.  She also noted that her stamina and exercise tolerance had diminished.  She denied any chest pain, PND, orthopnea or palpitatins.  In the ER she was found to be in atrial flutter with 2:1 conduction and had an elevated BNP of 1100.  She was admitted and placed on metoprolol, amiodarone and apixoban.  She was diuresed and an echo showed an EF of 55%, mild-moderate RV enlargement, mild-moderate MR with severely dilated LA, moderate TR and mild AoV sclerosis.     She underwent cardioversion in  and was taken off of amiodarone.      On  she presented with a several day history of palpitations and dyspnea and was found to be back in flutter.  She was seen in the ER where she was given metoprolol to control her rate and discharged.  Since then, she has done well without any palpitations or recurrent tachycardia.     She was taken for ablation  and had the followin. Alia-mitral atrial flutter ablated with a line between the anterior mitral anulus and an anterior scar in the LA  2. Successful ablation of the cavotricuspid isthmus  3. Normal SA node function  4. Normal AV node conduction  5. Normal His Purkinje conduction     Her pre-ablation SEBASTIAN showed:  · Left ventricular ejection fraction appears to be 56 - 60%.  · The left atrial cavity is severely dilated.  · The left atrial appendage was visualized through multiple planes. No evidence of a left atrial appendage thrombus was present.  · Moderate mitral valve regurgitation is present.  · Mild  tricuspid valve regurgitation is present. Estimated right ventricular systolic pressure from tricuspid regurgitation is normal (<35 mmHg).  There is moderate plaque in the descending aorta present.      She was admitted to [facility] 5/8/22 with what was thought to be atrial fibrillation but is more likely atypical flutter.  She was placed on IV diltiazem and converted spontaneously.  She was discharged on PO diltiazem.  Since then, she has been doing well without recurrent symptoms.         The following portions of the patient's history were reviewed and updated as appropriate: allergies, current medications, past family history, past medical history, past social history, past surgical history and problem list.    Problem List:  Patient Active Problem List   Diagnosis   • Atypical atrial flutter (HCC)   • Essential hypertension   • Palpitations   • Atrial fibrillation with RVR (HCC)   • Hx of atrioventricular node ablation   • DM (diabetes mellitus), type 2 (HCC)   • Gout   • Primary osteoarthritis of both knees   • Morbid obesity (HCC)       Active Med List:    Current Outpatient Medications:   •  acetaminophen (TYLENOL) 650 MG 8 hr tablet, Take 650 mg by mouth Every 8 (Eight) Hours As Needed for Mild Pain ., Disp: , Rfl:   •  Alcohol Swabs (B-D SINGLE USE SWABS REGULAR) pads, , Disp: , Rfl:   •  allopurinol (Zyloprim) 300 MG tablet, Take 1 tablet by mouth Daily., Disp: 90 tablet, Rfl: 1  •  apixaban (ELIQUIS) 5 MG tablet tablet, Take 1 tablet by mouth Every 12 (Twelve) Hours., Disp: 180 tablet, Rfl: 1  •  ascorbic acid (VITAMIN C) 1000 MG tablet, Take 1,000 mg by mouth Daily., Disp: , Rfl:   •  B Complex Vitamins (VITAMIN B COMPLEX PO), Take 1 tablet by mouth Daily., Disp: , Rfl:   •  Biotin w/ Vitamins C & E (Hair/Skin/Nails) 1250-7.5-7.5 MCG-MG-UNT chewable tablet, Chew 2 each Daily. gummies, Disp: , Rfl:   •  furosemide (LASIX) 40 MG tablet, Take 1 tablet by mouth Daily., Disp: 90 tablet, Rfl: 1  •   gabapentin (NEURONTIN) 100 MG capsule, Take 1 capsule by mouth At Night As Needed (pain)., Disp: 30 capsule, Rfl: 0  •  Glucosamine-Chondroit-Vit C-Mn (GLUCOSAMINE CHONDR 1500 COMPLX PO), Take 2 tablets by mouth Daily., Disp: , Rfl:   •  glucose blood (OneTouch Verio) test strip, 1 each by Other route Daily. Use as instructed Dx code: E11.65, Disp: 100 each, Rfl: 2  •  Isopropyl Alcohol (Alcohol Wipes) 70 % misc, Apply 1 each topically Daily. Dx code: E11.65, Disp: 100 each, Rfl: 2  •  Lancets (OneTouch Delica Plus [id number]) misc, 1 each Daily. Dx code: E11.65, Disp: 100 each, Rfl: 2  •  Magnesium 250 MG tablet, Take 1 tablet by mouth Daily., Disp: , Rfl:   •  Melatonin 10 MG tablet, Take 1 tablet by mouth Every Night., Disp: , Rfl:   •  metFORMIN ER (Glucophage XR) 500 MG 24 hr tablet, Take 1 tablet by mouth 2 (Two) Times a Day., Disp: 180 tablet, Rfl: 1  •  metoprolol tartrate (LOPRESSOR) 25 MG tablet, Take 0.5 tablets by mouth 2 (Two) Times a Day., Disp: 90 tablet, Rfl: 1  •  Misc Natural Products (BLOOD SUGAR BALANCE PO), Take 2 tablets by mouth Daily., Disp: , Rfl:   •  Multiple Vitamins-Minerals (ZINC PO), Take  by mouth., Disp: , Rfl:   •  multivitamin with minerals tablet tablet, Take 1 tablet by mouth Daily. Liver support, otc, Disp: , Rfl:   •  potassium chloride 10 MEQ CR tablet, Take 1 tablet by mouth 2 (Two) Times a Day., Disp: 180 tablet, Rfl: 1  •  Probiotic Product (PROBIOTIC COLON SUPPORT PO), Take 2 capsules by mouth Daily., Disp: , Rfl:   •  Probiotic Product (PROBIOTIC PO), Take 2 each by mouth 2 (Two) Times a Day., Disp: , Rfl:   •  SELENIUM PO, Take 1 tablet by mouth Daily., Disp: , Rfl:   •  VITAMIN D, CHOLECALCIFEROL, PO, Take 5,000 Units by mouth Daily., Disp: , Rfl:   •  dilTIAZem XR (DILACOR XR) 120 MG 24 hr capsule, Take 1 capsule by mouth Daily., Disp: 30 capsule, Rfl: 11     Past Medical History:  Past Medical History:   Diagnosis Date   • Atrial fibrillation (HCC)    • CHF (congestive  "heart failure) (HCC)    • Hypertension        Past Surgical History:  Past Surgical History:   Procedure Laterality Date   • CARDIAC ELECTROPHYSIOLOGY PROCEDURE N/A 2021    Procedure: Ablation atrial flutter;  Surgeon: Antonio Senior MD;  Location: [facility] INVASIVE LOCATION;  Service: Cardiovascular;  Laterality: N/A;   • CATARACT EXTRACTION         Social History:  Social History     Socioeconomic History   • Marital status:    Tobacco Use   • Smoking status: Former Smoker     Packs/day: 0.50     Years: 12.00     Pack years: 6.00     Types: Cigarettes     Start date:      Quit date:      Years since quittin.4   • Smokeless tobacco: Never Used   Substance and Sexual Activity   • Alcohol use: No   • Drug use: Never   • Sexual activity: Defer       Allergies:  No Known Allergies    Immunizations:    There is no immunization history on file for this patient.       Objective:         Review of Systems   Constitutional: Negative for fatigue.   Respiratory: Negative for shortness of breath.    Cardiovascular: Negative for chest pain, palpitations and leg swelling.   All other systems reviewed and are negative.       /64   Pulse 68   Resp 20   Ht 162.6 cm (64\")   Wt 91.2 kg (201 lb)   BMI 34.50 kg/m²     Constitutional:       General: Not in acute distress.     Appearance: Well-developed.   Eyes:      General: No scleral icterus.     Conjunctiva/sclera: Conjunctivae normal.      Pupils: Pupils are equal, round, and reactive to light.   HENT:      Head: Normocephalic and atraumatic.   Neck:      Thyroid: No thyromegaly.   Pulmonary:      Effort: Pulmonary effort is normal.      Breath sounds: Normal breath sounds.   Cardiovascular:      Normal rate. Regular rhythm.   Abdominal:      General: Bowel sounds are normal.      Palpations: Abdomen is soft.   Musculoskeletal: Normal range of motion.      Cervical back: Normal range of motion. Skin:     General: Skin is warm and dry. "   Neurological:      Mental Status: Alert and oriented to person, place, and time.         In-Office Procedure(s):    ECG 12 Lead    Date/Time: 6/16/2022 12:44 PM  Performed by: Antonio Senior MD  Authorized by: Antonio Senior MD   Comparison: compared with previous ECG from 5/10/2022  Comparison to previous ECG: Probable ectopic atrial rhythm  Rhythm comments: probable ectopic atrial rhythm    Clinical impression: abnormal EKG          ECG 12 Lead    (Results Pending)        ASCVD RIsk Score::  The ASCVD Risk score ([place] RAMIRO Marcelino., et al., 2013) failed to calculate for the following reasons:    The 2013 ASCVD risk score is only valid for ages 40 to 79    Recent Radiology:          Lab Review:       Recent labs reviewed and interpreted for clinical significance and application          Assessment:          Diagnosis Plan   1. Atrial flutter with controlled response (HCC)  ECG 12 Lead   2. Atypical atrial flutter (HCC)     3. Essential hypertension     4. Atrial fibrillation with RVR (HCC)            Plan:      1. Atypical atrial flutter - s/p ablation 6/2021, now with probable recurrence.  Will continue medical therapy with diltiazem, apixaban and metoprolol, consider amiodarone if it recurs     2. HTN - controlled on metoprolol,  continue same     RTC 6 months      Level of Care:                 Antonio Senior MD  06/16/22

## 2022-07-01 ENCOUNTER — OFFICE VISIT (OUTPATIENT)
Dept: ORTHOPEDIC SURGERY | Facility: CLINIC | Age: 82
End: 2022-07-01

## 2022-07-01 VITALS
HEART RATE: 75 BPM | HEIGHT: 64 IN | BODY MASS INDEX: 34.38 KG/M2 | WEIGHT: 201.4 LBS | SYSTOLIC BLOOD PRESSURE: 136 MMHG | DIASTOLIC BLOOD PRESSURE: 73 MMHG

## 2022-07-01 DIAGNOSIS — E66.9 OBESITY (BMI 30.0-34.9): ICD-10-CM

## 2022-07-01 DIAGNOSIS — M17.0 PRIMARY OSTEOARTHRITIS OF BOTH KNEES: Primary | ICD-10-CM

## 2022-07-01 PROBLEM — E66.811 OBESITY (BMI 30.0-34.9): Status: ACTIVE | Noted: 2022-07-01

## 2022-07-01 PROCEDURE — 20610 DRAIN/INJ JOINT/BURSA W/O US: CPT | Performed by: PHYSICIAN ASSISTANT

## 2022-07-01 RX ORDER — LIDOCAINE HYDROCHLORIDE 10 MG/ML
3 INJECTION, SOLUTION EPIDURAL; INFILTRATION; INTRACAUDAL; PERINEURAL
Status: COMPLETED | OUTPATIENT
Start: 2022-07-01 | End: 2022-07-01

## 2022-07-01 RX ADMIN — LIDOCAINE HYDROCHLORIDE 3 ML: 10 INJECTION, SOLUTION EPIDURAL; INFILTRATION; INTRACAUDAL; PERINEURAL at 12:17

## 2022-07-01 NOTE — PROGRESS NOTES
ORTHO FOLLOW UP       Subjective:    HPI:   Laure Leung is a 82 y.o. female who presents in follow-up for bilateral knee pain with a known history of bilateral knee DJD.  She presents for bilateral knee Zilretta injections.      Past Medical History:   Diagnosis Date   • Atrial fibrillation (HCC)    • CHF (congestive heart failure) (HCC)    • Hypertension    • Primary osteoarthritis of both knees 2022       Past Surgical History:   Procedure Laterality Date   • CARDIAC ELECTROPHYSIOLOGY PROCEDURE N/A 2021    Procedure: Ablation atrial flutter;  Surgeon: Antonio Senior MD;  Location: [facility] INVASIVE LOCATION;  Service: Cardiovascular;  Laterality: N/A;   • CATARACT EXTRACTION         Social History     Occupational History   • Not on file   Tobacco Use   • Smoking status: Former Smoker     Packs/day: 0.50     Years: 12.00     Pack years: 6.00     Types: Cigarettes     Start date:      Quit date:      Years since quittin.5   • Smokeless tobacco: Never Used   Substance and Sexual Activity   • Alcohol use: No   • Drug use: Never   • Sexual activity: Defer      The following portions of the patient's history were reviewed and updated as appropriate: allergies, current medications, past family history, past medical history, past social history, past surgical history and problem list.    Medications:    Current Outpatient Medications:   •  acetaminophen (TYLENOL) 650 MG 8 hr tablet, Take 650 mg by mouth Every 8 (Eight) Hours As Needed for Mild Pain ., Disp: , Rfl:   •  Alcohol Swabs (B-D SINGLE USE SWABS REGULAR) pads, , Disp: , Rfl:   •  allopurinol (Zyloprim) 300 MG tablet, Take 1 tablet by mouth Daily., Disp: 90 tablet, Rfl: 1  •  apixaban (ELIQUIS) 5 MG tablet tablet, Take 1 tablet by mouth Every 12 (Twelve) Hours., Disp: 180 tablet, Rfl: 1  •  ascorbic acid (VITAMIN C) 1000 MG tablet, Take 1,000 mg by mouth Daily., Disp: , Rfl:   •  B Complex Vitamins (VITAMIN B COMPLEX PO),  Take 1 tablet by mouth Daily., Disp: , Rfl:   •  Biotin w/ Vitamins C & E (Hair/Skin/Nails) 1250-7.5-7.5 MCG-MG-UNT chewable tablet, Chew 2 each Daily. gummies, Disp: , Rfl:   •  dilTIAZem XR (DILACOR XR) 120 MG 24 hr capsule, Take 1 capsule by mouth Daily., Disp: 30 capsule, Rfl: 11  •  furosemide (LASIX) 40 MG tablet, Take 1 tablet by mouth Daily., Disp: 90 tablet, Rfl: 1  •  gabapentin (NEURONTIN) 100 MG capsule, Take 1 capsule by mouth At Night As Needed (pain)., Disp: 30 capsule, Rfl: 0  •  Glucosamine-Chondroit-Vit C-Mn (GLUCOSAMINE CHONDR 1500 COMPLX PO), Take 2 tablets by mouth Daily., Disp: , Rfl:   •  glucose blood (OneTouch Verio) test strip, 1 each by Other route Daily. Use as instructed Dx code: E11.65, Disp: 100 each, Rfl: 2  •  Isopropyl Alcohol (Alcohol Wipes) 70 % misc, Apply 1 each topically Daily. Dx code: E11.65, Disp: 100 each, Rfl: 2  •  Lancets (OneTouch Delica Plus [id number]) misc, 1 each Daily. Dx code: E11.65, Disp: 100 each, Rfl: 2  •  Magnesium 250 MG tablet, Take 1 tablet by mouth Daily., Disp: , Rfl:   •  Melatonin 10 MG tablet, Take 1 tablet by mouth Every Night., Disp: , Rfl:   •  metFORMIN ER (Glucophage XR) 500 MG 24 hr tablet, Take 1 tablet by mouth 2 (Two) Times a Day., Disp: 180 tablet, Rfl: 1  •  metoprolol tartrate (LOPRESSOR) 25 MG tablet, Take 0.5 tablets by mouth 2 (Two) Times a Day., Disp: 90 tablet, Rfl: 1  •  Misc Natural Products (BLOOD SUGAR BALANCE PO), Take 2 tablets by mouth Daily., Disp: , Rfl:   •  Multiple Vitamins-Minerals (ZINC PO), Take  by mouth., Disp: , Rfl:   •  multivitamin with minerals tablet tablet, Take 1 tablet by mouth Daily. Liver support, otc, Disp: , Rfl:   •  potassium chloride 10 MEQ CR tablet, Take 1 tablet by mouth 2 (Two) Times a Day., Disp: 180 tablet, Rfl: 1  •  Probiotic Product (PROBIOTIC COLON SUPPORT PO), Take 2 capsules by mouth Daily., Disp: , Rfl:   •  Probiotic Product (PROBIOTIC PO), Take 2 each by mouth 2 (Two) Times a Day., Disp:  ", Rfl:   •  SELENIUM PO, Take 1 tablet by mouth Daily., Disp: , Rfl:   •  VITAMIN D, CHOLECALCIFEROL, PO, Take 5,000 Units by mouth Daily., Disp: , Rfl:     Allergies:  No Known Allergies    Review of Systems:  Gen -no fever, chills , sweats, headache   Eyes - no irritation or discharge   ENT -  no ear pain , runny nose , sore throat , difficulty swallowing   Resp - no cough , congestion , excessive expectoration   CVS - no chest pain , palpitations.   Abd - no pain , nausea , vomiting , diarrhea   Skin - no rash , lesions.   Neuro - no dizziness    Please see HPI for any other pertinent positives.  All other systems were reviewed and are negative.       Objective   Objective:    /73 (BP Location: Left arm, Patient Position: Sitting, Cuff Size: Large Adult)   Pulse 75   Ht 162.6 cm (64\")   Wt 91.4 kg (201 lb 6.4 oz)   BMI 34.57 kg/m²     Physical Examination:  Alert, oriented, obese individual in no acute distress, ambulating with the assistance of a walker  Right lower extremity shows no erythema, rashes, or open skin lesions. There is a mild amount of swelling. It is grossly well aligned, and the patient is neurovascularly intact distally. The knee is stable to varus and valgus stress, there is no patellar maltracking noted, and plantar and dorsiflexion is 5/5.  There is crepitus noted.  There is no tenderness to palpation or with range of motion, which is about 0-90.  Left lower extremity shows no erythema, rashes, or open skin lesions. There is a mild amount of swelling. It is grossly well aligned, and the patient is neurovascularly intact distally. The knee is stable to varus and valgus stress, there is no patellar maltracking noted, and plantar and dorsiflexion is 5/5.  There is crepitus noted.  There is no tenderness to palpation or with range of motion, which is about 0-90.         Imaging:  no diagnostic testing performed this visit            Assessment:  1. Primary osteoarthritis of both knees  " "  2. Obesity (BMI 30.0-34.9)                 Plan:  She is not interested in surgery at this time.  We will continue with conservative measures at this time.    Bilateral Zilretta injections today, risks and benefits were discussed postinjection instructions were given.  Continue weight loss efforts.  Follow-up with Dr. Jacques in 6 weeks if no improvement.  - Large Joint Arthrocentesis: bilateral knee on 7/1/2022 12:17 PM  Indications: pain  Details: 22 G needle, anterolateral approach  Medications (Right): 32 mg Triamcinolone Acetonide 32 MG; 3 mL lidocaine PF 1% 1 %  Medications (Left): 32 mg Triamcinolone Acetonide 32 MG; 3 mL lidocaine PF 1% 1 %  Outcome: tolerated well, no immediate complications  Procedure, treatment alternatives, risks and benefits explained, specific risks discussed. Consent was given by the patient. Immediately prior to procedure a time out was called to verify the correct patient, procedure, equipment, support staff and site/side marked as required. Patient was prepped and draped in the usual sterile fashion.       After consent was obtained and a time out was properly performed, the right knee was prepped with alcohol and chlorhexidine. A 25 gauge needle was used to inject 3 cc of 1% lidocaine, following this a 22 gauge needle was used to inject one syringe of Zilretta. Sterile technique was used and the patient tolerated the procedure well.  After consent was obtained and a time out was properly performed, the left knee was prepped with alcohol and chlorhexidine. A 25 gauge needle was used to inject 3 cc of 1% lidocaine, following this a 22 gauge needle was used to inject one syringe of Zilretta. Sterile technique was used and the patient tolerated the procedure well.             JANETH Mehta  07/01/22  12:16 EDT    \"Please note that portions of this note were completed with a voice recognition program\".   "

## 2022-07-12 ENCOUNTER — LAB (OUTPATIENT)
Dept: FAMILY MEDICINE CLINIC | Facility: CLINIC | Age: 82
End: 2022-07-12

## 2022-07-12 DIAGNOSIS — E11.69 TYPE 2 DIABETES MELLITUS WITH OTHER SPECIFIED COMPLICATION, UNSPECIFIED WHETHER LONG TERM INSULIN USE: ICD-10-CM

## 2022-07-12 DIAGNOSIS — I10 ESSENTIAL HYPERTENSION: Primary | ICD-10-CM

## 2022-07-12 DIAGNOSIS — M10.9 GOUT, UNSPECIFIED CAUSE, UNSPECIFIED CHRONICITY, UNSPECIFIED SITE: ICD-10-CM

## 2022-07-12 LAB — HBA1C MFR BLD: 6.9 % (ref 3.5–5.6)

## 2022-07-12 PROCEDURE — 84550 ASSAY OF BLOOD/URIC ACID: CPT | Performed by: NURSE PRACTITIONER

## 2022-07-12 PROCEDURE — 80053 COMPREHEN METABOLIC PANEL: CPT | Performed by: NURSE PRACTITIONER

## 2022-07-12 PROCEDURE — 36415 COLL VENOUS BLD VENIPUNCTURE: CPT | Performed by: NURSE PRACTITIONER

## 2022-07-12 PROCEDURE — 80061 LIPID PANEL: CPT | Performed by: NURSE PRACTITIONER

## 2022-07-12 PROCEDURE — 83036 HEMOGLOBIN GLYCOSYLATED A1C: CPT | Performed by: NURSE PRACTITIONER

## 2022-07-12 PROCEDURE — 85027 COMPLETE CBC AUTOMATED: CPT | Performed by: NURSE PRACTITIONER

## 2022-07-13 LAB
ALBUMIN SERPL-MCNC: 4.5 G/DL (ref 3.5–5.2)
ALBUMIN/GLOB SERPL: 2.5 G/DL
ALP SERPL-CCNC: 70 U/L (ref 39–117)
ALT SERPL W P-5'-P-CCNC: 17 U/L (ref 1–33)
ANION GAP SERPL CALCULATED.3IONS-SCNC: 10.3 MMOL/L (ref 5–15)
AST SERPL-CCNC: 18 U/L (ref 1–32)
BILIRUB SERPL-MCNC: 1 MG/DL (ref 0–1.2)
BUN SERPL-MCNC: 29 MG/DL (ref 8–23)
BUN/CREAT SERPL: 30.9 (ref 7–25)
CALCIUM SPEC-SCNC: 9.8 MG/DL (ref 8.6–10.5)
CHLORIDE SERPL-SCNC: 105 MMOL/L (ref 98–107)
CHOLEST SERPL-MCNC: 181 MG/DL (ref 0–200)
CO2 SERPL-SCNC: 24.7 MMOL/L (ref 22–29)
CREAT SERPL-MCNC: 0.94 MG/DL (ref 0.57–1)
DEPRECATED RDW RBC AUTO: 47.4 FL (ref 37–54)
EGFRCR SERPLBLD CKD-EPI 2021: 61.1 ML/MIN/1.73
ERYTHROCYTE [DISTWIDTH] IN BLOOD BY AUTOMATED COUNT: 13.7 % (ref 12.3–15.4)
GLOBULIN UR ELPH-MCNC: 1.8 GM/DL
GLUCOSE SERPL-MCNC: 130 MG/DL (ref 65–99)
HCT VFR BLD AUTO: 35.6 % (ref 34–46.6)
HDLC SERPL-MCNC: 45 MG/DL (ref 40–60)
HGB BLD-MCNC: 11.9 G/DL (ref 12–15.9)
LDLC SERPL CALC-MCNC: 120 MG/DL (ref 0–100)
LDLC/HDLC SERPL: 2.64 {RATIO}
MCH RBC QN AUTO: 32.2 PG (ref 26.6–33)
MCHC RBC AUTO-ENTMCNC: 33.4 G/DL (ref 31.5–35.7)
MCV RBC AUTO: 96.5 FL (ref 79–97)
PLATELET # BLD AUTO: 351 10*3/MM3 (ref 140–450)
PMV BLD AUTO: 11.1 FL (ref 6–12)
POTASSIUM SERPL-SCNC: 4.7 MMOL/L (ref 3.5–5.2)
PROT SERPL-MCNC: 6.3 G/DL (ref 6–8.5)
RBC # BLD AUTO: 3.69 10*6/MM3 (ref 3.77–5.28)
SODIUM SERPL-SCNC: 140 MMOL/L (ref 136–145)
TRIGL SERPL-MCNC: 86 MG/DL (ref 0–150)
URATE SERPL-MCNC: 4.5 MG/DL (ref 2.4–5.7)
VLDLC SERPL-MCNC: 16 MG/DL (ref 5–40)
WBC NRBC COR # BLD: 6.01 10*3/MM3 (ref 3.4–10.8)

## 2022-07-21 ENCOUNTER — OFFICE VISIT (OUTPATIENT)
Dept: FAMILY MEDICINE CLINIC | Facility: CLINIC | Age: 82
End: 2022-07-21

## 2022-07-21 VITALS
HEIGHT: 64 IN | HEART RATE: 69 BPM | BODY MASS INDEX: 33.67 KG/M2 | SYSTOLIC BLOOD PRESSURE: 122 MMHG | WEIGHT: 197.2 LBS | DIASTOLIC BLOOD PRESSURE: 58 MMHG | OXYGEN SATURATION: 96 %

## 2022-07-21 DIAGNOSIS — G89.29 CHRONIC PAIN OF BOTH KNEES: ICD-10-CM

## 2022-07-21 DIAGNOSIS — M25.512 CHRONIC LEFT SHOULDER PAIN: ICD-10-CM

## 2022-07-21 DIAGNOSIS — M1A.09X0 IDIOPATHIC CHRONIC GOUT OF MULTIPLE SITES WITHOUT TOPHUS: ICD-10-CM

## 2022-07-21 DIAGNOSIS — D64.9 CHRONIC ANEMIA: ICD-10-CM

## 2022-07-21 DIAGNOSIS — G89.29 CHRONIC LEFT SHOULDER PAIN: ICD-10-CM

## 2022-07-21 DIAGNOSIS — M25.562 CHRONIC PAIN OF BOTH KNEES: ICD-10-CM

## 2022-07-21 DIAGNOSIS — E11.65 TYPE 2 DIABETES MELLITUS WITH HYPERGLYCEMIA, WITHOUT LONG-TERM CURRENT USE OF INSULIN: Primary | ICD-10-CM

## 2022-07-21 DIAGNOSIS — R26.0 ATAXIC GAIT: ICD-10-CM

## 2022-07-21 DIAGNOSIS — M25.561 CHRONIC PAIN OF BOTH KNEES: ICD-10-CM

## 2022-07-21 DIAGNOSIS — I10 ESSENTIAL HYPERTENSION: ICD-10-CM

## 2022-07-21 DIAGNOSIS — I48.11 LONGSTANDING PERSISTENT ATRIAL FIBRILLATION: ICD-10-CM

## 2022-07-21 PROCEDURE — 99214 OFFICE O/P EST MOD 30 MIN: CPT | Performed by: NURSE PRACTITIONER

## 2022-07-21 NOTE — PROGRESS NOTES
Chief Complaint  Chief Complaint   Patient presents with   • Hypertension     Pt reports she just took her bp medication, so might be the cause of high bp today.  She reports that her bp has been running a little high at home, though.   • Atrial Fibrillation   • Gait Problem   • Congestive Heart Failure   • Follow-up     3 mo   • Results     Labs 7/12           Subjective          Laure Leung presents to [facility] for   History of Present Illness     HTN, slightly elevated initially today then 122/58, typically runs <140 sbp at home, she feels stable on meds and takes as directed, denies chest pain, headache, shortness of air, palpitations and swelling of extremities.     Atrial fibrillation/CHF, follows with Dr. Senior, on Eliquis, metoprolol, diltiazem, furosemide/KCl    Gout, stable on daily allopurinol    Diabetes mellitus type II, patient was started on metformin in April of this year for hemoglobin A1c of 8.3.  She has improved diet, exercising more and continues to stay active working in the yard.  Feels stable on meds, denies any signs/symptoms of hyper/hypoglycemia, blurry vision, polydipsia, polyuria, nocturia, and unintentional weight loss    Gait ataxia, with right greater than left knee pain, follows with Ortho and injections have been effective.  She is using walker to help with mobility.  She went to 1 visit of physical therapy, stopped due to cost. Does not use gabapentin, but has in case she needs for pain.     Chronic anemia, not on iron    Here to review labs        The following portions of the patient's history were reviewed and updated as appropriate: allergies, current medications, past family history, past medical history, past social history, past surgical history and problem list.    Past Medical History:   Diagnosis Date   • Atrial fibrillation (HCC)    • CHF (congestive heart failure) ([facility])    • Hypertension    • Primary osteoarthritis of both knees  2022     Past Surgical History:   Procedure Laterality Date   • CARDIAC ELECTROPHYSIOLOGY PROCEDURE N/A 2021    Procedure: Ablation atrial flutter;  Surgeon: Antonio Senior MD;  Location: [facility] IN[facility] LOCATION;  Service: Cardiovascular;  Laterality: N/A;   • CATARACT EXTRACTION       Family History   Problem Relation Age of Onset   • No Known Problems Mother    • No Known Problems Father    • Atrial fibrillation Sister    • Diabetes Sister    • No Known Problems Maternal Aunt    • No Known Problems Maternal Uncle    • No Known Problems Paternal Aunt    • No Known Problems Paternal Uncle    • No Known Problems Maternal Grandmother    • No Known Problems Maternal Grandfather    • No Known Problems Paternal Grandmother    • No Known Problems Paternal Grandfather    • Heart attack Neg Hx    • Heart disease Neg Hx    • Heart failure Neg Hx    • Hyperlipidemia Neg Hx    • Hypertension Neg Hx      Social History     Tobacco Use   • Smoking status: Former Smoker     Packs/day: 0.50     Years: 12.00     Pack years: 6.00     Types: Cigarettes     Start date:      Quit date:      Years since quittin.5   • Smokeless tobacco: Never Used   Substance Use Topics   • Alcohol use: No       Current Outpatient Medications:   •  acetaminophen (TYLENOL) 650 MG 8 hr tablet, Take 650 mg by mouth Every 8 (Eight) Hours As Needed for Mild Pain ., Disp: , Rfl:   •  Alcohol Swabs (B-D SINGLE USE SWABS REGULAR) pads, , Disp: , Rfl:   •  allopurinol (Zyloprim) 300 MG tablet, Take 1 tablet by mouth Daily., Disp: 90 tablet, Rfl: 1  •  apixaban (ELIQUIS) 5 MG tablet tablet, Take 1 tablet by mouth Every 12 (Twelve) Hours., Disp: 180 tablet, Rfl: 1  •  ascorbic acid (VITAMIN C) 1000 MG tablet, Take 1,000 mg by mouth Daily., Disp: , Rfl:   •  B Complex Vitamins (VITAMIN B COMPLEX PO), Take 1 tablet by mouth Daily., Disp: , Rfl:   •  Biotin w/ Vitamins C & E (Hair/Skin/Nails) 1250-7.5-7.5 MCG-MG-UNT chewable tablet,  Chew 2 each Daily. gummies, Disp: , Rfl:   •  dilTIAZem XR (DILACOR XR) 120 MG 24 hr capsule, Take 1 capsule by mouth Daily., Disp: 30 capsule, Rfl: 11  •  furosemide (LASIX) 40 MG tablet, Take 1 tablet by mouth Daily., Disp: 90 tablet, Rfl: 1  •  gabapentin (NEURONTIN) 100 MG capsule, Take 1 capsule by mouth At Night As Needed (pain)., Disp: 30 capsule, Rfl: 0  •  Glucosamine-Chondroit-Vit C-Mn (GLUCOSAMINE CHONDR 1500 COMPLX PO), Take 2 tablets by mouth Daily., Disp: , Rfl:   •  glucose blood (OneTouch Verio) test strip, 1 each by Other route Daily. Use as instructed Dx code: E11.65, Disp: 100 each, Rfl: 2  •  Isopropyl Alcohol (Alcohol Wipes) 70 % misc, Apply 1 each topically Daily. Dx code: E11.65, Disp: 100 each, Rfl: 2  •  Lancets (OneTouch Delica Plus Ioallv55V) misc, 1 each Daily. Dx code: E11.65, Disp: 100 each, Rfl: 2  •  Magnesium 250 MG tablet, Take 1 tablet by mouth Daily., Disp: , Rfl:   •  Melatonin 10 MG tablet, Take 1 tablet by mouth Every Night., Disp: , Rfl:   •  metFORMIN ER (Glucophage XR) 500 MG 24 hr tablet, Take 1 tablet by mouth 2 (Two) Times a Day., Disp: 180 tablet, Rfl: 1  •  metoprolol tartrate (LOPRESSOR) 25 MG tablet, Take 0.5 tablets by mouth 2 (Two) Times a Day., Disp: 90 tablet, Rfl: 1  •  Misc Natural Products (BLOOD SUGAR BALANCE PO), Take 2 tablets by mouth Daily., Disp: , Rfl:   •  Multiple Vitamins-Minerals (ZINC PO), Take  by mouth., Disp: , Rfl:   •  multivitamin with minerals tablet tablet, Take 1 tablet by mouth Daily. Liver support, otc, Disp: , Rfl:   •  potassium chloride 10 MEQ CR tablet, Take 1 tablet by mouth 2 (Two) Times a Day., Disp: 180 tablet, Rfl: 1  •  Probiotic Product (PROBIOTIC COLON SUPPORT PO), Take 2 capsules by mouth Daily., Disp: , Rfl:   •  Probiotic Product (PROBIOTIC PO), Take 2 each by mouth 2 (Two) Times a Day., Disp: , Rfl:   •  SELENIUM PO, Take 1 tablet by mouth Daily., Disp: , Rfl:   •  VITAMIN D, CHOLECALCIFEROL, PO, Take 5,000 Units by mouth  "Daily., Disp: , Rfl:     Objective   Vital Signs:   /58 (BP Location: Left arm, Patient Position: Sitting, Cuff Size: Adult)   Pulse 69   Ht 162.6 cm (64.02\")   Wt 89.4 kg (197 lb 3.2 oz)   SpO2 96%   BMI 33.83 kg/m²           Physical Exam  Vitals and nursing note reviewed.   Constitutional:       General: She is not in acute distress.     Appearance: She is well-developed. She is not diaphoretic.   Eyes:      Pupils: Pupils are equal, round, and reactive to light.   Neck:      Thyroid: No thyromegaly.      Vascular: No JVD.   Cardiovascular:      Rate and Rhythm: Normal rate and regular rhythm.      Heart sounds: Normal heart sounds. No murmur heard.  Pulmonary:      Effort: Pulmonary effort is normal. No respiratory distress.      Breath sounds: Normal breath sounds. No wheezing or rhonchi.   Abdominal:      General: Bowel sounds are normal. There is no distension.      Palpations: Abdomen is soft.      Tenderness: There is no abdominal tenderness.   Musculoskeletal:         General: Tenderness (chronic L>R knee arthritis/pain, mod avila mobility/range of motion) present. Normal range of motion.      Cervical back: Normal range of motion and neck supple.   Skin:     General: Skin is warm and dry.   Neurological:      Mental Status: She is alert and oriented to person, place, and time.      Sensory: No sensory deficit.      Gait: Gait abnormal (using walker for mobility).   Psychiatric:         Behavior: Behavior normal.         Thought Content: Thought content normal.         Judgment: Judgment normal.          Result Review :     No visits with results within 7 Day(s) from this visit.   Latest known visit with results is:   Lab on 07/12/2022   Component Date Value Ref Range Status   • WBC 07/12/2022 6.01  3.40 - 10.80 10*3/mm3 Final   • RBC 07/12/2022 3.69 (A) 3.77 - 5.28 10*6/mm3 Final   • Hemoglobin 07/12/2022 11.9 (A) 12.0 - 15.9 g/dL Final   • Hematocrit 07/12/2022 35.6  34.0 - 46.6 % Final   • MCV " 07/12/2022 96.5  79.0 - 97.0 fL Final   • MCH 07/12/2022 32.2  26.6 - 33.0 pg Final   • MCHC 07/12/2022 33.4  31.5 - 35.7 g/dL Final   • RDW 07/12/2022 13.7  12.3 - 15.4 % Final   • RDW-SD 07/12/2022 47.4  37.0 - 54.0 fl Final   • MPV 07/12/2022 11.1  6.0 - 12.0 fL Final   • Platelets 07/12/2022 351  140 - 450 10*3/mm3 Final   • Glucose 07/12/2022 130 (A) 65 - 99 mg/dL Final   • BUN 07/12/2022 29 (A) 8 - 23 mg/dL Final   • Creatinine 07/12/2022 0.94  0.57 - 1.00 mg/dL Final   • Sodium 07/12/2022 140  136 - 145 mmol/L Final   • Potassium 07/12/2022 4.7  3.5 - 5.2 mmol/L Final   • Chloride 07/12/2022 105  98 - 107 mmol/L Final   • CO2 07/12/2022 24.7  22.0 - 29.0 mmol/L Final   • Calcium 07/12/2022 9.8  8.6 - 10.5 mg/dL Final   • Total Protein 07/12/2022 6.3  6.0 - 8.5 g/dL Final   • Albumin 07/12/2022 4.50  3.50 - 5.20 g/dL Final   • ALT (SGPT) 07/12/2022 17  1 - 33 U/L Final   • AST (SGOT) 07/12/2022 18  1 - 32 U/L Final   • Alkaline Phosphatase 07/12/2022 70  39 - 117 U/L Final   • Total Bilirubin 07/12/2022 1.0  0.0 - 1.2 mg/dL Final   • Globulin 07/12/2022 1.8  gm/dL Final   • A/G Ratio 07/12/2022 2.5  g/dL Final   • BUN/Creatinine Ratio 07/12/2022 30.9 (A) 7.0 - 25.0 Final   • Anion Gap 07/12/2022 10.3  5.0 - 15.0 mmol/L Final   • eGFR 07/12/2022 61.1  >60.0 mL/min/1.73 Final    National Kidney Foundation and American Society of Nephrology (ASN) Task Force recommended calculation based on the Chronic Kidney Disease Epidemiology Collaboration (CKD-EPI) equation refit without adjustment for race.   • Total Cholesterol 07/12/2022 181  0 - 200 mg/dL Final   • Triglycerides 07/12/2022 86  0 - 150 mg/dL Final   • HDL Cholesterol 07/12/2022 45  40 - 60 mg/dL Final   • LDL Cholesterol  07/12/2022 120 (A) 0 - 100 mg/dL Final   • VLDL Cholesterol 07/12/2022 16  5 - 40 mg/dL Final   • LDL/HDL Ratio 07/12/2022 2.64   Final   • Hemoglobin A1C 07/12/2022 6.9 (A) 3.5 - 5.6 % Final   • Uric Acid 07/12/2022 4.5  2.4 - 5.7 mg/dL  Final                  BMI is >= 30 and <35. (Class 1 Obesity). The following options were offered after discussion;: exercise counseling/recommendations and nutrition counseling/recommendations           Assessment and Plan    Diagnoses and all orders for this visit:    1. Type 2 diabetes mellitus with hyperglycemia, without long-term current use of insulin (HCC) (Primary)    2. Essential hypertension    3. Chronic left shoulder pain    4. Ataxic gait    5. Longstanding persistent atrial fibrillation (HCC)    6. Idiopathic chronic gout of multiple sites without tophus    7. Chronic pain of both knees    8. Chronic anemia      Conditions stable  Labs reviewed, hemoglobin A1c improved, all other labs essentially stable  Continue current medication regimen, no changes  Praised efforts of weight loss and improvement in diabetes  Keep follow-ups with Dr. Senior as directed  Continue with Ortho for knees as needed  Continue checking BP outside of the office and notify if consistently greater than 150 systolic  Eliquis samples provided today for 1 month      I spent 30 minutes caring for Laure Leung on this date of service. This time includes time spent by me in the following activities: preparing for the visit, reviewing tests, performing a medically appropriate examination and/or evaluation , counseling and educating the patient/family/caregiver, ordering medications, tests, or procedures and documenting information in the medical record        Follow Up     Return in about 6 months (around 1/21/2023) for Recheck, DM panel, uric acid and urine micro prior to appt.  Patient was given instructions and counseling regarding her condition or for health maintenance advice. Please see specific information pulled into the AVS if appropriate.        Part of this note may be an electronic transcription/translation of spoken language to printed text using the Dragon Dictation System

## 2022-08-19 ENCOUNTER — DOCUMENTATION (OUTPATIENT)
Dept: PHYSICAL THERAPY | Facility: CLINIC | Age: 82
End: 2022-08-19

## 2022-08-19 NOTE — PROGRESS NOTES
Discharge Summary  Discharge Summary from Physical Therapy Report      Dates  PT visit: 22-22  Number of Visits: 3     Goals: Not Met    Discharge Plan: Patient to return to referring/providing physician    Comments : Pt cancelled remaining OPPT sessions and was showing minimal progress at last session in clinic. POC has now , therefore DC is appropriate. Pt not present for discharge, therefore no functional measures taken. See last note for most updated information.      Date of Discharge 22         Keturah Martino, PT  Physical Therapist

## 2022-09-20 ENCOUNTER — HOSPITAL ENCOUNTER (INPATIENT)
Facility: HOSPITAL | Age: 82
LOS: 3 days | Discharge: HOME | End: 2022-09-23
Attending: EMERGENCY MEDICINE | Admitting: INTERNAL MEDICINE

## 2022-09-20 ENCOUNTER — TELEPHONE (OUTPATIENT)
Dept: CARDIOLOGY | Facility: CLINIC | Age: 82
End: 2022-09-20

## 2022-09-20 ENCOUNTER — APPOINTMENT (OUTPATIENT)
Dept: GENERAL RADIOLOGY | Facility: HOSPITAL | Age: 82
End: 2022-09-20

## 2022-09-20 DIAGNOSIS — R79.89 ELEVATED BRAIN NATRIURETIC PEPTIDE (BNP) LEVEL: ICD-10-CM

## 2022-09-20 DIAGNOSIS — D68.9 COAGULOPATHY: ICD-10-CM

## 2022-09-20 DIAGNOSIS — I48.91 ATRIAL FIBRILLATION WITH RAPID VENTRICULAR RESPONSE: Primary | ICD-10-CM

## 2022-09-20 PROBLEM — I48.92 ATRIAL FLUTTER WITH RAPID VENTRICULAR RESPONSE: Status: ACTIVE | Noted: 2022-09-20

## 2022-09-20 LAB
ALBUMIN SERPL-MCNC: 4.4 G/DL (ref 3.5–5.2)
ALBUMIN/GLOB SERPL: 2.6 G/DL
ALP SERPL-CCNC: 78 U/L (ref 39–117)
ALT SERPL W P-5'-P-CCNC: 17 U/L (ref 1–33)
ANION GAP SERPL CALCULATED.3IONS-SCNC: 13.8 MMOL/L (ref 5–15)
AST SERPL-CCNC: 17 U/L (ref 1–32)
BASOPHILS # BLD AUTO: 0.09 10*3/MM3 (ref 0–0.2)
BASOPHILS NFR BLD AUTO: 1.3 % (ref 0–1.5)
BILIRUB SERPL-MCNC: 1 MG/DL (ref 0–1.2)
BUN SERPL-MCNC: 21 MG/DL (ref 8–23)
BUN/CREAT SERPL: 22.3 (ref 7–25)
CALCIUM SPEC-SCNC: 9.7 MG/DL (ref 8.6–10.5)
CHLORIDE SERPL-SCNC: 104 MMOL/L (ref 98–107)
CO2 SERPL-SCNC: 24.2 MMOL/L (ref 22–29)
CREAT SERPL-MCNC: 0.94 MG/DL (ref 0.57–1)
DEPRECATED RDW RBC AUTO: 49.4 FL (ref 37–54)
EGFRCR SERPLBLD CKD-EPI 2021: 61.1 ML/MIN/1.73
EOSINOPHIL # BLD AUTO: 0.06 10*3/MM3 (ref 0–0.4)
EOSINOPHIL NFR BLD AUTO: 0.9 % (ref 0.3–6.2)
ERYTHROCYTE [DISTWIDTH] IN BLOOD BY AUTOMATED COUNT: 13.7 % (ref 12.3–15.4)
GLOBULIN UR ELPH-MCNC: 1.7 GM/DL
GLUCOSE BLDC GLUCOMTR-MCNC: 104 MG/DL (ref 70–130)
GLUCOSE SERPL-MCNC: 175 MG/DL (ref 65–99)
HCT VFR BLD AUTO: 34.7 % (ref 34–46.6)
HGB BLD-MCNC: 11.5 G/DL (ref 12–15.9)
IMM GRANULOCYTES # BLD AUTO: 0.02 10*3/MM3 (ref 0–0.05)
IMM GRANULOCYTES NFR BLD AUTO: 0.3 % (ref 0–0.5)
INR PPP: 1.21 (ref 0.9–1.1)
LYMPHOCYTES # BLD AUTO: 1.55 10*3/MM3 (ref 0.7–3.1)
LYMPHOCYTES NFR BLD AUTO: 22.2 % (ref 19.6–45.3)
MAGNESIUM SERPL-MCNC: 1.9 MG/DL (ref 1.6–2.4)
MCH RBC QN AUTO: 32.4 PG (ref 26.6–33)
MCHC RBC AUTO-ENTMCNC: 33.1 G/DL (ref 31.5–35.7)
MCV RBC AUTO: 97.7 FL (ref 79–97)
MONOCYTES # BLD AUTO: 0.79 10*3/MM3 (ref 0.1–0.9)
MONOCYTES NFR BLD AUTO: 11.3 % (ref 5–12)
NEUTROPHILS NFR BLD AUTO: 4.47 10*3/MM3 (ref 1.7–7)
NEUTROPHILS NFR BLD AUTO: 64 % (ref 42.7–76)
NRBC BLD AUTO-RTO: 0.3 /100 WBC (ref 0–0.2)
NT-PROBNP SERPL-MCNC: 4000 PG/ML (ref 0–1800)
PLATELET # BLD AUTO: 380 10*3/MM3 (ref 140–450)
PMV BLD AUTO: 10.5 FL (ref 6–12)
POTASSIUM SERPL-SCNC: 4.3 MMOL/L (ref 3.5–5.2)
PROT SERPL-MCNC: 6.1 G/DL (ref 6–8.5)
PROTHROMBIN TIME: 15.2 SECONDS (ref 11.7–14.2)
QT INTERVAL: 298 MS
RBC # BLD AUTO: 3.55 10*6/MM3 (ref 3.77–5.28)
SODIUM SERPL-SCNC: 142 MMOL/L (ref 136–145)
TROPONIN T SERPL-MCNC: <0.01 NG/ML (ref 0–0.03)
TSH SERPL DL<=0.05 MIU/L-ACNC: 1.31 UIU/ML (ref 0.27–4.2)
WBC NRBC COR # BLD: 6.98 10*3/MM3 (ref 3.4–10.8)

## 2022-09-20 PROCEDURE — 99285 EMERGENCY DEPT VISIT HI MDM: CPT

## 2022-09-20 PROCEDURE — 71045 X-RAY EXAM CHEST 1 VIEW: CPT

## 2022-09-20 PROCEDURE — 80053 COMPREHEN METABOLIC PANEL: CPT | Performed by: EMERGENCY MEDICINE

## 2022-09-20 PROCEDURE — 85025 COMPLETE CBC W/AUTO DIFF WBC: CPT | Performed by: EMERGENCY MEDICINE

## 2022-09-20 PROCEDURE — 84484 ASSAY OF TROPONIN QUANT: CPT | Performed by: EMERGENCY MEDICINE

## 2022-09-20 PROCEDURE — 84443 ASSAY THYROID STIM HORMONE: CPT | Performed by: NURSE PRACTITIONER

## 2022-09-20 PROCEDURE — 93010 ELECTROCARDIOGRAM REPORT: CPT | Performed by: INTERNAL MEDICINE

## 2022-09-20 PROCEDURE — 83880 ASSAY OF NATRIURETIC PEPTIDE: CPT | Performed by: EMERGENCY MEDICINE

## 2022-09-20 PROCEDURE — 93005 ELECTROCARDIOGRAM TRACING: CPT | Performed by: EMERGENCY MEDICINE

## 2022-09-20 PROCEDURE — 85610 PROTHROMBIN TIME: CPT | Performed by: EMERGENCY MEDICINE

## 2022-09-20 PROCEDURE — 99223 1ST HOSP IP/OBS HIGH 75: CPT | Performed by: INTERNAL MEDICINE

## 2022-09-20 PROCEDURE — 83735 ASSAY OF MAGNESIUM: CPT | Performed by: EMERGENCY MEDICINE

## 2022-09-20 PROCEDURE — 82962 GLUCOSE BLOOD TEST: CPT

## 2022-09-20 RX ORDER — NITROGLYCERIN 0.4 MG/1
0.4 TABLET SUBLINGUAL
Status: DISCONTINUED | OUTPATIENT
Start: 2022-09-20 | End: 2022-09-23 | Stop reason: HOSPADM

## 2022-09-20 RX ORDER — SODIUM CHLORIDE 0.9 % (FLUSH) 0.9 %
10 SYRINGE (ML) INJECTION EVERY 12 HOURS SCHEDULED
Status: DISCONTINUED | OUTPATIENT
Start: 2022-09-20 | End: 2022-09-23 | Stop reason: HOSPADM

## 2022-09-20 RX ORDER — CHOLECALCIFEROL (VITAMIN D3) 125 MCG
5 CAPSULE ORAL NIGHTLY PRN
Status: DISCONTINUED | OUTPATIENT
Start: 2022-09-20 | End: 2022-09-23 | Stop reason: HOSPADM

## 2022-09-20 RX ORDER — DILTIAZEM HCL IN NACL,ISO-OSM 125 MG/125
5-15 PLASTIC BAG, INJECTION (ML) INTRAVENOUS
Status: DISCONTINUED | OUTPATIENT
Start: 2022-09-20 | End: 2022-09-21

## 2022-09-20 RX ORDER — ALLOPURINOL 300 MG/1
300 TABLET ORAL DAILY
Status: DISCONTINUED | OUTPATIENT
Start: 2022-09-21 | End: 2022-09-23 | Stop reason: HOSPADM

## 2022-09-20 RX ORDER — NICOTINE POLACRILEX 4 MG
15 LOZENGE BUCCAL
Status: DISCONTINUED | OUTPATIENT
Start: 2022-09-20 | End: 2022-09-23 | Stop reason: HOSPADM

## 2022-09-20 RX ORDER — SODIUM CHLORIDE 0.9 % (FLUSH) 0.9 %
10 SYRINGE (ML) INJECTION AS NEEDED
Status: DISCONTINUED | OUTPATIENT
Start: 2022-09-20 | End: 2022-09-23 | Stop reason: HOSPADM

## 2022-09-20 RX ORDER — METFORMIN HYDROCHLORIDE 500 MG/1
500 TABLET, EXTENDED RELEASE ORAL 2 TIMES DAILY
Status: DISCONTINUED | OUTPATIENT
Start: 2022-09-20 | End: 2022-09-23 | Stop reason: HOSPADM

## 2022-09-20 RX ORDER — FUROSEMIDE 40 MG/1
40 TABLET ORAL DAILY
Status: DISCONTINUED | OUTPATIENT
Start: 2022-09-21 | End: 2022-09-21

## 2022-09-20 RX ORDER — ACETAMINOPHEN 325 MG/1
650 TABLET ORAL EVERY 4 HOURS PRN
Status: DISCONTINUED | OUTPATIENT
Start: 2022-09-20 | End: 2022-09-23 | Stop reason: HOSPADM

## 2022-09-20 RX ORDER — INSULIN LISPRO 100 [IU]/ML
0-9 INJECTION, SOLUTION INTRAVENOUS; SUBCUTANEOUS
Status: DISCONTINUED | OUTPATIENT
Start: 2022-09-20 | End: 2022-09-23 | Stop reason: HOSPADM

## 2022-09-20 RX ORDER — DEXTROSE MONOHYDRATE 25 G/50ML
25 INJECTION, SOLUTION INTRAVENOUS
Status: DISCONTINUED | OUTPATIENT
Start: 2022-09-20 | End: 2022-09-23 | Stop reason: HOSPADM

## 2022-09-20 RX ORDER — POTASSIUM CHLORIDE 750 MG/1
10 TABLET, FILM COATED, EXTENDED RELEASE ORAL 2 TIMES DAILY
Status: DISCONTINUED | OUTPATIENT
Start: 2022-09-20 | End: 2022-09-23 | Stop reason: HOSPADM

## 2022-09-20 RX ADMIN — APIXABAN 5 MG: 5 TABLET, FILM COATED ORAL at 15:34

## 2022-09-20 RX ADMIN — Medication 5 MG/HR: at 13:20

## 2022-09-20 NOTE — TELEPHONE ENCOUNTER
22-Dr Senior  Pt: Laure Leung  : 1940  Phone: [phone]  Reason for Call:  Pt. Called stating heart rate running very irratic, anywhere from 's. States she is very short of air if she gets up to move around. I told her if she is having that much trouble to go to the ER

## 2022-09-21 LAB
ANION GAP SERPL CALCULATED.3IONS-SCNC: 10.2 MMOL/L (ref 5–15)
BACTERIA UR QL AUTO: ABNORMAL /HPF
BILIRUB UR QL STRIP: NEGATIVE
BUN SERPL-MCNC: 19 MG/DL (ref 8–23)
BUN/CREAT SERPL: 27.5 (ref 7–25)
CALCIUM SPEC-SCNC: 9.3 MG/DL (ref 8.6–10.5)
CHLORIDE SERPL-SCNC: 104 MMOL/L (ref 98–107)
CLARITY UR: CLEAR
CO2 SERPL-SCNC: 25.8 MMOL/L (ref 22–29)
COLOR UR: YELLOW
CREAT SERPL-MCNC: 0.69 MG/DL (ref 0.57–1)
EGFRCR SERPLBLD CKD-EPI 2021: 87.3 ML/MIN/1.73
GLUCOSE BLDC GLUCOMTR-MCNC: 134 MG/DL (ref 70–130)
GLUCOSE BLDC GLUCOMTR-MCNC: 146 MG/DL (ref 70–130)
GLUCOSE BLDC GLUCOMTR-MCNC: 148 MG/DL (ref 70–130)
GLUCOSE SERPL-MCNC: 133 MG/DL (ref 65–99)
GLUCOSE UR STRIP-MCNC: NEGATIVE MG/DL
HGB UR QL STRIP.AUTO: NEGATIVE
HYALINE CASTS UR QL AUTO: ABNORMAL /LPF
KETONES UR QL STRIP: NEGATIVE
LEUKOCYTE ESTERASE UR QL STRIP.AUTO: ABNORMAL
MAGNESIUM SERPL-MCNC: 2.1 MG/DL (ref 1.6–2.4)
NITRITE UR QL STRIP: NEGATIVE
PH UR STRIP.AUTO: 5.5 [PH] (ref 5–8)
POTASSIUM SERPL-SCNC: 4.2 MMOL/L (ref 3.5–5.2)
PROT UR QL STRIP: NEGATIVE
QT INTERVAL: 385 MS
RBC # UR STRIP: ABNORMAL /HPF
REF LAB TEST METHOD: ABNORMAL
SODIUM SERPL-SCNC: 140 MMOL/L (ref 136–145)
SP GR UR STRIP: 1.02 (ref 1–1.03)
SQUAMOUS #/AREA URNS HPF: ABNORMAL /HPF
T4 FREE SERPL-MCNC: 1.49 NG/DL (ref 0.93–1.7)
UROBILINOGEN UR QL STRIP: ABNORMAL
WBC # UR STRIP: ABNORMAL /HPF

## 2022-09-21 PROCEDURE — 36415 COLL VENOUS BLD VENIPUNCTURE: CPT | Performed by: NURSE PRACTITIONER

## 2022-09-21 PROCEDURE — 93010 ELECTROCARDIOGRAM REPORT: CPT | Performed by: INTERNAL MEDICINE

## 2022-09-21 PROCEDURE — 25010000002 FUROSEMIDE PER 20 MG: Performed by: NURSE PRACTITIONER

## 2022-09-21 PROCEDURE — 80048 BASIC METABOLIC PNL TOTAL CA: CPT | Performed by: NURSE PRACTITIONER

## 2022-09-21 PROCEDURE — 99232 SBSQ HOSP IP/OBS MODERATE 35: CPT | Performed by: INTERNAL MEDICINE

## 2022-09-21 PROCEDURE — 82962 GLUCOSE BLOOD TEST: CPT

## 2022-09-21 PROCEDURE — 93005 ELECTROCARDIOGRAM TRACING: CPT | Performed by: NURSE PRACTITIONER

## 2022-09-21 PROCEDURE — 81001 URINALYSIS AUTO W/SCOPE: CPT | Performed by: EMERGENCY MEDICINE

## 2022-09-21 PROCEDURE — 84439 ASSAY OF FREE THYROXINE: CPT | Performed by: NURSE PRACTITIONER

## 2022-09-21 PROCEDURE — 83735 ASSAY OF MAGNESIUM: CPT | Performed by: NURSE PRACTITIONER

## 2022-09-21 RX ORDER — FUROSEMIDE 10 MG/ML
20 INJECTION INTRAMUSCULAR; INTRAVENOUS ONCE
Status: COMPLETED | OUTPATIENT
Start: 2022-09-21 | End: 2022-09-21

## 2022-09-21 RX ORDER — AMIODARONE HYDROCHLORIDE 200 MG/1
400 TABLET ORAL 2 TIMES DAILY WITH MEALS
Status: DISCONTINUED | OUTPATIENT
Start: 2022-09-21 | End: 2022-09-23 | Stop reason: HOSPADM

## 2022-09-21 RX ORDER — FUROSEMIDE 40 MG/1
40 TABLET ORAL DAILY
Status: DISCONTINUED | OUTPATIENT
Start: 2022-09-22 | End: 2022-09-23 | Stop reason: HOSPADM

## 2022-09-21 RX ADMIN — APIXABAN 5 MG: 5 TABLET, FILM COATED ORAL at 20:41

## 2022-09-21 RX ADMIN — Medication 5 MG: at 00:12

## 2022-09-21 RX ADMIN — METOPROLOL TARTRATE 25 MG: 25 TABLET, FILM COATED ORAL at 08:22

## 2022-09-21 RX ADMIN — POTASSIUM CHLORIDE 10 MEQ: 750 TABLET, EXTENDED RELEASE ORAL at 08:22

## 2022-09-21 RX ADMIN — APIXABAN 5 MG: 5 TABLET, FILM COATED ORAL at 08:21

## 2022-09-21 RX ADMIN — DILTIAZEM HYDROCHLORIDE 30 MG: 30 TABLET, FILM COATED ORAL at 12:36

## 2022-09-21 RX ADMIN — Medication 10 ML: at 20:42

## 2022-09-21 RX ADMIN — DILTIAZEM HYDROCHLORIDE 30 MG: 30 TABLET, FILM COATED ORAL at 17:46

## 2022-09-21 RX ADMIN — APIXABAN 5 MG: 5 TABLET, FILM COATED ORAL at 00:12

## 2022-09-21 RX ADMIN — METFORMIN HYDROCHLORIDE 500 MG: 500 TABLET, EXTENDED RELEASE ORAL at 08:22

## 2022-09-21 RX ADMIN — ACETAMINOPHEN 650 MG: 325 TABLET, FILM COATED ORAL at 04:33

## 2022-09-21 RX ADMIN — POTASSIUM CHLORIDE 10 MEQ: 750 TABLET, EXTENDED RELEASE ORAL at 20:42

## 2022-09-21 RX ADMIN — METFORMIN HYDROCHLORIDE 500 MG: 500 TABLET, EXTENDED RELEASE ORAL at 20:42

## 2022-09-21 RX ADMIN — METOPROLOL TARTRATE 25 MG: 25 TABLET, FILM COATED ORAL at 00:12

## 2022-09-21 RX ADMIN — METFORMIN HYDROCHLORIDE 500 MG: 500 TABLET, EXTENDED RELEASE ORAL at 00:12

## 2022-09-21 RX ADMIN — AMIODARONE HYDROCHLORIDE 400 MG: 200 TABLET ORAL at 10:04

## 2022-09-21 RX ADMIN — POTASSIUM CHLORIDE 10 MEQ: 750 TABLET, EXTENDED RELEASE ORAL at 00:12

## 2022-09-21 RX ADMIN — AMIODARONE HYDROCHLORIDE 400 MG: 200 TABLET ORAL at 17:46

## 2022-09-21 RX ADMIN — ALLOPURINOL 300 MG: 300 TABLET ORAL at 08:21

## 2022-09-21 RX ADMIN — Medication 10 ML: at 00:13

## 2022-09-21 RX ADMIN — Medication 10 ML: at 10:05

## 2022-09-21 RX ADMIN — Medication 5 MG: at 20:45

## 2022-09-21 RX ADMIN — Medication 5 MG/HR: at 00:16

## 2022-09-21 RX ADMIN — METOPROLOL TARTRATE 25 MG: 25 TABLET, FILM COATED ORAL at 20:42

## 2022-09-21 RX ADMIN — FUROSEMIDE 20 MG: 10 INJECTION, SOLUTION INTRAMUSCULAR; INTRAVENOUS at 10:04

## 2022-09-22 LAB
ANION GAP SERPL CALCULATED.3IONS-SCNC: 12 MMOL/L (ref 5–15)
BUN SERPL-MCNC: 21 MG/DL (ref 8–23)
BUN/CREAT SERPL: 22.3 (ref 7–25)
CALCIUM SPEC-SCNC: 9 MG/DL (ref 8.6–10.5)
CHLORIDE SERPL-SCNC: 106 MMOL/L (ref 98–107)
CO2 SERPL-SCNC: 21 MMOL/L (ref 22–29)
CREAT SERPL-MCNC: 0.94 MG/DL (ref 0.57–1)
EGFRCR SERPLBLD CKD-EPI 2021: 61.1 ML/MIN/1.73
GLUCOSE BLDC GLUCOMTR-MCNC: 122 MG/DL (ref 70–130)
GLUCOSE BLDC GLUCOMTR-MCNC: 137 MG/DL (ref 70–130)
GLUCOSE BLDC GLUCOMTR-MCNC: 150 MG/DL (ref 70–130)
GLUCOSE BLDC GLUCOMTR-MCNC: 168 MG/DL (ref 70–130)
GLUCOSE SERPL-MCNC: 154 MG/DL (ref 65–99)
POTASSIUM SERPL-SCNC: 4.6 MMOL/L (ref 3.5–5.2)
SODIUM SERPL-SCNC: 139 MMOL/L (ref 136–145)

## 2022-09-22 PROCEDURE — 82962 GLUCOSE BLOOD TEST: CPT

## 2022-09-22 PROCEDURE — 63710000001 INSULIN LISPRO (HUMAN) PER 5 UNITS: Performed by: NURSE PRACTITIONER

## 2022-09-22 PROCEDURE — 99232 SBSQ HOSP IP/OBS MODERATE 35: CPT | Performed by: INTERNAL MEDICINE

## 2022-09-22 PROCEDURE — 93005 ELECTROCARDIOGRAM TRACING: CPT | Performed by: NURSE PRACTITIONER

## 2022-09-22 PROCEDURE — 93010 ELECTROCARDIOGRAM REPORT: CPT | Performed by: INTERNAL MEDICINE

## 2022-09-22 PROCEDURE — 80048 BASIC METABOLIC PNL TOTAL CA: CPT | Performed by: NURSE PRACTITIONER

## 2022-09-22 RX ADMIN — POTASSIUM CHLORIDE 10 MEQ: 750 TABLET, EXTENDED RELEASE ORAL at 20:39

## 2022-09-22 RX ADMIN — DILTIAZEM HYDROCHLORIDE 30 MG: 30 TABLET, FILM COATED ORAL at 06:09

## 2022-09-22 RX ADMIN — APIXABAN 5 MG: 5 TABLET, FILM COATED ORAL at 20:39

## 2022-09-22 RX ADMIN — METOPROLOL TARTRATE 25 MG: 25 TABLET, FILM COATED ORAL at 20:39

## 2022-09-22 RX ADMIN — DILTIAZEM HYDROCHLORIDE 30 MG: 30 TABLET, FILM COATED ORAL at 23:11

## 2022-09-22 RX ADMIN — Medication 5 MG: at 20:45

## 2022-09-22 RX ADMIN — DILTIAZEM HYDROCHLORIDE 30 MG: 30 TABLET, FILM COATED ORAL at 12:00

## 2022-09-22 RX ADMIN — FUROSEMIDE 40 MG: 40 TABLET ORAL at 08:25

## 2022-09-22 RX ADMIN — AMIODARONE HYDROCHLORIDE 400 MG: 200 TABLET ORAL at 17:20

## 2022-09-22 RX ADMIN — AMIODARONE HYDROCHLORIDE 400 MG: 200 TABLET ORAL at 08:25

## 2022-09-22 RX ADMIN — METFORMIN HYDROCHLORIDE 500 MG: 500 TABLET, EXTENDED RELEASE ORAL at 20:39

## 2022-09-22 RX ADMIN — Medication 10 ML: at 20:38

## 2022-09-22 RX ADMIN — DILTIAZEM HYDROCHLORIDE 30 MG: 30 TABLET, FILM COATED ORAL at 17:20

## 2022-09-22 RX ADMIN — DILTIAZEM HYDROCHLORIDE 30 MG: 30 TABLET, FILM COATED ORAL at 00:49

## 2022-09-22 RX ADMIN — APIXABAN 5 MG: 5 TABLET, FILM COATED ORAL at 08:25

## 2022-09-22 RX ADMIN — INSULIN LISPRO 2 UNITS: 100 INJECTION, SOLUTION INTRAVENOUS; SUBCUTANEOUS at 12:00

## 2022-09-22 RX ADMIN — POTASSIUM CHLORIDE 10 MEQ: 750 TABLET, EXTENDED RELEASE ORAL at 08:29

## 2022-09-22 RX ADMIN — Medication 10 ML: at 08:29

## 2022-09-22 RX ADMIN — METFORMIN HYDROCHLORIDE 500 MG: 500 TABLET, EXTENDED RELEASE ORAL at 08:25

## 2022-09-22 RX ADMIN — METOPROLOL TARTRATE 25 MG: 25 TABLET, FILM COATED ORAL at 08:29

## 2022-09-22 RX ADMIN — ALLOPURINOL 300 MG: 300 TABLET ORAL at 08:29

## 2022-09-23 ENCOUNTER — APPOINTMENT (OUTPATIENT)
Dept: CARDIOLOGY | Facility: HOSPITAL | Age: 82
End: 2022-09-23

## 2022-09-23 ENCOUNTER — READMISSION MANAGEMENT (OUTPATIENT)
Dept: CALL CENTER | Facility: HOSPITAL | Age: 82
End: 2022-09-23

## 2022-09-23 VITALS
SYSTOLIC BLOOD PRESSURE: 138 MMHG | OXYGEN SATURATION: 95 % | BODY MASS INDEX: 34.21 KG/M2 | WEIGHT: 200.4 LBS | DIASTOLIC BLOOD PRESSURE: 65 MMHG | TEMPERATURE: 98.2 F | HEIGHT: 64 IN | HEART RATE: 56 BPM | RESPIRATION RATE: 16 BRPM

## 2022-09-23 LAB
ANION GAP SERPL CALCULATED.3IONS-SCNC: 8.1 MMOL/L (ref 5–15)
BUN SERPL-MCNC: 23 MG/DL (ref 8–23)
BUN/CREAT SERPL: 24.2 (ref 7–25)
CALCIUM SPEC-SCNC: 9.4 MG/DL (ref 8.6–10.5)
CHLORIDE SERPL-SCNC: 102 MMOL/L (ref 98–107)
CO2 SERPL-SCNC: 27.9 MMOL/L (ref 22–29)
CREAT SERPL-MCNC: 0.95 MG/DL (ref 0.57–1)
EGFRCR SERPLBLD CKD-EPI 2021: 60.3 ML/MIN/1.73
GLUCOSE BLDC GLUCOMTR-MCNC: 132 MG/DL (ref 70–130)
GLUCOSE SERPL-MCNC: 140 MG/DL (ref 65–99)
MAXIMAL PREDICTED HEART RATE: 139 BPM
POTASSIUM SERPL-SCNC: 4.1 MMOL/L (ref 3.5–5.2)
SODIUM SERPL-SCNC: 138 MMOL/L (ref 136–145)
STRESS TARGET HR: 118 BPM

## 2022-09-23 PROCEDURE — 5A2204Z RESTORATION OF CARDIAC RHYTHM, SINGLE: ICD-10-PCS | Performed by: INTERNAL MEDICINE

## 2022-09-23 PROCEDURE — 36415 COLL VENOUS BLD VENIPUNCTURE: CPT | Performed by: NURSE PRACTITIONER

## 2022-09-23 PROCEDURE — 80048 BASIC METABOLIC PNL TOTAL CA: CPT | Performed by: NURSE PRACTITIONER

## 2022-09-23 PROCEDURE — 92960 CARDIOVERSION ELECTRIC EXT: CPT | Performed by: INTERNAL MEDICINE

## 2022-09-23 PROCEDURE — 82962 GLUCOSE BLOOD TEST: CPT

## 2022-09-23 PROCEDURE — 92960 CARDIOVERSION ELECTRIC EXT: CPT

## 2022-09-23 PROCEDURE — 99238 HOSP IP/OBS DSCHRG MGMT 30/<: CPT | Performed by: INTERNAL MEDICINE

## 2022-09-23 PROCEDURE — 93005 ELECTROCARDIOGRAM TRACING: CPT | Performed by: INTERNAL MEDICINE

## 2022-09-23 PROCEDURE — 93010 ELECTROCARDIOGRAM REPORT: CPT | Performed by: INTERNAL MEDICINE

## 2022-09-23 PROCEDURE — 25010000002 MIDAZOLAM PER 1 MG: Performed by: INTERNAL MEDICINE

## 2022-09-23 RX ORDER — MIDAZOLAM HYDROCHLORIDE 1 MG/ML
INJECTION INTRAMUSCULAR; INTRAVENOUS
Status: COMPLETED | OUTPATIENT
Start: 2022-09-23 | End: 2022-09-23

## 2022-09-23 RX ORDER — SODIUM CHLORIDE 9 MG/ML
INJECTION, SOLUTION INTRAVENOUS
Status: COMPLETED | OUTPATIENT
Start: 2022-09-23 | End: 2022-09-23

## 2022-09-23 RX ORDER — AMIODARONE HYDROCHLORIDE 200 MG/1
400 TABLET ORAL 3 TIMES DAILY
Qty: 88 TABLET | Refills: 11 | Status: SHIPPED | OUTPATIENT
Start: 2022-09-23 | End: 2022-10-11 | Stop reason: SDUPTHER

## 2022-09-23 RX ADMIN — METFORMIN HYDROCHLORIDE 500 MG: 500 TABLET, EXTENDED RELEASE ORAL at 09:00

## 2022-09-23 RX ADMIN — AMIODARONE HYDROCHLORIDE 400 MG: 200 TABLET ORAL at 09:01

## 2022-09-23 RX ADMIN — ALLOPURINOL 300 MG: 300 TABLET ORAL at 09:00

## 2022-09-23 RX ADMIN — DILTIAZEM HYDROCHLORIDE 30 MG: 30 TABLET, FILM COATED ORAL at 06:42

## 2022-09-23 RX ADMIN — MIDAZOLAM 2 MG: 1 INJECTION INTRAMUSCULAR; INTRAVENOUS at 08:08

## 2022-09-23 RX ADMIN — FUROSEMIDE 40 MG: 40 TABLET ORAL at 09:00

## 2022-09-23 RX ADMIN — Medication 10 ML: at 09:01

## 2022-09-23 RX ADMIN — SODIUM CHLORIDE 75 ML/HR: 9 INJECTION, SOLUTION INTRAVENOUS at 08:06

## 2022-09-23 RX ADMIN — METHOHEXITAL SODIUM 20 MG: 500 INJECTION, POWDER, LYOPHILIZED, FOR SOLUTION INTRAMUSCULAR; INTRAVENOUS; RECTAL at 08:09

## 2022-09-23 RX ADMIN — APIXABAN 5 MG: 5 TABLET, FILM COATED ORAL at 09:00

## 2022-09-23 RX ADMIN — POTASSIUM CHLORIDE 10 MEQ: 750 TABLET, EXTENDED RELEASE ORAL at 09:00

## 2022-09-24 NOTE — OUTREACH NOTE
Prep Survey    Flowsheet Row Responses   [facility] discharged [facility]? [facility]   Is LACE score < 7 ? No   Emergency Room discharge w/ pulse ox? No   Eligibility [facility]   Date of Admission 09/20/22   Date of Discharge 09/23/22   Discharge Disposition Home or Self Care   Discharge diagnosis A-fib RVR, mild CHF   Does the patient have one of the following disease processes/diagnoses(primary or secondary)? CHF   Does the patient have Home health ordered? No   Is there a DME ordered? No   Prep survey completed? Yes          GABRIEL GONZALEZ - Registered Nurse

## 2022-09-26 ENCOUNTER — TRANSITIONAL CARE MANAGEMENT TELEPHONE ENCOUNTER (OUTPATIENT)
Dept: CALL CENTER | Facility: HOSPITAL | Age: 82
End: 2022-09-26

## 2022-09-26 ENCOUNTER — TELEPHONE (OUTPATIENT)
Dept: CARDIOLOGY | Facility: CLINIC | Age: 82
End: 2022-09-26

## 2022-09-26 LAB
QT INTERVAL: 397 MS
QT INTERVAL: 401 MS
QT INTERVAL: 453 MS

## 2022-09-26 NOTE — OUTREACH NOTE
Call Center TCM Note    Flowsheet Row Responses   [facility] patient discharged from? [facility]   Does the patient have one of the following disease processes/diagnoses(primary or secondary)? CHF   TCM attempt successful? Yes   Call start time 1203   Call end time 1205   Discharge diagnosis A-fib RVR, mild CHF   Does the patient have all medications ordered at discharge? Yes   Is the patient taking all medications as directed (includes completed medication regime)? Yes   Comments hospital f/u with PCP on 9/28   Has home health visited the patient within 72 hours of discharge? N/A   Psychosocial issues? No   Did the patient receive a copy of their discharge instructions? Yes   What is the patient's perception of their health status since discharge? Improving   Nursing interventions Nurse provided patient education   Is the patient able to teach back signs and symptoms of worsening condition? (i.e. weight gain, shortness of air, etc.) Yes   Is the patient/caregiver able to teach back the hierarchy of who to call/visit for symptoms/problems? PCP, Specialist, Home health nurse, Urgent Care, ED, 911 Yes   TCM call completed? Yes   Wrap up additional comments Doing well, no questions, educated on checking her weight daily, confirmed f/u with PCP on 9/28.           Tessie Boyle RN    9/26/2022, 12:05 EDT

## 2022-09-26 NOTE — TELEPHONE ENCOUNTER
Caller: Laure Leung    Relationship: Self    Best call back number: [phone]    What is the best time to reach you: ANYTIME     Who are you requesting to speak with (clinical staff, provider,  specific staff member): ANYONE     What was the call regarding: PATIENT CALLED TO SCHEDULE 2 WEEK HOSPITAL FOLLOW UP WITH . FIRST AVAIL 10.27.22. CURRENTLY HAS APPT SCHEDULED FOR 10.24.22 AND REQUESTS SOONER APPT DUE TO NEW MEDICATION.     Do you require a callback: YES

## 2022-09-28 ENCOUNTER — OFFICE VISIT (OUTPATIENT)
Dept: FAMILY MEDICINE CLINIC | Facility: CLINIC | Age: 82
End: 2022-09-28

## 2022-09-28 VITALS
SYSTOLIC BLOOD PRESSURE: 140 MMHG | WEIGHT: 195 LBS | TEMPERATURE: 97.8 F | OXYGEN SATURATION: 98 % | HEIGHT: 64 IN | BODY MASS INDEX: 33.29 KG/M2 | HEART RATE: 50 BPM | DIASTOLIC BLOOD PRESSURE: 60 MMHG

## 2022-09-28 DIAGNOSIS — I48.11 LONGSTANDING PERSISTENT ATRIAL FIBRILLATION: Primary | ICD-10-CM

## 2022-09-28 DIAGNOSIS — M54.31 SCIATICA OF RIGHT SIDE: ICD-10-CM

## 2022-09-28 DIAGNOSIS — S90.31XA HEMATOMA OF RIGHT FOOT: ICD-10-CM

## 2022-09-28 PROCEDURE — 1111F DSCHRG MED/CURRENT MED MERGE: CPT | Performed by: NURSE PRACTITIONER

## 2022-09-28 PROCEDURE — 99495 TRANSJ CARE MGMT MOD F2F 14D: CPT | Performed by: NURSE PRACTITIONER

## 2022-09-28 NOTE — PROGRESS NOTES
Transitional Care Follow Up Visit  Subjective     Laure Leung is a 82 y.o. female who presents for a transitional care management visit.    Within 48 business hours after discharge our office contacted her via telephone to coordinate her care and needs.      I reviewed and discussed the details of that call along with the discharge summary, hospital problems, inpatient lab results, inpatient diagnostic studies, and consultation reports with Laure Real.     Current outpatient and discharge medications have been reconciled for the patient.  Reviewed by: MIRZA Krishnamurthy      Date of TCM Phone Call 9/23/2022   [facility]   Date of Admission 9/20/2022   Date of Discharge 9/23/2022   Discharge Disposition Home or Self Care     Risk for Readmission (LACE) Score: 8 (9/23/2022  6:00 AM)    Chief Complaint   Patient presents with   • Hospital Follow Up Visit     Patient seen 9/20 for Afib, pt states she has been better since leaving the Ed but does still feel a bit weak.   • Atrial Fibrillation   • Back Pain     Patient is having lowere back pain, more to the right side.   • Foot Injury     Patient had small bruise on right foot that is not bigger than it started she would like you to look at.   • Med Management     Patient would like Eliquis samples.         History of Present Illness   Course During Hospital Stay: Patient presented to [facility] ED on 9/20/2022 with palpitations, exertional dyspnea and anxiety that started the day before.  She has a known history of atrial fibrillation/flutter s/p ablation 6/20/2021 with recurrent atrial flutter 5/2022-cardioverted at that time and started on diltiazem.  Upon admission EKG showed rapid atrial flutter, she was started on Cardizem drip, beta-blocker was titrated and admitted to cardiology for further management.  On 9/23/2022 she underwent successful cardioversion to sinus rhythm, she was started on amiodarone 400 mg 3 times daily, metoprolol remained at 12.5  mg twice daily.  She was discharged on 9/23/2022 on amiodarone and continued Eliquis.    BNP 4000  Chest x-ray showed no acute findings  2D echo 5/9/2022 EF 60%    Today she denies palpitations, shortness of air and feels her heart rate is regular. She is taking metoprolol, Eliquis and amiodarone 400 mg 3 times daily and will decrease to 400 mg daily on Friday. She complains of right foot injury-dropped her phone on the top of foot and right sided low back pain, her knees have severe arthritis, she likes to work in her yard and will sit and lean over from a chair.        The following portions of the patient's history were reviewed and updated as appropriate: allergies, current medications, past family history, past medical history, past social history, past surgical history and problem list.    Review of Systems    Objective      Physical Exam  Vitals and nursing note reviewed.   Constitutional:       General: She is not in acute distress.     Appearance: She is well-developed. She is not diaphoretic.   Eyes:      Pupils: Pupils are equal, round, and reactive to light.   Neck:      Thyroid: No thyromegaly.      Vascular: No JVD.   Cardiovascular:      Rate and Rhythm: Normal rate and regular rhythm.      Heart sounds: Normal heart sounds. No murmur heard.  Pulmonary:      Effort: Pulmonary effort is normal. No respiratory distress.      Breath sounds: Normal breath sounds.   Abdominal:      General: Bowel sounds are normal. There is no distension.      Palpations: Abdomen is soft.      Tenderness: There is no abdominal tenderness.   Musculoskeletal:         General: Tenderness (R sciatica, mild ttp) present. No swelling. Normal range of motion.      Cervical back: Normal range of motion and neck supple.   Skin:     General: Skin is warm and dry.      Findings: Bruising (R foot, with tiny hematoma midfoot, good range of motion) present.   Neurological:      Mental Status: She is alert and oriented to person, place,  and time.      Sensory: No sensory deficit.      Motor: Weakness present.      Gait: Gait abnormal.   Psychiatric:         Behavior: Behavior normal.         Thought Content: Thought content normal.         Judgment: Judgment normal.         Assessment & Plan   Diagnoses and all orders for this visit:    1. Longstanding persistent atrial fibrillation (HCC) (Primary)  Comments:  Decrease amiodarone to 400 mg daily on 9/30/2022  f/u with Dr. Senior as directed  Cot metoprolol and Eliquis   Eliquis samples provided  LFT's, TSH carla as sched    2. Hematoma of right foot  Comments:  Self-limiting, d/w pt bruise may turn colors and disperse before resolving due to Eliquis  Recommend ice as needed    3. Sciatica of right side  Comments:  Recommend exercises provided  Tylenol as needed  Limit bending at the waist  Consider PT      Return if symptoms worsen or fail to improve, for Next scheduled follow up.    EMR Dragon transcription disclaimer:  Part of this note may be an electronic transcription/translation of spoken language to printed text using the Dragon Dictation System.

## 2022-10-11 DIAGNOSIS — I48.4 ATYPICAL ATRIAL FLUTTER: Primary | ICD-10-CM

## 2022-10-11 DIAGNOSIS — I48.91 ATRIAL FIBRILLATION WITH RVR: ICD-10-CM

## 2022-10-11 RX ORDER — AMIODARONE HYDROCHLORIDE 200 MG/1
400 TABLET ORAL DAILY
Qty: 180 TABLET | Refills: 1 | Status: SHIPPED | OUTPATIENT
Start: 2022-10-11 | End: 2023-01-17

## 2022-10-11 NOTE — TELEPHONE ENCOUNTER
Caller: Laure Leung    Relationship: Self    Best call back number: [phone]    Requested Prescriptions:   Requested Prescriptions     Pending Prescriptions Disp Refills   • amiodarone (PACERONE) 200 MG tablet 88 tablet 11     Sig: Take 2 tablets by mouth 3 (Three) Times a Day for 7 (Seven) Days, then take 2 tablets by mouth Daily thereafter        Pharmacy where request should be sent: [facility] - [address] - [phone] [facility] [phone] FX     Additional details provided by patient: CAN'T DRIVE ALL THE WAY TO [facility] TO GET MEDICATION. WOULD LIKE THIS TRANSFERRED TO [facility] AND GET THE 90 DAY SUPPLY IF POSSIBLE     Does the patient have less than a 3 day supply:  [] Yes  [x] No    Marilee Marin Rep   10/11/22 11:11 EDT

## 2022-10-13 ENCOUNTER — TELEPHONE (OUTPATIENT)
Dept: CARDIOLOGY | Facility: CLINIC | Age: 82
End: 2022-10-13

## 2022-10-13 DIAGNOSIS — I48.4 ATYPICAL ATRIAL FLUTTER: ICD-10-CM

## 2022-10-13 DIAGNOSIS — I10 ESSENTIAL HYPERTENSION: ICD-10-CM

## 2022-10-13 RX ORDER — FUROSEMIDE 40 MG/1
TABLET ORAL
Qty: 90 TABLET | Refills: 1 | Status: SHIPPED | OUTPATIENT
Start: 2022-10-13 | End: 2023-01-17 | Stop reason: ALTCHOICE

## 2022-10-13 RX ORDER — FUROSEMIDE 40 MG/1
40 TABLET ORAL DAILY
Qty: 90 TABLET | Refills: 1 | Status: SHIPPED | OUTPATIENT
Start: 2022-10-13

## 2022-10-13 NOTE — TELEPHONE ENCOUNTER
Nadeen pt    Patient requesting refills on     1) Furosemide 40mg 1 tab daily  2) Metoprolol 25mg 1/2 tab BID    [facility]

## 2022-10-17 RX ORDER — ALLOPURINOL 300 MG/1
300 TABLET ORAL DAILY
Qty: 90 TABLET | Refills: 1 | Status: SHIPPED | OUTPATIENT
Start: 2022-10-17

## 2022-10-17 NOTE — TELEPHONE ENCOUNTER
Caller: Laure Leung    Relationship: Self    Best call back number: [phone]    Requested Prescriptions:   Requested Prescriptions     Pending Prescriptions Disp Refills   • allopurinol (Zyloprim) 300 MG tablet 90 tablet 1     Sig: Take 1 tablet by mouth Daily.        Pharmacy where request should be sent: [facility] [address] - [phone]  - [phone] FX     Additional details provided by patient: PATIENT HAS ABOUT A DOZEN LEFT.    Does the patient have less than a 3 day supply:  [] Yes  [x] No    Marilee Valadez Rep   10/17/22 10:23 EDT

## 2022-10-24 ENCOUNTER — OFFICE VISIT (OUTPATIENT)
Dept: CARDIOLOGY | Facility: CLINIC | Age: 82
End: 2022-10-24

## 2022-10-24 VITALS
HEART RATE: 51 BPM | WEIGHT: 195 LBS | RESPIRATION RATE: 18 BRPM | OXYGEN SATURATION: 97 % | DIASTOLIC BLOOD PRESSURE: 66 MMHG | BODY MASS INDEX: 33.29 KG/M2 | HEIGHT: 64 IN | SYSTOLIC BLOOD PRESSURE: 128 MMHG

## 2022-10-24 DIAGNOSIS — I48.4 ATYPICAL ATRIAL FLUTTER: ICD-10-CM

## 2022-10-24 DIAGNOSIS — I48.91 ATRIAL FIBRILLATION WITH RAPID VENTRICULAR RESPONSE: ICD-10-CM

## 2022-10-24 DIAGNOSIS — I48.92 ATRIAL FLUTTER WITH CONTROLLED RESPONSE: Primary | ICD-10-CM

## 2022-10-24 DIAGNOSIS — I10 ESSENTIAL HYPERTENSION: ICD-10-CM

## 2022-10-24 DIAGNOSIS — Z98.890 HX OF ATRIOVENTRICULAR NODE ABLATION: ICD-10-CM

## 2022-10-24 DIAGNOSIS — I48.92 ATRIAL FLUTTER WITH RAPID VENTRICULAR RESPONSE: ICD-10-CM

## 2022-10-24 PROCEDURE — 99213 OFFICE O/P EST LOW 20 MIN: CPT | Performed by: INTERNAL MEDICINE

## 2022-10-24 PROCEDURE — 93000 ELECTROCARDIOGRAM COMPLETE: CPT | Performed by: INTERNAL MEDICINE

## 2022-11-03 ENCOUNTER — OFFICE VISIT (OUTPATIENT)
Dept: CARDIOLOGY | Facility: CLINIC | Age: 82
End: 2022-11-03

## 2022-11-03 VITALS
HEIGHT: 64 IN | SYSTOLIC BLOOD PRESSURE: 150 MMHG | BODY MASS INDEX: 32.78 KG/M2 | DIASTOLIC BLOOD PRESSURE: 70 MMHG | WEIGHT: 192 LBS | OXYGEN SATURATION: 97 % | RESPIRATION RATE: 16 BRPM | HEART RATE: 69 BPM

## 2022-11-03 DIAGNOSIS — I48.4 ATYPICAL ATRIAL FLUTTER: ICD-10-CM

## 2022-11-03 DIAGNOSIS — I48.91 ATRIAL FIBRILLATION WITH RAPID VENTRICULAR RESPONSE: ICD-10-CM

## 2022-11-03 DIAGNOSIS — I48.92 ATRIAL FLUTTER WITH RAPID VENTRICULAR RESPONSE: ICD-10-CM

## 2022-11-03 DIAGNOSIS — I48.92 ATRIAL FLUTTER WITH CONTROLLED RESPONSE: Primary | ICD-10-CM

## 2022-11-03 DIAGNOSIS — I10 ESSENTIAL HYPERTENSION: ICD-10-CM

## 2022-11-03 PROCEDURE — 99213 OFFICE O/P EST LOW 20 MIN: CPT | Performed by: INTERNAL MEDICINE

## 2022-11-03 PROCEDURE — 93000 ELECTROCARDIOGRAM COMPLETE: CPT | Performed by: INTERNAL MEDICINE

## 2022-11-03 NOTE — PROGRESS NOTES
Subjective:     Encounter Date:2022      Patient ID: Laure Leung is a 82 y.o. female.    Chief Complaint:  Chief Complaint   Patient presents with   • Atrial Flutter       HPI:   Ms Leung is a 81 yo with a past medical history significant for HTN who developed atypical atrial flutter in . At that time she presented to [facility] with complaints of dyspnea which had been ongoing for several weeks.  She also noted that her stamina and exercise tolerance had diminished.  She denied any chest pain, PND, orthopnea or palpitatins.  In the ER she was found to be in atrial flutter with 2:1 conduction and had an elevated BNP of 1100.  She was admitted and placed on metoprolol, amiodarone and apixoban.  She was diuresed and an echo showed an EF of 55%, mild-moderate RV enlargement, mild-moderate MR with severely dilated LA, moderate TR and mild AoV sclerosis.     She underwent cardioversion in  and was taken off of amiodarone.      On  she presented with a several day history of palpitations and dyspnea and was found to be back in flutter.  She was seen in the ER where she was given metoprolol to control her rate and discharged.  Since then, she has done well without any palpitations or recurrent tachycardia.     She was taken for ablation  and had the followin. Alia-mitral atrial flutter ablated with a line between the anterior mitral anulus and an anterior scar in the LA  2. Successful ablation of the cavotricuspid isthmus  3. Normal SA node function  4. Normal AV node conduction  5. Normal His Purkinje conduction     Her pre-ablation SEBASTIAN showed:  • Left ventricular ejection fraction appears to be 56 - 60%.  • The left atrial cavity is severely dilated.  • The left atrial appendage was visualized through multiple planes. No evidence of a left atrial appendage thrombus was present.  • Moderate mitral valve regurgitation is present.  • Mild tricuspid valve regurgitation is  present. Estimated right ventricular systolic pressure from tricuspid regurgitation is normal (<35 mmHg).  There is moderate plaque in the descending aorta present.      She was admitted to [facility] 5/8/22 with what was thought to be atrial fibrillation but is more likely atypical flutter.  She was placed on IV diltiazem and converted spontaneously.  She was discharged on PO diltiazem.     She did ok until she was admitted 9/2022 with recurrent atypical atrial flutter with 2:1 conduction.  It was decided to load her with amiodarone rather than do a repeat ablation.  She was then cardioverted and discharged.    Last week, she was found to have a junctional bradycardia in the 40's.  Her metoprolol was discontinued and amiodarone reduced to 100mg qd.     Today she states that she has been doing well and is without symptoms of weakness, lightheadedness, dyspnea or palpitations.         The following portions of the patient's history were reviewed and updated as appropriate: current medications, past family history, past medical history, past social history, past surgical history and problem list.    Problem List:  Patient Active Problem List   Diagnosis   • Atypical atrial flutter (HCC)   • Essential hypertension   • Palpitations   • Atrial fibrillation with RVR (HCC)   • DM (diabetes mellitus), type 2 (HCC)   • Gout   • Primary osteoarthritis of both knees   • Morbid obesity (HCC)   • Obesity (BMI 30.0-34.9)   • Atrial fibrillation with rapid ventricular response (HCC)   • Atrial flutter with rapid ventricular response (HCC)       Active Med List:    Current Outpatient Medications:   •  acetaminophen (TYLENOL) 650 MG 8 hr tablet, Take 650 mg by mouth Every 8 (Eight) Hours As Needed for Mild Pain ., Disp: , Rfl:   •  allopurinol (Zyloprim) 300 MG tablet, Take 1 tablet by mouth Daily., Disp: 90 tablet, Rfl: 1  •  amiodarone (PACERONE) 200 MG tablet, Take 2 tablets by mouth Daily., Disp: 180 tablet, Rfl: 1  •   apixaban (ELIQUIS) 5 MG tablet tablet, Take 1 tablet by mouth Every 12 (Twelve) Hours., Disp: 180 tablet, Rfl: 1  •  furosemide (LASIX) 40 MG tablet, TAKE 1 TABLET DAILY, Disp: 90 tablet, Rfl: 1  •  gabapentin (NEURONTIN) 100 MG capsule, Take 1 capsule by mouth At Night As Needed (pain)., Disp: 30 capsule, Rfl: 0  •  Glucosamine-Chondroit-Vit C-Mn (GLUCOSAMINE CHONDR 1500 COMPLX PO), Take 2 tablets by mouth Daily., Disp: , Rfl:   •  glucose blood (OneTouch Verio) test strip, 1 each by Other route Daily. Use as instructed Dx code: E11.65, Disp: 100 each, Rfl: 2  •  Isopropyl Alcohol (Alcohol Wipes) 70 % misc, Apply 1 each topically Daily. Dx code: E11.65, Disp: 100 each, Rfl: 2  •  Lancets (OneTouch Delica Plus [id number]) misc, 1 each Daily. Dx code: E11.65, Disp: 100 each, Rfl: 2  •  Melatonin 10 MG tablet, Take 1 tablet by mouth Every Night., Disp: , Rfl:   •  metFORMIN ER (Glucophage XR) 500 MG 24 hr tablet, Take 1 tablet by mouth 2 (Two) Times a Day., Disp: 180 tablet, Rfl: 1  •  Misc Natural Products (BLOOD SUGAR BALANCE PO), Take 2 tablets by mouth Daily., Disp: , Rfl:   •  multivitamin with minerals tablet tablet, Take 1 tablet by mouth Daily. Liver support, otc, Disp: , Rfl:   •  potassium chloride 10 MEQ CR tablet, Take 1 tablet by mouth 2 (Two) Times a Day., Disp: 180 tablet, Rfl: 1  •  Probiotic Product (PROBIOTIC COLON SUPPORT PO), Take 2 capsules by mouth Daily., Disp: , Rfl:   •  VITAMIN D, CHOLECALCIFEROL, PO, Take 5,000 Units by mouth Daily., Disp: , Rfl:   •  furosemide (LASIX) 40 MG tablet, Take 1 tablet by mouth Daily., Disp: 90 tablet, Rfl: 1  •  metoprolol tartrate (LOPRESSOR) 25 MG tablet, TAKE 1/2 TABLET TWICE A DAY, Disp: 90 tablet, Rfl: 3  •  metoprolol tartrate (LOPRESSOR) 25 MG tablet, Take 0.5 tablets by mouth 2 (Two) Times a Day., Disp: 90 tablet, Rfl: 3  •  Probiotic Product (PROBIOTIC PO), Take 2 each by mouth 2 (Two) Times a Day., Disp: , Rfl:      Past Medical History:  Past Medical  "History:   Diagnosis Date   • Atrial fibrillation (HCC)    • CHF (congestive heart failure) (HCC)    • Hypertension    • Primary osteoarthritis of both knees 2022       Past Surgical History:  Past Surgical History:   Procedure Laterality Date   • CARDIAC ELECTROPHYSIOLOGY PROCEDURE N/A 2021    Procedure: Ablation atrial flutter;  Surgeon: Antonio Senior MD;  Location:  INVASIVE LOCATION;  Service: Cardiovascular;  Laterality: N/A;   • CATARACT EXTRACTION         Social History:  Social History     Socioeconomic History   • Marital status:    Tobacco Use   • Smoking status: Former     Packs/day: 0.50     Years: 12.00     Pack years: 6.00     Types: Cigarettes     Start date:      Quit date:      Years since quittin.8   • Smokeless tobacco: Never   Vaping Use   • Vaping Use: Never used   Substance and Sexual Activity   • Alcohol use: No   • Drug use: Never   • Sexual activity: Defer       Allergies:  No Known Allergies    Immunizations:    There is no immunization history on file for this patient.       Objective:         Review of Systems   Constitutional: Negative for fatigue.   Respiratory: Negative for shortness of breath.    Cardiovascular: Negative for chest pain, palpitations and leg swelling.   All other systems reviewed and are negative.       /70 (BP Location: Left arm, Patient Position: Sitting, Cuff Size: Large Adult)   Pulse 69   Resp 16   Ht 162.6 cm (64.02\")   Wt 87.1 kg (192 lb)   SpO2 97%   BMI 32.94 kg/m²     Constitutional:       General: Not in acute distress.     Appearance: Well-developed.   Eyes:      General: No scleral icterus.     Conjunctiva/sclera: Conjunctivae normal.      Pupils: Pupils are equal, round, and reactive to light.   HENT:      Head: Normocephalic and atraumatic.   Neck:      Thyroid: No thyromegaly.   Pulmonary:      Effort: Pulmonary effort is normal.      Breath sounds: Normal breath sounds.   Cardiovascular:      " Normal rate. Regular rhythm.   Abdominal:      General: Bowel sounds are normal.      Palpations: Abdomen is soft.   Musculoskeletal: Normal range of motion.      Cervical back: Normal range of motion. Skin:     General: Skin is warm and dry.   Neurological:      Mental Status: Alert and oriented to person, place, and time.         In-Office Procedure(s):    ECG 12 Lead    Date/Time: 11/3/2022 11:33 AM  Performed by: Antonio Senior MD  Authorized by: Antonio Senior MD   Comparison: compared with previous ECG from 10/24/2022  Comparison to previous ECG: Junctional bradycardia  Rhythm: sinus rhythm  Conduction: 1st degree AV block    Clinical impression: abnormal EKG          ECG 12 Lead    (Results Pending)        ASCVD RIsk Score::  The ASCVD Risk score (Zuhair DK, et al., 2019) failed to calculate for the following reasons:    The 2019 ASCVD risk score is only valid for ages 40 to 79    Recent Radiology:          Lab Review:       Recent labs reviewed and interpreted for clinical significance and application          Assessment:          Diagnosis Plan   1. Atrial flutter with controlled response (HCC)  ECG 12 Lead      2. Atrial fibrillation with rapid ventricular response (HCC)        3. Atrial flutter with rapid ventricular response (HCC)        4. Atypical atrial flutter (HCC)        5. Essential hypertension               Plan:      1) Atypical Atrial flutter  - hx atypical rosy-mitral atrial flutter 2019 s/p ablation 6/2021  - recurrent atrial flutter 5/2022 s/p conversion on diltiazem   - 2D echo 5/2022 showed EF = 60% with mild MR.    2. Junctional bradycardia - resolved, with discontinuation of metoprolol land reduction of amio to 100mg qd     3.  chronic diastolic heart failure - appears euvolemic, continue Lasix     4. HTN - controlled     RTC  3 months    Level of Care:                 Antonio Senior MD  11/03/22

## 2022-11-05 PROBLEM — Z98.890 HX OF ATRIOVENTRICULAR NODE ABLATION: Status: RESOLVED | Noted: 2022-05-09 | Resolved: 2022-11-05

## 2022-11-07 DIAGNOSIS — I10 ESSENTIAL HYPERTENSION: ICD-10-CM

## 2022-11-07 RX ORDER — LANCETS 30 GAUGE
1 EACH MISCELLANEOUS DAILY
Qty: 100 EACH | Refills: 2 | Status: SHIPPED | OUTPATIENT
Start: 2022-11-07

## 2022-11-07 RX ORDER — METFORMIN HYDROCHLORIDE 500 MG/1
500 TABLET, EXTENDED RELEASE ORAL 2 TIMES DAILY
Qty: 180 TABLET | Refills: 1 | Status: SHIPPED | OUTPATIENT
Start: 2022-11-07

## 2022-11-07 RX ORDER — ISOPROPYL ALCOHOL 700 MG/ML
1 CLOTH TOPICAL DAILY
Qty: 100 EACH | Refills: 2 | Status: SHIPPED | OUTPATIENT
Start: 2022-11-07 | End: 2023-01-17

## 2022-11-07 NOTE — TELEPHONE ENCOUNTER
Caller: Laure Leung    Relationship: Self    Best call back number: [phone]    Requested Prescriptions:   Requested Prescriptions     Pending Prescriptions Disp Refills   • potassium chloride 10 MEQ CR tablet 180 tablet 1     Sig: Take 1 tablet by mouth 2 (Two) Times a Day.        Pharmacy where request should be sent: [facility] - [address] - [phone]  - [phone] FX     Additional details provided by patient: HAS 10 DAYS LEFT OF MEDICATION. PATIENT WOULD LIKE TO KNOW IF SHE CAN GET A REFILL TO LAST HER UNTIL SHE GETS ESTABLISHED WITH HER NEXT CARDIOLOGIST.     Does the patient have less than a 3 day supply:  [] Yes  [x] No    Marilee Greenfield Rep   11/07/22 09:52 EST

## 2022-11-07 NOTE — TELEPHONE ENCOUNTER
Caller: Laure Leung    Relationship: Self    Best call back number: [phone] (Mobile)  Requested Prescriptions:   Requested Prescriptions     Pending Prescriptions Disp Refills   • metFORMIN ER (Glucophage XR) 500 MG 24 hr tablet 180 tablet 1     Sig: Take 1 tablet by mouth 2 (Two) Times a Day.   • glucose blood (OneTouch Verio) test strip 100 each 2     Si each by Other route Daily. Use as instructed  Dx code: E11.65   • Isopropyl Alcohol (Alcohol Wipes) 70 % misc 100 each 2     Sig: Apply 1 each topically Daily. Dx code: E11.65   • Lancets (OneTouch Delica Plus [id number]) misc 100 each 2     Si each Daily. Dx code: E11.65        Pharmacy where request should be sent: [facility] - [address] - [phone]  - [phone] FX     Additional details provided by patient: PATIENT IS NEEDING TO CHANGE PHARMACY   Does the patient have less than a 3 day supply:  [x] Yes  [] No    Marilee Pizarro Rep   22 10:01 EST

## 2022-11-08 RX ORDER — POTASSIUM CHLORIDE 750 MG/1
10 TABLET, FILM COATED, EXTENDED RELEASE ORAL 2 TIMES DAILY
Qty: 180 TABLET | Refills: 1 | Status: SHIPPED | OUTPATIENT
Start: 2022-11-08

## 2022-11-09 NOTE — TELEPHONE ENCOUNTER
Caller: Laure Leung    Relationship: Self    Best call back number:    [phone]    Requested Prescriptions:   Requested Prescriptions     Pending Prescriptions Disp Refills   • glucose blood (OneTouch Verio) test strip 100 each 2     Si each by Other route Daily. Use as instructed  Dx code: E11.65        Pharmacy where request should be sent: [facility] - [address] - [phone] [facility] [phone] FX     Additional details provided by patient: PATIENT CALLING STATING THAT THE PHARMACY NEVER RECEIVED THE REFILL REQUEST     Does the patient have less than a 3 day supply:  [] Yes  [x] No    Marilee Chen Rep   22 10:33 EST

## 2022-11-14 NOTE — TELEPHONE ENCOUNTER
Caller: Laure Leung    Relationship: Self    Best call back number: [phone]    Requested Prescriptions:   Requested Prescriptions     Pending Prescriptions Disp Refills   • glucose blood (OneTouch Verio) test strip 100 each 2     Si each by Other route Daily. Use as instructed  Dx code: E11.65        Pharmacy where request should be sent: [facility] - [address] - [phone] [facility] [phone] FX     Additional details provided by patient: ENOUGH FOR A COUPLE DAYS    Does the patient have less than a 3 day supply:  [x] Yes  [] No    Marilee Green Rep   22 14:59 EST

## 2022-11-18 NOTE — TELEPHONE ENCOUNTER
Verbally called into the pharmacy because they were not getting the fax. Pt notified she should get in 5-7 days via mail.

## 2023-01-03 ENCOUNTER — TELEPHONE (OUTPATIENT)
Dept: FAMILY MEDICINE CLINIC | Facility: CLINIC | Age: 83
End: 2023-01-03
Payer: MEDICARE

## 2023-01-03 RX ORDER — GABAPENTIN 100 MG/1
CAPSULE ORAL
Qty: 30 CAPSULE | Refills: 0 | Status: SHIPPED | OUTPATIENT
Start: 2023-01-03

## 2023-01-03 NOTE — TELEPHONE ENCOUNTER
Caller: Laure Leung    Relationship: Self    Best call back number: [phone]    Requested Prescriptions:   Requested Prescriptions     Pending Prescriptions Disp Refills   • gabapentin (NEURONTIN) 100 MG capsule [Pharmacy Med Name: GABAPENTIN CAP 100MG] 30 capsule 0     Sig: TAKE 1 CAPSULE AT NIGHT AS NEEDED (PAIN)        Pharmacy where request [facility] be sent: [facility] - [address] - [phone] [facility] [phone] FX     Additional details provided by patient: PATIENT HAS 3 CAPSULES LEFT OF MEDICATION     Does the patient have less than a 3 day supply:  [x] Yes  [] No    Would you like a call back once the refill request has been completed: [] Yes [x] No    If the office needs to give you a call back, can they leave a voicemail: [] Yes [x] No    Marilee Villalobos Rep   01/03/23 14:24 EST

## 2023-01-10 ENCOUNTER — TELEPHONE (OUTPATIENT)
Dept: FAMILY MEDICINE CLINIC | Facility: CLINIC | Age: 83
End: 2023-01-10
Payer: MEDICARE

## 2023-01-10 NOTE — TELEPHONE ENCOUNTER
Received surgery clearance for R total knee with Dr. Young. Pt has appt on 1/16/23 and will discuss/complete paperwork at that time. Pls let Dr. Young office know she has hx of afib/flutter, on eliquis, follows with cardiology and will need cardiac clearance as well.

## 2023-01-11 NOTE — TELEPHONE ENCOUNTER
Spoke to surgery scheduler and she said she will make a note that pt needs clearance from cardiology as well.

## 2023-01-16 ENCOUNTER — CLINICAL SUPPORT (OUTPATIENT)
Dept: FAMILY MEDICINE CLINIC | Facility: CLINIC | Age: 83
End: 2023-01-16
Payer: MEDICARE

## 2023-01-16 DIAGNOSIS — I10 ESSENTIAL HYPERTENSION: ICD-10-CM

## 2023-01-16 DIAGNOSIS — E11.65 TYPE 2 DIABETES MELLITUS WITH HYPERGLYCEMIA, WITHOUT LONG-TERM CURRENT USE OF INSULIN: Primary | ICD-10-CM

## 2023-01-16 LAB — HBA1C MFR BLD: 6.3 % (ref 3.5–5.6)

## 2023-01-16 PROCEDURE — 80061 LIPID PANEL: CPT | Performed by: NURSE PRACTITIONER

## 2023-01-16 PROCEDURE — 85027 COMPLETE CBC AUTOMATED: CPT | Performed by: NURSE PRACTITIONER

## 2023-01-16 PROCEDURE — 80053 COMPREHEN METABOLIC PANEL: CPT | Performed by: NURSE PRACTITIONER

## 2023-01-16 PROCEDURE — 83036 HEMOGLOBIN GLYCOSYLATED A1C: CPT | Performed by: NURSE PRACTITIONER

## 2023-01-16 PROCEDURE — 36415 COLL VENOUS BLD VENIPUNCTURE: CPT | Performed by: NURSE PRACTITIONER

## 2023-01-16 NOTE — PROGRESS NOTES
Venipuncture Blood Specimen Collection  Venipuncture performed in the right arm by Leeann Koo MA with good hemostasis. Patient tolerated the procedure well without complications.   01/16/23   Leeann Koo MA

## 2023-01-17 ENCOUNTER — OFFICE VISIT (OUTPATIENT)
Dept: CARDIOLOGY | Facility: CLINIC | Age: 83
End: 2023-01-17
Payer: MEDICARE

## 2023-01-17 VITALS
HEART RATE: 74 BPM | DIASTOLIC BLOOD PRESSURE: 72 MMHG | OXYGEN SATURATION: 96 % | BODY MASS INDEX: 32.61 KG/M2 | SYSTOLIC BLOOD PRESSURE: 177 MMHG | WEIGHT: 191 LBS | HEIGHT: 64 IN

## 2023-01-17 DIAGNOSIS — I48.91 ATRIAL FIBRILLATION WITH RVR: ICD-10-CM

## 2023-01-17 DIAGNOSIS — I48.4 ATYPICAL ATRIAL FLUTTER: Primary | ICD-10-CM

## 2023-01-17 DIAGNOSIS — I10 ESSENTIAL HYPERTENSION: ICD-10-CM

## 2023-01-17 DIAGNOSIS — Z01.810 PREOPERATIVE CARDIOVASCULAR EXAMINATION: ICD-10-CM

## 2023-01-17 LAB
ALBUMIN SERPL-MCNC: 4.8 G/DL (ref 3.5–5.2)
ALBUMIN/GLOB SERPL: 3 G/DL
ALP SERPL-CCNC: 78 U/L (ref 39–117)
ALT SERPL W P-5'-P-CCNC: 14 U/L (ref 1–33)
ANION GAP SERPL CALCULATED.3IONS-SCNC: 13.6 MMOL/L (ref 5–15)
AST SERPL-CCNC: 14 U/L (ref 1–32)
BILIRUB SERPL-MCNC: 0.8 MG/DL (ref 0–1.2)
BUN SERPL-MCNC: 16 MG/DL (ref 8–23)
BUN/CREAT SERPL: 17.6 (ref 7–25)
CALCIUM SPEC-SCNC: 9.9 MG/DL (ref 8.6–10.5)
CHLORIDE SERPL-SCNC: 105 MMOL/L (ref 98–107)
CHOLEST SERPL-MCNC: 214 MG/DL (ref 0–200)
CO2 SERPL-SCNC: 25.4 MMOL/L (ref 22–29)
CREAT SERPL-MCNC: 0.91 MG/DL (ref 0.57–1)
DEPRECATED RDW RBC AUTO: 46.9 FL (ref 37–54)
EGFRCR SERPLBLD CKD-EPI 2021: 63.1 ML/MIN/1.73
ERYTHROCYTE [DISTWIDTH] IN BLOOD BY AUTOMATED COUNT: 13.5 % (ref 12.3–15.4)
GLOBULIN UR ELPH-MCNC: 1.6 GM/DL
GLUCOSE SERPL-MCNC: 119 MG/DL (ref 65–99)
HCT VFR BLD AUTO: 33.3 % (ref 34–46.6)
HDLC SERPL-MCNC: 45 MG/DL (ref 40–60)
HGB BLD-MCNC: 10.8 G/DL (ref 12–15.9)
LDLC SERPL CALC-MCNC: 145 MG/DL (ref 0–100)
LDLC/HDLC SERPL: 3.16 {RATIO}
MCH RBC QN AUTO: 31.3 PG (ref 26.6–33)
MCHC RBC AUTO-ENTMCNC: 32.4 G/DL (ref 31.5–35.7)
MCV RBC AUTO: 96.5 FL (ref 79–97)
PLATELET # BLD AUTO: 402 10*3/MM3 (ref 140–450)
PMV BLD AUTO: 11.1 FL (ref 6–12)
POTASSIUM SERPL-SCNC: 4.2 MMOL/L (ref 3.5–5.2)
PROT SERPL-MCNC: 6.4 G/DL (ref 6–8.5)
RBC # BLD AUTO: 3.45 10*6/MM3 (ref 3.77–5.28)
SODIUM SERPL-SCNC: 144 MMOL/L (ref 136–145)
TRIGL SERPL-MCNC: 134 MG/DL (ref 0–150)
VLDLC SERPL-MCNC: 24 MG/DL (ref 5–40)
WBC NRBC COR # BLD: 8.08 10*3/MM3 (ref 3.4–10.8)

## 2023-01-17 PROCEDURE — 99213 OFFICE O/P EST LOW 20 MIN: CPT | Performed by: NURSE PRACTITIONER

## 2023-01-17 RX ORDER — AMIODARONE HYDROCHLORIDE 200 MG/1
100 TABLET ORAL DAILY
COMMUNITY
Start: 2023-01-17

## 2023-01-17 NOTE — PROGRESS NOTES
[facility] CARDIOLOGY      REASON FOR FOLLOW-UP:  Presurgical cardiac risk assessment  History of paroxysmal atrial fibrillation/flutter  Establish EP care        Chief Complaint   Patient presents with   • Heart Problem         Sarita Henriquez APRN        History of Present Illness   It was my pleasure to see Ms. Leung in the office today.  She is an 83-year-old female with no known history of coronary occlusive disease.  She does have history of atypical atrial flutter diagnosed June 2019 when she presented to [facility] with complaints of shortness of breath which had been ongoing for several weeks.  She was treated with metoprolol and amiodarone.  She was anticoagulated with apixaban.  TTE at that time showed normal LV systolic function with EF 55%, mild-moderate RV enlargement, mild-moderate MR with severely dilated LA, moderate TR and mild aortic valve sclerosis.  In 2019 she underwent cardioversion and was taken off amiodarone.  On 5/27/2022 she presented back to the hospital in atrial flutter which was treated with beta-blocker therapy.  In June 2022 she underwent EP ablation by Dr. Senior.      Preablation SEBASTIAN:  • Left ventricular ejection fraction appears to be 56 - 60%.  • The left atrial cavity is severely dilated.  • The left atrial appendage was visualized through multiple planes. No evidence of a left atrial appendage thrombus was present.  • Moderate mitral valve regurgitation is present.  • Mild tricuspid valve regurgitation is present. Estimated right ventricular systolic pressure from tricuspid regurgitation is normal (<35 mmHg).  There is moderate plaque in the descending aorta present    Patient was admitted to [facility] 5/8/2022 with what was thought to be atrial fibrillation but is more likely atypical flutter.  She was placed on IV diltiazem and converted spontaneously.  She was discharged on PO diltiazem.      She did ok until she was admitted  9/2022 with recurrent atypical atrial flutter with 2:1 conduction.  It was decided to load her with amiodarone rather than do a repeat ablation.  She was then cardioverted and discharged.     In October 2022 she was found to have a junctional bradycardia in the 40's.  Her metoprolol was discontinued and amiodarone reduced to 100mg qd.    Ms. Leung presents today to establish care and for perioperative cardiac risk assessment to undergo total knee replacement surgery.  Her last visit with Dr. Senior was 11/3/2022.  At that time, the patient reported no complaints and doing well.  Today, she also states that she feels very well.  She denies specifically any complaints of chest pains, pressure, tightness or palpitations.  No shortness of breath at rest or dyspnea with exertion.  She has had no swelling in her lower legs.  Most recent TTE May 2022 showed normal LV systolic function with EF 60%, mild mitral valve insufficiency.  The patient's blood pressure is elevated in the office today at 177/72, but reports pressures at home and in recent office visit were in the 140s systolic.    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  Continue antiarrhythmic.  She may hold Eliquis 3 days prior to procedure and resume when deemed safe to do so.  Risks and benefits are reviewed with the patient.  We will schedule follow-up with Dr. Deutsch in 3 months      Diagnoses and all orders for this visit:    1. Atypical atrial flutter (HCC) (Primary)    2. Essential hypertension          The following portions of  the patient's history were reviewed and updated as appropriate: allergies, current medications, past family history, past medical history, past social history, past surgical history and problem list.    REVIEW OF SYSTEMS:    Review of Systems   Cardiovascular: Negative for chest pain, dyspnea on exertion, irregular heartbeat, leg swelling, near-syncope, orthopnea, palpitations, paroxysmal nocturnal dyspnea and syncope.   Respiratory: Negative for shortness of breath.    Musculoskeletal: Positive for joint pain.   All other systems reviewed and are negative.      Vitals:    01/17/23 0911   BP: 177/72   Pulse: 74   SpO2: 96%         PHYSICAL EXAM:    General: Alert, cooperative, no distress, appears stated age  Head:  Normocephalic, atraumatic, mucous membranes moist  Eyes:  Conjunctiva/corneas clear, EOM's intact     Neck:  Supple,  no JVD or bruit     Lungs: Clear to auscultation bilaterally, no wheezes rhonchi rales are noted  Chest wall: No tenderness  Musculoskeletal:   Ambulates with assistance of a walker  Heart::  Regular rate and rhythm, S1 and S2 normal, no murmur, rub or gallop  Abdomen: Soft, non-tender, nondistended, bowel sounds active, no abdominal bruit  Extremities: No cyanosis, clubbing, or edema   Pulses: 2+ and symmetric all extremities  Skin:  No rashes or lesions  Neuro/psych: A&O x3. CN II through XII are grossly intact with appropriate affect        Past Medical History:   Diagnosis Date   • Atrial fibrillation (HCC)    • CHF (congestive heart failure) ([facility])    • Hypertension    • Primary osteoarthritis of both knees 5/25/2022       Past Surgical History:   Procedure Laterality Date   • CARDIAC ELECTROPHYSIOLOGY PROCEDURE N/A 6/23/2021    Procedure: Ablation atrial flutter;  Surgeon: Antonio Senior MD;  Location: [facility] INVASIVE LOCATION;  Service: Cardiovascular;  Laterality: N/A;   • CATARACT EXTRACTION           Current Outpatient Medications:   •  acetaminophen (TYLENOL) 650 MG 8  hr tablet, Take 650 mg by mouth Every 8 (Eight) Hours As Needed for Mild Pain ., Disp: , Rfl:   •  allopurinol (Zyloprim) 300 MG tablet, Take 1 tablet by mouth Daily., Disp: 90 tablet, Rfl: 1  •  amiodarone (PACERONE) 200 MG tablet, Take 2 tablets by mouth Daily. (Patient taking differently: Take 200 mg by mouth Daily. Take 1/2 tab daily), Disp: 180 tablet, Rfl: 1  •  apixaban (ELIQUIS) 5 MG tablet tablet, Take 1 tablet by mouth Every 12 (Twelve) Hours., Disp: 180 tablet, Rfl: 1  •  furosemide (LASIX) 40 MG tablet, Take 1 tablet by mouth Daily., Disp: 90 tablet, Rfl: 1  •  gabapentin (NEURONTIN) 100 MG capsule, TAKE 1 CAPSULE AT NIGHT AS NEEDED (PAIN), Disp: 30 capsule, Rfl: 0  •  Glucosamine-Chondroit-Vit C-Mn (GLUCOSAMINE CHONDR 1500 COMPLX PO), Take 2 tablets by mouth Daily., Disp: , Rfl:   •  glucose blood (OneTouch Verio) test strip, 1 each by Other route Daily. Use as instructed Dx code: E11.65, Disp: 100 each, Rfl: 2  •  Isopropyl Alcohol (Alcohol Wipes) 70 % misc, Apply 1 each topically Daily. Dx code: E11.65, Disp: 100 each, Rfl: 2  •  Lancets (OneTouch Delica Plus [id number]) misc, 1 each Daily. Dx code: E11.65, Disp: 100 each, Rfl: 2  •  Melatonin 10 MG tablet, Take 1 tablet by mouth Every Night., Disp: , Rfl:   •  metFORMIN ER (Glucophage XR) 500 MG 24 hr tablet, Take 1 tablet by mouth 2 (Two) Times a Day., Disp: 180 tablet, Rfl: 1  •  Misc Natural Products (BLOOD SUGAR BALANCE PO), Take 2 tablets by mouth Daily., Disp: , Rfl:   •  multivitamin with minerals tablet tablet, Take 1 tablet by mouth Daily. Liver support, otc, Disp: , Rfl:   •  potassium chloride 10 MEQ CR tablet, Take 1 tablet by mouth 2 (Two) Times a Day., Disp: 180 tablet, Rfl: 1  •  Probiotic Product (PROBIOTIC COLON SUPPORT PO), Take 2 capsules by mouth Daily., Disp: , Rfl:   •  Probiotic Product (PROBIOTIC PO), Take 2 each by mouth 2 (Two) Times a Day., Disp: , Rfl:   •  VITAMIN D, CHOLECALCIFEROL, PO, Take 5,000 Units by mouth Daily.,  "Disp: , Rfl:     No Known Allergies    Family History   Problem Relation Age of Onset   • No Known Problems Mother    • No Known Problems Father    • Atrial fibrillation Sister    • Diabetes Sister    • No Known Problems Maternal Aunt    • No Known Problems Maternal Uncle    • No Known Problems Paternal Aunt    • No Known Problems Paternal Uncle    • No Known Problems Maternal Grandmother    • No Known Problems Maternal Grandfather    • No Known Problems Paternal Grandmother    • No Known Problems Paternal Grandfather    • Heart attack Neg Hx    • Heart disease Neg Hx    • Heart failure Neg Hx    • Hyperlipidemia Neg Hx    • Hypertension Neg Hx        Social History     Tobacco Use   • Smoking status: Former     Packs/day: 0.50     Years: 12.00     Pack years: 6.00     Types: Cigarettes     Start date:      Quit date:      Years since quittin.0   • Smokeless tobacco: Never   Substance Use Topics   • Alcohol use: No           Current Electrocardiogram:  Procedures        EMR Dragon/Transcription:   \"Dictated utilizing Dragon dictation\".       "

## 2023-01-23 ENCOUNTER — OFFICE VISIT (OUTPATIENT)
Dept: FAMILY MEDICINE CLINIC | Facility: CLINIC | Age: 83
End: 2023-01-23
Payer: MEDICARE

## 2023-01-23 VITALS
BODY MASS INDEX: 32.85 KG/M2 | HEART RATE: 76 BPM | HEIGHT: 64 IN | OXYGEN SATURATION: 98 % | DIASTOLIC BLOOD PRESSURE: 72 MMHG | SYSTOLIC BLOOD PRESSURE: 142 MMHG | WEIGHT: 192.4 LBS

## 2023-01-23 DIAGNOSIS — R26.0 ATAXIC GAIT: ICD-10-CM

## 2023-01-23 DIAGNOSIS — L71.9 ROSACEA: ICD-10-CM

## 2023-01-23 DIAGNOSIS — I48.11 LONGSTANDING PERSISTENT ATRIAL FIBRILLATION: ICD-10-CM

## 2023-01-23 DIAGNOSIS — E11.9 TYPE 2 DIABETES MELLITUS WITHOUT COMPLICATION, WITHOUT LONG-TERM CURRENT USE OF INSULIN: ICD-10-CM

## 2023-01-23 DIAGNOSIS — I10 ESSENTIAL HYPERTENSION: Primary | ICD-10-CM

## 2023-01-23 DIAGNOSIS — K59.09 CHRONIC CONSTIPATION: ICD-10-CM

## 2023-01-23 DIAGNOSIS — M17.11 PRIMARY OSTEOARTHRITIS OF RIGHT KNEE: ICD-10-CM

## 2023-01-23 DIAGNOSIS — D17.24 LIPOMA OF LEFT LOWER EXTREMITY: ICD-10-CM

## 2023-01-23 DIAGNOSIS — H35.30 MACULAR DEGENERATION OF BOTH EYES, UNSPECIFIED TYPE: ICD-10-CM

## 2023-01-23 DIAGNOSIS — D50.9 IRON DEFICIENCY ANEMIA, UNSPECIFIED IRON DEFICIENCY ANEMIA TYPE: ICD-10-CM

## 2023-01-23 PROCEDURE — 99214 OFFICE O/P EST MOD 30 MIN: CPT | Performed by: NURSE PRACTITIONER

## 2023-01-23 RX ORDER — FERROUS SULFATE 325(65) MG
325 TABLET ORAL
Qty: 90 TABLET | Refills: 1 | Status: SHIPPED | OUTPATIENT
Start: 2023-01-23 | End: 2023-02-03 | Stop reason: SDUPTHER

## 2023-01-23 RX ORDER — DOCUSATE SODIUM 100 MG/1
200 CAPSULE, LIQUID FILLED ORAL DAILY
Qty: 180 CAPSULE | Refills: 1 | Status: SHIPPED | OUTPATIENT
Start: 2023-01-23 | End: 2023-02-03 | Stop reason: SDUPTHER

## 2023-01-23 RX ORDER — METRONIDAZOLE 7.5 MG/G
GEL TOPICAL 2 TIMES DAILY
Qty: 45 G | Refills: 0 | Status: SHIPPED | OUTPATIENT
Start: 2023-01-23

## 2023-01-23 RX ORDER — LISINOPRIL 5 MG/1
5 TABLET ORAL DAILY
Qty: 90 TABLET | Refills: 1 | Status: SHIPPED | OUTPATIENT
Start: 2023-01-23

## 2023-01-23 NOTE — PROGRESS NOTES
Chief Complaint  Chief Complaint   Patient presents with   • Follow-up     6 month follow up and discuss blood work done recently    • Mass     Pt said she has a knot/bump under her skin on her left upper leg           Subjective          Laure Leung presents to [facility] for   History of Present Illness     Right knee pain/degeneration, planning TKR with Dr. Young. She has been cleared by cardiology and okay to stop Eliquis 3 days prior to procedure.  She does use gabapentin as needed for severe pain    Gout, stable on allopurinol daily    Diabetes mellitus type II, feels stable on meds, denies any signs/symptoms of hyper/hypoglycemia, blurry vision, polydipsia, polyuria, nocturia, and unintentional weight loss    A fib, now off metoprolol d/t low rate, amiodarone has been decreased to 100 mg daily continues Eliquis.     HTN, now off metop d/t low rate, BP is elevated and she reports 140's systolic at home as well.  Denies chest pain, headache, shortness of air, palpitations and swelling of extremities.     Chronic anemia, she is not on iron, she does take a multivitamin.  She reports chronic constipation and prolapsed uterus, she is concerned with developing constipation if takes iron.  She was taking a colon supplement OTC which caused diarrhea and she stopped it 2 weeks ago.  She reports occasional dark stool but denies blood in the stool.     She complains of facial redness and dryness     Here to review labs      The following portions of the patient's history were reviewed and updated as appropriate: allergies, current medications, past family history, past medical history, past social history, past surgical history and problem list.    Past Medical History:   Diagnosis Date   • Atrial fibrillation (HCC)    • CHF (congestive heart failure) (HCC)    • Hypertension    • Primary osteoarthritis of both knees 5/25/2022     Past Surgical History:   Procedure Laterality Date   •  CARDIAC ELECTROPHYSIOLOGY PROCEDURE N/A 2021    Procedure: Ablation atrial flutter;  Surgeon: Antonio Senior MD;  Location: [facility] INVASIVE LOCATION;  Service: Cardiovascular;  Laterality: N/A;   • CATARACT EXTRACTION       Family History   Problem Relation Age of Onset   • No Known Problems Mother    • No Known Problems Father    • Atrial fibrillation Sister    • Diabetes Sister    • No Known Problems Maternal Aunt    • No Known Problems Maternal Uncle    • No Known Problems Paternal Aunt    • No Known Problems Paternal Uncle    • No Known Problems Maternal Grandmother    • No Known Problems Maternal Grandfather    • No Known Problems Paternal Grandmother    • No Known Problems Paternal Grandfather    • Heart attack Neg Hx    • Heart disease Neg Hx    • Heart failure Neg Hx    • Hyperlipidemia Neg Hx    • Hypertension Neg Hx      Social History     Tobacco Use   • Smoking status: Former     Packs/day: 0.50     Years: 12.00     Pack years: 6.00     Types: Cigarettes     Start date:      Quit date:      Years since quittin.0   • Smokeless tobacco: Never   Substance Use Topics   • Alcohol use: No       Current Outpatient Medications:   •  acetaminophen (TYLENOL) 650 MG 8 hr tablet, Take 650 mg by mouth Every 8 (Eight) Hours As Needed for Mild Pain ., Disp: , Rfl:   •  allopurinol (Zyloprim) 300 MG tablet, Take 1 tablet by mouth Daily., Disp: 90 tablet, Rfl: 1  •  amiodarone (PACERONE) 200 MG tablet, Take 0.5 tablets by mouth Daily., Disp: , Rfl:   •  apixaban (ELIQUIS) 5 MG tablet tablet, Take 1 tablet by mouth Every 12 (Twelve) Hours., Disp: 180 tablet, Rfl: 1  •  furosemide (LASIX) 40 MG tablet, Take 1 tablet by mouth Daily., Disp: 90 tablet, Rfl: 1  •  gabapentin (NEURONTIN) 100 MG capsule, TAKE 1 CAPSULE AT NIGHT AS NEEDED (PAIN), Disp: 30 capsule, Rfl: 0  •  Glucosamine-Chondroit-Vit C-Mn (GLUCOSAMINE CHONDR 1500 COMPLX PO), Take 2 tablets by mouth Daily., Disp: , Rfl:   •  glucose  "blood (OneTouch Verio) test strip, 1 each by Other route Daily. Use as instructed Dx code: E11.65, Disp: 100 each, Rfl: 2  •  Lancets (OneTouch Delica Plus [id number]) misc, 1 each Daily. Dx code: E11.65, Disp: 100 each, Rfl: 2  •  Melatonin 10 MG tablet, Take 1 tablet by mouth Every Night., Disp: , Rfl:   •  metFORMIN ER (Glucophage XR) 500 MG 24 hr tablet, Take 1 tablet by mouth 2 (Two) Times a Day., Disp: 180 tablet, Rfl: 1  •  Misc Natural Products (BLOOD SUGAR BALANCE PO), Take 2 tablets by mouth Daily., Disp: , Rfl:   •  multivitamin with minerals tablet tablet, Take 1 tablet by mouth Daily. Liver support, otc, Disp: , Rfl:   •  potassium chloride 10 MEQ CR tablet, Take 1 tablet by mouth 2 (Two) Times a Day., Disp: 180 tablet, Rfl: 1  •  Probiotic Product (PROBIOTIC PO), Take 2 each by mouth 2 (Two) Times a Day., Disp: , Rfl:   •  VITAMIN D, CHOLECALCIFEROL, PO, Take 5,000 Units by mouth Daily., Disp: , Rfl:   •  docusate sodium (Colace) 100 MG capsule, Take 2 capsules by mouth Daily., Disp: 180 capsule, Rfl: 1  •  ferrous sulfate 325 (65 FE) MG tablet, Take 1 tablet by mouth Daily With Breakfast., Disp: 90 tablet, Rfl: 1  •  lisinopril (PRINIVIL,ZESTRIL) 5 MG tablet, Take 1 tablet by mouth Daily., Disp: 90 tablet, Rfl: 1  •  metroNIDAZOLE (METROGEL) 0.75 % gel, Apply  topically to the appropriate area as directed 2 (Two) Times a Day., Disp: 45 g, Rfl: 0    Objective   Vital Signs:   /72 (BP Location: Left arm, Patient Position: Sitting, Cuff Size: Adult)   Pulse 76   Ht 162.6 cm (64.02\")   Wt 87.3 kg (192 lb 6.4 oz)   SpO2 98%   BMI 33.01 kg/m²           Physical Exam  Vitals and nursing note reviewed.   Constitutional:       General: She is not in acute distress.     Appearance: Normal appearance. She is well-developed. She is obese. She is not diaphoretic.   Eyes:      Pupils: Pupils are equal, round, and reactive to light.   Neck:      Thyroid: No thyromegaly.      Vascular: No JVD. "   Cardiovascular:      Rate and Rhythm: Normal rate and regular rhythm.      Heart sounds: Murmur heard.      Comments: Regular rate and rhythm on exam today  Pulmonary:      Effort: Pulmonary effort is normal. No respiratory distress.      Breath sounds: Normal breath sounds. No wheezing or rhonchi.   Abdominal:      General: Bowel sounds are normal. There is no distension.      Palpations: Abdomen is soft.      Tenderness: There is no abdominal tenderness.   Musculoskeletal:         General: Swelling (trace pitting BLE) and tenderness (Chronic B knee pain, R>L with mod/severe avila rom) present. Normal range of motion.      Cervical back: Normal range of motion and neck supple.   Skin:     General: Skin is warm and dry.      Findings: Erythema (facial erythema/dryness of forehead and nose. left lateral nose lesion erythemic) and lesion (Left upper leg mobile, small lipoma, mild ttp with deep palpation) present. No rash.   Neurological:      General: No focal deficit present.      Mental Status: She is alert and oriented to person, place, and time. Mental status is at baseline.      Sensory: No sensory deficit.      Motor: Weakness present.      Gait: Gait abnormal (Uses walker for mobility).   Psychiatric:         Behavior: Behavior normal.         Thought Content: Thought content normal.         Judgment: Judgment normal.          Result Review :     No visits with results within 7 Day(s) from this visit.   Latest known visit with results is:   Clinical Support on 01/16/2023   Component Date Value Ref Range Status   • WBC 01/16/2023 8.08  3.40 - 10.80 10*3/mm3 Final   • RBC 01/16/2023 3.45 (L)  3.77 - 5.28 10*6/mm3 Final   • Hemoglobin 01/16/2023 10.8 (L)  12.0 - 15.9 g/dL Final   • Hematocrit 01/16/2023 33.3 (L)  34.0 - 46.6 % Final   • MCV 01/16/2023 96.5  79.0 - 97.0 fL Final   • MCH 01/16/2023 31.3  26.6 - 33.0 pg Final   • MCHC 01/16/2023 32.4  31.5 - 35.7 g/dL Final   • RDW 01/16/2023 13.5  12.3 - 15.4 %  Final   • RDW-SD 01/16/2023 46.9  37.0 - 54.0 fl Final   • MPV 01/16/2023 11.1  6.0 - 12.0 fL Final   • Platelets 01/16/2023 402  140 - 450 10*3/mm3 Final   • Glucose 01/16/2023 119 (H)  65 - 99 mg/dL Final   • BUN 01/16/2023 16  8 - 23 mg/dL Final   • Creatinine 01/16/2023 0.91  0.57 - 1.00 mg/dL Final   • Sodium 01/16/2023 144  136 - 145 mmol/L Final   • Potassium 01/16/2023 4.2  3.5 - 5.2 mmol/L Final   • Chloride 01/16/2023 105  98 - 107 mmol/L Final   • CO2 01/16/2023 25.4  22.0 - 29.0 mmol/L Final   • Calcium 01/16/2023 9.9  8.6 - 10.5 mg/dL Final   • Total Protein 01/16/2023 6.4  6.0 - 8.5 g/dL Final   • Albumin 01/16/2023 4.8  3.5 - 5.2 g/dL Final   • ALT (SGPT) 01/16/2023 14  1 - 33 U/L Final   • AST (SGOT) 01/16/2023 14  1 - 32 U/L Final   • Alkaline Phosphatase 01/16/2023 78  39 - 117 U/L Final   • Total Bilirubin 01/16/2023 0.8  0.0 - 1.2 mg/dL Final   • Globulin 01/16/2023 1.6  gm/dL Final   • A/G Ratio 01/16/2023 3.0  g/dL Final   • BUN/Creatinine Ratio 01/16/2023 17.6  7.0 - 25.0 Final   • Anion Gap 01/16/2023 13.6  5.0 - 15.0 mmol/L Final   • eGFR 01/16/2023 63.1  >60.0 mL/min/1.73 Final    National Kidney Foundation and American Society of Nephrology (ASN) Task Force recommended calculation based on the Chronic Kidney Disease Epidemiology Collaboration (CKD-EPI) equation refit without adjustment for race.   • Total Cholesterol 01/16/2023 214 (H)  0 - 200 mg/dL Final   • Triglycerides 01/16/2023 134  0 - 150 mg/dL Final   • HDL Cholesterol 01/16/2023 45  40 - 60 mg/dL Final   • LDL Cholesterol  01/16/2023 145 (H)  0 - 100 mg/dL Final   • VLDL Cholesterol 01/16/2023 24  5 - 40 mg/dL Final   • LDL/HDL Ratio 01/16/2023 3.16   Final   • Hemoglobin A1C 01/16/2023 6.3 (H)  3.5 - 5.6 % Final                  BMI is >= 30 and <35. (Class 1 Obesity). The following options were offered after discussion;: exercise counseling/recommendations and nutrition counseling/recommendations           Assessment and Plan     Diagnoses and all orders for this visit:    1. Essential hypertension (Primary)  Comments:  start lisinopril  d/c'd metop d/t low HR      2. Iron deficiency anemia, unspecified iron deficiency anemia type  Comments:  Recommend trial of iron, discussed side effects.   rec also start Colace twice daily, titrate if needed    3. Chronic constipation  Comments:  Recommend Colace two daily, may take up to 4/day if develops constipation on iron    4. Longstanding persistent atrial fibrillation (HCC)  Comments:  Continue Eliquis amiodarone-now 100mg daily  Metoprolol discontinued due to low rate  Keep follow-ups with EP    5. Primary osteoarthritis of right knee  Comments:  planning R TKR per Dr. Young  Patient has been cleared by cardiology  Cleared from my standpoint, clearance paperwork completed today and will fax to Dr. Young    6. Ataxic gait  Comments:  Continue walker  Planning right THR    7. Type 2 diabetes mellitus without complication, without long-term current use of insulin (HCC)  Comments:  A1c stable at 6.3, continue metformin XR twice daily    8. Macular degeneration of both eyes, unspecified type  Comments:  Continue with optometry as directed    9. Lipoma of left lower extremity  Comments:  No treatment indicated, will monitor    10. Rosacea  Comments:  Recommend MetroGel, pt to monitor nasal lesion, if changes will refer to derm    Other orders  -     ferrous sulfate 325 (65 FE) MG tablet; Take 1 tablet by mouth Daily With Breakfast.  Dispense: 90 tablet; Refill: 1  -     docusate sodium (Colace) 100 MG capsule; Take 2 capsules by mouth Daily.  Dispense: 180 capsule; Refill: 1  -     lisinopril (PRINIVIL,ZESTRIL) 5 MG tablet; Take 1 tablet by mouth Daily.  Dispense: 90 tablet; Refill: 1  -     metroNIDAZOLE (METROGEL) 0.75 % gel; Apply  topically to the appropriate area as directed 2 (Two) Times a Day.  Dispense: 45 g; Refill: 0        I spent 30 minutes caring for Laure Leung on this date of  service. This time includes time spent by me in the following activities: preparing for the visit, reviewing tests, performing a medically appropriate examination and/or evaluation , counseling and educating the patient/family/caregiver, ordering medications, tests, or procedures and documenting information in the medical record        Follow Up     Return in about 6 months (around 7/23/2023), or if symptoms worsen or fail to improve, for Recheck DM, HTN, R knee. Dm panel prior to appt.  Patient was given instructions and counseling regarding her condition or for health maintenance advice. Please see specific information pulled into the AVS if appropriate.        Part of this note may be an electronic transcription/translation of spoken language to printed text using the Dragon Dictation System

## 2023-02-03 ENCOUNTER — TELEPHONE (OUTPATIENT)
Dept: FAMILY MEDICINE CLINIC | Facility: CLINIC | Age: 83
End: 2023-02-03
Payer: MEDICARE

## 2023-02-03 RX ORDER — FERROUS SULFATE 325(65) MG
325 TABLET ORAL
Qty: 90 TABLET | Refills: 1 | Status: SHIPPED | OUTPATIENT
Start: 2023-02-03

## 2023-02-03 RX ORDER — DOCUSATE SODIUM 100 MG/1
200 CAPSULE, LIQUID FILLED ORAL DAILY
Qty: 180 CAPSULE | Refills: 1 | Status: SHIPPED | OUTPATIENT
Start: 2023-02-03

## 2023-02-03 NOTE — TELEPHONE ENCOUNTER
Caller: Laure Leung    Relationship: Self    Best call back number: [phone]    What is the best time to reach you: ANY TIME    Who are you requesting to speak with (clinical staff, provider,  specific staff member): CLINICAL STAFF    What was the call regarding: PATIENT REQUESTING HER docusate sodium (Colace) 100 MG capsule AND ferrous sulfate 325 (65 FE) MG tablet PRESCRIPTIONS BE TRANSFERRED TO [facility] IN [place]. MAIL ORDER CANNOT FILL    PLEASE ADVISE WHEN SENT    Do you require a callback: YES

## 2023-02-20 ENCOUNTER — OFFICE VISIT (OUTPATIENT)
Dept: CARDIOLOGY | Facility: CLINIC | Age: 83
End: 2023-02-20
Payer: MEDICARE

## 2023-02-20 VITALS
HEIGHT: 64 IN | SYSTOLIC BLOOD PRESSURE: 135 MMHG | OXYGEN SATURATION: 98 % | BODY MASS INDEX: 32.95 KG/M2 | DIASTOLIC BLOOD PRESSURE: 70 MMHG | HEART RATE: 76 BPM | WEIGHT: 193 LBS

## 2023-02-20 DIAGNOSIS — I10 PRIMARY HYPERTENSION: ICD-10-CM

## 2023-02-20 DIAGNOSIS — I48.4 ATYPICAL ATRIAL FLUTTER: Primary | ICD-10-CM

## 2023-02-20 DIAGNOSIS — I10 ESSENTIAL HYPERTENSION: ICD-10-CM

## 2023-02-20 DIAGNOSIS — R00.2 PALPITATIONS: ICD-10-CM

## 2023-02-20 PROCEDURE — 99214 OFFICE O/P EST MOD 30 MIN: CPT | Performed by: INTERNAL MEDICINE

## 2023-02-20 PROCEDURE — 93000 ELECTROCARDIOGRAM COMPLETE: CPT | Performed by: INTERNAL MEDICINE

## 2023-02-20 NOTE — PROGRESS NOTES
CC--atrial arrhythmias and history of essential hypertension    Sub  83-year-old pleasant patient came to establish practice with our EP services.  She was followed by Dr. Senior in the past.  Patient has history of atypical atrial flutter diagnosed in 2019.  She underwent ablation in June 2021 and she had a perimitral atrial flutter which was ablated with a line between the anterior mitral annulus and anterior scar in the LA.  She also had a successful ablation of cavotricuspid isthmus and she has normal sinus node and normal Purkinje conduction in the past.  She developed bradycardia and beta-blockers and amiodarone dose has been reduced in the past.  Her SEBASTIAN in the past revealed normal EF without evidence of any shunt or clot in the left atrium is severely dilated.  She has mild to moderate MR and moderate TR without pulm hypertension and mild aortic valve sclerosis in the past.  She had recurrent atrial arrhythmia in September 2022 with recurrent atypical atrial flutter with 2-1 conduction and was treated with amiodarone and cardioverted and left on medical management.        Past Medical History:   Diagnosis Date   • Atrial fibrillation (HCC)    • CHF (congestive heart failure) ([facility])    • Hypertension    • Primary osteoarthritis of both knees 5/25/2022     Past Surgical History:   Procedure Laterality Date   • CARDIAC ELECTROPHYSIOLOGY PROCEDURE N/A 6/23/2021    Procedure: Ablation atrial flutter;  Surgeon: Antonio Senior MD;  Location: [facility] INVASIVE LOCATION;  Service: Cardiovascular;  Laterality: N/A;   • CATARACT EXTRACTION           Physical Exam    General:      well developed, well nourished, in no acute distress.    Head:      normocephalic and atraumatic.    Eyes:      PERRL/EOM intact, conjunctivae and sclerae clear without nystagmus.    Neck:      no  thyromegaly, trachea central with normal respiratory effort  Lungs:      clear bilaterally to auscultation.    Heart:       regular rate and  rhythm, S1, S2 without murmurs, rubs, or gallops  Skin:      intact without lesions or rashes.    Psych:      alert and cooperative; normal mood and affect; normal attention span and concentration.       Assessment and plan    Recurrent atrial arrhythmias with prior ablation for atypical and typical atrial flutter and ablation done in 2021 with recurrence in 2022 on medical therapy  Previous EP notes extensively reviewed  Previous labs include hemoglobin of 10.8 with normal platelets and normal renal functions normal TSH  Prior echocardiography reviewed  Diabetes followed by primary care physician  Iron deficiency anemia on iron supplementation--GI referral recommended--patient wants to defer  HTN well controlled      ECG 12 Lead    Date/Time: 2/20/2023 9:50 AM  Performed by: Howard Deutsch MD  Authorized by: Howard Deutsch MD   Comparison: compared with previous ECG   Similar to previous ECG  Rhythm: sinus rhythm  Rate: normal  Conduction: 1st degree AV block              Electronically signed by Howard Deutsch MD, 02/20/23, 9:50 AM EST.

## 2023-04-10 DIAGNOSIS — I10 ESSENTIAL HYPERTENSION: ICD-10-CM

## 2023-04-10 RX ORDER — ALLOPURINOL 300 MG/1
300 TABLET ORAL DAILY
Qty: 90 TABLET | Refills: 1 | Status: SHIPPED | OUTPATIENT
Start: 2023-04-10

## 2023-04-10 RX ORDER — ALLOPURINOL 300 MG/1
TABLET ORAL
Qty: 90 TABLET | Refills: 1 | OUTPATIENT
Start: 2023-04-10

## 2023-04-10 RX ORDER — FUROSEMIDE 40 MG/1
40 TABLET ORAL DAILY
Qty: 90 TABLET | Refills: 1 | Status: SHIPPED | OUTPATIENT
Start: 2023-04-10

## 2023-04-10 NOTE — TELEPHONE ENCOUNTER
"    Caller: Laure Leung \"Addie\"    Relationship: Self    Best call back number: [phone]    Requested Prescriptions:   Requested Prescriptions     Pending Prescriptions Disp Refills   • furosemide (LASIX) 40 MG tablet 90 tablet 1     Sig: Take 1 tablet by mouth Daily.   • allopurinol (Zyloprim) 300 MG tablet 90 tablet 1     Sig: Take 1 tablet by mouth Daily.        Pharmacy where request should be sent: [facility] - [address] [phone] [facility] [phone] FX     Last office visit with prescribing clinician: 1/23/2023   Last telemedicine visit with prescribing clinician: 6/1/2023   Next office visit with prescribing clinician: 6/1/2023     Additional details provided by patient:     Does the patient have less than a 3 day supply:  [] Yes  [x] No    Would you like a call back once the refill request has been completed: [x] Yes [] No    If the office needs to give you a call back, can they leave a voicemail: [x] Yes [] No    Marilee Ny Rep   04/10/23 10:46 EDT           "

## 2023-04-16 ENCOUNTER — APPOINTMENT (OUTPATIENT)
Dept: GENERAL RADIOLOGY | Facility: HOSPITAL | Age: 83
End: 2023-04-16
Payer: MEDICARE

## 2023-04-16 ENCOUNTER — HOSPITAL ENCOUNTER (OUTPATIENT)
Facility: HOSPITAL | Age: 83
Setting detail: OBSERVATION
Discharge: HOME | End: 2023-04-18
Attending: EMERGENCY MEDICINE | Admitting: INTERNAL MEDICINE
Payer: MEDICARE

## 2023-04-16 DIAGNOSIS — I48.91 ATRIAL FIBRILLATION WITH RVR: Primary | ICD-10-CM

## 2023-04-16 LAB
ALBUMIN SERPL-MCNC: 4.4 G/DL (ref 3.5–5.2)
ALBUMIN/GLOB SERPL: 2.2 G/DL
ALP SERPL-CCNC: 69 U/L (ref 39–117)
ALT SERPL W P-5'-P-CCNC: 12 U/L (ref 1–33)
ANION GAP SERPL CALCULATED.3IONS-SCNC: 10 MMOL/L (ref 5–15)
APTT PPP: 27.4 SECONDS (ref 61–76.5)
AST SERPL-CCNC: 13 U/L (ref 1–32)
BASOPHILS # BLD AUTO: 0.1 10*3/MM3 (ref 0–0.2)
BASOPHILS NFR BLD AUTO: 1.3 % (ref 0–1.5)
BILIRUB SERPL-MCNC: 0.8 MG/DL (ref 0–1.2)
BUN SERPL-MCNC: 38 MG/DL (ref 8–23)
BUN/CREAT SERPL: 39.2 (ref 7–25)
CALCIUM SPEC-SCNC: 10 MG/DL (ref 8.6–10.5)
CHLORIDE SERPL-SCNC: 108 MMOL/L (ref 98–107)
CO2 SERPL-SCNC: 24 MMOL/L (ref 22–29)
CREAT SERPL-MCNC: 0.97 MG/DL (ref 0.57–1)
DEPRECATED RDW RBC AUTO: 56.9 FL (ref 37–54)
EGFRCR SERPLBLD CKD-EPI 2021: 58.5 ML/MIN/1.73
EOSINOPHIL # BLD AUTO: 0.1 10*3/MM3 (ref 0–0.4)
EOSINOPHIL NFR BLD AUTO: 1 % (ref 0.3–6.2)
ERYTHROCYTE [DISTWIDTH] IN BLOOD BY AUTOMATED COUNT: 16 % (ref 12.3–15.4)
GEN 5 2HR TROPONIN T REFLEX: 24 NG/L
GLOBULIN UR ELPH-MCNC: 2 GM/DL
GLUCOSE BLDC GLUCOMTR-MCNC: 113 MG/DL (ref 70–105)
GLUCOSE BLDC GLUCOMTR-MCNC: 232 MG/DL (ref 70–105)
GLUCOSE SERPL-MCNC: 130 MG/DL (ref 65–99)
HCT VFR BLD AUTO: 32 % (ref 34–46.6)
HGB BLD-MCNC: 10.9 G/DL (ref 12–15.9)
INR PPP: 1.05 (ref 0.93–1.1)
LYMPHOCYTES # BLD AUTO: 1.6 10*3/MM3 (ref 0.7–3.1)
LYMPHOCYTES NFR BLD AUTO: 17.3 % (ref 19.6–45.3)
MAGNESIUM SERPL-MCNC: 2 MG/DL (ref 1.6–2.4)
MCH RBC QN AUTO: 32.7 PG (ref 26.6–33)
MCHC RBC AUTO-ENTMCNC: 34 G/DL (ref 31.5–35.7)
MCV RBC AUTO: 96.2 FL (ref 79–97)
MONOCYTES # BLD AUTO: 1 10*3/MM3 (ref 0.1–0.9)
MONOCYTES NFR BLD AUTO: 10.9 % (ref 5–12)
NEUTROPHILS NFR BLD AUTO: 6.3 10*3/MM3 (ref 1.7–7)
NEUTROPHILS NFR BLD AUTO: 69.5 % (ref 42.7–76)
NRBC BLD AUTO-RTO: 0.2 /100 WBC (ref 0–0.2)
NT-PROBNP SERPL-MCNC: 3856 PG/ML (ref 0–1800)
PLATELET # BLD AUTO: 363 10*3/MM3 (ref 140–450)
PMV BLD AUTO: 8.5 FL (ref 6–12)
POTASSIUM SERPL-SCNC: 5.1 MMOL/L (ref 3.5–5.2)
PROT SERPL-MCNC: 6.4 G/DL (ref 6–8.5)
PROTHROMBIN TIME: 10.8 SECONDS (ref 9.6–11.7)
RBC # BLD AUTO: 3.33 10*6/MM3 (ref 3.77–5.28)
SODIUM SERPL-SCNC: 142 MMOL/L (ref 136–145)
TROPONIN T DELTA: -1 NG/L
TROPONIN T SERPL HS-MCNC: 25 NG/L
TSH SERPL DL<=0.05 MIU/L-ACNC: 0.66 UIU/ML (ref 0.27–4.2)
WBC NRBC COR # BLD: 9.1 10*3/MM3 (ref 3.4–10.8)

## 2023-04-16 PROCEDURE — G0378 HOSPITAL OBSERVATION PER HR: HCPCS

## 2023-04-16 PROCEDURE — 96366 THER/PROPH/DIAG IV INF ADDON: CPT

## 2023-04-16 PROCEDURE — 99285 EMERGENCY DEPT VISIT HI MDM: CPT

## 2023-04-16 PROCEDURE — 25010000002 AMIODARONE IN DEXTROSE 5% 150-4.21 MG/100ML-% SOLUTION: Performed by: EMERGENCY MEDICINE

## 2023-04-16 PROCEDURE — 63710000001 INSULIN LISPRO (HUMAN) PER 5 UNITS: Performed by: INTERNAL MEDICINE

## 2023-04-16 PROCEDURE — 96365 THER/PROPH/DIAG IV INF INIT: CPT

## 2023-04-16 PROCEDURE — 93005 ELECTROCARDIOGRAM TRACING: CPT

## 2023-04-16 PROCEDURE — 83735 ASSAY OF MAGNESIUM: CPT | Performed by: EMERGENCY MEDICINE

## 2023-04-16 PROCEDURE — 80053 COMPREHEN METABOLIC PANEL: CPT | Performed by: EMERGENCY MEDICINE

## 2023-04-16 PROCEDURE — 84443 ASSAY THYROID STIM HORMONE: CPT | Performed by: EMERGENCY MEDICINE

## 2023-04-16 PROCEDURE — 71045 X-RAY EXAM CHEST 1 VIEW: CPT

## 2023-04-16 PROCEDURE — 25010000002 FUROSEMIDE PER 20 MG: Performed by: INTERNAL MEDICINE

## 2023-04-16 PROCEDURE — 25010000002 AMIODARONE IN DEXTROSE 5% 360-4.14 MG/200ML-% SOLUTION: Performed by: EMERGENCY MEDICINE

## 2023-04-16 PROCEDURE — 85025 COMPLETE CBC W/AUTO DIFF WBC: CPT | Performed by: EMERGENCY MEDICINE

## 2023-04-16 PROCEDURE — 84484 ASSAY OF TROPONIN QUANT: CPT | Performed by: EMERGENCY MEDICINE

## 2023-04-16 PROCEDURE — 85730 THROMBOPLASTIN TIME PARTIAL: CPT | Performed by: EMERGENCY MEDICINE

## 2023-04-16 PROCEDURE — 93005 ELECTROCARDIOGRAM TRACING: CPT | Performed by: EMERGENCY MEDICINE

## 2023-04-16 PROCEDURE — 83880 ASSAY OF NATRIURETIC PEPTIDE: CPT | Performed by: EMERGENCY MEDICINE

## 2023-04-16 PROCEDURE — 85610 PROTHROMBIN TIME: CPT | Performed by: EMERGENCY MEDICINE

## 2023-04-16 PROCEDURE — 96376 TX/PRO/DX INJ SAME DRUG ADON: CPT

## 2023-04-16 PROCEDURE — 82962 GLUCOSE BLOOD TEST: CPT

## 2023-04-16 PROCEDURE — 96375 TX/PRO/DX INJ NEW DRUG ADDON: CPT

## 2023-04-16 RX ORDER — POLYETHYLENE GLYCOL 3350 17 G/17G
17 POWDER, FOR SOLUTION ORAL DAILY PRN
Status: DISCONTINUED | OUTPATIENT
Start: 2023-04-16 | End: 2023-04-18 | Stop reason: HOSPADM

## 2023-04-16 RX ORDER — SODIUM CHLORIDE 0.9 % (FLUSH) 0.9 %
10 SYRINGE (ML) INJECTION EVERY 12 HOURS SCHEDULED
Status: DISCONTINUED | OUTPATIENT
Start: 2023-04-16 | End: 2023-04-18 | Stop reason: HOSPADM

## 2023-04-16 RX ORDER — FUROSEMIDE 40 MG/1
40 TABLET ORAL DAILY
Status: DISCONTINUED | OUTPATIENT
Start: 2023-04-16 | End: 2023-04-16

## 2023-04-16 RX ORDER — ACETAMINOPHEN 325 MG/1
650 TABLET ORAL EVERY 4 HOURS PRN
Status: DISCONTINUED | OUTPATIENT
Start: 2023-04-16 | End: 2023-04-18 | Stop reason: HOSPADM

## 2023-04-16 RX ORDER — CHOLECALCIFEROL (VITAMIN D3) 125 MCG
10 CAPSULE ORAL NIGHTLY
Status: DISCONTINUED | OUTPATIENT
Start: 2023-04-16 | End: 2023-04-18 | Stop reason: HOSPADM

## 2023-04-16 RX ORDER — SODIUM CHLORIDE 0.9 % (FLUSH) 0.9 %
10 SYRINGE (ML) INJECTION AS NEEDED
Status: DISCONTINUED | OUTPATIENT
Start: 2023-04-16 | End: 2023-04-18 | Stop reason: HOSPADM

## 2023-04-16 RX ORDER — INSULIN LISPRO 100 [IU]/ML
2-9 INJECTION, SOLUTION INTRAVENOUS; SUBCUTANEOUS
Status: DISCONTINUED | OUTPATIENT
Start: 2023-04-16 | End: 2023-04-18 | Stop reason: HOSPADM

## 2023-04-16 RX ORDER — BISACODYL 10 MG
10 SUPPOSITORY, RECTAL RECTAL DAILY PRN
Status: DISCONTINUED | OUTPATIENT
Start: 2023-04-16 | End: 2023-04-18 | Stop reason: HOSPADM

## 2023-04-16 RX ORDER — ONDANSETRON 2 MG/ML
4 INJECTION INTRAMUSCULAR; INTRAVENOUS EVERY 6 HOURS PRN
Status: DISCONTINUED | OUTPATIENT
Start: 2023-04-16 | End: 2023-04-18 | Stop reason: HOSPADM

## 2023-04-16 RX ORDER — NICOTINE POLACRILEX 4 MG
15 LOZENGE BUCCAL
Status: DISCONTINUED | OUTPATIENT
Start: 2023-04-16 | End: 2023-04-18 | Stop reason: HOSPADM

## 2023-04-16 RX ORDER — BISACODYL 5 MG/1
5 TABLET, DELAYED RELEASE ORAL DAILY PRN
Status: DISCONTINUED | OUTPATIENT
Start: 2023-04-16 | End: 2023-04-18 | Stop reason: HOSPADM

## 2023-04-16 RX ORDER — DOCUSATE SODIUM 100 MG/1
200 CAPSULE, LIQUID FILLED ORAL DAILY
Status: DISCONTINUED | OUTPATIENT
Start: 2023-04-16 | End: 2023-04-18 | Stop reason: HOSPADM

## 2023-04-16 RX ORDER — ENOXAPARIN SODIUM 100 MG/ML
1 INJECTION SUBCUTANEOUS EVERY 12 HOURS
Status: DISCONTINUED | OUTPATIENT
Start: 2023-04-16 | End: 2023-04-16

## 2023-04-16 RX ORDER — ALLOPURINOL 300 MG/1
300 TABLET ORAL DAILY
Status: DISCONTINUED | OUTPATIENT
Start: 2023-04-16 | End: 2023-04-18 | Stop reason: HOSPADM

## 2023-04-16 RX ORDER — AMOXICILLIN 250 MG
2 CAPSULE ORAL 2 TIMES DAILY
Status: DISCONTINUED | OUTPATIENT
Start: 2023-04-16 | End: 2023-04-18 | Stop reason: HOSPADM

## 2023-04-16 RX ORDER — MIDODRINE HYDROCHLORIDE 5 MG/1
5 TABLET ORAL
Status: DISCONTINUED | OUTPATIENT
Start: 2023-04-16 | End: 2023-04-18 | Stop reason: HOSPADM

## 2023-04-16 RX ORDER — LISINOPRIL 5 MG/1
5 TABLET ORAL DAILY
Status: DISCONTINUED | OUTPATIENT
Start: 2023-04-16 | End: 2023-04-16

## 2023-04-16 RX ORDER — ONDANSETRON 4 MG/1
4 TABLET, FILM COATED ORAL EVERY 6 HOURS PRN
Status: DISCONTINUED | OUTPATIENT
Start: 2023-04-16 | End: 2023-04-18 | Stop reason: HOSPADM

## 2023-04-16 RX ORDER — DILTIAZEM HCL/D5W 125 MG/125
5-15 PLASTIC BAG, INJECTION (ML) INTRAVENOUS
Status: DISCONTINUED | OUTPATIENT
Start: 2023-04-16 | End: 2023-04-16

## 2023-04-16 RX ORDER — FUROSEMIDE 10 MG/ML
20 INJECTION INTRAMUSCULAR; INTRAVENOUS EVERY 12 HOURS
Status: DISCONTINUED | OUTPATIENT
Start: 2023-04-16 | End: 2023-04-18 | Stop reason: HOSPADM

## 2023-04-16 RX ORDER — NITROGLYCERIN 0.4 MG/1
0.4 TABLET SUBLINGUAL
Status: DISCONTINUED | OUTPATIENT
Start: 2023-04-16 | End: 2023-04-18 | Stop reason: HOSPADM

## 2023-04-16 RX ORDER — DEXTROSE MONOHYDRATE 25 G/50ML
25 INJECTION, SOLUTION INTRAVENOUS
Status: DISCONTINUED | OUTPATIENT
Start: 2023-04-16 | End: 2023-04-18 | Stop reason: HOSPADM

## 2023-04-16 RX ORDER — IBUPROFEN 600 MG/1
1 TABLET ORAL
Status: DISCONTINUED | OUTPATIENT
Start: 2023-04-16 | End: 2023-04-18 | Stop reason: HOSPADM

## 2023-04-16 RX ORDER — SODIUM CHLORIDE 9 MG/ML
40 INJECTION, SOLUTION INTRAVENOUS AS NEEDED
Status: DISCONTINUED | OUTPATIENT
Start: 2023-04-16 | End: 2023-04-18 | Stop reason: HOSPADM

## 2023-04-16 RX ADMIN — AMIODARONE HYDROCHLORIDE 1 MG/MIN: 1.8 INJECTION, SOLUTION INTRAVENOUS at 11:51

## 2023-04-16 RX ADMIN — AMIODARONE HYDROCHLORIDE 150 MG: 1.5 INJECTION, SOLUTION INTRAVENOUS at 11:25

## 2023-04-16 RX ADMIN — MIDODRINE HYDROCHLORIDE 5 MG: 5 TABLET ORAL at 14:15

## 2023-04-16 RX ADMIN — APIXABAN 5 MG: 5 TABLET, FILM COATED ORAL at 14:15

## 2023-04-16 RX ADMIN — Medication 10 MG: at 22:03

## 2023-04-16 RX ADMIN — INSULIN LISPRO 4 UNITS: 100 INJECTION, SOLUTION INTRAVENOUS; SUBCUTANEOUS at 16:12

## 2023-04-16 RX ADMIN — AMIODARONE HYDROCHLORIDE 0.5 MG/MIN: 1.8 INJECTION, SOLUTION INTRAVENOUS at 17:52

## 2023-04-16 RX ADMIN — APIXABAN 5 MG: 5 TABLET, FILM COATED ORAL at 22:03

## 2023-04-16 RX ADMIN — ALLOPURINOL 300 MG: 300 TABLET ORAL at 14:15

## 2023-04-16 RX ADMIN — FUROSEMIDE 20 MG: 10 INJECTION, SOLUTION INTRAMUSCULAR; INTRAVENOUS at 14:16

## 2023-04-16 NOTE — Clinical Note
Level of Care: Progressive Care [20]   Admitting Physician: AISLINN STOVALL [[id number]]   Attending Physician: AISLINN STOVALL [[id number]]   Bed Request Comments: pcu

## 2023-04-16 NOTE — CONSULTS
Cardiology consult note  Ketan Alonso MD, PhD    Primary cardiologist Dr. Deutsch      Patient Care Team:  Sarita Esparza APRN as PCP - General (Nurse Practitioner)  Antonio Senior MD as Consulting Physician (Cardiac Electrophysiology)  Napoleon Estrada MD as Surgeon (Orthopedic Surgery)    CHIEF COMPLAINT: A-fib RVR    HISTORY OF PRESENT ILLNESS:    I the pleasure to see this 83-year-old female seen in the hospital with A-fib RVR, on my encounter she is on amiodarone drip and is converted to sinus rhythm with atrial bigeminy, this is intermixed with sinus rhythm without bigeminy with restoration of normal rhythm pharmacologically.  She has a history of paroxysmal A-fib and is on Eliquis at home for anticoagulation.  He denies any volume overload chest pain or shortness of breath but felt extreme palpitations when she started to doing some activity noting her heart rate was in the 170s, this has been going on a few days waxing and waning likely paroxysmal in nature and she presented to the hospital ultimately having amiodarone drip started for control.  He denies any fevers chill sweats nausea vomiting or diarrhea.  No anginal chest pain    Review of systems otherwise negative x14 point review of systems except as mentioned above    Historical data copied forward from previous encounters in EMR is unchanged    EKG at bedside on my encounter demonstrates sinus rhythm with normal conduction, admission demonstrates A-fib RVR    Past Medical History:   Diagnosis Date   • Atrial fibrillation    • CHF (congestive heart failure)    • Hypertension    • Primary osteoarthritis of both knees 5/25/2022     Past Surgical History:   Procedure Laterality Date   • CARDIAC ELECTROPHYSIOLOGY PROCEDURE N/A 6/23/2021    Procedure: Ablation atrial flutter;  Surgeon: Antonio Senior MD;  Location: [facility] INVASIVE LOCATION;  Service: Cardiovascular;  Laterality: N/A;   • CATARACT EXTRACTION       Family History  "  Problem Relation Age of Onset   • No Known Problems Mother    • No Known Problems Father    • Atrial fibrillation Sister    • Diabetes Sister    • No Known Problems Maternal Aunt    • No Known Problems Maternal Uncle    • No Known Problems Paternal Aunt    • No Known Problems Paternal Uncle    • No Known Problems Maternal Grandmother    • No Known Problems Maternal Grandfather    • No Known Problems Paternal Grandmother    • No Known Problems Paternal Grandfather    • Heart attack Neg Hx    • Heart disease Neg Hx    • Heart failure Neg Hx    • Hyperlipidemia Neg Hx    • Hypertension Neg Hx      Social History     Tobacco Use   • Smoking status: Former     Packs/day: 0.50     Years: 12.00     Pack years: 6.00     Types: Cigarettes     Start date:      Quit date:      Years since quittin.3   • Smokeless tobacco: Never   Vaping Use   • Vaping Use: Never used   Substance Use Topics   • Alcohol use: No   • Drug use: Never     (Not in a hospital admission)    Allergies:  Patient has no known allergies.    REVIEW OF SYSTEMS:  Please see the above history of present illness for pertinent positives and negatives.  The remainder of the patient's systems have been reviewed and are negative.     Vital Signs  Temp:  [98 °F (36.7 °C)] 98 °F (36.7 °C)  Heart Rate:  [] 99  Resp:  [16-20] 18  BP: ()/(36-69) 122/36    Flowsheet Rows    Flowsheet Row First Filed Value   Admission Height 162.6 cm (64\") Documented at 2023 1032   Admission Weight 87.5 kg (193 lb) Documented at 2023 1032           Physical Exam:  Physical Exam   Constitutional: Patient appears well-developed and well-nourished and in no acute distress   HEENT:   Head: Normocephalic and atraumatic.   Eyes:  Pupils are equal, round, and reactive to light. EOM are intact. Sclerae are anicteric and noninjected.  Mouth and Throat: Patient has moist mucous membranes. Oropharynx is clear of any erythema or exudate.     Neck: Neck supple. No " JVD present. No thyromegaly present. No lymphadenopathy present.  Cardiovascular: Regular rate, regular rhythm, S1 normal and S2 normal.  Exam reveals no gallop and no friction rub.  No murmur heard.  Pulmonary/Chest: Lungs are clear to auscultation bilaterally. No respiratory distress. No wheezes. No rhonchi. No rales.   Abdominal: Soft. Bowel sounds are normal. No distension and no mass. There is no hepatosplenomegaly. There is no tenderness.   Musculoskeletal: Normal muscle tone  Extremities: No edema. Pulses are palpable in all 4 extremities.  Neurological: Patient is alert and oriented to person, place, and time. Cranial nerves II-XII are grossly intact with no focal deficits.  Skin: Skin is warm. No rash noted. Nails show no clubbing.  No cyanosis or erythema.     Results Review:    I reviewed the patient's new clinical results.  Lab Results (most recent)     Procedure Component Value Units Date/Time    High Sensitivity Troponin T 2Hr [[id number]]  (Abnormal) Collected: 04/16/23 1314    Specimen: Blood from Arm, Left Updated: 04/16/23 1339     HS Troponin T 24 ng/L      Troponin T Delta -1 ng/L     Narrative:      High Sensitive Troponin T Reference Range:  <10.0 ng/L- Negative Female for AMI  <15.0 ng/L- Negative Male for AMI  >=10 - Abnormal Female indicating possible myocardial injury.  >=15 - Abnormal Male indicating possible myocardial injury.   Clinicians would have to utilize clinical acumen, EKG, Troponin, and serial changes to determine if it is an Acute Myocardial Infarction or myocardial injury due to an underlying chronic condition.         High Sensitivity Troponin T [[id number]]  (Abnormal) Collected: 04/16/23 1055    Specimen: Blood Updated: 04/16/23 1138     HS Troponin T 25 ng/L     Narrative:      High Sensitive Troponin T Reference Range:  <10.0 ng/L- Negative Female for AMI  <15.0 ng/L- Negative Male for AMI  >=10 - Abnormal Female indicating possible myocardial injury.  >=15 - Abnormal Male  indicating possible myocardial injury.   Clinicians would have to utilize clinical acumen, EKG, Troponin, and serial changes to determine if it is an Acute Myocardial Infarction or myocardial injury due to an underlying chronic condition.         TSH [[id number]]  (Normal) Collected: 04/16/23 1055    Specimen: Blood Updated: 04/16/23 1138     TSH 0.661 uIU/mL     BNP [[id number]]  (Abnormal) Collected: 04/16/23 1055    Specimen: Blood Updated: 04/16/23 1138     proBNP 3,856.0 pg/mL     Narrative:      Among patients with dyspnea, NT-proBNP is highly sensitive for the detection of acute congestive heart failure. In addition NT-proBNP of <300 pg/ml effectively rules out acute congestive heart failure with 99% negative predictive value.    Results may be falsely decreased if patient taking Biotin.      Comprehensive Metabolic Panel [[id number]]  (Abnormal) Collected: 04/16/23 1055    Specimen: Blood Updated: 04/16/23 1131     Glucose 130 mg/dL      BUN 38 mg/dL      Creatinine 0.97 mg/dL      Sodium 142 mmol/L      Potassium 5.1 mmol/L      Chloride 108 mmol/L      CO2 24.0 mmol/L      Calcium 10.0 mg/dL      Total Protein 6.4 g/dL      Albumin 4.4 g/dL      ALT (SGPT) 12 U/L      AST (SGOT) 13 U/L      Alkaline Phosphatase 69 U/L      Total Bilirubin 0.8 mg/dL      Globulin 2.0 gm/dL      A/G Ratio 2.2 g/dL      BUN/Creatinine Ratio 39.2     Anion Gap 10.0 mmol/L      eGFR 58.5 mL/min/1.73     Narrative:      GFR Normal >60  Chronic Kidney Disease <60  Kidney Failure <15    The GFR formula is only valid for adults with stable renal function between ages 18 and 70.    Magnesium [[id number]]  (Normal) Collected: 04/16/23 1055    Specimen: Blood Updated: 04/16/23 1131     Magnesium 2.0 mg/dL     Protime-INR [[id number]]  (Normal) Collected: 04/16/23 1055    Specimen: Blood Updated: 04/16/23 1115     Protime 10.8 Seconds      INR 1.05    aPTT [[id number]]  (Abnormal) Collected: 04/16/23 1055    Specimen: Blood Updated: 04/16/23  1115     PTT 27.4 seconds     CBC & Differential [[id number]]  (Abnormal) Collected: 04/16/23 1055    Specimen: Blood Updated: 04/16/23 1105    Narrative:      The following orders were created for panel order CBC & Differential.  Procedure                               Abnormality         Status                     ---------                               -----------         ------                     CBC Auto Differential[[id number]]        Abnormal            Final result                 Please view results for these tests on the individual orders.    CBC Auto Differential [[id number]]  (Abnormal) Collected: 04/16/23 1055    Specimen: Blood Updated: 04/16/23 1105     WBC 9.10 10*3/mm3      RBC 3.33 10*6/mm3      Hemoglobin 10.9 g/dL      Hematocrit 32.0 %      MCV 96.2 fL      MCH 32.7 pg      MCHC 34.0 g/dL      RDW 16.0 %      RDW-SD 56.9 fl      MPV 8.5 fL      Platelets 363 10*3/mm3      Neutrophil % 69.5 %      Lymphocyte % 17.3 %      Monocyte % 10.9 %      Eosinophil % 1.0 %      Basophil % 1.3 %      Neutrophils, Absolute 6.30 10*3/mm3      Lymphocytes, Absolute 1.60 10*3/mm3      Monocytes, Absolute 1.00 10*3/mm3      Eosinophils, Absolute 0.10 10*3/mm3      Basophils, Absolute 0.10 10*3/mm3      nRBC 0.2 /100 WBC           Imaging Results (Most Recent)     Procedure Component Value Units Date/Time    XR Chest 1 View [[id number]] Collected: 04/16/23 1118     Updated: 04/16/23 1126    Narrative:      XR CHEST 1 VW    Date of Exam: 4/16/2023 11:09 AM EDT    Indication: palpitations.    Comparison: September 20, 2022    Findings:  There are no airspace consolidations. No pleural fluid. No pneumothorax. The pulmonary vasculature appears within normal limits. The cardiac and mediastinal silhouette appear unremarkable. No acute osseous abnormality identified.      Impression:      Impression:  No active disease    Electronically Signed: Jaxson Alvarez    4/16/2023 11:24 AM EDT    Workstation ID: [id number]         reviewed    ECG/EMG Results (most recent)     Procedure Component Value Units Date/Time    ECG 12 Lead Tachycardia [[id number]] Collected: 04/16/23 1037     Updated: 04/16/23 1039     QT Interval 342 ms     Narrative:      HEART RATE= 119  bpm  RR Interval= 509  ms  DC Interval=   ms  P Horizontal Axis=   deg  P Front Axis=   deg  QRSD Interval= 89  ms  QT Interval= 342  ms  QRS Axis= -25  deg  T Wave Axis= 97  deg  - ABNORMAL ECG -  Atrial flutter with predominant 3:1 AV block  Nonspecific T abnormalities, lateral leads  Electronically Signed By:   Date and Time of Study: 2023-04-16 10:37:45        reviewed    Assessment & Plan     Paroxysmal A-fib RVR  Successful conversion to sinus rhythm on amiodarone drip, continue drip with de-escalating fashion, inserted this into 300 mg p.o. twice daily tomorrow  Continue Eliquis twice daily  We will discuss with primary cardiologist on whether to continue amiodarone or transition to alternative agent such as Tikosyn or sotalol  She has severe left atrial enlargement by prior transesophageal echo, may need ablation if continues to recur  Has atypical flutter historically, no recurrence today  Keep potassium greater than 4 magnesium greater than 2    Mild valvular heart disease, stable moderate TR, mild aortic valve sclerosis    Perioperative evaluation for knee surgery, no need for ischemic evaluation, provided she is in sinus rhythm she can continue with amiodarone perioperatively, hold Eliquis a few days prior to the procedure and be changed to alternative antiarrhythmic for my assessment but would defer to primary cardiologist      To be seen by Dr. Deutsch in the morning    I discussed the patient's findings and my recommendations with patient and staff    Ketan Alonso MD  04/16/23  14:39 EDT

## 2023-04-16 NOTE — H&P
[facility] Medicine Services      Patient Name: Laure Leung  : 1940  MRN: [id number]  Primary Care Physician:  Sarita Esparza APRN  Date of admission: 2023      Subjective      Chief Complaint: Palpitations shortness of breath    History of Present Illness: Laure Leung is a 83 y.o. female who presented to [facility] on 2023 complaining of palpitations and shortness of breath x1 day.  Nice 83-year-old lady who sees Dr. Deutsch outpatient for atrial fibrillation presents to the hospital with complaints of shortness of breath and palpitations.  Patient came to the ER for evaluation and was found to be in A-fib with RVR.  She was placed on amiodarone drip per cardiology instructions and converted to sinus rhythm during my evaluation.  She was admitted for evaluation by Dr. Deutsch in the morning.  Patient denies any chest pain or shortness of breath during my eval.  No dysuria abdominal pain diarrhea or constipation.      ROS negative except as above    Personal History     Past Medical History:   Diagnosis Date   • Atrial fibrillation    • CHF (congestive heart failure)    • Hypertension    • Primary osteoarthritis of both knees 2022       Past Surgical History:   Procedure Laterality Date   • CARDIAC ELECTROPHYSIOLOGY PROCEDURE N/A 2021    Procedure: Ablation atrial flutter;  Surgeon: Antonio Senior MD;  Location: [facility] INVASIVE LOCATION;  Service: Cardiovascular;  Laterality: N/A;   • CATARACT EXTRACTION         Family History: family history includes Atrial fibrillation in her sister; Diabetes in her sister; No Known Problems in her father, maternal aunt, maternal grandfather, maternal grandmother, maternal uncle, mother, paternal aunt, paternal grandfather, paternal grandmother, and paternal uncle. Otherwise pertinent FHx was reviewed and not pertinent to current issue.    Social History:  reports that she quit smoking about 50  years ago. Her smoking use included cigarettes. She started smoking about 62 years ago. She has a 6.00 pack-year smoking history. She has never used smokeless tobacco. She reports that she does not drink alcohol and does not use drugs.    Home Medications:  Prior to Admission Medications     Prescriptions Last Dose Informant Patient Reported? Taking?    acetaminophen (TYLENOL) 650 MG 8 hr tablet   Yes No    Take 650 mg by mouth Every 8 (Eight) Hours As Needed for Mild Pain .    allopurinol (Zyloprim) 300 MG tablet   No No    Take 1 tablet by mouth Daily.    amiodarone (PACERONE) 200 MG tablet   Yes No    Take 0.5 tablets by mouth Daily.    apixaban (ELIQUIS) 5 MG tablet tablet   No No    Take 1 tablet by mouth Every 12 (Twelve) Hours.    docusate sodium (Colace) 100 MG capsule   No No    Take 2 capsules by mouth Daily.    ferrous sulfate 325 (65 FE) MG tablet   No No    Take 1 tablet by mouth Daily With Breakfast.    furosemide (LASIX) 40 MG tablet   No No    Take 1 tablet by mouth Daily.    gabapentin (NEURONTIN) 100 MG capsule   No No    TAKE 1 CAPSULE AT NIGHT AS NEEDED (PAIN)    Glucosamine-Chondroit-Vit C-Mn (GLUCOSAMINE CHONDR 1500 COMPLX PO)   Yes No    Take 2 tablets by mouth Daily.    glucose blood (OneTouch Verio) test strip   No No    1 each by Other route Daily. Use as instructed  Dx code: E11.65    Lancets (OneTouch Delica Plus [id number]) misc   No No    1 each Daily. Dx code: E11.65    lisinopril (PRINIVIL,ZESTRIL) 5 MG tablet   No No    Take 1 tablet by mouth Daily.    Melatonin 10 MG tablet   Yes No    Take 1 tablet by mouth Every Night.    metFORMIN ER (Glucophage XR) 500 MG 24 hr tablet   No No    Take 1 tablet by mouth 2 (Two) Times a Day.    metroNIDAZOLE (METROGEL) 0.75 % gel   No No    Apply  topically to the appropriate area as directed 2 (Two) Times a Day.    [facility] Natural Products (BLOOD SUGAR BALANCE PO)   Yes No    Take 2 tablets by mouth Daily.    multivitamin with minerals tablet tablet    Yes No    Take 1 tablet by mouth Daily. Liver support, otc    potassium chloride 10 MEQ CR tablet   No No    Take 1 tablet by mouth 2 (Two) Times a Day.    Probiotic Product (PROBIOTIC PO)  Self Yes No    Take 2 each by mouth 2 (Two) Times a Day.    VITAMIN D, CHOLECALCIFEROL, PO   Yes No    Take 5,000 Units by mouth Daily.            Allergies:  No Known Allergies    Objective      Vitals:   Temp:  [98 °F (36.7 °C)] 98 °F (36.7 °C)  Heart Rate:  [] 100  Resp:  [16-20] 18  BP: ()/(39-69) 112/39    Physical Exam  Vitals reviewed.   Constitutional:       Comments: Nice lady no distress  Receiving IV amiodarone drip  Nurse at bedside   HENT:      Head: Normocephalic.      Nose: Nose normal.      Mouth/Throat:      Mouth: Mucous membranes are moist.   Cardiovascular:      Rate and Rhythm: Normal rate and regular rhythm.      Pulses: Normal pulses.      Heart sounds: Normal heart sounds.   Pulmonary:      Effort: Pulmonary effort is normal.      Breath sounds: Normal breath sounds.   Abdominal:      General: Abdomen is flat.      Palpations: Abdomen is soft.   Musculoskeletal:         General: Normal range of motion.      Cervical back: Normal range of motion.      Right lower leg: Edema present.      Left lower leg: Edema present.   Skin:     General: Skin is warm.   Neurological:      General: No focal deficit present.      Mental Status: She is alert and oriented to person, place, and time.   Psychiatric:         Mood and Affect: Mood normal.         Behavior: Behavior normal.          Result Review    Result Review:  I have personally reviewed the results from the time of this admission to 4/16/2023 13:24 EDT and agree with these findings:  [x]  Laboratory  []  Microbiology  []  Radiology  []  EKG/Telemetry   []  Cardiology/Vascular   []  Pathology  []  Old records  []  Other:  Most notable findings include: Mildly elevated troponin elevated proBNP      Assessment & Plan    83-year-old female with A-fib  with RVR    Active Hospital Problems:  There are no active hospital problems to display for this patient.    Plan:   Acute A-fib with RVR  Amiodarone drip ordered  Follow protocol  Follow-up cardiology recommendations  Anticoagulation ordered    Chronic diastolic CHF without exacerbation  Resume Lasix  Resume beta-blocker    Type 2 diabetes  Diabetic diet  Sliding scale ordered    History of gout  Allopurinol resume    Insomnia  Melatonin resumed    Borderline hypotension  Midodrine ordered        DVT prophylaxis:  Full anticoagulation with Eliquis for A-fib    CODE STATUS:       Admission Status:  I believe this patient meets observation status.    I discussed the patient's findings and my recommendations with patient.    This patient has been examined wearing appropriate Personal Protective Equipment and discussed with Patient. 04/16/23      Signature: Reji Howe MD

## 2023-04-16 NOTE — ED PROVIDER NOTES
"Subjective   History of Present Illness  Chief complaint: Patient is a pleasant 83-year-old female.  She has a history of a flutter and A-fib with RVR.  She sees Dr. Deutsch.  She states since Friday she has been in A-fib.  She felt it started acutely while she was doing some exertional activity in the yard.  She states that was as high as 170's today with her heart rate.  She states her \"heart pill was cut in half\" last fall.  She states she took a whole 1 today because of the persisting tachycardia.  She states it was decreased because her heart rate was going down to low.  She does not have any chest pain or shortness of breath.  She has taken her blood thinner as she is supposed to.  However she has not taken it yet today.    Context:    Duration:    Timing:    Severity:    Associated Symptoms:        PCP:  LMP:        Review of Systems   Constitutional: Positive for fatigue.   Respiratory: Negative for shortness of breath.    Cardiovascular: Positive for palpitations. Negative for chest pain.   Gastrointestinal: Negative for abdominal pain.   Genitourinary: Negative.    Musculoskeletal: Negative.        Past Medical History:   Diagnosis Date   • Atrial fibrillation    • CHF (congestive heart failure)    • Hypertension    • Primary osteoarthritis of both knees 5/25/2022       No Known Allergies    Past Surgical History:   Procedure Laterality Date   • CARDIAC ELECTROPHYSIOLOGY PROCEDURE N/A 6/23/2021    Procedure: Ablation atrial flutter;  Surgeon: Antonio Senior MD;  Location: [facility] INVASIVE LOCATION;  Service: Cardiovascular;  Laterality: N/A;   • CATARACT EXTRACTION         Family History   Problem Relation Age of Onset   • No Known Problems Mother    • No Known Problems Father    • Atrial fibrillation Sister    • Diabetes Sister    • No Known Problems Maternal Aunt    • No Known Problems Maternal Uncle    • No Known Problems Paternal Aunt    • No Known Problems Paternal Uncle    • No Known " Problems Maternal Grandmother    • No Known Problems Maternal Grandfather    • No Known Problems Paternal Grandmother    • No Known Problems Paternal Grandfather    • Heart attack Neg Hx    • Heart disease Neg Hx    • Heart failure Neg Hx    • Hyperlipidemia Neg Hx    • Hypertension Neg Hx        Social History     Socioeconomic History   • Marital status:    Tobacco Use   • Smoking status: Former     Packs/day: 0.50     Years: 12.00     Pack years: 6.00     Types: Cigarettes     Start date:      Quit date:      Years since quittin.3   • Smokeless tobacco: Never   Vaping Use   • Vaping Use: Never used   Substance and Sexual Activity   • Alcohol use: No   • Drug use: Never   • Sexual activity: Defer           Objective   Physical Exam  Vitals and nursing note reviewed.   Constitutional:       General: She is not in acute distress.  HENT:      Head: Normocephalic and atraumatic.   Eyes:      Extraocular Movements: Extraocular movements intact.   Cardiovascular:      Rate and Rhythm: Tachycardia present. Rhythm irregular.   Pulmonary:      Effort: Pulmonary effort is normal.      Breath sounds: Normal breath sounds.   Abdominal:      Tenderness: There is no abdominal tenderness.      Hernia: No hernia is present.   Musculoskeletal:      Cervical back: Normal range of motion.   Skin:     General: Skin is warm and dry.   Neurological:      General: No focal deficit present.      Mental Status: She is alert and oriented to person, place, and time.   Psychiatric:         Mood and Affect: Mood normal.         Thought Content: Thought content normal.         Procedures           ED Course            Results for orders placed or performed during the hospital encounter of 23   Comprehensive Metabolic Panel    Specimen: Blood   Result Value Ref Range    Glucose 130 (H) 65 - 99 mg/dL    BUN 38 (H) 8 - 23 mg/dL    Creatinine 0.97 0.57 - 1.00 mg/dL    Sodium 142 136 - 145 mmol/L    Potassium 5.1 3.5 - 5.2  mmol/L    Chloride 108 (H) 98 - 107 mmol/L    CO2 24.0 22.0 - 29.0 mmol/L    Calcium 10.0 8.6 - 10.5 mg/dL    Total Protein 6.4 6.0 - 8.5 g/dL    Albumin 4.4 3.5 - 5.2 g/dL    ALT (SGPT) 12 1 - 33 U/L    AST (SGOT) 13 1 - 32 U/L    Alkaline Phosphatase 69 39 - 117 U/L    Total Bilirubin 0.8 0.0 - 1.2 mg/dL    Globulin 2.0 gm/dL    A/G Ratio 2.2 g/dL    BUN/Creatinine Ratio 39.2 (H) 7.0 - 25.0    Anion Gap 10.0 5.0 - 15.0 mmol/L    eGFR 58.5 (L) >60.0 mL/min/1.73   Protime-INR    Specimen: Blood   Result Value Ref Range    Protime 10.8 9.6 - 11.7 Seconds    INR 1.05 0.93 - 1.10   aPTT    Specimen: Blood   Result Value Ref Range    PTT 27.4 (L) 61.0 - 76.5 seconds   BNP    Specimen: Blood   Result Value Ref Range    proBNP 3,856.0 (H) 0.0 - 1,800.0 pg/mL   High Sensitivity Troponin T    Specimen: Blood   Result Value Ref Range    HS Troponin T 25 (H) <10 ng/L   Magnesium    Specimen: Blood   Result Value Ref Range    Magnesium 2.0 1.6 - 2.4 mg/dL   TSH    Specimen: Blood   Result Value Ref Range    TSH 0.661 0.270 - 4.200 uIU/mL   CBC Auto Differential    Specimen: Blood   Result Value Ref Range    WBC 9.10 3.40 - 10.80 10*3/mm3    RBC 3.33 (L) 3.77 - 5.28 10*6/mm3    Hemoglobin 10.9 (L) 12.0 - 15.9 g/dL    Hematocrit 32.0 (L) 34.0 - 46.6 %    MCV 96.2 79.0 - 97.0 fL    MCH 32.7 26.6 - 33.0 pg    MCHC 34.0 31.5 - 35.7 g/dL    RDW 16.0 (H) 12.3 - 15.4 %    RDW-SD 56.9 (H) 37.0 - 54.0 fl    MPV 8.5 6.0 - 12.0 fL    Platelets 363 140 - 450 10*3/mm3    Neutrophil % 69.5 42.7 - 76.0 %    Lymphocyte % 17.3 (L) 19.6 - 45.3 %    Monocyte % 10.9 5.0 - 12.0 %    Eosinophil % 1.0 0.3 - 6.2 %    Basophil % 1.3 0.0 - 1.5 %    Neutrophils, Absolute 6.30 1.70 - 7.00 10*3/mm3    Lymphocytes, Absolute 1.60 0.70 - 3.10 10*3/mm3    Monocytes, Absolute 1.00 (H) 0.10 - 0.90 10*3/mm3    Eosinophils, Absolute 0.10 0.00 - 0.40 10*3/mm3    Basophils, Absolute 0.10 0.00 - 0.20 10*3/mm3    nRBC 0.2 0.0 - 0.2 /100 WBC   ECG 12 Lead Tachycardia    Result Value Ref Range    QT Interval 342 ms                            XR Chest 1 View    Result Date: 4/16/2023  Impression: No active disease Electronically Signed: Jaxson Alvarez  4/16/2023 11:24 AM EDT  Workstation ID: [id number]                Medical Decision Making  Patient seen and evaluated for her palpitations    Differential diagnosis includes but not limited to A-fib with RVR, PE    Initial EKG interpreted by myself shows atrial flutter/fibrillation with ventricular rate of 119 and rapid ventricular response.  Compared to prior EKG dated 9/23/2022 of sinus rhythm prolonged KS interval rate of 97.  Chest x-ray shows no active disease.  Patient is not hypoxic or any acute distress.  She did respond as she had been decreased on her amnio to an amnio drip with heart rate diminishing at this point in time.  We will admit to the hospital placed on amiodarone drip for cardiology consultation.  Discussed with  who agrees to admit for further Cardiologic management patient verbalized understanding.  CT chest was considered but not ordered secondary to the fact the patient is already anticoagulated on Eliquis and PE felt less likely.  Contributing comorbidity atrial fibrillation.    Atrial fibrillation with RVR: acute illness or injury  Amount and/or Complexity of Data Reviewed  Labs: ordered. Decision-making details documented in ED Course.     Details: Elevated BNP.  Troponin is 25.  Magnesium normal.  Hemoglobin 10.9.  Creatinine 0.97.  Radiology: ordered and independent interpretation performed. Decision-making details documented in ED Course.     Details: Chest x-ray showed no dense infiltrate no effusion  ECG/medicine tests: ordered and independent interpretation performed. Decision-making details documented in ED Course.     Details: Reviewed by myself as above.      Risk  Prescription drug management.  Drug therapy requiring intensive monitoring for toxicity.  Decision regarding  hospitalization.          Final diagnoses:   None     Atrial fibrillation with rapid ventricular response  ED Disposition  ED Disposition     None          No follow-up provider specified.       Medication List      No changes were made to your prescriptions during this visit.          Rj Dasilva DO  04/16/23 7830

## 2023-04-17 LAB
ALBUMIN SERPL-MCNC: 4.2 G/DL (ref 3.5–5.2)
ALBUMIN/GLOB SERPL: 2.3 G/DL
ALP SERPL-CCNC: 67 U/L (ref 39–117)
ALT SERPL W P-5'-P-CCNC: 12 U/L (ref 1–33)
ANION GAP SERPL CALCULATED.3IONS-SCNC: 11 MMOL/L (ref 5–15)
AST SERPL-CCNC: 13 U/L (ref 1–32)
BASOPHILS # BLD AUTO: 0.1 10*3/MM3 (ref 0–0.2)
BASOPHILS NFR BLD AUTO: 1.9 % (ref 0–1.5)
BILIRUB SERPL-MCNC: 0.6 MG/DL (ref 0–1.2)
BUN SERPL-MCNC: 39 MG/DL (ref 8–23)
BUN/CREAT SERPL: 39 (ref 7–25)
CALCIUM SPEC-SCNC: 9.7 MG/DL (ref 8.6–10.5)
CHLORIDE SERPL-SCNC: 108 MMOL/L (ref 98–107)
CO2 SERPL-SCNC: 25 MMOL/L (ref 22–29)
CREAT SERPL-MCNC: 1 MG/DL (ref 0.57–1)
DEPRECATED RDW RBC AUTO: 54.7 FL (ref 37–54)
EGFRCR SERPLBLD CKD-EPI 2021: 56.4 ML/MIN/1.73
EOSINOPHIL # BLD AUTO: 0.2 10*3/MM3 (ref 0–0.4)
EOSINOPHIL NFR BLD AUTO: 2.4 % (ref 0.3–6.2)
ERYTHROCYTE [DISTWIDTH] IN BLOOD BY AUTOMATED COUNT: 15.9 % (ref 12.3–15.4)
GLOBULIN UR ELPH-MCNC: 1.8 GM/DL
GLUCOSE BLDC GLUCOMTR-MCNC: 131 MG/DL (ref 70–105)
GLUCOSE BLDC GLUCOMTR-MCNC: 138 MG/DL (ref 70–105)
GLUCOSE BLDC GLUCOMTR-MCNC: 150 MG/DL (ref 70–105)
GLUCOSE SERPL-MCNC: 136 MG/DL (ref 65–99)
HCT VFR BLD AUTO: 29.9 % (ref 34–46.6)
HGB BLD-MCNC: 9.9 G/DL (ref 12–15.9)
LYMPHOCYTES # BLD AUTO: 1.7 10*3/MM3 (ref 0.7–3.1)
LYMPHOCYTES NFR BLD AUTO: 26.2 % (ref 19.6–45.3)
MAGNESIUM SERPL-MCNC: 2 MG/DL (ref 1.6–2.4)
MCH RBC QN AUTO: 32.5 PG (ref 26.6–33)
MCHC RBC AUTO-ENTMCNC: 33.2 G/DL (ref 31.5–35.7)
MCV RBC AUTO: 97.9 FL (ref 79–97)
MONOCYTES # BLD AUTO: 0.8 10*3/MM3 (ref 0.1–0.9)
MONOCYTES NFR BLD AUTO: 12 % (ref 5–12)
NEUTROPHILS NFR BLD AUTO: 3.7 10*3/MM3 (ref 1.7–7)
NEUTROPHILS NFR BLD AUTO: 57.5 % (ref 42.7–76)
NRBC BLD AUTO-RTO: 0.1 /100 WBC (ref 0–0.2)
PHOSPHATE SERPL-MCNC: 4.2 MG/DL (ref 2.5–4.5)
PLATELET # BLD AUTO: 307 10*3/MM3 (ref 140–450)
PMV BLD AUTO: 8.5 FL (ref 6–12)
POTASSIUM SERPL-SCNC: 4.3 MMOL/L (ref 3.5–5.2)
PROT SERPL-MCNC: 6 G/DL (ref 6–8.5)
QT INTERVAL: 458 MS
RBC # BLD AUTO: 3.05 10*6/MM3 (ref 3.77–5.28)
SODIUM SERPL-SCNC: 144 MMOL/L (ref 136–145)
WBC NRBC COR # BLD: 6.4 10*3/MM3 (ref 3.4–10.8)

## 2023-04-17 PROCEDURE — 83735 ASSAY OF MAGNESIUM: CPT | Performed by: INTERNAL MEDICINE

## 2023-04-17 PROCEDURE — 96366 THER/PROPH/DIAG IV INF ADDON: CPT

## 2023-04-17 PROCEDURE — G0378 HOSPITAL OBSERVATION PER HR: HCPCS

## 2023-04-17 PROCEDURE — 25010000002 FUROSEMIDE PER 20 MG: Performed by: INTERNAL MEDICINE

## 2023-04-17 PROCEDURE — 84100 ASSAY OF PHOSPHORUS: CPT | Performed by: INTERNAL MEDICINE

## 2023-04-17 PROCEDURE — 82962 GLUCOSE BLOOD TEST: CPT

## 2023-04-17 PROCEDURE — 85025 COMPLETE CBC W/AUTO DIFF WBC: CPT | Performed by: INTERNAL MEDICINE

## 2023-04-17 PROCEDURE — 93005 ELECTROCARDIOGRAM TRACING: CPT | Performed by: INTERNAL MEDICINE

## 2023-04-17 PROCEDURE — 97165 OT EVAL LOW COMPLEX 30 MIN: CPT

## 2023-04-17 PROCEDURE — 25010000002 AMIODARONE IN DEXTROSE 5% 360-4.14 MG/200ML-% SOLUTION: Performed by: EMERGENCY MEDICINE

## 2023-04-17 PROCEDURE — 96376 TX/PRO/DX INJ SAME DRUG ADON: CPT

## 2023-04-17 PROCEDURE — 80053 COMPREHEN METABOLIC PANEL: CPT | Performed by: INTERNAL MEDICINE

## 2023-04-17 RX ORDER — AMIODARONE HYDROCHLORIDE 200 MG/1
100 TABLET ORAL
Status: DISCONTINUED | OUTPATIENT
Start: 2023-04-18 | End: 2023-04-18 | Stop reason: HOSPADM

## 2023-04-17 RX ADMIN — Medication 10 MG: at 20:18

## 2023-04-17 RX ADMIN — AMIODARONE HYDROCHLORIDE 0.5 MG/MIN: 1.8 INJECTION, SOLUTION INTRAVENOUS at 06:21

## 2023-04-17 RX ADMIN — MIDODRINE HYDROCHLORIDE 5 MG: 5 TABLET ORAL at 08:22

## 2023-04-17 RX ADMIN — FUROSEMIDE 20 MG: 10 INJECTION, SOLUTION INTRAMUSCULAR; INTRAVENOUS at 17:07

## 2023-04-17 RX ADMIN — MIDODRINE HYDROCHLORIDE 5 MG: 5 TABLET ORAL at 11:03

## 2023-04-17 RX ADMIN — APIXABAN 5 MG: 5 TABLET, FILM COATED ORAL at 08:22

## 2023-04-17 RX ADMIN — APIXABAN 5 MG: 5 TABLET, FILM COATED ORAL at 20:18

## 2023-04-17 RX ADMIN — MIDODRINE HYDROCHLORIDE 5 MG: 5 TABLET ORAL at 17:07

## 2023-04-17 RX ADMIN — Medication 10 ML: at 20:18

## 2023-04-17 RX ADMIN — Medication 10 ML: at 08:23

## 2023-04-17 RX ADMIN — ALLOPURINOL 300 MG: 300 TABLET ORAL at 08:22

## 2023-04-17 RX ADMIN — FUROSEMIDE 20 MG: 10 INJECTION, SOLUTION INTRAMUSCULAR; INTRAVENOUS at 05:42

## 2023-04-17 RX ADMIN — AMIODARONE HYDROCHLORIDE 0.5 MG/MIN: 1.8 INJECTION, SOLUTION INTRAVENOUS at 17:07

## 2023-04-17 NOTE — THERAPY DISCHARGE NOTE
Spoke with OT after their evaluation. Pt is at her baseline functional status and plans return home at d/c with no skilled PT needs at this time.

## 2023-04-17 NOTE — PLAN OF CARE
Goal Outcome Evaluation:  Plan of Care Reviewed With: patient           Outcome Evaluation: 83 y.o. female who presented to [facility] on 4/16/2023 complaining of palpitations and shortness of breath, diagnosed with acute A-fib with RVR. Since admission, she has been successfully converted to sinus rhythm. Pt in chair upon OT arrival. At this time, no noted deficits are present, patient is at PLOF. She retains ADL independence and does have minor balance deficits in standing (scheduled knee replacement 4/21/23.) OT will sign off, patient safe to dc home when stable.

## 2023-04-17 NOTE — NURSING NOTE
Nursing report ED to floor  Laure Leung  83 y.o.  female    HPI:   Chief Complaint   Patient presents with   • Atrial Fibrillation       Admitting doctor:   Reji Howe MD    Admitting diagnosis:   The encounter diagnosis was Atrial fibrillation with RVR.    Code status:   Current Code Status     Date Active Code Status Order ID Comments User Context       4/16/2023 1331 CPR (Attempt to Resuscitate) [id number]  Reji Howe MD ED      Question Answer    Code Status (Patient has no pulse and is not breathing) CPR (Attempt to Resuscitate)    Medical Interventions (Patient has pulse or is breathing) Full Support    Level Of Support Discussed With Patient                Allergies:   Patient has no known allergies.    Isolation:  No active isolations     Fall Risk:  Fall Risk Assessment was completed, and patient is at low risk for falls.   Predictive Model Details         25 (Low) Factor Value    Calculated 4/17/2023 14:14 Age 83    Risk of Fall Model Musculoskeletal Assessment WDL     Active Peripheral IV Present     Imaging order in this encounter Present     Number of Distinct Medication Classes administered 8     Diastolic BP 44     Skin Assessment WDL     Magnesium 2 mg/dL     Financial Class Other     Drug Use No     Tobacco Use Quit     Days after Admission 1.147     Diomedes Scale 20     Peripheral Vascular Assessment WDL     Number of administrations of Anti-Coagulants 3     Respiratory Rate 13     Chloride 108 mmol/L     Gastrointestinal Assessment WDL     Creatinine 1 mg/dL     Total Bilirubin 0.6 mg/dL     Cardiac Assessment WDL     Albumin 4.2 g/dL     Potassium 4.3 mmol/L     Calcium 9.7 mg/dL     ALT 12 U/L         Weight:       04/16/23  1032   Weight: 87.5 kg (193 lb)       Intake and Output    Intake/Output Summary (Last 24 hours) at 4/17/2023 1610  Last data filed at 4/17/2023 1315  Gross per 24 hour   Intake 1139.82 ml   Output --   Net 1139.82 ml       Diet:   Dietary Orders (From admission,  onward)     Start     Ordered    04/16/23 1331  Diet: Diabetic Diets; Consistent Carbohydrate; Texture: Regular Texture (IDDSI 7); Fluid Consistency: Thin (IDDSI 0)  Diet Effective Now        References:    Diet Order Crosswalk   Question Answer Comment   Diets: Diabetic Diets    Diabetic Diet: Consistent Carbohydrate    Texture: Regular Texture (IDDSI 7)    Fluid Consistency: Thin (IDDSI 0)        04/16/23 1330                 Most recent vitals:   Vitals:    04/17/23 1430 04/17/23 1500 04/17/23 1530 04/17/23 1600   BP: 133/57 134/57 104/88 128/57   BP Location:       Patient Position:       Pulse:  61  60   Resp:       Temp:       TempSrc:       SpO2:    97%   Weight:       Height:           Active LDAs/IV Access:   Lines, Drains & Airways     Active LDAs     Name Placement date Placement time Site Days    Peripheral IV 04/16/23 1056 Anterior;Proximal;Right Forearm 04/16/23  1056  Forearm  1                Skin Condition:   Skin Assessments (last day)     Date/Time Skin WDL Sensory Perception Moisture Activity Mobility Nutrition Friction and Shear Diomedes Score    04/16/23 1057 WDL -- -- -- -- -- -- --    04/16/23 1755 WDL 4-->no impairment 4-->rarely moist 4-->walks frequently 4-->no limitation 4-->excellent 3-->no apparent problem 23 04/17/23 0000 -- 4-->no impairment 4-->rarely moist 3-->walks occasionally 3-->slightly limited 3-->adequate 3-->no apparent problem 20 04/17/23 0800 WDL 4-->no impairment 4-->rarely moist 3-->walks occasionally 3-->slightly limited 3-->adequate 3-->no apparent problem 20 04/17/23 1220 WDL -- -- -- -- -- -- --           Labs (abnormal labs have a star):   Labs Reviewed   COMPREHENSIVE METABOLIC PANEL - Abnormal; Notable for the following components:       Result Value    Glucose 130 (*)     BUN 38 (*)     Chloride 108 (*)     BUN/Creatinine Ratio 39.2 (*)     eGFR 58.5 (*)     All other components within normal limits    Narrative:     GFR Normal >60  Chronic Kidney Disease  <60  Kidney Failure <15    The GFR formula is only valid for adults with stable renal function between ages 18 and 70.   APTT - Abnormal; Notable for the following components:    PTT 27.4 (*)     All other components within normal limits   BNP (IN-HOUSE) - Abnormal; Notable for the following components:    proBNP 3,856.0 (*)     All other components within normal limits    Narrative:     Among patients with dyspnea, NT-proBNP is highly sensitive for the detection of acute congestive heart failure. In addition NT-proBNP of <300 pg/ml effectively rules out acute congestive heart failure with 99% negative predictive value.    Results may be falsely decreased if patient taking Biotin.     TROPONIN - Abnormal; Notable for the following components:    HS Troponin T 25 (*)     All other components within normal limits    Narrative:     High Sensitive Troponin T Reference Range:  <10.0 ng/L- Negative Female for AMI  <15.0 ng/L- Negative Male for AMI  >=10 - Abnormal Female indicating possible myocardial injury.  >=15 - Abnormal Male indicating possible myocardial injury.   Clinicians would have to utilize clinical acumen, EKG, Troponin, and serial changes to determine if it is an Acute Myocardial Infarction or myocardial injury due to an underlying chronic condition.        CBC WITH AUTO DIFFERENTIAL - Abnormal; Notable for the following components:    RBC 3.33 (*)     Hemoglobin 10.9 (*)     Hematocrit 32.0 (*)     RDW 16.0 (*)     RDW-SD 56.9 (*)     Lymphocyte % 17.3 (*)     Monocytes, Absolute 1.00 (*)     All other components within normal limits   HIGH SENSITIVITIY TROPONIN T 2HR - Abnormal; Notable for the following components:    HS Troponin T 24 (*)     All other components within normal limits    Narrative:     High Sensitive Troponin T Reference Range:  <10.0 ng/L- Negative Female for AMI  <15.0 ng/L- Negative Male for AMI  >=10 - Abnormal Female indicating possible myocardial injury.  >=15 - Abnormal Male  indicating possible myocardial injury.   Clinicians would have to utilize clinical acumen, EKG, Troponin, and serial changes to determine if it is an Acute Myocardial Infarction or myocardial injury due to an underlying chronic condition.        COMPREHENSIVE METABOLIC PANEL - Abnormal; Notable for the following components:    Glucose 136 (*)     BUN 39 (*)     Chloride 108 (*)     BUN/Creatinine Ratio 39.0 (*)     eGFR 56.4 (*)     All other components within normal limits    Narrative:     GFR Normal >60  Chronic Kidney Disease <60  Kidney Failure <15    The GFR formula is only valid for adults with stable renal function between ages 18 and 70.   CBC WITH AUTO DIFFERENTIAL - Abnormal; Notable for the following components:    RBC 3.05 (*)     Hemoglobin 9.9 (*)     Hematocrit 29.9 (*)     MCV 97.9 (*)     RDW 15.9 (*)     RDW-SD 54.7 (*)     Basophil % 1.9 (*)     All other components within normal limits   POCT GLUCOSE FINGERSTICK - Abnormal; Notable for the following components:    Glucose 232 (*)     All other components within normal limits   POCT GLUCOSE FINGERSTICK - Abnormal; Notable for the following components:    Glucose 113 (*)     All other components within normal limits   POCT GLUCOSE FINGERSTICK - Abnormal; Notable for the following components:    Glucose 138 (*)     All other components within normal limits   POCT GLUCOSE FINGERSTICK - Abnormal; Notable for the following components:    Glucose 131 (*)     All other components within normal limits   PROTIME-INR - Normal   MAGNESIUM - Normal   TSH - Normal   MAGNESIUM - Normal   PHOSPHORUS - Normal   POCT GLUCOSE FINGERSTICK   POCT GLUCOSE FINGERSTICK   POCT GLUCOSE FINGERSTICK   POCT GLUCOSE FINGERSTICK   POCT GLUCOSE FINGERSTICK   POCT GLUCOSE FINGERSTICK   CBC AND DIFFERENTIAL    Narrative:     The following orders were created for panel order CBC & Differential.  Procedure                               Abnormality         Status                      ---------                               -----------         ------                     CBC Auto Differential[[id number]]        Abnormal            Final result                 Please view results for these tests on the individual orders.   CBC AND DIFFERENTIAL    Narrative:     The following orders were created for panel order CBC & Differential.  Procedure                               Abnormality         Status                     ---------                               -----------         ------                     CBC Auto Differential[[id number]]        Abnormal            Final result                 Please view results for these tests on the individual orders.       LOC: Person, Place, Time and Situation    Telemetry:  Stepdown    Cardiac Monitoring Ordered: yes    EKG:   ECG 12 Lead Rhythm Change   Preliminary Result   HEART RATE= 61  bpm   RR Interval= 988  ms   WI Interval=   ms   P Horizontal Axis=   deg   P Front Axis=   deg   QRSD Interval= 94  ms   QT Interval= 458  ms   QRS Axis= -22  deg   T Wave Axis= 10  deg   - ABNORMAL ECG -   Atrial fibrillation   Consider anterior infarct   Electronically Signed By:    Date and Time of Study: 2023-04-17 13:20:05      ECG 12 Lead Tachycardia   Preliminary Result   HEART RATE= 119  bpm   RR Interval= 509  ms   WI Interval=   ms   P Horizontal Axis=   deg   P Front Axis=   deg   QRSD Interval= 89  ms   QT Interval= 342  ms   QRS Axis= -25  deg   T Wave Axis= 97  deg   - ABNORMAL ECG -   Atrial flutter with predominant 3:1 AV block   Nonspecific T abnormalities, lateral leads   Electronically Signed By:    Date and Time of Study: 2023-04-16 10:37:45          Medications Given in the ED:   Medications   sodium chloride 0.9 % flush 10 mL (has no administration in time range)   amiodarone in dextrose 5% (NEXTERONE) loading dose 150mg/100mL (0 mg Intravenous Stopped 4/16/23 1135)     Followed by   amiodarone 360 mg in 200 mL D5W infusion (0 mg/min Intravenous Stopped  4/16/23 1715)     Followed by   amiodarone 360 mg in 200 mL D5W infusion (0.5 mg/min Intravenous Currently Infusing 4/17/23 1444)   allopurinol (ZYLOPRIM) tablet 300 mg (300 mg Oral Given 4/17/23 0822)   docusate sodium (COLACE) capsule 200 mg (200 mg Oral Not Given 4/17/23 0823)   melatonin tablet 10 mg (10 mg Oral Given 4/16/23 2203)   dextrose (GLUTOSE) oral gel 15 g (has no administration in time range)   dextrose (D50W) (25 g/50 mL) IV injection 25 g (has no administration in time range)   glucagon (GLUCAGEN) injection 1 mg (has no administration in time range)   insulin lispro (ADMELOG) injection 2-9 Units (2 Units Subcutaneous Not Given 4/17/23 1136)   sodium chloride 0.9 % flush 10 mL (10 mL Intravenous Given 4/17/23 0823)   sodium chloride 0.9 % flush 10 mL (has no administration in time range)   sodium chloride 0.9 % infusion 40 mL (has no administration in time range)   nitroglycerin (NITROSTAT) SL tablet 0.4 mg (has no administration in time range)   acetaminophen (TYLENOL) tablet 650 mg (has no administration in time range)   ondansetron (ZOFRAN) tablet 4 mg (has no administration in time range)     Or   ondansetron (ZOFRAN) injection 4 mg (has no administration in time range)   sennosides-docusate (PERICOLACE) 8.6-50 MG per tablet 2 tablet (2 tablets Oral Not Given 4/17/23 0823)     And   polyethylene glycol (MIRALAX) packet 17 g (has no administration in time range)     And   bisacodyl (DULCOLAX) EC tablet 5 mg (has no administration in time range)     And   bisacodyl (DULCOLAX) suppository 10 mg (has no administration in time range)   furosemide (LASIX) injection 20 mg (20 mg Intravenous Given 4/17/23 0542)   midodrine (PROAMATINE) tablet 5 mg (5 mg Oral Given 4/17/23 1103)   apixaban (ELIQUIS) tablet 5 mg (5 mg Oral Given 4/17/23 0822)       Imaging results:  XR Chest 1 View    Result Date: 4/16/2023  Impression: No active disease Electronically Signed: Jaxson Alvarez  4/16/2023 11:24 AM EDT   Workstation ID: [id number]      Social issues:   Social History     Socioeconomic History   • Marital status:    Tobacco Use   • Smoking status: Former     Packs/day: 0.50     Years: 12.00     Pack years: 6.00     Types: Cigarettes     Start date:      Quit date:      Years since quittin.3   • Smokeless tobacco: Never   Vaping Use   • Vaping Use: Never used   Substance and Sexual Activity   • Alcohol use: No   • Drug use: Never   • Sexual activity: Defer       NIH Stroke Scale:  Interval: (not recorded)  1a. Level of Consciousness: (not recorded)  1b. LOC Questions: (not recorded)  1c. LOC Commands: (not recorded)  2. Best Gaze: (not recorded)  3. Visual: (not recorded)  4. Facial Palsy: (not recorded)  5a. Motor Arm, Left: (not recorded)  5b. Motor Arm, Right: (not recorded)  6a. Motor Leg, Left: (not recorded)  6b. Motor Leg, Right: (not recorded)  7. Limb Ataxia: (not recorded)  8. Sensory: (not recorded)  9. Best Language: (not recorded)  10. Dysarthria: (not recorded)  11. Extinction and Inattention (formerly Neglect): (not recorded)    Total (NIH Stroke Scale): (not recorded)     Additional notable assessment information:     Nursing report ED to floor:  [facility]    Megha Soares RN   23 16:10 EDT

## 2023-04-17 NOTE — PLAN OF CARE
Problem: Adult Inpatient Plan of Care  Goal: Plan of Care Review  Outcome: Ongoing, Not Progressing  Flowsheets (Taken 4/17/2023 0454)  Progress: no change  Plan of Care Reviewed With: patient  Outcome Evaluation: pt still on Amiodarone drip, being followed by cardiology, VSS, afib rate controlled on tele, no complaints, plan of care continued  Goal: Patient-Specific Goal (Individualized)  Outcome: Ongoing, Not Progressing  Goal: Absence of Hospital-Acquired Illness or Injury  Outcome: Ongoing, Not Progressing  Intervention: Identify and Manage Fall Risk  Recent Flowsheet Documentation  Taken 4/17/2023 0400 by Karuna Marques, RN  Safety Promotion/Fall Prevention: safety round/check completed  Taken 4/17/2023 0200 by Karuna Marques, RN  Safety Promotion/Fall Prevention: safety round/check completed  Taken 4/17/2023 0000 by Karuna Marques, RN  Safety Promotion/Fall Prevention: safety round/check completed  Goal: Optimal Comfort and Wellbeing  Outcome: Ongoing, Not Progressing  Goal: Readiness for Transition of Care  Outcome: Ongoing, Not Progressing     Problem: Diabetes Comorbidity  Goal: Blood Glucose Level Within Targeted Range  Outcome: Ongoing, Not Progressing     Problem: Hypertension Comorbidity  Goal: Blood Pressure in Desired Range  Outcome: Ongoing, Not Progressing     Problem: Osteoarthritis Comorbidity  Goal: Maintenance of Osteoarthritis Symptom Control  Outcome: Ongoing, Not Progressing     Problem: Dysrhythmia  Goal: Normalized Cardiac Rhythm  Outcome: Ongoing, Not Progressing   Goal Outcome Evaluation:  Plan of Care Reviewed With: patient        Progress: no change  Outcome Evaluation: pt still on Amiodarone drip, being followed by cardiology, VSS, afib rate controlled on tele, no complaints, plan of care continued

## 2023-04-17 NOTE — CONSULTS
Cardiology Consult-EP      REQUESTING PROVIDER  Reji Howe MD    PATIENT IDENTIFICATION  Name: Laure Leung  Age: 83 y.o.  Sex: female  :  1940  MRN: [id number]             REASON  FOR  CONSULTATION  83-year-old female with known history of atypical atrial flutter diagnosed , status post flutter ablation 2021.  History of valvular heart disease including mild-mod MR, moderate TR without pulmonary hypertension, mild aortic valve sclerosis.  History of primary hypertension, coagulopathy on Eliquis    CC:  Atrial flutter with rapid ventricular response    HPI:  Patient presented to the emergency department at [facility] with complaint of significant palpitations and elevated heart rate and shortness of breath, waxing and waning over the past 1-2 days days.  She presented with atrial fibrillation and heart rate in the 170s.  She was started on IV amiodarone and has converted to sinus rhythm.  Upon my evaluation this morning, patient sitting in bedside chair and appears quite comfortable.  She denies any chest discomfort or shortness of breath at present.  She remains on IV amiodarone at 0.5 mg.  BNP 3856 on admission    Review of Systems  General: Positive for  fatigue and tiredness  Eyes: No redness  Cardiovascular: No chest pain, positive for palpitations  Respiratory:   No  shortness of breath  Gastrointestinal: No nausea or vomiting, bleeding  Genitourinary: no hematuria or dysuria  Musculoskeletal: No arthralgia or myalgia  Skin: No rash  Neurologic: No numbness, tingling, syncope  Hematologic/Lymphatic: No abnormal bleeding        OBJECTIVE   Hemoglobin 9.9    ASSESSMENT  Atrial fibrillation with rapid ventricular response  Coagulopathy on Eliquis  Heart failure with preserved ejection fraction  History of atypical flutter  Valvular heart disease  History of hypertension  Elevated chads vascular score  CHADS-VASc Risk Assessment            6 Total Score    1 CHF    1  "Hypertension    2 Age >/= 75    1 DM    1 Sex: Female        Criteria that do not apply:    PRIOR STROKE/TIA/THROMBO    Vascular Disease    Age 65-74            RECOMMENDATIONS  Continue apixaban for stroke prevention from A-fib and elevated chads vascular score  Transition to oral diuretic  Patient receiving midodrine 5 mg 3 times daily for hypotension  Finish IV amiodarone per protocol   Monitor rhythm closely  Further recommendations per Dr. Deutsch              Vital Signs  Visit Vitals  /59   Pulse 62   Temp 97.5 °F (36.4 °C) (Oral)   Resp 17   Ht 162.6 cm (64.02\")   Wt 87.5 kg (193 lb)   SpO2 98%   BMI 33.11 kg/m²     Oxygen Therapy  SpO2: 98 %  Pulse Oximetry Type: Continuous  Device (Oxygen Therapy): room air  Flow (L/min): 2  Flowsheet Rows    Flowsheet Row First Filed Value   Admission Height 162.6 cm (64\") Documented at 04/16/2023 1032   Admission Weight 87.5 kg (193 lb) Documented at 04/16/2023 1032        Intake & Output (last 3 days)       04/14 0701  04/15 0700 04/15 0701  04/16 0700 04/16 0701  04/17 0700 04/17 0701  04/18 0700    P.O.   240 480    I.V. (mL/kg)   279.8 (3.2)     Total Intake(mL/kg)   519.8 (5.9) 480 (5.5)    Net   +519.8 +480            Urine Unmeasured Occurrence    1 x    Stool Unmeasured Occurrence    1 x        Lines, Drains & Airways     Active LDAs     Name Placement date Placement time Site Days    Peripheral IV 04/16/23 1056 Anterior;Proximal;Right Forearm 04/16/23  1056  Forearm  1                MEDICAL HISTORY    Past Medical History:   Diagnosis Date   • Atrial fibrillation    • CHF (congestive heart failure)    • Hypertension    • Primary osteoarthritis of both knees 5/25/2022        SURGICAL HISTORY    Past Surgical History:   Procedure Laterality Date   • CARDIAC ELECTROPHYSIOLOGY PROCEDURE N/A 6/23/2021    Procedure: Ablation atrial flutter;  Surgeon: Antonio Senior MD;  Location: [facility] CATH INVASIVE LOCATION;  Service: Cardiovascular;  Laterality: N/A; " "  • CATARACT EXTRACTION          FAMILY HISTORY    Family History   Problem Relation Age of Onset   • No Known Problems Mother    • No Known Problems Father    • Atrial fibrillation Sister    • Diabetes Sister    • No Known Problems Maternal Aunt    • No Known Problems Maternal Uncle    • No Known Problems Paternal Aunt    • No Known Problems Paternal Uncle    • No Known Problems Maternal Grandmother    • No Known Problems Maternal Grandfather    • No Known Problems Paternal Grandmother    • No Known Problems Paternal Grandfather    • Heart attack Neg Hx    • Heart disease Neg Hx    • Heart failure Neg Hx    • Hyperlipidemia Neg Hx    • Hypertension Neg Hx        SOCIAL HISTORY    Social History     Tobacco Use   • Smoking status: Former     Packs/day: 0.50     Years: 12.00     Pack years: 6.00     Types: Cigarettes     Start date:      Quit date:      Years since quittin.3   • Smokeless tobacco: Never   Substance Use Topics   • Alcohol use: No        ALLERGIES    No Known Allergies           /59   Pulse 62   Temp 97.5 °F (36.4 °C) (Oral)   Resp 17   Ht 162.6 cm (64.02\")   Wt 87.5 kg (193 lb)   SpO2 98%   BMI 33.11 kg/m²   Intake/Output last 3 shifts:  I/O last 3 completed shifts:  In: 519.8 [P.O.:240; I.V.:279.8]  Out: -   Intake/Output this shift:  I/O this shift:  In: 480 [P.O.:480]  Out: -     PHYSICAL EXAM:    General: Alert, cooperative, no distress, appears stated age  Head:  Normocephalic, atraumatic, mucous membranes moist  Eyes:  Conjunctivae/corneas clear, EOM's intact     Neck:  Supple,  no adenopathy;      Lungs:            Clear to auscultation bilaterally, no wheezes, rhonchi or rales are noted  Chest wall: No tenderness  Heart::  Regular rate and irregular rhythm, S1 and S2 normal, 2/6 murmur at the base  Abdomen: Soft, nontender, nondistended, bowel sounds active  Extremities: No cyanosis, clubbing, or edema, groin soft, no hematoma.  Pulses: 2+ and symmetric all " extremities  Skin:  No rashes or lesions  Neuro/psych: A&O x3. CN II through XII are grossly intact with appropriate affect      Scheduled Meds:      allopurinol, 300 mg, Oral, Daily  apixaban, 5 mg, Oral, Q12H  docusate sodium, 200 mg, Oral, Daily  furosemide, 20 mg, Intravenous, Q12H  insulin lispro, 2-9 Units, Subcutaneous, TID With Meals  melatonin, 10 mg, Oral, Nightly  midodrine, 5 mg, Oral, TID AC  senna-docusate sodium, 2 tablet, Oral, BID  sodium chloride, 10 mL, Intravenous, Q12H        Continuous Infusions:    amiodarone, 0.5 mg/min, Last Rate: 0.5 mg/min (04/17/23 1041)        PRN Meds:    •  acetaminophen  •  senna-docusate sodium **AND** polyethylene glycol **AND** bisacodyl **AND** bisacodyl  •  dextrose  •  dextrose  •  glucagon (human recombinant)  •  nitroglycerin  •  ondansetron **OR** ondansetron  •  [COMPLETED] Insert Peripheral IV **AND** sodium chloride  •  sodium chloride  •  sodium chloride     Data Review:     Results Review:     I reviewed the patient's new clinical results.    CBC    Results from last 7 days   Lab Units 04/17/23  0424 04/16/23  1055   WBC 10*3/mm3 6.40 9.10   HEMOGLOBIN g/dL 9.9* 10.9*   PLATELETS 10*3/mm3 307 363     Cr Clearance Estimated Creatinine Clearance: 46.4 mL/min (by C-G formula based on SCr of 1 mg/dL).  Coag   Results from last 7 days   Lab Units 04/16/23  1055   INR  1.05   APTT seconds 27.4*     HbA1C   Lab Results   Component Value Date    HGBA1C 6.3 (H) 01/16/2023    HGBA1C 6.9 (H) 07/12/2022    HGBA1C 8.3 (H) 04/20/2022     Blood Glucose   Glucose   Date/Time Value Ref Range Status   04/17/2023 1134 131 (H) 70 - 105 mg/dL Final     Comment:     Serial Number: [id number]:  [id number]   04/17/2023 0705 138 (H) 70 - 105 mg/dL Final     Comment:     Serial Number: [id number]:  [id number]   04/16/2023 1850 113 (H) 70 - 105 mg/dL Final     Comment:     Serial Number: [id number]:  [id number]   04/16/2023 1557 232 (H) 70 - 105 mg/dL Final      Comment:     Serial Number: [id number]:  [id number]     Infection     CMP   Results from last 7 days   Lab Units 04/17/23  0424 04/16/23  1055   SODIUM mmol/L 144 142   POTASSIUM mmol/L 4.3 5.1   CHLORIDE mmol/L 108* 108*   CO2 mmol/L 25.0 24.0   BUN mg/dL 39* 38*   CREATININE mg/dL 1.00 0.97   GLUCOSE mg/dL 136* 130*   ALBUMIN g/dL 4.2 4.4   BILIRUBIN mg/dL 0.6 0.8   ALK PHOS U/L 67 69   AST (SGOT) U/L 13 13   ALT (SGPT) U/L 12 12     ABG      UA      HU  No results found for: POCMETH, POCAMPHET, POCBARBITUR, POCBENZO, POCCOCAINE, POCOPIATES, POCOXYCODO, POCPHENCYC, POCPROPOXY, POCTHC, POCTRICYC  Lysis Labs   Results from last 7 days   Lab Units 04/17/23  0424 04/16/23  1055   INR   --  1.05   APTT seconds  --  27.4*   HEMOGLOBIN g/dL 9.9* 10.9*   PLATELETS 10*3/mm3 307 363   CREATININE mg/dL 1.00 0.97     Radiology(recent) XR Chest 1 View    Result Date: 4/16/2023  Impression: No active disease Electronically Signed: Jaxson Alvarez  4/16/2023 11:24 AM EDT  Workstation ID: [id number]        Results from last 7 days   Lab Units 04/16/23  1314   HSTROP T ng/L 24*       X-rays, labs reviewed personally by physician.    ECG/EMG Results (most recent)     Procedure Component Value Units Date/Time    ECG 12 Lead Tachycardia [[id number]] Collected: 04/16/23 1037     Updated: 04/16/23 1039     QT Interval 342 ms     Narrative:      HEART RATE= 119  bpm  RR Interval= 509  ms  SC Interval=   ms  P Horizontal Axis=   deg  P Front Axis=   deg  QRSD Interval= 89  ms  QT Interval= 342  ms  QRS Axis= -25  deg  T Wave Axis= 97  deg  - ABNORMAL ECG -  Atrial flutter with predominant 3:1 AV block  Nonspecific T abnormalities, lateral leads  Electronically Signed By:   Date and Time of Study: 2023-04-16 10:37:45            Medication Review:   I have reviewed the patient's current medication list  Scheduled Meds:allopurinol, 300 mg, Oral, Daily  apixaban, 5 mg, Oral, Q12H  docusate sodium, 200 mg, Oral, Daily  furosemide, 20 mg,  "Intravenous, Q12H  insulin lispro, 2-9 Units, Subcutaneous, TID With Meals  melatonin, 10 mg, Oral, Nightly  midodrine, 5 mg, Oral, TID AC  senna-docusate sodium, 2 tablet, Oral, BID  sodium chloride, 10 mL, Intravenous, Q12H      Continuous Infusions:amiodarone, 0.5 mg/min, Last Rate: 0.5 mg/min (04/17/23 1041)      PRN Meds:.•  acetaminophen  •  senna-docusate sodium **AND** polyethylene glycol **AND** bisacodyl **AND** bisacodyl  •  dextrose  •  dextrose  •  glucagon (human recombinant)  •  nitroglycerin  •  ondansetron **OR** ondansetron  •  [COMPLETED] Insert Peripheral IV **AND** sodium chloride  •  sodium chloride  •  sodium chloride    Imaging:  Imaging Results (Last 72 Hours)     Procedure Component Value Units Date/Time    XR Chest 1 View [[id number]] Collected: 04/16/23 1118     Updated: 04/16/23 1126    Narrative:      XR CHEST 1 VW    Date of Exam: 4/16/2023 11:09 AM EDT    Indication: palpitations.    Comparison: September 20, 2022    Findings:  There are no airspace consolidations. No pleural fluid. No pneumothorax. The pulmonary vasculature appears within normal limits. The cardiac and mediastinal silhouette appear unremarkable. No acute osseous abnormality identified.      Impression:      Impression:  No active disease    Electronically Signed: Jaxson Alvarez    4/16/2023 11:24 AM EDT    Workstation ID: [id number]            MIRZA Soto  04/17/23  12:10 EDT      EMR Dragon/Transcription:   \"Dictated utilizing Dragon dictation\".              Electronically signed by MIRZA Soto, 04/17/23, 12:10 PM EDT.      Patient seen and examined.  Patient is well-known to me.  Patient was seen in the office in the past.  Patient has history of peripheral atrial flutter and atrial flutter which was ablated in 2021.  Left on amiodarone to suppress recurrent arrhythmias.  Started having recurrent arrhythmias since Friday and came to the ER and started on intravenous amiodarone with resolution of " symptoms.  She is also awaiting knee surgery.      Physical Exam    General:      well developed, well nourished, in no acute distress.    Head:      normocephalic and atraumatic.    Eyes:      PERRL/EOM intact, conjunctivae and sclerae clear without nystagmus.    Neck:      no  thyromegaly, trachea central with normal respiratory effort  Lungs:      clear bilaterally to auscultation.    Heart:       regular rate and rhythm, S1, S2 without murmurs, rubs, or gallops  Skin:      intact without lesions or rashes.    Psych:      alert and cooperative; normal mood and affect; normal attention span and concentration.        Recurrent atrial arrhythmias currently in sinus rhythm.  Amiodarone can be stopped in the morning change to oral amiodarone.  If she has recurrent arrhythmia, despite medical management, redo ablation may be needed.  discussed with the patient and the nurse.      Electronically signed by Howard Deutsch MD, 04/17/23, 5:49 PM EDT.

## 2023-04-17 NOTE — THERAPY EVALUATION
Patient Name: Laure Leung  : 1940    MRN: [id number]                              Today's Date: 2023       Admit Date: 2023    Visit Dx:     ICD-10-CM ICD-9-CM   1. Atrial fibrillation with RVR  I48.91 427.31     Patient Active Problem List   Diagnosis   • Atypical atrial flutter   • Essential hypertension   • Palpitations   • Atrial fibrillation with RVR   • DM (diabetes mellitus), type 2   • Gout   • Primary osteoarthritis of both knees   • Morbid obesity   • Obesity (BMI 30.0-34.9)   • Atrial fibrillation with rapid ventricular response   • Atrial flutter with rapid ventricular response   • Preoperative cardiovascular examination     Past Medical History:   Diagnosis Date   • Atrial fibrillation    • CHF (congestive heart failure)    • Hypertension    • Primary osteoarthritis of both knees 2022     Past Surgical History:   Procedure Laterality Date   • CARDIAC ELECTROPHYSIOLOGY PROCEDURE N/A 2021    Procedure: Ablation atrial flutter;  Surgeon: Antonio Senior MD;  Location: [facility] INVASIVE LOCATION;  Service: Cardiovascular;  Laterality: N/A;   • CATARACT EXTRACTION        General Information     Row Name 23 1142          OT Time and Intention    Document Type evaluation  -LS     Mode of Treatment occupational therapy  -     Row Name 23 1142          General Information    Patient Profile Reviewed yes  -LS     Prior Level of Function independent:;ADL's;driving;all household mobility  Uses RW out of home, rollator in the home  -     Row Name 23 1142          Living Environment    People in Home alone  -     Row Name 23 1142          Home Main Entrance    Number of Stairs, Main Entrance one  -LS     Row Name 23 1142          Stairs Within Home, Primary    Number of Stairs, Within Home, Primary none  -LS     Row Name 23 1142          Cognition    Orientation Status (Cognition) oriented x 4  -LS     Row Name 23 1142           Safety Issues, Functional Mobility    Impairments Affecting Function (Mobility) balance  -           User Key  (r) = Recorded By, (t) = Taken By, (c) = Cosigned By    Initials Name Provider Type    Samuel Ribera OT Occupational Therapist                 Mobility/ADL's     Row Name 04/17/23 1143          Bed Mobility    Comment, (Bed Mobility) Pt up in chair on arrival  -     Row Name 04/17/23 1143          Transfers    Transfers sit-stand transfer  -     Row Name 04/17/23 1143          Sit-Stand Transfer    Sit-Stand Shirley (Transfers) standby assist  -     Row Name 04/17/23 1143          Functional Mobility    Functional Mobility- Ind. Level supervision required  -     Row Name 04/17/23 1143          Activities of Daily Living    BADL Assessment/Intervention other (see comments)  Pt retains her ADL independence  -           User Key  (r) = Recorded By, (t) = Taken By, (c) = Cosigned By    Initials Name Provider Type    Samuel Ribera OT Occupational Therapist               Obj/Interventions     Row Name 04/17/23 1147          Sensory Assessment (Somatosensory)    Sensory Assessment (Somatosensory) sensation intact  -[facility] Name 04/17/23 1147          Vision Assessment/Intervention    Visual Impairment/Limitations corrective lenses full-time  -     Row Name 04/17/23 1147          Range of Motion Comprehensive    General Range of Motion no range of motion deficits identified  -     Row Name 04/17/23 1147          Strength Comprehensive (MMT)    General Manual Muscle Testing (MMT) Assessment no strength deficits identified  -[facility] Name 04/17/23 1147          Balance    Balance Assessment sitting static balance;sitting dynamic balance;standing static balance;standing dynamic balance  -LS     Static Sitting Balance independent  -LS     Dynamic Sitting Balance independent  -     Static Standing Balance standby assist  -     Dynamic Standing Balance standby assist  -      Position/Device Used, Standing Balance unsupported  -LS           User Key  (r) = Recorded By, (t) = Taken By, (c) = Cosigned By    Initials Name Provider Type    Samuel Ribera OT Occupational Therapist               Goals/Plan    No documentation.                Clinical Impression     Row Name 04/17/23 1147          Pain Assessment    Pretreatment Pain Rating 0/10 - no pain  -LS     Posttreatment Pain Rating 0/10 - no pain  -LS     Row Name 04/17/23 1147          Plan of Care Review    Plan of Care Reviewed With patient  -LS     Outcome Evaluation 83 y.o. female who presented [facility] on [facility]16/2023 complaining of palpitations and shortness of breath, diagnosed with acute A-fib with RVR. Since admission, she has been successfully converted to sinus rhythm. Pt in chair upon OT arrival. At this time, no noted deficits are present, patient is at PLOF. She retains ADL independence and does have minor balance deficits in standing (scheduled knee replacement 4/21/23.) OT will sign off, patient safe to dc home when stable.  -     Row Name 04/17/23 1147          Therapy Assessment/Plan (OT)    Rehab Potential (OT) good, to achieve stated therapy goals  -     Criteria for Skilled Therapeutic Interventions Met (OT) yes;no problems identified which require skilled intervention  -     Therapy Frequency (OT) evaluation only  -     Predicted Duration of Therapy Intervention (OT) until dc  -     Row Name 04/17/23 1147          Therapy Plan Review/Discharge Plan (OT)    Anticipated Discharge Disposition (OT) home  -     Row Name 04/17/23 1147          Vital Signs    O2 Delivery Pre Treatment room air  -LS     O2 Delivery Intra Treatment room air  -LS     O2 Delivery Post Treatment room air  -LS     Pre Patient Position Sitting  -LS     Intra Patient Position Standing  -LS     Post Patient Position Sitting  -LS     Row Name 04/17/23 1147          Positioning and Restraints    Pre-Treatment Position  sitting in chair/recliner  -LS     Post Treatment Position chair  -LS     In Chair notified nsg;reclined;encouraged to call for assist;call light within reach  -LS           User Key  (r) = Recorded By, (t) = Taken By, (c) = Cosigned By    Initials Name Provider Type    Samuel Ribera OT Occupational Therapist               Outcome Measures     Row Name 04/17/23 1150          How much help from another is currently needed...    Putting on and taking off regular lower body clothing? 4  -LS     Bathing (including washing, rinsing, and drying) 4  -LS     Toileting (which includes using toilet bed pan or urinal) 4  -LS     Putting on and taking off regular upper body clothing 4  -LS     Taking care of personal grooming (such as brushing teeth) 4  -LS     Eating meals 4  -LS     AM-PAC 6 Clicks Score (OT) 24  -LS     Row Name 04/17/23 0800          How much help from another person do you currently need...    Turning from your back to your side while in flat bed without using bedrails? 4  -MS     Moving from lying on back to sitting on the side of a flat bed without bedrails? 4  -MS     Moving to and from a bed to a chair (including a wheelchair)? 3  -MS     Standing up from a chair using your arms (e.g., wheelchair, bedside chair)? 3  -MS     Climbing 3-5 steps with a railing? 3  -MS     To walk in hospital room? 3  -MS     AM-PAC 6 Clicks Score (PT) 20  -MS     Highest level of mobility 6 --> Walked 10 steps or more  -MS     Row Name 04/17/23 1150          Functional Assessment    Outcome Measure Options AM-PAC 6 Clicks Daily Activity (OT)  -LS           User Key  (r) = Recorded By, (t) = Taken By, (c) = Cosigned By    Initials Name Provider Type    Addie Frances RN Registered Nurse    Samuel Ribera OT Occupational Therapist                Occupational Therapy Education     Title: PT OT SLP Therapies (Done)     Topic: Occupational Therapy (Done)     Point: ADL training (Done)     Description:   Instruct  learner(s) on proper safety adaptation and remediation techniques during self care or transfers.   Instruct in proper use of assistive devices.              Learning Progress Summary           Patient Acceptance, E,TB, VU by  at 4/17/2023 1150                               User Key     Initials Effective Dates Name Provider Type Discipline     09/22/22 -  Samuel Mcfadden OT Occupational Therapist OT              OT Recommendation and Plan  Therapy Frequency (OT): evaluation only  Plan of Care Review  Plan of Care Reviewed With: patient  Outcome Evaluation: 83 y.o. female who presented to [facility] on 4/16/2023 complaining of palpitations and shortness of breath, diagnosed with acute A-fib with RVR. Since admission, she has been successfully converted to sinus rhythm. Pt in chair upon OT arrival. At this time, no noted deficits are present, patient is at PLOF. She retains ADL independence and does have minor balance deficits in standing (scheduled knee replacement 4/21/23.) OT will sign off, patient safe to dc home when stable.     Time Calculation:    Time Calculation- OT     Row Name 04/17/23 1151             Time Calculation- OT    OT Start Time 0848  -LS      OT Stop Time 0910  -      OT Time Calculation (min) 22 min  -      OT Received On 04/17/23  -         Untimed Charges    OT Eval/Re-eval Minutes 22  -LS         Total Minutes    Untimed Charges Total Minutes 22  -LS       Total Minutes 22  -LS            User Key  (r) = Recorded By, (t) = Taken By, (c) = Cosigned By    Initials Name Provider Type     Samuel Mcfadden OT Occupational Therapist              Therapy Charges for Today     Code Description Service Date Service Provider Modifiers Qty    [id number]  OT EVAL LOW COMPLEXITY 4 4/17/2023 Samuel Mcfadden OT GO 1               Samuel Mcfadden OT  4/17/2023

## 2023-04-17 NOTE — CASE MANAGEMENT/SOCIAL WORK
Discharge Planning Assessment  [facility]     Patient Name: Laure Leung  MRN: [id number]  Today's Date: 4/17/2023    Admit Date: 4/16/2023    Plan: DC PLAN: Routine home      Discharge Needs Assessment     Row Name 04/17/23 0847       Living Environment    People in Home alone    Current Living Arrangements home    Primary Care Provided by self    Provides Primary Care For no one    Family Caregiver if Needed none    Able to Return to Prior Arrangements yes       Resource/Environmental Concerns    Resource/Environmental Concerns none       Food Insecurity    Within the past 12 months, you worried that your food would run out before you got the money to buy more. Never true    Within the past 12 months, the food you bought just didn't last and you didn't have money to get more. Never true       Transition Planning    Patient/Family Anticipates Transition to home    Patient/Family Anticipated Services at Transition     Transportation Anticipated car, drives self;health plan transportation;other (see comments)  Medicare Transport       Discharge Needs Assessment    Equipment Currently Used at Home walker, standard               Discharge Plan     Row Name 04/17/23 0847       Plan    Plan DC PLAN: Routine home    Patient/Family in Agreement with Plan yes    Plan Comments Met with patient at bedside, from routine home alone. Independent with ADL's, uses walker occassionally. PCP is Sarita Esparza. Pharmacy is [facility]. Able to afford medications, denies any transportation issues, still drives, or  uses Medicare transport. Denies any concerns about returning home. DC BARRIERS: Amio gtt, awaiting cardiology to sign off. Possible discharge today. 4/17              Continued Care and Services - Admitted Since 4/16/2023    Coordination has not been started for this encounter.          Demographic Summary     Row Name 04/17/23 0846       General Information    Admission Type observation    Arrived From  emergency department    Referral Source admission list    Reason for Consult discharge planning    Preferred Language English       Contact Information    Permission Granted to Share Info With     Contact Information Obtained for                Functional Status     Row Name 04/17/23 0846       Functional Status    Usual Activity Tolerance moderate    Current Activity Tolerance moderate       Assessment of Health Literacy    How often do you have someone help you read hospital materials? Sometimes    How often do you have problems learning about your medical condition because of difficulty understanding written information? Sometimes    How often do you have a problem understanding what is told to you about your medical condition? Sometimes    How confident are you filling out medical forms by yourself? Somewhat    Health Literacy Moderate       Functional Status, IADL    Medications independent    Meal Preparation independent    Housekeeping independent    Laundry independent    Shopping independent       Mental Status    General Appearance WDL WDL                   Patient Forms     Row Name 04/17/23 0834       Patient Forms    Important Message from Medicare ([facility]) --  ARROYO 4/16/23 per registration    Patient Observation Letter Delivered  ARROYO 4/16/23 per registration              Met with patient in room wearing PPE:     Maintained distance greater than six feet and spent less than 15 minutes in the room.        Betsy Callejas RN     Office phone: [phone]  Cell phone: [phone]

## 2023-04-17 NOTE — PROGRESS NOTES
[facility] Medicine Services Daily Progress Note    Patient Name: Laure Leung  : 1940  MRN: [id number]  Primary Care Physician:  Sarita Esparza APRN  Date of admission: 2023      Subjective      Chief Complaint: Shortness of breath palpitations      Patient Reports feeling a lot better today.  Amiodarone drip still being administered.  Converted to sinus rhythm in the 60s.  Awaiting cardiology evaluation.    ROS negative except as above      Objective      Vitals:   Temp:  [97.5 °F (36.4 °C)-98.1 °F (36.7 °C)] 98.1 °F (36.7 °C)  Heart Rate:  [] 64  Resp:  [13-17] 13  BP: ()/() 117/44  Flow (L/min):  [2] 2    Physical Exam  Vitals reviewed.   Constitutional:       Comments: Sitting in the chair no distress   HENT:      Head: Normocephalic.      Nose: Nose normal.      Mouth/Throat:      Mouth: Mucous membranes are moist.   Cardiovascular:      Rate and Rhythm: Normal rate and regular rhythm.      Pulses: Normal pulses.      Heart sounds: Normal heart sounds.   Pulmonary:      Effort: Pulmonary effort is normal.      Breath sounds: Normal breath sounds.   Abdominal:      General: Abdomen is flat.      Palpations: Abdomen is soft.   Musculoskeletal:         General: Normal range of motion.      Cervical back: Normal range of motion.   Skin:     General: Skin is warm.   Neurological:      General: No focal deficit present.      Mental Status: She is alert and oriented to person, place, and time.   Psychiatric:         Mood and Affect: Mood normal.         Behavior: Behavior normal.             Result Review    Result Review:  I have personally reviewed the results from the time of this admission to 2023 15:15 EDT and agree with these findings:  [x]  Laboratory  []  Microbiology  []  Radiology  []  EKG/Telemetry   []  Cardiology/Vascular   []  Pathology  []  Old records  []  Other:  Most notable findings include: Hemoglobin 9.9         Assessment & Plan     83-year-old female with A-fib with RVR     Active Hospital Problems:  There are no active hospital problems to display for this patient.     Plan:   Acute A-fib with RVR  Amiodarone drip ordered  Follow protocol  Follow-up cardiology recommendations  Anticoagulation ordered     Chronic diastolic CHF without exacerbation  Resume Lasix  Resume beta-blocker     Type 2 diabetes  Diabetic diet  Sliding scale ordered     History of gout  Allopurinol resume     Insomnia  Melatonin resumed     Borderline hypotension  Midodrine ordered           DVT prophylaxis:  Full anticoagulation with Eliquis for A-fib     CODE STATUS:    Admission Status:  I believe this patient meets observation status.     I discussed the patient's findings and my recommendations with patient.     This patient has been examined wearing appropriate Personal Protective Equipment and discussed with Patient.   04/17/23      Electronically signed by Reji Howe MD, 04/17/23, 15:15 EDT.  [facility] Hospitalist Team

## 2023-04-18 ENCOUNTER — READMISSION MANAGEMENT (OUTPATIENT)
Dept: CALL CENTER | Facility: HOSPITAL | Age: 83
End: 2023-04-18
Payer: MEDICARE

## 2023-04-18 VITALS
HEIGHT: 64 IN | HEART RATE: 57 BPM | TEMPERATURE: 98.1 F | RESPIRATION RATE: 15 BRPM | WEIGHT: 192.9 LBS | DIASTOLIC BLOOD PRESSURE: 51 MMHG | SYSTOLIC BLOOD PRESSURE: 135 MMHG | BODY MASS INDEX: 32.93 KG/M2 | OXYGEN SATURATION: 98 %

## 2023-04-18 LAB
ALBUMIN SERPL-MCNC: 4.5 G/DL (ref 3.5–5.2)
ALBUMIN/GLOB SERPL: 2.5 G/DL
ALP SERPL-CCNC: 74 U/L (ref 39–117)
ALT SERPL W P-5'-P-CCNC: 13 U/L (ref 1–33)
ANION GAP SERPL CALCULATED.3IONS-SCNC: 13 MMOL/L (ref 5–15)
AST SERPL-CCNC: 16 U/L (ref 1–32)
BASOPHILS # BLD AUTO: 0.1 10*3/MM3 (ref 0–0.2)
BASOPHILS NFR BLD AUTO: 2 % (ref 0–1.5)
BILIRUB SERPL-MCNC: 0.7 MG/DL (ref 0–1.2)
BUN SERPL-MCNC: 33 MG/DL (ref 8–23)
BUN/CREAT SERPL: 31.7 (ref 7–25)
CALCIUM SPEC-SCNC: 9.6 MG/DL (ref 8.6–10.5)
CHLORIDE SERPL-SCNC: 103 MMOL/L (ref 98–107)
CO2 SERPL-SCNC: 24 MMOL/L (ref 22–29)
CREAT SERPL-MCNC: 1.04 MG/DL (ref 0.57–1)
DEPRECATED RDW RBC AUTO: 54.7 FL (ref 37–54)
EGFRCR SERPLBLD CKD-EPI 2021: 53.8 ML/MIN/1.73
EOSINOPHIL # BLD AUTO: 0.2 10*3/MM3 (ref 0–0.4)
EOSINOPHIL NFR BLD AUTO: 2.4 % (ref 0.3–6.2)
ERYTHROCYTE [DISTWIDTH] IN BLOOD BY AUTOMATED COUNT: 15.4 % (ref 12.3–15.4)
GLOBULIN UR ELPH-MCNC: 1.8 GM/DL
GLUCOSE BLDC GLUCOMTR-MCNC: 121 MG/DL (ref 70–105)
GLUCOSE BLDC GLUCOMTR-MCNC: 133 MG/DL (ref 70–105)
GLUCOSE SERPL-MCNC: 133 MG/DL (ref 65–99)
HCT VFR BLD AUTO: 30.4 % (ref 34–46.6)
HGB BLD-MCNC: 10.2 G/DL (ref 12–15.9)
LYMPHOCYTES # BLD AUTO: 1.3 10*3/MM3 (ref 0.7–3.1)
LYMPHOCYTES NFR BLD AUTO: 19.7 % (ref 19.6–45.3)
MAGNESIUM SERPL-MCNC: 2 MG/DL (ref 1.6–2.4)
MCH RBC QN AUTO: 32.3 PG (ref 26.6–33)
MCHC RBC AUTO-ENTMCNC: 33.7 G/DL (ref 31.5–35.7)
MCV RBC AUTO: 96 FL (ref 79–97)
MONOCYTES # BLD AUTO: 0.8 10*3/MM3 (ref 0.1–0.9)
MONOCYTES NFR BLD AUTO: 11.8 % (ref 5–12)
NEUTROPHILS NFR BLD AUTO: 4.2 10*3/MM3 (ref 1.7–7)
NEUTROPHILS NFR BLD AUTO: 64.1 % (ref 42.7–76)
NRBC BLD AUTO-RTO: 0.3 /100 WBC (ref 0–0.2)
PHOSPHATE SERPL-MCNC: 4.4 MG/DL (ref 2.5–4.5)
PLATELET # BLD AUTO: 337 10*3/MM3 (ref 140–450)
PMV BLD AUTO: 9 FL (ref 6–12)
POTASSIUM SERPL-SCNC: 4.2 MMOL/L (ref 3.5–5.2)
PROT SERPL-MCNC: 6.3 G/DL (ref 6–8.5)
QT INTERVAL: 342 MS
RBC # BLD AUTO: 3.17 10*6/MM3 (ref 3.77–5.28)
SODIUM SERPL-SCNC: 140 MMOL/L (ref 136–145)
WBC NRBC COR # BLD: 6.6 10*3/MM3 (ref 3.4–10.8)

## 2023-04-18 PROCEDURE — 84100 ASSAY OF PHOSPHORUS: CPT | Performed by: INTERNAL MEDICINE

## 2023-04-18 PROCEDURE — 96376 TX/PRO/DX INJ SAME DRUG ADON: CPT

## 2023-04-18 PROCEDURE — 82962 GLUCOSE BLOOD TEST: CPT

## 2023-04-18 PROCEDURE — 93005 ELECTROCARDIOGRAM TRACING: CPT | Performed by: INTERNAL MEDICINE

## 2023-04-18 PROCEDURE — 85025 COMPLETE CBC W/AUTO DIFF WBC: CPT | Performed by: INTERNAL MEDICINE

## 2023-04-18 PROCEDURE — 83735 ASSAY OF MAGNESIUM: CPT | Performed by: INTERNAL MEDICINE

## 2023-04-18 PROCEDURE — 25010000002 FUROSEMIDE PER 20 MG: Performed by: INTERNAL MEDICINE

## 2023-04-18 PROCEDURE — G0378 HOSPITAL OBSERVATION PER HR: HCPCS

## 2023-04-18 PROCEDURE — 36415 COLL VENOUS BLD VENIPUNCTURE: CPT | Performed by: INTERNAL MEDICINE

## 2023-04-18 PROCEDURE — 96366 THER/PROPH/DIAG IV INF ADDON: CPT

## 2023-04-18 PROCEDURE — 80053 COMPREHEN METABOLIC PANEL: CPT | Performed by: INTERNAL MEDICINE

## 2023-04-18 RX ORDER — MIDODRINE HYDROCHLORIDE 5 MG/1
5 TABLET ORAL
Qty: 90 TABLET | Refills: 0 | Status: SHIPPED | OUTPATIENT
Start: 2023-04-18

## 2023-04-18 RX ADMIN — MIDODRINE HYDROCHLORIDE 5 MG: 5 TABLET ORAL at 10:53

## 2023-04-18 RX ADMIN — Medication 10 ML: at 08:20

## 2023-04-18 RX ADMIN — APIXABAN 5 MG: 5 TABLET, FILM COATED ORAL at 08:20

## 2023-04-18 RX ADMIN — ALLOPURINOL 300 MG: 300 TABLET ORAL at 08:20

## 2023-04-18 RX ADMIN — FUROSEMIDE 20 MG: 10 INJECTION, SOLUTION INTRAMUSCULAR; INTRAVENOUS at 06:15

## 2023-04-18 RX ADMIN — MIDODRINE HYDROCHLORIDE 5 MG: 5 TABLET ORAL at 08:19

## 2023-04-18 RX ADMIN — AMIODARONE HYDROCHLORIDE 100 MG: 200 TABLET ORAL at 08:19

## 2023-04-18 NOTE — NURSING NOTE
HR dropped to low 50's consecutively, amiodarone drip stopped. Patient is to transition to PO amiodarone at 09:00 am this morning. Patient is asymptomatic, resting in bed. RN will continue to monitor.

## 2023-04-18 NOTE — PLAN OF CARE
Goal Outcome Evaluation: Discharge instructions given. Patient verbalized understanding.  Denies further questions or needs at this time.

## 2023-04-18 NOTE — DISCHARGE SUMMARY
[facility] Medicine Services  DISCHARGE SUMMARY    Patient Name: Laure Leung  : 1940  MRN: [id number]    Discharge condition: Stabilized  Date of Admission: 2023  Discharge Diagnosis: A-fib with RVR  Date of Discharge: 2023  Primary Care Physician: Sarita Esparza APRN      Presenting Problem:   Atrial fibrillation with RVR [I48.91]    Active and Resolved Hospital Problems:  Active Hospital Problems    Diagnosis POA   • **Atrial fibrillation with RVR [I48.91] Yes      Resolved Hospital Problems   No resolved problems to display.         Hospital Course     Hospital Course:  Laure Leung is a 83 y.o. female who presented to the hospital with palpitations shortness of breath and was found to have A-fib with RVR.  She was placed on amiodarone drip and cardiology was consulted.  Patient eventually converted to sinus rhythm and was transitioned to oral amiodarone.  She was monitored overnight and once cleared by cardiology she was sent home in stable condition with stable vitals on Eliquis and amiodarone.            Reasons For Change In Medications and Indications for New Medications:      Day of Discharge     Vital Signs:  Temp:  [97.6 °F (36.4 °C)-98.3 °F (36.8 °C)] 98.1 °F (36.7 °C)  Heart Rate:  [53-64] 57  Resp:  [13-19] 15  BP: ()/(38-88) 135/51    Physical Exam:  Physical Exam  Vitals reviewed.   HENT:      Head: Normocephalic.      Nose: Nose normal.      Mouth/Throat:      Mouth: Mucous membranes are moist.   Cardiovascular:      Rate and Rhythm: Normal rate. Rhythm irregular.      Pulses: Normal pulses.      Heart sounds: Normal heart sounds.   Pulmonary:      Effort: Pulmonary effort is normal.      Breath sounds: Normal breath sounds.   Abdominal:      General: Abdomen is flat.      Palpations: Abdomen is soft.   Musculoskeletal:         General: Normal range of motion.      Cervical back: Normal range of motion.   Skin:     General: Skin is  warm.   Neurological:      General: No focal deficit present.      Mental Status: She is alert and oriented to person, place, and time.   Psychiatric:         Mood and Affect: Mood normal.         Behavior: Behavior normal.            Pertinent  and/or Most Recent Results     LAB RESULTS:      Lab 04/18/23  0751 04/17/23  0424 04/16/23  1055   WBC 6.60 6.40 9.10   HEMOGLOBIN 10.2* 9.9* 10.9*   HEMATOCRIT 30.4* 29.9* 32.0*   PLATELETS 337 307 363   NEUTROS ABS 4.20 3.70 6.30   LYMPHS ABS 1.30 1.70 1.60   MONOS ABS 0.80 0.80 1.00*   EOS ABS 0.20 0.20 0.10   MCV 96.0 97.9* 96.2   PROTIME  --   --  10.8   APTT  --   --  27.4*         Lab 04/18/23  0751 04/17/23  0424 04/16/23  1055   SODIUM 140 144 142   POTASSIUM 4.2 4.3 5.1   CHLORIDE 103 108* 108*   CO2 24.0 25.0 24.0   ANION GAP 13.0 11.0 10.0   BUN 33* 39* 38*   CREATININE 1.04* 1.00 0.97   EGFR 53.8* 56.4* 58.5*   GLUCOSE 133* 136* 130*   CALCIUM 9.6 9.7 10.0   MAGNESIUM 2.0 2.0 2.0   PHOSPHORUS 4.4 4.2  --    TSH  --   --  0.661         Lab 04/18/23  0751 04/17/23  0424 04/16/23  1055   TOTAL PROTEIN 6.3 6.0 6.4   ALBUMIN 4.5 4.2 4.4   GLOBULIN 1.8 1.8 2.0   ALT (SGPT) 13 12 12   AST (SGOT) 16 13 13   BILIRUBIN 0.7 0.6 0.8   ALK PHOS 74 67 69         Lab 04/16/23  1314 04/16/23  1055   PROBNP  --  3,856.0*   HSTROP T 24* 25*   PROTIME  --  10.8   INR  --  1.05                 Brief Urine Lab Results  (Last result in the past 365 days)      Color   Clarity   Blood   Leuk Est   Nitrite   Protein   CREAT   Urine HCG        09/21/22 0118 Yellow   Clear   Negative   Trace   Negative   Negative               Microbiology Results (last 10 days)     ** No results found for the last 240 hours. **          XR Chest 1 View    Result Date: 4/16/2023  Impression: Impression: No active disease Electronically Signed: Jaxson Alvarez  4/16/2023 11:24 AM EDT  Workstation ID: [id number]              Results for orders placed during the hospital encounter of 05/08/22    Adult  Transthoracic Echo Complete W/ Cont if Necessary Per Protocol    Interpretation Summary  · Estimated left ventricular EF = 60% Left ventricular systolic function is normal.    Indications  Heart failure    Technically satisfactory study.  Mitral valve is structurally normal.  Mitral annular calcification.  Mild mitral regurgitation is present.  Tricuspid valve is structurally normal.  Aortic valve is thickened with mild aortic valve stenosis.  Pulmonic valve could not be well visualized.  No evidence for tricuspid or aortic regurgitation is seen by Doppler study.  Left atrium is enlarged.  Right atrium is normal in size.  Left ventricle is normal in size and contractility with ejection fraction of 60%.  Concentric left ventricle hypertrophy.  Right ventricle is normal in size.  Atrial septum is intact.  Aorta is normal.  No pericardial effusion or intracardiac thrombus is seen.    Impression  Mild mitral annular calcification.  Mild aortic valve stenosis.  Mild mitral regurgitation.  Left atrium is enlarged.  Left ventricle is hypertrophic.  Left ventricular diastolic dysfunction.  Left ventricular contractility is normal with ejection fraction of 60%.      Labs Pending at Discharge:      Procedures Performed           Consults:   Consults     Date and Time Order Name Status Description    4/16/2023  1:22 PM Cardiology (on-call MD unless specified) Completed     4/16/2023 12:53 PM Hospitalist (on-call MD unless specified)      4/16/2023 10:57 AM Cardiology (on-call MD unless specified) Completed             Discharge Details        Discharge Medications      New Medications      Instructions Start Date   midodrine 5 MG tablet  Commonly known as: PROAMATINE   5 mg, Oral, 3 Times Daily Before Meals         Continue These Medications      Instructions Start Date   acetaminophen 650 MG 8 hr tablet  Commonly known as: TYLENOL   650 mg, Oral, 2 Times Daily PRN      allopurinol 300 MG tablet  Commonly known as: Zyloprim    300 mg, Oral, Daily      amiodarone 200 MG tablet  Commonly known as: PACERONE   100 mg, Oral, Daily      apixaban 5 MG tablet tablet  Commonly known as: ELIQUIS   5 mg, Oral, Every 12 Hours Scheduled      BLOOD SUGAR BALANCE PO   2 tablets, Oral, Daily      ferrous sulfate 325 (65 FE) MG tablet   325 mg, Oral, Daily With Breakfast      furosemide 40 MG tablet  Commonly known as: LASIX   40 mg, Oral, Daily      GLUCOSAMINE CHONDR 1500 COMPLX PO   2 tablets, Oral, Daily      lisinopril 5 MG tablet  Commonly known as: PRINIVIL,ZESTRIL   5 mg, Oral, Daily      Melatonin 10 MG tablet   1 tablet, Oral, Nightly      metFORMIN  MG 24 hr tablet  Commonly known as: Glucophage XR   500 mg, Oral, 2 Times Daily      multivitamin with minerals tablet tablet   1 tablet, Oral, Daily, Liver support, otc      potassium chloride 10 MEQ CR tablet   10 mEq, Oral, 2 Times Daily      PROBIOTIC PO   1 capsule, Oral, Daily      VITAMIN D (CHOLECALCIFEROL) PO   5,000 Units, Oral, Daily             No Known Allergies      Discharge Disposition:   Home or Self Care    Diet:  Hospital:  Diet Order   Procedures   • Diet: Diabetic Diets; Consistent Carbohydrate; Texture: Regular Texture (IDDSI 7); Fluid Consistency: Thin (IDDSI 0)         Discharge Activity:         CODE STATUS:  Code Status and Medical Interventions:   Ordered at: 04/16/23 1331     Level Of Support Discussed With:    Patient     Code Status (Patient has no pulse and is not breathing):    CPR (Attempt to Resuscitate)     Medical Interventions (Patient has pulse or is breathing):    Full Support         Future Appointments   Date Time Provider Department Center   6/1/2023  1:00 PM Sarita Esparza APRN [facility]   6/16/2023  9:30 AM Howard Deutsch MD [facility]   7/17/2023 10:30 AM NURSE/MA [facility]   7/24/2023 10:30 AM Sarita Esparza APRN [facility]           Time spent on Discharge including face to face service:  45 minutes    This  patient has been examined wearing appropriate Personal Protective Equipment and discussed with cardiology. 04/18/23      Signature: Rjei Howe MD

## 2023-04-18 NOTE — PLAN OF CARE
Problem: Diabetes Comorbidity  Goal: Blood Glucose Level Within Targeted Range  Outcome: Ongoing, Progressing  Intervention: Monitor and Manage Glycemia  Recent Flowsheet Documentation  Taken 4/18/2023 0800 by Sarah Heart RN  Glycemic Management: blood glucose monitored     Problem: Hypertension Comorbidity  Goal: Blood Pressure in Desired Range  Outcome: Ongoing, Progressing  Intervention: Maintain Blood Pressure Management  Recent Flowsheet Documentation  Taken 4/18/2023 0800 by Sarah Heart RN  Medication Review/Management: medications reviewed     Problem: Dysrhythmia  Goal: Normalized Cardiac Rhythm  Outcome: Ongoing, Progressing  Intervention: Monitor and Manage Cardiac Rhythm Effect  Recent Flowsheet Documentation  Taken 4/18/2023 0800 by Sarah Heart RN  VTE Prevention/Management: (pt up in chair) sequential compression devices off     Problem: Fall Injury Risk  Goal: Absence of Fall and Fall-Related Injury  Outcome: Ongoing, Progressing  Intervention: Identify and Manage Contributors  Recent Flowsheet Documentation  Taken 4/18/2023 0800 by Sarah Heart RN  Medication Review/Management: medications reviewed  Intervention: Promote Injury-Free Environment  Recent Flowsheet Documentation  Taken 4/18/2023 0800 by Sarah Heart RN  Safety Promotion/Fall Prevention:   safety round/check completed   activity supervised   assistive device/personal items within reach   clutter free environment maintained   fall prevention program maintained   gait belt   nonskid shoes/slippers when out of bed   Goal Outcome Evaluation:  Patient up in chair, denies needs at thi time.

## 2023-04-18 NOTE — OUTREACH NOTE
Prep Survey    Flowsheet Row Responses   [facility] patient discharged from? [facility]   Is LACE score < 7 ? No   Eligibility [facility]   Date of Admission 04/16/23   Date of Discharge 04/18/23   Discharge Disposition Home or Self Care   Discharge diagnosis Atrial fibrillation with RVR   Does the patient have one of the following disease processes/diagnoses(primary or secondary)? Other   Does the patient have Home health ordered? No   Is there a DME ordered? No   Prep survey completed? Yes          Joyce VEGA - Registered Nurse

## 2023-04-18 NOTE — PLAN OF CARE
Problem: Adult Inpatient Plan of Care  Goal: Absence of Hospital-Acquired Illness or Injury  Intervention: Identify and Manage Fall Risk  Recent Flowsheet Documentation  Taken 4/18/2023 0010 by Lupe Marin RN  Safety Promotion/Fall Prevention:   assistive device/personal items within reach   clutter free environment maintained   fall prevention program maintained   safety round/check completed   nonskid shoes/slippers when out of bed   lighting adjusted  Taken 4/17/2023 2002 by Lupe Marin RN  Safety Promotion/Fall Prevention:   assistive device/personal items within reach   clutter free environment maintained   lighting adjusted   nonskid shoes/slippers when out of bed   room organization consistent   safety round/check completed  Intervention: Prevent Skin Injury  Recent Flowsheet Documentation  Taken 4/18/2023 0010 by Lupe Marin RN  Body Position: position changed independently  Skin Protection:   adhesive use limited   tubing/devices free from skin contact  Taken 4/17/2023 2002 by Lupe Marin RN  Body Position: position changed independently  Intervention: Prevent and Manage VTE (Venous Thromboembolism) Risk  Recent Flowsheet Documentation  Taken 4/18/2023 0010 by Lupe Marin RN  Range of Motion: active ROM (range of motion) encouraged  Taken 4/17/2023 2002 by Lupe Marin RN  Activity Management:   ambulated in room   back to bed   up to bedside commode  Range of Motion: active ROM (range of motion) encouraged  Intervention: Prevent Infection  Recent Flowsheet Documentation  Taken 4/18/2023 0010 by Lupe Marin RN  Infection Prevention:   equipment surfaces disinfected   single patient room provided   rest/sleep promoted   hand hygiene promoted  Taken 4/17/2023 2002 by Lupe Marin RN  Infection Prevention:   hand hygiene promoted   equipment surfaces disinfected   single patient room provided   rest/sleep promoted  Goal: Optimal Comfort and  Wellbeing  Intervention: Provide Person-Centered Care  Recent Flowsheet Documentation  Taken 4/18/2023 0010 by Lupe Marin, RN  Trust Relationship/Rapport:   care explained   choices provided   thoughts/feelings acknowledged   empathic listening provided   questions answered  Taken 4/17/2023 2002 by Lupe Marin, RN  Trust Relationship/Rapport:   care explained   choices provided   Goal Outcome Evaluation:

## 2023-04-18 NOTE — PROGRESS NOTES
CC--- recurrent symptomatic AF    Sub  Feels better in sinus rhythm with resolution of symptoms  Denies any chest pain or dyspnea  Denies any bleeding diathesis        Past Medical History:   Diagnosis Date   • Atrial fibrillation    • CHF (congestive heart failure)    • Hypertension    • Primary osteoarthritis of both knees 2022     Past Surgical History:   Procedure Laterality Date   • CARDIAC ELECTROPHYSIOLOGY PROCEDURE N/A 2021    Procedure: Ablation atrial flutter;  Surgeon: Antonio Senior MD;  Location: [facility] INVASIVE LOCATION;  Service: Cardiovascular;  Laterality: N/A;   • CATARACT EXTRACTION       Family History   Problem Relation Age of Onset   • No Known Problems Mother    • No Known Problems Father    • Atrial fibrillation Sister    • Diabetes Sister    • No Known Problems Maternal Aunt    • No Known Problems Maternal Uncle    • No Known Problems Paternal Aunt    • No Known Problems Paternal Uncle    • No Known Problems Maternal Grandmother    • No Known Problems Maternal Grandfather    • No Known Problems Paternal Grandmother    • No Known Problems Paternal Grandfather    • Heart attack Neg Hx    • Heart disease Neg Hx    • Heart failure Neg Hx    • Hyperlipidemia Neg Hx    • Hypertension Neg Hx      Social History     Tobacco Use   • Smoking status: Former     Packs/day: 0.50     Years: 12.00     Pack years: 6.00     Types: Cigarettes     Start date:      Quit date:      Years since quittin.3   • Smokeless tobacco: Never   Vaping Use   • Vaping Use: Never used   Substance Use Topics   • Alcohol use: No   • Drug use: Never     Medications Prior to Admission   Medication Sig Dispense Refill Last Dose   • allopurinol (Zyloprim) 300 MG tablet Take 1 tablet by mouth Daily. 90 tablet 1 4/15/2023   • amiodarone (PACERONE) 200 MG tablet Take 0.5 tablets by mouth Daily.   2023   • apixaban (ELIQUIS) 5 MG tablet tablet Take 1 tablet by mouth Every 12 (Twelve) Hours. 180  tablet 1 4/15/2023   • ferrous sulfate 325 (65 FE) MG tablet Take 1 tablet by mouth Daily With Breakfast. 90 tablet 1 4/15/2023   • furosemide (LASIX) 40 MG tablet Take 1 tablet by mouth Daily. 90 tablet 1 4/15/2023   • Glucosamine-Chondroit-Vit C-Mn (GLUCOSAMINE CHONDR 1500 COMPLX PO) Take 2 tablets by mouth Daily.   4/15/2023   • lisinopril (PRINIVIL,ZESTRIL) 5 MG tablet Take 1 tablet by mouth Daily. 90 tablet 1 4/15/2023   • Melatonin 10 MG tablet Take 1 tablet by mouth Every Night.   4/15/2023   • metFORMIN ER (Glucophage XR) 500 MG 24 hr tablet Take 1 tablet by mouth 2 (Two) Times a Day. 180 tablet 1 4/15/2023   • Misc Natural Products (BLOOD SUGAR BALANCE PO) Take 2 tablets by mouth Daily.   4/15/2023   • multivitamin with minerals tablet tablet Take 1 tablet by mouth Daily. Liver support, otc   4/15/2023   • potassium chloride 10 MEQ CR tablet Take 1 tablet by mouth 2 (Two) Times a Day. 180 tablet 1 4/15/2023   • Probiotic Product (PROBIOTIC PO) Take 1 capsule by mouth Daily.   4/15/2023   • VITAMIN D, CHOLECALCIFEROL, PO Take 5,000 Units by mouth Daily.   4/15/2023   • acetaminophen (TYLENOL) 650 MG 8 hr tablet Take 1 tablet by mouth 2 (Two) Times a Day As Needed for Mild Pain.        Allergies:  Patient has no known allergies.  Review of Systems  General: Negative  for fatigue and tiredness  Eyes: No redness  Cardiovascular: No chest pain  Respiratory:   No  shortness of breath  Gastrointestinal: No nausea or vomiting, bleeding  Genitourinary: no hematuria or dysuria  Musculoskeletal: No arthralgia or myalgia  Skin: No rash  Neurologic: No numbness, tingling, syncope  Hematologic/Lymphatic: No abnormal bleeding      Physical Exam    General:      well developed, well nourished, in no acute distress.    Head:      normocephalic and atraumatic.    Eyes:      PERRL/EOM intact, conjunctivae and sclerae clear without nystagmus.    Neck:      no  thyromegaly, trachea central with normal respiratory  effort  Lungs:      clear bilaterally to auscultation.    Heart:       regular rate and rhythm, S1, S2 without murmurs, rubs, or gallops  Skin:      intact without lesions or rashes.    Psych:      alert and cooperative; normal mood and affect; normal attention span and concentration.        Pulse rate is 55 blood pressure 140/70 patient afebrile respiration 12 times a minute          CBC    Results from last 7 days   Lab Units 04/18/23  0751 04/17/23  0424 04/16/23  1055   WBC 10*3/mm3 6.60 6.40 9.10   HEMOGLOBIN g/dL 10.2* 9.9* 10.9*   PLATELETS 10*3/mm3 337 307 363     BMP   Results from last 7 days   Lab Units 04/18/23  0751 04/17/23  0424 04/16/23  1055   SODIUM mmol/L 140 144 142   POTASSIUM mmol/L 4.2 4.3 5.1   CHLORIDE mmol/L 103 108* 108*   CO2 mmol/L 24.0 25.0 24.0   BUN mg/dL 33* 39* 38*   CREATININE mg/dL 1.04* 1.00 0.97   GLUCOSE mg/dL 133* 136* 130*   MAGNESIUM mg/dL 2.0 2.0 2.0   PHOSPHORUS mg/dL 4.4 4.2  --          Assessment and plan    Recurrent atrial fibrillation with prior mitral valve flutter ablation and right atrial flutter ablation currently on amiodarone.  IV amiodarone converted the patient to sinus rhythm and currently transition to oral amiodarone.  Patient has high risk of having recurrent arrhythmias and may need a repeat ablation in future which was discussed with the patient.  Essential hypertension well-controlled  Patient has a mild anemia which was discussed with the patient which appears chronic and may need outpatient follow-up no history of bleeding diathesis  Knee arthritis awaiting surgery  Follow-up in my office as an outpatient patient can be discharged home I discussed with the patient and the nurse      Electronically signed by Howard Deutsch MD, 04/18/23, 11:29 AM EDT.

## 2023-04-19 ENCOUNTER — TRANSITIONAL CARE MANAGEMENT TELEPHONE ENCOUNTER (OUTPATIENT)
Dept: CALL CENTER | Facility: HOSPITAL | Age: 83
End: 2023-04-19
Payer: MEDICARE

## 2023-04-19 NOTE — OUTREACH NOTE
Call Center TCM Note    Flowsheet Row Responses   [facility] patient discharged from? [facility]   Does the patient have one of the following disease processes/diagnoses(primary or secondary)? Other   TCM attempt successful? Yes   Call start time 1515   Call end time 1517   Discharge diagnosis Atrial fibrillation with RVR   Person spoke with today (if not patient) and relationship pt   Meds reviewed with patient/caregiver? Yes   Is the patient having any side effects they believe may be caused by any medication additions or changes? No   Does the patient have all medications ordered at discharge? Yes   Is the patient taking all medications as directed (includes completed medication regime)? Yes   Comments Cardiologist fu appt on 6/16/23 at 9:30 AM   Does the patient have an appointment with their PCP within 7 days of discharge? No appointments available   Nursing Interventions Routed TCM call to PCP office   Psychosocial issues? No   Did the patient receive a copy of their discharge instructions? Yes   Nursing interventions Reviewed instructions with patient   What is the patient's perception of their health status since discharge? Improving   Is the patient/caregiver able to teach back signs and symptoms related to disease process for when to call PCP? Yes   Is the patient/caregiver able to teach back signs and symptoms related to disease process for when to call 911? Yes   Is the patient/caregiver able to teach back the hierarchy of who to call/visit for symptoms/problems? PCP, Specialist, Home health nurse, Urgent Care, ED, 911 Yes   If the patient is a current smoker, are they able to teach back resources for cessation? Not a smoker   TCM call completed? Yes   Wrap up additional comments Pt states she is doing better, and denies any irregular heartbeats at this time. Reviewed AVS/medications with pt. RN will send a message to PCP office to help pt make a hospital fu appt as there are no appts available that  satisfy TCM guidelines.   Call end time 1517   Would this patient benefit from a Referral to [facility] Social Work? No   Is the patient interested in additional calls from an ambulatory ?  NOTE:  applies to high risk patients requiring additional follow-up. No          Angelina Alfonso RN    4/19/2023, 15:19 EDT

## 2023-04-19 NOTE — CASE MANAGEMENT/SOCIAL WORK
Case Management Discharge Note      Final Note: Routine Home         Transportation Services  Private: Car    Final Discharge Disposition Code: 01 - home or self-care

## 2023-04-20 LAB — QT INTERVAL: 473 MS

## 2023-04-20 NOTE — PROGRESS NOTES
Offered pt hospital follow up appt, pt declined at this time and says she will see us at her next scheduled appt.

## 2023-05-11 DIAGNOSIS — I10 ESSENTIAL HYPERTENSION: ICD-10-CM

## 2023-05-11 RX ORDER — POTASSIUM CHLORIDE 750 MG/1
10 TABLET, FILM COATED, EXTENDED RELEASE ORAL 2 TIMES DAILY
Qty: 180 TABLET | Refills: 0 | Status: SHIPPED | OUTPATIENT
Start: 2023-05-11

## 2023-05-11 NOTE — TELEPHONE ENCOUNTER
"Caller: Laure Leung \"Addie\"    Relationship: Self    Best call back number: [phone]    Requested Prescriptions:   Requested Prescriptions     Pending Prescriptions Disp Refills   • potassium chloride 10 MEQ CR tablet 180 tablet 1     Sig: Take 1 tablet by mouth 2 (Two) Times a Day.        Pharmacy where request should be sent: [facility] [place] [address] [address] [facility] [phone] [facility] [phone] FX     Last office visit with prescribing clinician: 2/20/2023   Last telemedicine visit with prescribing clinician: 2/20/2023   Next office visit with prescribing clinician: 6/16/2023       Does the patient have less than a 3 day supply:  [] Yes  [x] No    Would you like a call back once the refill request has been completed: [x] Yes [] No    If the office needs to give you a call back, can they leave a voicemail: [x] Yes [] No    Marilee Crook Rep   05/11/23 09:49 EDT       "

## 2023-05-25 ENCOUNTER — APPOINTMENT (OUTPATIENT)
Dept: GENERAL RADIOLOGY | Facility: HOSPITAL | Age: 83
End: 2023-05-25
Payer: MEDICARE

## 2023-05-25 ENCOUNTER — HOSPITAL ENCOUNTER (EMERGENCY)
Facility: HOSPITAL | Age: 83
Discharge: HOME | End: 2023-05-25
Attending: EMERGENCY MEDICINE
Payer: MEDICARE

## 2023-05-25 ENCOUNTER — TELEPHONE (OUTPATIENT)
Dept: CARDIOLOGY | Facility: CLINIC | Age: 83
End: 2023-05-25

## 2023-05-25 VITALS
WEIGHT: 190 LBS | HEART RATE: 100 BPM | HEIGHT: 64 IN | BODY MASS INDEX: 32.44 KG/M2 | RESPIRATION RATE: 15 BRPM | SYSTOLIC BLOOD PRESSURE: 158 MMHG | DIASTOLIC BLOOD PRESSURE: 90 MMHG | TEMPERATURE: 98 F | OXYGEN SATURATION: 90 %

## 2023-05-25 DIAGNOSIS — I48.91 ATRIAL FIBRILLATION WITH RAPID VENTRICULAR RESPONSE: Primary | ICD-10-CM

## 2023-05-25 LAB
ALBUMIN SERPL-MCNC: 4.5 G/DL (ref 3.5–5.2)
ALBUMIN/GLOB SERPL: 2.1 G/DL
ALP SERPL-CCNC: 76 U/L (ref 39–117)
ALT SERPL W P-5'-P-CCNC: 13 U/L (ref 1–33)
ANION GAP SERPL CALCULATED.3IONS-SCNC: 13 MMOL/L (ref 5–15)
AST SERPL-CCNC: 16 U/L (ref 1–32)
BASOPHILS # BLD AUTO: 0.1 10*3/MM3 (ref 0–0.2)
BASOPHILS NFR BLD AUTO: 1.8 % (ref 0–1.5)
BILIRUB SERPL-MCNC: 1.1 MG/DL (ref 0–1.2)
BUN SERPL-MCNC: 26 MG/DL (ref 8–23)
BUN/CREAT SERPL: 22 (ref 7–25)
CALCIUM SPEC-SCNC: 10 MG/DL (ref 8.6–10.5)
CHLORIDE SERPL-SCNC: 102 MMOL/L (ref 98–107)
CO2 SERPL-SCNC: 24 MMOL/L (ref 22–29)
CREAT SERPL-MCNC: 1.18 MG/DL (ref 0.57–1)
DEPRECATED RDW RBC AUTO: 53.8 FL (ref 37–54)
EGFRCR SERPLBLD CKD-EPI 2021: 46.2 ML/MIN/1.73
EOSINOPHIL # BLD AUTO: 0 10*3/MM3 (ref 0–0.4)
EOSINOPHIL NFR BLD AUTO: 0.5 % (ref 0.3–6.2)
ERYTHROCYTE [DISTWIDTH] IN BLOOD BY AUTOMATED COUNT: 15.6 % (ref 12.3–15.4)
GLOBULIN UR ELPH-MCNC: 2.1 GM/DL
GLUCOSE SERPL-MCNC: 198 MG/DL (ref 65–99)
HCT VFR BLD AUTO: 31.6 % (ref 34–46.6)
HGB BLD-MCNC: 10.5 G/DL (ref 12–15.9)
LYMPHOCYTES # BLD AUTO: 0.8 10*3/MM3 (ref 0.7–3.1)
LYMPHOCYTES NFR BLD AUTO: 10.6 % (ref 19.6–45.3)
MAGNESIUM SERPL-MCNC: 2.2 MG/DL (ref 1.6–2.4)
MCH RBC QN AUTO: 32.7 PG (ref 26.6–33)
MCHC RBC AUTO-ENTMCNC: 33.2 G/DL (ref 31.5–35.7)
MCV RBC AUTO: 98.7 FL (ref 79–97)
MONOCYTES # BLD AUTO: 0.7 10*3/MM3 (ref 0.1–0.9)
MONOCYTES NFR BLD AUTO: 9.3 % (ref 5–12)
NEUTROPHILS NFR BLD AUTO: 5.6 10*3/MM3 (ref 1.7–7)
NEUTROPHILS NFR BLD AUTO: 77.8 % (ref 42.7–76)
NRBC BLD AUTO-RTO: 0.3 /100 WBC (ref 0–0.2)
PLATELET # BLD AUTO: 392 10*3/MM3 (ref 140–450)
PMV BLD AUTO: 8.4 FL (ref 6–12)
POTASSIUM SERPL-SCNC: 4.8 MMOL/L (ref 3.5–5.2)
PROT SERPL-MCNC: 6.6 G/DL (ref 6–8.5)
QT INTERVAL: 307 MS
QT INTERVAL: 368 MS
RBC # BLD AUTO: 3.2 10*6/MM3 (ref 3.77–5.28)
SODIUM SERPL-SCNC: 139 MMOL/L (ref 136–145)
TROPONIN T SERPL HS-MCNC: 26 NG/L
TSH SERPL DL<=0.05 MIU/L-ACNC: 0.58 UIU/ML (ref 0.27–4.2)
WBC NRBC COR # BLD: 7.2 10*3/MM3 (ref 3.4–10.8)

## 2023-05-25 PROCEDURE — 99284 EMERGENCY DEPT VISIT MOD MDM: CPT

## 2023-05-25 PROCEDURE — 93005 ELECTROCARDIOGRAM TRACING: CPT | Performed by: EMERGENCY MEDICINE

## 2023-05-25 PROCEDURE — 80053 COMPREHEN METABOLIC PANEL: CPT | Performed by: EMERGENCY MEDICINE

## 2023-05-25 PROCEDURE — 71045 X-RAY EXAM CHEST 1 VIEW: CPT

## 2023-05-25 PROCEDURE — 93005 ELECTROCARDIOGRAM TRACING: CPT

## 2023-05-25 PROCEDURE — 96365 THER/PROPH/DIAG IV INF INIT: CPT

## 2023-05-25 PROCEDURE — 84484 ASSAY OF TROPONIN QUANT: CPT | Performed by: EMERGENCY MEDICINE

## 2023-05-25 PROCEDURE — 85025 COMPLETE CBC W/AUTO DIFF WBC: CPT | Performed by: EMERGENCY MEDICINE

## 2023-05-25 PROCEDURE — 84443 ASSAY THYROID STIM HORMONE: CPT | Performed by: EMERGENCY MEDICINE

## 2023-05-25 PROCEDURE — 83735 ASSAY OF MAGNESIUM: CPT | Performed by: EMERGENCY MEDICINE

## 2023-05-25 RX ORDER — DILTIAZEM HCL/D5W 125 MG/125
5-15 PLASTIC BAG, INJECTION (ML) INTRAVENOUS CONTINUOUS
Status: DISCONTINUED | OUTPATIENT
Start: 2023-05-25 | End: 2023-05-25 | Stop reason: HOSPADM

## 2023-05-25 RX ORDER — SODIUM CHLORIDE 0.9 % (FLUSH) 0.9 %
10 SYRINGE (ML) INJECTION AS NEEDED
Status: DISCONTINUED | OUTPATIENT
Start: 2023-05-25 | End: 2023-05-25 | Stop reason: HOSPADM

## 2023-05-25 RX ADMIN — Medication 5 MG/HR: at 12:07

## 2023-05-25 NOTE — TELEPHONE ENCOUNTER
Caller: ROMELIA    Relationship: SELF    Best call back number:[phone]    What is the best time to reach you: ANY      What was the call regarding:   UNABLE TO TO WARM TRANSFER-PT STATES HER HEART RATE HAS BEEN AROUND 160 SINCE 5/24/23.  SHE REPORTS NO CHEST PAIN, ONLY A FEELING OF ANXIETY.        Do you require a callback: YES,SHE WANTED TO TALK TO DR. MORRISSEY ABOUT WHAT TO DO

## 2023-05-25 NOTE — DISCHARGE INSTRUCTIONS
Follow-up with your cardiologist this week.  Return for shortness of breath, increased heart racing or any other concerns.

## 2023-05-25 NOTE — ED PROVIDER NOTES
Subjective   History of Present Illness  83-year-old female describes heart racing palpitations and some lightheadedness after feeling like her A-fib returned at around 6 PM yesterday.  She reports no chest pain or fevers or chills or cough.  Review of Systems    Past Medical History:   Diagnosis Date   • Atrial fibrillation    • CHF (congestive heart failure)    • Hypertension    • Primary osteoarthritis of both knees 2022       No Known Allergies    Past Surgical History:   Procedure Laterality Date   • CARDIAC ELECTROPHYSIOLOGY PROCEDURE N/A 2021    Procedure: Ablation atrial flutter;  Surgeon: Antonio Senior MD;  Location: [facility] INVASIVE LOCATION;  Service: Cardiovascular;  Laterality: N/A;   • CATARACT EXTRACTION         Family History   Problem Relation Age of Onset   • No Known Problems Mother    • No Known Problems Father    • Atrial fibrillation Sister    • Diabetes Sister    • No Known Problems Maternal Aunt    • No Known Problems Maternal Uncle    • No Known Problems Paternal Aunt    • No Known Problems Paternal Uncle    • No Known Problems Maternal Grandmother    • No Known Problems Maternal Grandfather    • No Known Problems Paternal Grandmother    • No Known Problems Paternal Grandfather    • Heart attack Neg Hx    • Heart disease Neg Hx    • Heart failure Neg Hx    • Hyperlipidemia Neg Hx    • Hypertension Neg Hx        Social History     Socioeconomic History   • Marital status:    Tobacco Use   • Smoking status: Former     Packs/day: 0.50     Years: 12.00     Pack years: 6.00     Types: Cigarettes     Start date:      Quit date:      Years since quittin.4   • Smokeless tobacco: Never   Vaping Use   • Vaping Use: Never used   Substance and Sexual Activity   • Alcohol use: No   • Drug use: Never   • Sexual activity: Defer       Prior to Admission medications    Medication Sig Start Date End Date Taking? Authorizing Provider   acetaminophen (TYLENOL) 650 MG 8  "hr tablet Take 1 tablet by mouth 2 (Two) Times a Day As Needed for Mild Pain.    Sarah Dasilva MD   allopurinol (Zyloprim) 300 MG tablet Take 1 tablet by mouth Daily. 4/10/23   Sarita Esparza APRN   amiodarone (PACERONE) 200 MG tablet Take 0.5 tablets by mouth Daily. 1/17/23   Winter Krishna APRN   apixaban (ELIQUIS) 5 MG tablet tablet Take 1 tablet by mouth Every 12 (Twelve) Hours. 3/30/22   Antonio Senior MD   ferrous sulfate 325 (65 FE) MG tablet Take 1 tablet by mouth Daily With Breakfast. 2/3/23   Sarita Esparza APRN   furosemide (LASIX) 40 MG tablet Take 1 tablet by mouth Daily. 4/10/23   Sarita Esparza APRN   Glucosamine-Chondroit-Vit C-Mn (GLUCOSAMINE CHONDR 1500 COMPLX PO) Take 2 tablets by mouth Daily.    Sarah Dasilva MD   lisinopril (PRINIVIL,ZESTRIL) 5 MG tablet Take 1 tablet by mouth Daily. 1/23/23   Sarita Esparza APRN   Melatonin 10 MG tablet Take 1 tablet by mouth Every Night.    Sarah Dasilva MD   metFORMIN ER (Glucophage XR) 500 MG 24 hr tablet Take 1 tablet by mouth 2 (Two) Times a Day. 11/7/22   Sarita Esparza APRN   midodrine (PROAMATINE) 5 MG tablet Take 1 tablet by mouth 3 (Three) Times a Day Before Meals. 4/18/23   Reji Howe MD   Misc Natural Products (BLOOD SUGAR BALANCE PO) Take 2 tablets by mouth Daily.    Sarah Dasilva MD   multivitamin with minerals tablet tablet Take 1 tablet by mouth Daily. Liver support, otc    Sarah Dasilva MD   potassium chloride 10 MEQ CR tablet Take 1 tablet by mouth 2 (Two) Times a Day. 5/11/23   Howard Deutsch MD   Probiotic Product (PROBIOTIC PO) Take 1 capsule by mouth Daily.    Sarah Dasilva MD   VITAMIN D, CHOLECALCIFEROL, PO Take 5,000 Units by mouth Daily.    Sarah Dasilva MD     /90   Pulse 99   Temp 98 °F (36.7 °C) (Oral)   Resp 15   Ht 162.6 cm (64\")   Wt 86.2 kg (190 lb)   SpO2 94%   BMI 32.61 kg/m²       Objective   Physical Exam  General: Well-developed " well-appearing, no acute distress, alert and appropriate  Eyes:  sclera nonicteric  HEENT: Mucous membranes moist, no mucosal swelling  Neck: Supple, no nuchal rigidity,   Respirations: Respirations nonlabored, equal breath sounds bilaterally, clear lungs  Heart rapid rate, irregular rhythm, no murmurs rubs or gallops,   Abdomen soft nontender nondistended, no hepatosplenomegaly, no hernia, no mass, normal bowel sounds, no CVA tenderness  Extremities no clubbing cyanosis or edema, calves are symmetric and nontender  Neuro cranial nerves grossly intact, no focal limb deficits  Psych oriented, pleasant affect  Skin no rash, brisk cap refill  Procedures           ED Course      Results for orders placed or performed during the hospital encounter of 05/25/23   Comprehensive Metabolic Panel    Specimen: Blood   Result Value Ref Range    Glucose 198 (H) 65 - 99 mg/dL    BUN 26 (H) 8 - 23 mg/dL    Creatinine 1.18 (H) 0.57 - 1.00 mg/dL    Sodium 139 136 - 145 mmol/L    Potassium 4.8 3.5 - 5.2 mmol/L    Chloride 102 98 - 107 mmol/L    CO2 24.0 22.0 - 29.0 mmol/L    Calcium 10.0 8.6 - 10.5 mg/dL    Total Protein 6.6 6.0 - 8.5 g/dL    Albumin 4.5 3.5 - 5.2 g/dL    ALT (SGPT) 13 1 - 33 U/L    AST (SGOT) 16 1 - 32 U/L    Alkaline Phosphatase 76 39 - 117 U/L    Total Bilirubin 1.1 0.0 - 1.2 mg/dL    Globulin 2.1 gm/dL    A/G Ratio 2.1 g/dL    BUN/Creatinine Ratio 22.0 7.0 - 25.0    Anion Gap 13.0 5.0 - 15.0 mmol/L    eGFR 46.2 (L) >60.0 mL/min/1.73   Single High Sensitivity Troponin T    Specimen: Blood   Result Value Ref Range    HS Troponin T 26 (H) <10 ng/L   TSH    Specimen: Blood   Result Value Ref Range    TSH 0.578 0.270 - 4.200 uIU/mL   Magnesium    Specimen: Blood   Result Value Ref Range    Magnesium 2.2 1.6 - 2.4 mg/dL   CBC Auto Differential    Specimen: Blood   Result Value Ref Range    WBC 7.20 3.40 - 10.80 10*3/mm3    RBC 3.20 (L) 3.77 - 5.28 10*6/mm3    Hemoglobin 10.5 (L) 12.0 - 15.9 g/dL    Hematocrit 31.6 (L)  34.0 - 46.6 %    MCV 98.7 (H) 79.0 - 97.0 fL    MCH 32.7 26.6 - 33.0 pg    MCHC 33.2 31.5 - 35.7 g/dL    RDW 15.6 (H) 12.3 - 15.4 %    RDW-SD 53.8 37.0 - 54.0 fl    MPV 8.4 6.0 - 12.0 fL    Platelets 392 140 - 450 10*3/mm3    Neutrophil % 77.8 (H) 42.7 - 76.0 %    Lymphocyte % 10.6 (L) 19.6 - 45.3 %    Monocyte % 9.3 5.0 - 12.0 %    Eosinophil % 0.5 0.3 - 6.2 %    Basophil % 1.8 (H) 0.0 - 1.5 %    Neutrophils, Absolute 5.60 1.70 - 7.00 10*3/mm3    Lymphocytes, Absolute 0.80 0.70 - 3.10 10*3/mm3    Monocytes, Absolute 0.70 0.10 - 0.90 10*3/mm3    Eosinophils, Absolute 0.00 0.00 - 0.40 10*3/mm3    Basophils, Absolute 0.10 0.00 - 0.20 10*3/mm3    nRBC 0.3 (H) 0.0 - 0.2 /100 WBC   ECG 12 Lead Rhythm Change   Result Value Ref Range    QT Interval 307 ms   ECG 12 Lead Rhythm Change   Result Value Ref Range    QT Interval 368 ms     XR Chest 1 View    Result Date: 5/25/2023  Impression: No active cardiopulmonary disease Electronically Signed: Steven Skinner  5/25/2023 12:17 PM EDT  Workstation ID: [id number]                                         Medical Decision Making  Patient presents with palpitations with history of chronic A-fib.  EKG does show some atrial fibrillation with rapid ventricular sponsor she was placed on the monitor ordered Cardizem bolus and drip.  She is notably not describing any chest pain or any active dyspnea currently.  The patient responded well to the Cardizem bolus and drip converting to a apparent sinus rhythm with a heart rate around 90 with several PACs.  Cardizem drip was stopped she was observed for another couple of hours and had a heart rate around .  She is asymptomatic.  Notably she is chronically anticoagulated on Eliquis.  Patient be discharged home to follow-up with her cardiologist and was given warning signs for return.  She is agreeable to the plan.    Atrial fibrillation with rapid ventricular response: acute illness or injury  Amount and/or Complexity of Data  Reviewed  Labs: ordered. Decision-making details documented in ED Course.     Details: Troponin indeterminate range, CBC mild anemia, comprehensive metabolic panel reveals some renal insufficiency, I compared to previous labs in the records and this is a chronic finding and not significantly changed  Radiology: ordered and independent interpretation performed.     Details: My independent interpretation of chest x-ray image no apparent acute cardiopulmonary process, no congestive failure  ECG/medicine tests: ordered and independent interpretation performed.     Details: My EKG interpretation there is atrial fibrillation with rapid ventricular response around 140  Repeat EKG shows apparent sinus rhythm with PACs      Risk  Prescription drug management.          Final diagnoses:   Atrial fibrillation with rapid ventricular response       ED Disposition  ED Disposition     ED Disposition   Discharge    Condition   Stable    Comment   --             No follow-up provider specified.       Medication List      No changes were made to your prescriptions during this visit.          Aurelio Soliz MD  05/25/23 [id number]

## 2023-05-26 LAB — QT INTERVAL: 370 MS

## 2023-05-26 NOTE — TELEPHONE ENCOUNTER
PATIENT CALLED BACK TODAY 5.26.23 UPSET SHE STATED THAT NO ONE CALLED HER BACK YESTERDAY ABOUT HER SYMPTOMS AND ADVISED PATIENT THAT IT LOOKED LIKE SHE WAS REACHED BACK YESTERDAY BUT SHE WAS ALREADY IN THE ED. PATIENT STATED SHE CALLED BEFORE 9 AND WAS UPSET SHE WASN'T RESPONDED TO RIGHT AWAY. ADVISED PATIENT THAT DOCTOR WAS IN CLINIC AND UNABLE TO PULL PROVIDER AWAY FROM PATIENT FOR CALL BUT CLINICAL STAFF RESPONDED AS SOON AS THEY WERE ABLE. SHE STATED SHE WANTED APPOINTMENT WITH DR YUNI BARBA, PATIENT IS CURRENTLY SCHEDULED FOR 6.16.23 BUT SHE IS WANTING TO BE SEEN SOONER.

## 2023-05-30 ENCOUNTER — TELEPHONE (OUTPATIENT)
Dept: CARDIOLOGY | Facility: CLINIC | Age: 83
End: 2023-05-30

## 2023-05-30 RX ORDER — METFORMIN HYDROCHLORIDE 500 MG/1
TABLET, EXTENDED RELEASE ORAL
Qty: 180 TABLET | Refills: 1 | Status: SHIPPED | OUTPATIENT
Start: 2023-05-30

## 2023-05-30 NOTE — TELEPHONE ENCOUNTER
"    Caller: Laure Leung \"Addie\"    Relationship: Self    Best call back number: [phone]    Who is your current provider: YUNI AND FLOR TAPIA     Who would you like your new provider to be: DR RUIZ     What are your reasons for transferring care: EXPLAINED TO PATIENT THAT YUNI IS AN EP AND JOSEPH IS A CARDIOLOGIST. SHE IS STILL WANTING TO TRANSITION CARE BECAUSE SHE IS NOT BEING SEEN AS SOON AS SHE WOULD LIKE IN THE [facility] OFFICE AND HER CALLS ARE NOT BEING RETURNED IN THE TIMEFRAME SHE WOULD LIKE. AS WELL AS SHES NOT HAPPY ABOUT SOMETIMES SEEN A APRN INSTEAD OF THE DOCTOR.     Additional notes:        "

## 2023-05-31 ENCOUNTER — OFFICE VISIT (OUTPATIENT)
Dept: CARDIOLOGY | Facility: CLINIC | Age: 83
End: 2023-05-31

## 2023-05-31 ENCOUNTER — TELEPHONE (OUTPATIENT)
Dept: FAMILY MEDICINE CLINIC | Facility: CLINIC | Age: 83
End: 2023-05-31

## 2023-05-31 VITALS
BODY MASS INDEX: 32.44 KG/M2 | HEIGHT: 64 IN | HEART RATE: 79 BPM | DIASTOLIC BLOOD PRESSURE: 65 MMHG | WEIGHT: 190 LBS | SYSTOLIC BLOOD PRESSURE: 100 MMHG | OXYGEN SATURATION: 97 %

## 2023-05-31 DIAGNOSIS — I48.0 PAF (PAROXYSMAL ATRIAL FIBRILLATION): Primary | ICD-10-CM

## 2023-05-31 PROCEDURE — 3078F DIAST BP <80 MM HG: CPT | Performed by: NURSE PRACTITIONER

## 2023-05-31 PROCEDURE — 99213 OFFICE O/P EST LOW 20 MIN: CPT | Performed by: NURSE PRACTITIONER

## 2023-05-31 PROCEDURE — 3074F SYST BP LT 130 MM HG: CPT | Performed by: NURSE PRACTITIONER

## 2023-05-31 PROCEDURE — 93000 ELECTROCARDIOGRAM COMPLETE: CPT | Performed by: NURSE PRACTITIONER

## 2023-05-31 NOTE — PROGRESS NOTES
Cardiology Office Follow Up Visit      Primary Care Provider:  Sarita Esparza APRN    Reason for f/u:     Hospital Follow-Up AF      Subjective       History of Present Illness       Laure Leung is a 83 y.o. female seen in clinic today for hospital follow-up.    Patient has a past cardiac history of atypical atrial flutter in 2019 s/p ablation in 6/2021 with recurrence 9/2022 and conversion to sinus on medical therapy.  SEBASTIAN 2021 showed a preserved EF and moderate MR, mild TR.   Last 2D echo 5/2022 showed EF = 60% with mild AS and mild MR.  PMH includes HTN, OA, and iron deficiency anemia.  Patient presented to the ER 4/2022 with c/o palpitations and found with atrial fibrillation with RVR and acute diastolic CHF.  She converted to sinus with IV amiodarone.  Note she has had bradycardia on amio and beta blockers in the past and required lower dosage.  As per Dr. Deutsch, if patient has recurrence AF, will plan for repeat ablation.      Thereafter patient again presented to the ER 5/25 with atrial flutter RVR and converted with IV cardizem.  She was not admitted this time and presents for hospital follow-up.    Patient reports that since her discharge 5/25 she felt recurrent irregular heart beat she describes an an anxious weak feeling with SOA with both high and low heart rates per her report.  She states that this resolved on Sunday and has not reoccurred.  She denies chest pain or dizziness.  She denies any edema.  She admits easy bruising.  She has a large ecchymosis of her RUE following some yard work, which is resolving.  She denies any nosebleeds, hematuria, or rectal bleeding.        ASSESSMENT/PLAN:      Diagnoses and all orders for this visit:    1. PAF (paroxysmal atrial fibrillation) (Primary)            MEDICAL DECISION MAKING:    Patient continues to have recurrent symptomatic afib/flutter.  EKG shows SR today on PO amio.  She is not on AV darnell blockers given hx bradycardia.  Continue on  anticoagulation with eliquis.  F/U with EP for consideration repeat AF ablation.        RTC: Will keep scheduled f/u appointment with Dr. Deutsch.      Past Medical History:   Diagnosis Date   • Atrial fibrillation    • CHF (congestive heart failure)    • Hypertension    • Primary osteoarthritis of both knees 5/25/2022       Past Surgical History:   Procedure Laterality Date   • CARDIAC ELECTROPHYSIOLOGY PROCEDURE N/A 6/23/2021    Procedure: Ablation atrial flutter;  Surgeon: Antonio Senior MD;  Location: [facility] INVASIVE LOCATION;  Service: Cardiovascular;  Laterality: N/A;   • CATARACT EXTRACTION           Current Outpatient Medications:   •  acetaminophen (TYLENOL) 650 MG 8 hr tablet, Take 1 tablet by mouth 2 (Two) Times a Day As Needed for Mild Pain., Disp: , Rfl:   •  allopurinol (Zyloprim) 300 MG tablet, Take 1 tablet by mouth Daily., Disp: 90 tablet, Rfl: 1  •  amiodarone (PACERONE) 200 MG tablet, Take 0.5 tablets by mouth Daily., Disp: , Rfl:   •  apixaban (ELIQUIS) 5 MG tablet tablet, Take 1 tablet by mouth Every 12 (Twelve) Hours., Disp: 180 tablet, Rfl: 1  •  ferrous sulfate 325 (65 FE) MG tablet, Take 1 tablet by mouth Daily With Breakfast., Disp: 90 tablet, Rfl: 1  •  furosemide (LASIX) 40 MG tablet, Take 1 tablet by mouth Daily., Disp: 90 tablet, Rfl: 1  •  Glucosamine-Chondroit-Vit C-Mn (GLUCOSAMINE CHONDR 1500 COMPLX PO), Take 2 tablets by mouth Daily., Disp: , Rfl:   •  lisinopril (PRINIVIL,ZESTRIL) 5 MG tablet, Take 1 tablet by mouth Daily., Disp: 90 tablet, Rfl: 1  •  Melatonin 10 MG tablet, Take 1 tablet by mouth Every Night., Disp: , Rfl:   •  metFORMIN ER (GLUCOPHAGE-XR) 500 MG 24 hr tablet, TAKE 1 TABLET TWICE A DAY. DISCONTINUE METFORMIN IR   DUE TO SIDE EFFECTS, Disp: 180 tablet, Rfl: 1  •  midodrine (PROAMATINE) 5 MG tablet, Take 1 tablet by mouth 3 (Three) Times a Day Before Meals., Disp: 90 tablet, Rfl: 0  •  Misc Natural Products (BLOOD SUGAR BALANCE PO), Take 2 tablets by  "mouth Daily., Disp: , Rfl:   •  multivitamin with minerals tablet tablet, Take 1 tablet by mouth Daily. Liver support, otc, Disp: , Rfl:   •  potassium chloride 10 MEQ CR tablet, Take 1 tablet by mouth 2 (Two) Times a Day., Disp: 180 tablet, Rfl: 0  •  Probiotic Product (PROBIOTIC PO), Take 1 capsule by mouth Daily., Disp: , Rfl:   •  VITAMIN D, CHOLECALCIFEROL, PO, Take 5,000 Units by mouth Daily., Disp: , Rfl:     Social History     Socioeconomic History   • Marital status:    Tobacco Use   • Smoking status: Former     Packs/day: 0.50     Years: 12.00     Pack years: 6.00     Types: Cigarettes     Start date:      Quit date:      Years since quittin.4   • Smokeless tobacco: Never   Vaping Use   • Vaping Use: Never used   Substance and Sexual Activity   • Alcohol use: No   • Drug use: Never   • Sexual activity: Defer       Family History   Problem Relation Age of Onset   • No Known Problems Mother    • No Known Problems Father    • Atrial fibrillation Sister    • Diabetes Sister    • No Known Problems Maternal Aunt    • No Known Problems Maternal Uncle    • No Known Problems Paternal Aunt    • No Known Problems Paternal Uncle    • No Known Problems Maternal Grandmother    • No Known Problems Maternal Grandfather    • No Known Problems Paternal Grandmother    • No Known Problems Paternal Grandfather    • Heart attack Neg Hx    • Heart disease Neg Hx    • Heart failure Neg Hx    • Hyperlipidemia Neg Hx    • Hypertension Neg Hx        The following portions of the patient's history were reviewed and updated as appropriate: allergies, current medications, past family history, past medical history, past social history, past surgical history and problem list.    ROS  /65   Pulse 79   Ht 162.6 cm (64\")   Wt 86.2 kg (190 lb)   SpO2 97%   BMI 32.61 kg/m² .  Objective     Physical Exam    Physical Exam:  Neuro:  CV:  Resp:  GI:  Ext:  Pysch: AAOx3, no gross deficits  S1S2 RRR, 2-3/6 systolic " murmur  Non-labored, CTA  BS+, abd soft  Pedal pulses palp, no edema, RUE ecchymosis  Calm and cooperative       Procedures    EKG ordered by and reviewed by me in office  EKG shows SR with first degree AVB.  Prior EKG showed ST with bigiminy PACs.

## 2023-06-01 ENCOUNTER — OFFICE VISIT (OUTPATIENT)
Dept: FAMILY MEDICINE CLINIC | Facility: CLINIC | Age: 83
End: 2023-06-01

## 2023-06-01 ENCOUNTER — TELEPHONE (OUTPATIENT)
Dept: FAMILY MEDICINE CLINIC | Facility: CLINIC | Age: 83
End: 2023-06-01

## 2023-06-01 VITALS
DIASTOLIC BLOOD PRESSURE: 70 MMHG | SYSTOLIC BLOOD PRESSURE: 110 MMHG | OXYGEN SATURATION: 97 % | WEIGHT: 191 LBS | BODY MASS INDEX: 32.61 KG/M2 | HEIGHT: 64 IN | HEART RATE: 70 BPM

## 2023-06-01 DIAGNOSIS — M25.562 CHRONIC PAIN OF BOTH KNEES: ICD-10-CM

## 2023-06-01 DIAGNOSIS — I10 ESSENTIAL HYPERTENSION: ICD-10-CM

## 2023-06-01 DIAGNOSIS — I48.11 LONGSTANDING PERSISTENT ATRIAL FIBRILLATION: ICD-10-CM

## 2023-06-01 DIAGNOSIS — M10.9 GOUT, UNSPECIFIED CAUSE, UNSPECIFIED CHRONICITY, UNSPECIFIED SITE: ICD-10-CM

## 2023-06-01 DIAGNOSIS — K59.09 CHRONIC CONSTIPATION: ICD-10-CM

## 2023-06-01 DIAGNOSIS — M25.561 CHRONIC PAIN OF BOTH KNEES: ICD-10-CM

## 2023-06-01 DIAGNOSIS — D50.9 IRON DEFICIENCY ANEMIA, UNSPECIFIED IRON DEFICIENCY ANEMIA TYPE: ICD-10-CM

## 2023-06-01 DIAGNOSIS — E11.9 TYPE 2 DIABETES MELLITUS WITHOUT COMPLICATION, WITHOUT LONG-TERM CURRENT USE OF INSULIN: ICD-10-CM

## 2023-06-01 DIAGNOSIS — M17.11 PRIMARY OSTEOARTHRITIS OF RIGHT KNEE: ICD-10-CM

## 2023-06-01 DIAGNOSIS — Z00.00 MEDICARE ANNUAL WELLNESS VISIT, SUBSEQUENT: Primary | ICD-10-CM

## 2023-06-01 DIAGNOSIS — R58 ECCHYMOSIS: ICD-10-CM

## 2023-06-01 DIAGNOSIS — G89.29 CHRONIC PAIN OF BOTH KNEES: ICD-10-CM

## 2023-06-01 DIAGNOSIS — L71.9 ROSACEA: ICD-10-CM

## 2023-06-01 RX ORDER — LISINOPRIL 5 MG/1
5 TABLET ORAL DAILY
Qty: 90 TABLET | Refills: 1 | Status: SHIPPED | OUTPATIENT
Start: 2023-06-01

## 2023-06-01 NOTE — PROGRESS NOTES
"The ABCs of the Annual Wellness Visit  Subsequent Medicare Wellness Visit    Subjective    {Wrapup  Review (Popup)  Advance Care Planning  Labs  CC  Problem List  Visit Diagnosis  Medications  Result Review  Imaging  [facility]  [facility]  SnapShot  Encounters  Notes  Media  Procedures :23}  Laure Leung is a 83 y.o. female who presents for a Subsequent Medicare Wellness Visit and to follow-up on chronic conditions      Atrial fibrillation, status post ablation 2021 with recurrence 2022 and conversion to sinus on medical therapy.  She presented to [facility] ED on 416 and 525 with recurrence of A-fib with RVR and acute diastolic CHF.  She converted to sinus rhythm with IV amiodarone, she is unable to tolerate amiodarone or beta-blockers due to bradycardia.  Dr. Deutsch has mentioned plan for repeat ablation if has further recurrence of A-fib.  She saw cardiology yesterday has an appointment scheduled with Dr. Deutsch to discuss possible repeat ablation.        The following portions of the patient's history were reviewed and   updated as appropriate: {history reviewed:20406::\"allergies\",\"current medications\",\"past family history\",\"past medical history\",\"past social history\",\"past surgical history\",\"problem list\"}.    Compared to one year ago, the patient feels her physical   health is {better worse same:26586}.    Compared to one year ago, the patient feels her mental   health is {better worse same:55832}.    Recent Hospitalizations:  This patient has had a [facility] admission record on file within the last 365 days.    Current Medical Providers:  Patient Care Team:  Sarita Esparza APRN as PCP - General (Nurse Practitioner)  Nadeen, Antonio Ramires MD as Consulting Physician (Cardiac Electrophysiology)  Napoleon Estrada MD as Surgeon (Orthopedic Surgery)    Outpatient Medications Prior to Visit   Medication Sig Dispense Refill   • acetaminophen (TYLENOL) " 650 MG 8 hr tablet Take 1 tablet by mouth 2 (Two) Times a Day As Needed for Mild Pain.     • allopurinol (Zyloprim) 300 MG tablet Take 1 tablet by mouth Daily. 90 tablet 1   • amiodarone (PACERONE) 200 MG tablet Take 0.5 tablets by mouth Daily.     • apixaban (ELIQUIS) 5 MG tablet tablet Take 1 tablet by mouth Every 12 (Twelve) Hours. 180 tablet 1   • ferrous sulfate 325 (65 FE) MG tablet Take 1 tablet by mouth Daily With Breakfast. 90 tablet 1   • furosemide (LASIX) 40 MG tablet Take 1 tablet by mouth Daily. 90 tablet 1   • Glucosamine-Chondroit-Vit C-Mn (GLUCOSAMINE CHONDR 1500 COMPLX PO) Take 2 tablets by mouth Daily.     • lisinopril (PRINIVIL,ZESTRIL) 5 MG tablet Take 1 tablet by mouth Daily. 90 tablet 1   • Melatonin 10 MG tablet Take 1 tablet by mouth Every Night.     • metFORMIN ER (GLUCOPHAGE-XR) 500 MG 24 hr tablet TAKE 1 TABLET TWICE A DAY. DISCONTINUE METFORMIN IR   DUE TO SIDE EFFECTS 180 tablet 1   • midodrine (PROAMATINE) 5 MG tablet Take 1 tablet by mouth 3 (Three) Times a Day Before Meals. 90 tablet 0   • Misc Natural Products (BLOOD SUGAR BALANCE PO) Take 2 tablets by mouth Daily.     • multivitamin with minerals tablet tablet Take 1 tablet by mouth Daily. Liver support, otc     • potassium chloride 10 MEQ CR tablet Take 1 tablet by mouth 2 (Two) Times a Day. 180 tablet 0   • Probiotic Product (PROBIOTIC PO) Take 1 capsule by mouth Daily.     • VITAMIN D, CHOLECALCIFEROL, PO Take 5,000 Units by mouth Daily.       No facility-administered medications prior to visit.       No opioid medication identified on active medication list. I have reviewed chart for other potential  high risk medication/s and harmful drug interactions in the elderly.          Aspirin is not on active medication list.  {ASPIRIN NOT ON MEDICATION LIST INDICATED/NOT INDICATED:61139}.    Patient Active Problem List   Diagnosis   • Atypical atrial flutter   • Essential hypertension   • Palpitations   • PAF (paroxysmal atrial  "fibrillation)   • DM (diabetes mellitus), type 2   • Gout   • Primary osteoarthritis of both knees   • Morbid obesity   • Obesity (BMI 30.0-34.9)   • Atrial fibrillation with rapid ventricular response   • Atrial flutter with rapid ventricular response   • Preoperative cardiovascular examination     Advance Care Planning   Advance Care Planning     Advance Directive is not on file.  {ACP Discussion, Advance Directive not in EMR:66048}     Objective    There were no vitals filed for this visit.  Estimated body mass index is 32.61 kg/m² as calculated from the following:    Height as of 23: 162.6 cm (64\").    Weight as of 23: 86.2 kg (190 lb).    {BMI is >= 30 and <35. (Class 1 Obesity). The following options were offered after discussion;:[id number]}      Does the patient have evidence of cognitive impairment?   {Yes/No:77074}            HEALTH RISK ASSESSMENT    Smoking Status:  Social History     Tobacco Use   Smoking Status Former   • Packs/day: 0.50   • Years: 12.00   • Pack years: 6.00   • Types: Cigarettes   • Start date:    • Quit date:    • Years since quittin.4   Smokeless Tobacco Never     Alcohol Consumption:  Social History     Substance and Sexual Activity   Alcohol Use No     Fall Risk Screen:    STEADI Fall Risk Assessment was completed, and patient is at MODERATE risk for falls. Assessment completed on:2023    Depression Screenin/1/2023     1:08 PM   PHQ-2/PHQ-9 Depression Screening   Little Interest or Pleasure in Doing Things 0-->not at all   Feeling Down, Depressed or Hopeless 0-->not at all   PHQ-9: Brief Depression Severity Measure Score 0       Health Habits and Functional and Cognitive Screenin/1/2023     1:00 PM   Functional & Cognitive Status   Do you have difficulty preparing food and eating? No   Do you have difficulty bathing yourself, getting dressed or grooming yourself? No   Do you have difficulty using the toilet? No   Do you have difficulty " moving around from place to place? Yes   Do you have trouble with steps or getting out of a bed or a chair? Yes   Do you need help using the phone?  No   Are you deaf or do you have serious difficulty hearing?  No   Do you need help with transportation? No   Do you need help shopping? No   Do you need help preparing meals?  No   Do you need help with housework?  No   Do you need help with laundry? No   Do you need help taking your medications? No   Do you need help managing money? No   Do you ever drive or ride in a car without wearing a seat belt? Yes   Have you felt unusual stress, anger or loneliness in the last month? No   Who do you live with? Alone   If you need help, do you have trouble finding someone available to you? Yes   Have you been bothered in the last four weeks by sexual problems? No   Do you have difficulty concentrating, remembering or making decisions? No     ATTENTION  What is the year: correct  What is the month of the year: correct  What is the day of the week?: correct  What is the date?: correct  MEMORY  Repeat address three times, only score third attempt: Erickson Skinner [address] [place] [address]: 7  HOW MANY ANIMALS DID THE PATIENT NAME  Verbal Fluency -- Animal Names (0-25): 22+  CLOCK DRAWING  Clock Drawing: All Correct  MEMORY RECALL  Tell me what you remember about that name and address we were repeating at the beginnin  ACE TOTAL SCORE  Total ACE Score - <25/30 strongly suggests cognitive impairment; <21/30 almost certainly shows dementia: 29        Age-appropriate Screening Schedule:  Refer to the list below for future screening recommendations based on patient's age, sex and/or medical conditions. Orders for these recommended tests are listed in the plan section. The patient has been provided with a written plan.    Health Maintenance   Topic Date Due   • URINE MICROALBUMIN  Never done   • DXA SCAN  Never done   • ANNUAL WELLNESS VISIT  2023   • ZOSTER VACCINE (1  of 2) 06/01/2023 (Originally 1/14/1990)   • TDAP/TD VACCINES (1 - Tdap) 06/01/2024 (Originally 1/14/1959)   • HEMOGLOBIN A1C  07/16/2023   • INFLUENZA VACCINE  08/01/2023   • DIABETIC EYE EXAM  12/15/2023   • COVID-19 Vaccine  Discontinued   • Pneumococcal Vaccine 65+  Discontinued                  CMS Preventative Services Quick Reference  Risk Factors Identified During Encounter:    {Medicare Wellness Risk Factors:70266}    The above risks/problems have been discussed with the patient.  Pertinent information has been shared with the patient in the After Visit Summary.    Diagnoses and all orders for this visit:    1. Ecchymosis (Primary)    2. Medicare annual wellness visit, subsequent        Follow Up:   Next Medicare Wellness visit to be scheduled in 1 year.      An After Visit Summary and PPPS were made available to the patient.

## 2023-06-01 NOTE — PROGRESS NOTES
The ABCs of the Annual Wellness Visit  Subsequent Medicare Wellness Visit    Chief Complaint   Patient presents with    Medicare Wellness-subsequent    Hospital Follow Up Visit     Pt was in the hospital twice in the last few months for A-Fib    bruise     Pt has very large bruise on her right arm she is concerned about. Pt says she does not how she got the bruise. Pt says she was working in her yard about 2 weeks ago and then about a day later her arm was aching/sore. Pt says a few days later she had a large bruise          Subjective      Laure Leung is a 83 y.o. female who presents for a Subsequent Medicare Wellness Visit and to f/u on chronic conditions      Atrial fibrillation, status post ablation 2021 with recurrence 2022 and conversion to sinus rhythm on medical therapy.  She presented to [facility] ED on 4/16 and 5/25 with recurrence of A-fib with RVR and acute diastolic CHF.  She converted to sinus rhythm with IV amiodarone, she is unable to tolerate high-dose amiodarone or beta-blockers due to bradycardia.  Dr. Deutsch has mentioned plan for repeat ablation if has further recurrence of A-fib. She saw cardiology yesterday has an appointment scheduled with Dr. Deutsch to discuss possible repeat ablation.     She has a large bruise on RUE, she reports developing upper arm ache on Friday, sat/sun, mon developed large bruise.  She denies any known injury or bumping it.     Right knee pain/degeneration, had been planning TKR with Dr. Young, but now on hold following x2 episodes of afib, next available surgery was later this year and got second opinion, planning R TKR with Dr. Candelaria on July 20th. She has been cleared by cardiology and lesly to stop Eliquis 3 days prior to procedure.  She does use gabapentin as needed for severe pain     Gout, stable on allopurinol daily     Diabetes mellitus type II, feels stable on meds, denies any signs/symptoms of hyper/hypoglycemia, blurry vision, polydipsia, polyuria,  nocturia, and unintentional weight loss     HTN, now off metop d/t low rate, BP has been stable. Denies chest pain, headache, shortness of air, palpitations and swelling of extremities.      Chronic anemia, she is not on iron d/t constipation, she does take a multivitamin.  She has hx of prolapsed uterus. She reports occasional dark stool but denies blood in the stool.      She complains of facial redness and dryness, metrogel is over $40, requesting alternative     Here to review labs      The following portions of the patient's history were reviewed and   updated as appropriate: allergies, current medications, past family history, past medical history, past social history, past surgical history and problem list.    Compared to one year ago, the patient feels her physical   health is worse.    Compared to one year ago, the patient feels her mental   health is worse.    Recent Hospitalizations:  This patient has had a [facility] admission record on file within the last 365 days.    Current Medical Providers:  Patient Care Team:  Sarita Esparza APRN as PCP - General (Nurse Practitioner)  Antonio Senior MD as Consulting Physician (Cardiac Electrophysiology)  Napoleon Estrada MD as Surgeon (Orthopedic Surgery)    Outpatient Medications Prior to Visit   Medication Sig Dispense Refill    acetaminophen (TYLENOL) 650 MG 8 hr tablet Take 1 tablet by mouth 2 (Two) Times a Day As Needed for Mild Pain.      allopurinol (Zyloprim) 300 MG tablet Take 1 tablet by mouth Daily. 90 tablet 1    amiodarone (PACERONE) 200 MG tablet Take 0.5 tablets by mouth Daily.      apixaban (ELIQUIS) 5 MG tablet tablet Take 1 tablet by mouth Every 12 (Twelve) Hours. 180 tablet 1    ferrous sulfate 325 (65 FE) MG tablet Take 1 tablet by mouth Daily With Breakfast. 90 tablet 1    furosemide (LASIX) 40 MG tablet Take 1 tablet by mouth Daily. 90 tablet 1    Glucosamine-Chondroit-Vit C-Mn (GLUCOSAMINE CHONDR 1500 COMPLX PO) Take 2  tablets by mouth Daily.      Melatonin 10 MG tablet Take 1 tablet by mouth Every Night.      metFORMIN ER (GLUCOPHAGE-XR) 500 MG 24 hr tablet TAKE 1 TABLET TWICE A DAY. DISCONTINUE METFORMIN IR   DUE TO SIDE EFFECTS 180 tablet 1    midodrine (PROAMATINE) 5 MG tablet Take 1 tablet by mouth 3 (Three) Times a Day Before Meals. 90 tablet 0    Misc Natural Products (BLOOD SUGAR BALANCE PO) Take 2 tablets by mouth Daily.      multivitamin with minerals tablet tablet Take 1 tablet by mouth Daily. Liver support, otc      potassium chloride 10 MEQ CR tablet Take 1 tablet by mouth 2 (Two) Times a Day. 180 tablet 0    Probiotic Product (PROBIOTIC PO) Take 1 capsule by mouth Daily.      VITAMIN D, CHOLECALCIFEROL, PO Take 5,000 Units by mouth Daily.      lisinopril (PRINIVIL,ZESTRIL) 5 MG tablet Take 1 tablet by mouth Daily. 90 tablet 1     No facility-administered medications prior to visit.       No opioid medication identified on active medication list. I have reviewed chart for other potential  high risk medication/s and harmful drug interactions in the elderly.        Aspirin is not on active medication list.  Aspirin use is not indicated based on review of current medical condition/s. Risk of harm outweighs potential benefits.  .    Patient Active Problem List   Diagnosis    Atypical atrial flutter    Essential hypertension    Palpitations    PAF (paroxysmal atrial fibrillation)    DM (diabetes mellitus), type 2    Gout    Primary osteoarthritis of both knees    Morbid obesity    Obesity (BMI 30.0-34.9)    Atrial fibrillation with rapid ventricular response    Atrial flutter with rapid ventricular response    Preoperative cardiovascular examination     Advance Care Planning   Advance Care Planning     Advance Directive is not on file.  ACP discussion was held with the patient during this visit. Patient does not have an advance directive, declines further assistance.     Objective    Vitals:    06/01/23 1316   BP: 110/70  "  BP Location: Left arm   Patient Position: Sitting   Cuff Size: Large Adult   Pulse: 70   SpO2: 97%   Weight: 86.6 kg (191 lb)   Height: 162.6 cm (64.02\")     Estimated body mass index is 32.77 kg/m² as calculated from the following:    Height as of this encounter: 162.6 cm (64.02\").    Weight as of this encounter: 86.6 kg (191 lb).      BMI is >= 30 and <35. (Class 1 Obesity). The following options were offered after discussion;: exercise counseling/recommendations and nutrition counseling/recommendations    Physical Exam  Constitutional:       General: She is not in acute distress.     Appearance: Normal appearance. She is well-developed. She is not ill-appearing or diaphoretic.   HENT:      Head: Normocephalic.   Eyes:      Conjunctiva/sclera: Conjunctivae normal.      Pupils: Pupils are equal, round, and reactive to light.   Neck:      Thyroid: No thyromegaly.      Vascular: No JVD.   Cardiovascular:      Rate and Rhythm: Normal rate and regular rhythm.      Heart sounds: Normal heart sounds. No murmur heard.  Pulmonary:      Effort: Pulmonary effort is normal. No respiratory distress.      Breath sounds: Normal breath sounds. No wheezing or rhonchi.   Abdominal:      General: Bowel sounds are normal. There is no distension.      Palpations: Abdomen is soft.      Tenderness: There is no abdominal tenderness.   Musculoskeletal:         General: Tenderness (R knee pain mod avila rom) present. No swelling. Normal range of motion.      Cervical back: Normal range of motion and neck supple. No tenderness.   Lymphadenopathy:      Cervical: No cervical adenopathy.   Skin:     General: Skin is warm and dry.      Coloration: Skin is not jaundiced.      Findings: Bruising (lateral RUE large brusing from mid upper to below the elbow) present. No erythema or rash.   Neurological:      General: No focal deficit present.      Mental Status: She is alert and oriented to person, place, and time. Mental status is at baseline.     "  Sensory: No sensory deficit.   Psychiatric:         Mood and Affect: Mood normal.         Behavior: Behavior normal.         Thought Content: Thought content normal.         Judgment: Judgment normal.         Does the patient have evidence of cognitive impairment?   No            HEALTH RISK ASSESSMENT    Smoking Status:  Social History     Tobacco Use   Smoking Status Former    Packs/day: 0.50    Years: 12.00    Pack years: 6.00    Types: Cigarettes    Start date:     Quit date:     Years since quittin.4   Smokeless Tobacco Never     Alcohol Consumption:  Social History     Substance and Sexual Activity   Alcohol Use No     Fall Risk Screen:    STEADI Fall Risk Assessment was completed, and patient is at MODERATE risk for falls. Assessment completed on:2023    Depression Screenin/1/2023     1:08 PM   PHQ-2/PHQ-9 Depression Screening   Little Interest or Pleasure in Doing Things 0-->not at all   Feeling Down, Depressed or Hopeless 0-->not at all   PHQ-9: Brief Depression Severity Measure Score 0       Health Habits and Functional and Cognitive Screenin/1/2023     1:00 PM   Functional & Cognitive Status   Do you have difficulty preparing food and eating? No   Do you have difficulty bathing yourself, getting dressed or grooming yourself? No   Do you have difficulty using the toilet? No   Do you have difficulty moving around from place to place? Yes   Do you have trouble with steps or getting out of a bed or a chair? Yes   Do you need help using the phone?  No   Are you deaf or do you have serious difficulty hearing?  No   Do you need help with transportation? No   Do you need help shopping? No   Do you need help preparing meals?  No   Do you need help with housework?  No   Do you need help with laundry? No   Do you need help taking your medications? No   Do you need help managing money? No   Do you ever drive or ride in a car without wearing a seat belt? Yes   Have you felt unusual  stress, anger or loneliness in the last month? No   Who do you live with? Alone   If you need help, do you have trouble finding someone available to you? Yes   Have you been bothered in the last four weeks by sexual problems? No   Do you have difficulty concentrating, remembering or making decisions? No       ATTENTION  What is the year: correct  What is the month of the year: correct  What is the day of the week?: correct  What is the date?: correct  MEMORY  Repeat address three times, only score third attempt: Erickson Skinner [address] [place] [address]: 7  HOW MANY ANIMALS DID THE PATIENT NAME  Verbal Fluency -- Animal Names (0-25): 22+  CLOCK DRAWING  Clock Drawing: All Correct  MEMORY RECALL  Tell me what you remember about that name and address we were repeating at the beginnin  ACE TOTAL SCORE  Total ACE Score - <25/30 strongly suggests cognitive impairment; <21/30 almost certainly shows dementia: 29      Age-appropriate Screening Schedule:  Refer to the list below for future screening recommendations based on patient's age, sex and/or medical conditions. Orders for these recommended tests are listed in the plan section. The patient has been provided with a written plan.    Health Maintenance   Topic Date Due    URINE MICROALBUMIN  Never done    DXA SCAN  Never done    ZOSTER VACCINE (1 of 2) Never done    ANNUAL WELLNESS VISIT  2023    TDAP/TD VACCINES (1 - Tdap) 2024 (Originally 1959)    HEMOGLOBIN A1C  2023    INFLUENZA VACCINE  2023    DIABETIC EYE EXAM  12/15/2023    COVID-19 Vaccine  Discontinued    Pneumococcal Vaccine 65+  Discontinued                  CMS Preventative Services Quick Reference  Risk Factors Identified During Encounter:    Immunizations Discussed/Encouraged: Tdap, Prevnar 20 (Pneumococcal 20-valent conjugate), and Shingrix    The above risks/problems have been discussed with the patient.  Pertinent information has been shared with the patient in  the After Visit Summary.    Diagnoses and all orders for this visit:    1. Medicare annual wellness visit, subsequent (Primary)    2. Ecchymosis  Comments:  Unknown etiology.  Nontender, rest, elevate and recommend heat and rest    3. Longstanding persistent atrial fibrillation  Comments:  pt with x2 episodes of afib with RVR, no med changes were done  can not katia BB  rec increase amio to 200mg daily  cont eliquis, samples ok when needed    4. Rosacea  Comments:  We will try metronidazole cream due to cost. d/c metrogel    5. Type 2 diabetes mellitus without complication, without long-term current use of insulin  Comments:  Stable, continue current medication regimen, A1c as scheduled in july    6. Chronic constipation  Comments:  Unchanged    7. Primary osteoarthritis of right knee  Comments:  Proceed with right TKR July 20, cleared by cardiology    8. Essential hypertension  Comments:  BP stable, continue lisinopril    9. Iron deficiency anemia, unspecified iron deficiency anemia type  Comments:  Stable/unchanged continue iron in the diet    10. Chronic pain of both knees  Comments:  planning R TKR per Dr. Candelaria July 20th    11. Gout, unspecified cause, unspecified chronicity, unspecified site    Other orders  -     lisinopril (PRINIVIL,ZESTRIL) 5 MG tablet; Take 1 tablet by mouth Daily.  Dispense: 90 tablet; Refill: 1  -     metroNIDAZOLE (METROCREAM) 0.75 % cream; Apply 1 application topically to the appropriate area as directed 2 (Two) Times a Day.  Dispense: 45 g; Refill: 0        Recommend pt to get tdap, pneumo 20 and shingrix at pharmacy  Age appropriate preventative counseling provided, including healthy lifestyle modifications and exercise      Follow Up:     Return in about 6 months (around 12/1/2023) for Recheck HTN, DM, afib, DM panel prior to appt, Next scheduled follow up.    Next Medicare Wellness visit to be scheduled in 1 year.      An After Visit Summary and PPPS were made available to the  patient.      EMR Dragon transcription disclaimer:  Part of this note may be an electronic transcription/translation of spoken language to printed text using the Dragon Dictation System.

## 2023-06-08 ENCOUNTER — OFFICE VISIT (OUTPATIENT)
Dept: CARDIOLOGY | Facility: CLINIC | Age: 83
End: 2023-06-08
Payer: MEDICARE

## 2023-06-08 VITALS
BODY MASS INDEX: 33.49 KG/M2 | RESPIRATION RATE: 18 BRPM | WEIGHT: 189 LBS | HEIGHT: 63 IN | DIASTOLIC BLOOD PRESSURE: 59 MMHG | SYSTOLIC BLOOD PRESSURE: 122 MMHG | HEART RATE: 68 BPM

## 2023-06-08 DIAGNOSIS — I48.91 ATRIAL FIBRILLATION WITH RVR: ICD-10-CM

## 2023-06-08 DIAGNOSIS — I48.4 ATYPICAL ATRIAL FLUTTER: ICD-10-CM

## 2023-06-08 RX ORDER — AMIODARONE HYDROCHLORIDE 200 MG/1
200 TABLET ORAL DAILY
Qty: 90 TABLET | Refills: 0 | Status: SHIPPED | OUTPATIENT
Start: 2023-06-08

## 2023-06-13 ENCOUNTER — TELEPHONE (OUTPATIENT)
Dept: FAMILY MEDICINE CLINIC | Facility: CLINIC | Age: 83
End: 2023-06-13
Payer: MEDICARE

## 2023-06-13 NOTE — PROGRESS NOTES
Cardiology Clinic Note  Ketan Alonso MD, PhD    Subjective:     Encounter Date:06/08/2023      Patient ID: Laure Leung is a 83 y.o. female.    Chief Complaint:  Chief Complaint   Patient presents with    Atrial Fibrillation       HPI:    I the pleasure to see this 83-year-old female as a new patient today wishing to switch cardiac care to our clinic.  She has a history of paroxysmal atrial fibrillation flutter, essential hypertension and diabetes.  She had an echo in May 2022 that demonstrated EF of 60%, mild MR and TR, mild aortic valve stenosis with calcified valve, concentric LVH mildly, normal RV with grade 1 diastolic dysfunction.  She appears to have a tumultuous time with recurrent A-fib and flutter despite ablation in the past.  She is currently on amiodarone 100 mg p.o. daily, Eliquis as well as midodrine for some blood pressure support as needed as she has had some marginal blood pressures in the past.  She has a history of atypical flutter in 2019 status post ablation in 2021 with recurrence in 2022, she did also had a history of iron deficiency anemia.  Her most recent hospitalization last June 2022 with A-fib RVR and acute on chronic diastolic CHF she converted to sinus rhythm on IV amiodarone which was complicated by bradycardia and ultimately she was plan for possible repeat ablation should she have recurrent issues and symptoms.  She is somewhat frustrated with her clinical course understandably she denies any anginal chest pain, has no lower extremity edema today or unexplained syncope    Review of systems otherwise negative x14 point review of systems except as mentioned above    Historical data copied forward from previous encounters in EMR including the history, exam, and assessment/plan has been reviewed and is unchanged unless noted otherwise.    Cardiac medicines reviewed with risk, benefits, and necessity of each discussed.    Risk and benefit of cardiac testing reviewed including death  "heart attack stroke pain bleeding infection need for vascular /cardiovascular surgery were discussed and the patient     Objective:         /59 (BP Location: Left arm, Patient Position: Sitting)   Pulse 68   Resp 18   Ht 160 cm (63\")   Wt 85.7 kg (189 lb)   BMI 33.48 kg/m²     Physical Exam  Regular rate and rhythm no rubs gallops heave or lift  2 out of 6 systolic ejection murmur left sternal border  No clubbing cyanosis or significant edema  No carotid bruits or JVD  Soft nontender nondistended  Clear to auscultation  Skin is warm and dry  Intact grossly  Assessment:         Diagnoses and all orders for this visit:    1. Atrial fibrillation with RVR  -     amiodarone (PACERONE) 200 MG tablet; Take 1 tablet by mouth Daily.  Dispense: 90 tablet; Refill: 0  -     Ambulatory Referral to Cardiac Electrophysiology  -     Adult Transthoracic Echo Complete W/ Cont if Necessary Per Protocol; Future    2. Atypical atrial flutter  -     amiodarone (PACERONE) 200 MG tablet; Take 1 tablet by mouth Daily.  Dispense: 90 tablet; Refill: 0  -     Ambulatory Referral to Cardiac Electrophysiology  -     Adult Transthoracic Echo Complete W/ Cont if Necessary Per Protocol; Future    Essential hypertension  Chronic diastolic CHF  Osteoarthritis history  Former smoker quit remotely 1973 continue abstinence    Refer to electrophysiology for evaluation for repeat ablation versus alternative antiarrhythmic therapy  Increase amiodarone to 200 daily for rhythm control strategy  Not on beta-blockers given prior bradycardia  Midodrine as needed for blood pressure support  Stop lisinopril to have more room on blood pressure    No evidence of any exertional chest pain no indication for ischemic evaluation  Most recent 2D echo EF 60% with mild valvular insufficiency and mild aortic valve stenosis, unchanged on exam    See her back in 90 days           The pleasure to be involved in this patient's cardiovascular care.  Please call with " any questions or concerns  Ketan Alonso MD, PhD    Most recent EKG as reviewed and interpreted by me:  Procedures     Most recent echo as reviewed and interpreted by me:  Results for orders placed during the hospital encounter of 05/08/22    Adult Transthoracic Echo Complete W/ Cont if Necessary Per Protocol    Interpretation Summary  · Estimated left ventricular EF = 60% Left ventricular systolic function is normal.    Indications  Heart failure    Technically satisfactory study.  Mitral valve is structurally normal.  Mitral annular calcification.  Mild mitral regurgitation is present.  Tricuspid valve is structurally normal.  Aortic valve is thickened with mild aortic valve stenosis.  Pulmonic valve could not be well visualized.  No evidence for tricuspid or aortic regurgitation is seen by Doppler study.  Left atrium is enlarged.  Right atrium is normal in size.  Left ventricle is normal in size and contractility with ejection fraction of 60%.  Concentric left ventricle hypertrophy.  Right ventricle is normal in size.  Atrial septum is intact.  Aorta is normal.  No pericardial effusion or intracardiac thrombus is seen.    Impression  Mild mitral annular calcification.  Mild aortic valve stenosis.  Mild mitral regurgitation.  Left atrium is enlarged.  Left ventricle is hypertrophic.  Left ventricular diastolic dysfunction.  Left ventricular contractility is normal with ejection fraction of 60%.      Most recent stress test as reviewed and interpreted by me:      Most recent cardiac catheterization as reviewed interpreted by me:  No results found for this or any previous visit.    The following portions of the patient's history were reviewed and updated as appropriate: allergies, current medications, past family history, past medical history, past social history, past surgical history, and problem list.      ROS:  14 point review of systems negative except as mentioned above    Current Outpatient Medications:      acetaminophen (TYLENOL) 650 MG 8 hr tablet, Take 1 tablet by mouth 2 (Two) Times a Day As Needed for Mild Pain., Disp: , Rfl:     allopurinol (Zyloprim) 300 MG tablet, Take 1 tablet by mouth Daily., Disp: 90 tablet, Rfl: 1    amiodarone (PACERONE) 200 MG tablet, Take 1 tablet by mouth Daily., Disp: 90 tablet, Rfl: 0    apixaban (ELIQUIS) 5 MG tablet tablet, Take 1 tablet by mouth Every 12 (Twelve) Hours., Disp: 180 tablet, Rfl: 1    ferrous sulfate 325 (65 FE) MG tablet, Take 1 tablet by mouth Daily With Breakfast., Disp: 90 tablet, Rfl: 1    furosemide (LASIX) 40 MG tablet, Take 1 tablet by mouth Daily., Disp: 90 tablet, Rfl: 1    Glucosamine-Chondroit-Vit C-Mn (GLUCOSAMINE CHONDR 1500 COMPLX PO), Take 2 tablets by mouth Daily., Disp: , Rfl:     Melatonin 10 MG tablet, Take 1 tablet by mouth Every Night., Disp: , Rfl:     metFORMIN ER (GLUCOPHAGE-XR) 500 MG 24 hr tablet, TAKE 1 TABLET TWICE A DAY. DISCONTINUE METFORMIN IR   DUE TO SIDE EFFECTS, Disp: 180 tablet, Rfl: 1    Misc Natural Products (BLOOD SUGAR BALANCE PO), Take 2 tablets by mouth Daily., Disp: , Rfl:     multivitamin with minerals tablet tablet, Take 1 tablet by mouth Daily. Liver support, otc, Disp: , Rfl:     potassium chloride 10 MEQ CR tablet, Take 1 tablet by mouth 2 (Two) Times a Day., Disp: 180 tablet, Rfl: 0    Probiotic Product (PROBIOTIC PO), Take 1 capsule by mouth Daily., Disp: , Rfl:     VITAMIN D, CHOLECALCIFEROL, PO, Take 5,000 Units by mouth Daily., Disp: , Rfl:     lisinopril (PRINIVIL,ZESTRIL) 5 MG tablet, Take 1 tablet by mouth Daily. (Patient not taking: Reported on 6/8/2023), Disp: 90 tablet, Rfl: 1    metroNIDAZOLE (METROCREAM) 0.75 % cream, Apply 1 application topically to the appropriate area as directed 2 (Two) Times a Day. (Patient not taking: Reported on 6/8/2023), Disp: 45 g, Rfl: 0    midodrine (PROAMATINE) 5 MG tablet, Take 1 tablet by mouth 3 (Three) Times a Day Before Meals. (Patient not taking: Reported on 6/8/2023),  Disp: 90 tablet, Rfl: 0    Problem List:  Patient Active Problem List   Diagnosis    Atypical atrial flutter    Essential hypertension    Palpitations    PAF (paroxysmal atrial fibrillation)    DM (diabetes mellitus), type 2    Gout    Primary osteoarthritis of both knees    Morbid obesity    Obesity (BMI 30.0-34.9)    Atrial fibrillation with rapid ventricular response    Atrial flutter with rapid ventricular response    Preoperative cardiovascular examination     Past Medical History:  Past Medical History:   Diagnosis Date    Atrial fibrillation     CHF (congestive heart failure)     Hypertension     Primary osteoarthritis of both knees 2022     Past Surgical History:  Past Surgical History:   Procedure Laterality Date    CARDIAC ELECTROPHYSIOLOGY PROCEDURE N/A 2021    Procedure: Ablation atrial flutter;  Surgeon: Antonio Senior MD;  Location: [facility] INVASIVE LOC[facility];  Service: Cardiovascular;  Laterality: N/A;    CATARACT EXTRACTION       Social History:  Social History     Socioeconomic History    Marital status:    Tobacco Use    Smoking status: Former     Packs/day: 0.50     Years: 12.00     Pack years: 6.00     Types: Cigarettes     Start date:      Quit date:      Years since quittin.4    Smokeless tobacco: Never   Vaping Use    Vaping Use: Never used   Substance and Sexual Activity    Alcohol use: No    Drug use: Never    Sexual activity: Defer     Allergies:  No Known Allergies  Immunizations:    There is no immunization history on file for this patient.         In-Office Procedure(s):  No orders to display        ASCVD RIsk Score::  The ASCVD Risk score (Zuhair DK, et al., 2019) failed to calculate for the following reasons:    The 2019 ASCVD risk score is only valid for ages 40 to 79    Imaging:    Results for orders placed during the hospital encounter of 23    XR Chest 1 View    Narrative  XR CHEST 1 VW    Date of Exam: 2023 12:00 PM  EDT    Indication: palpitation    Comparison: 4/16/2023    Findings:  Heart size and pulmonary vasculature are within normal limits. Lungs clear. Costophrenic angle sharp    Impression  Impression:  No active cardiopulmonary disease      Electronically Signed: Steven Pereznes  5/25/2023 12:17 PM EDT  Workstation ID: [id number]               Lab Review:   Admission on 05/25/2023, Discharged on 05/25/2023   Component Date Value    QT Interval 05/25/2023 307     Glucose 05/25/2023 198 (H)     BUN 05/25/2023 26 (H)     Creatinine 05/25/2023 1.18 (H)     Sodium 05/25/2023 139     Potassium 05/25/2023 4.8     Chloride 05/25/2023 102     CO2 05/25/2023 24.0     Calcium 05/25/2023 10.0     Total Protein 05/25/2023 6.6     Albumin 05/25/2023 4.5     ALT (SGPT) 05/25/2023 13     AST (SGOT) 05/25/2023 16     Alkaline Phosphatase 05/25/2023 76     Total Bilirubin 05/25/2023 1.1     Globulin 05/25/2023 2.1     A/G Ratio 05/25/2023 2.1     BUN/Creatinine Ratio 05/25/2023 22.0     Anion Gap 05/25/2023 13.0     eGFR 05/25/2023 46.2 (L)     HS Troponin T 05/25/2023 26 (H)     TSH 05/25/2023 0.578     Magnesium 05/25/2023 2.2     WBC 05/25/2023 7.20     RBC 05/25/2023 3.20 (L)     Hemoglobin 05/25/2023 10.5 (L)     Hematocrit 05/25/2023 31.6 (L)     MCV 05/25/2023 98.7 (H)     MCH 05/25/2023 32.7     MCHC 05/25/2023 33.2     RDW 05/25/2023 15.6 (H)     RDW-SD 05/25/2023 53.8     MPV 05/25/2023 8.4     Platelets 05/25/2023 392     Neutrophil % 05/25/2023 77.8 (H)     Lymphocyte % 05/25/2023 10.6 (L)     Monocyte % 05/25/2023 9.3     Eosinophil % 05/25/2023 0.5     Basophil % 05/25/2023 1.8 (H)     Neutrophils, Absolute 05/25/2023 5.60     Lymphocytes, Absolute 05/25/2023 0.80     Monocytes, Absolute 05/25/2023 0.70     Eosinophils, Absolute 05/25/2023 0.00     Basophils, Absolute 05/25/2023 0.10     nRBC 05/25/2023 0.3 (H)     QT Interval 05/25/2023 368     QT Interval 05/25/2023 370    Admission on 04/16/2023, Discharged on 04/18/2023    Component Date Value    QT Interval 04/16/2023 342     Glucose 04/16/2023 130 (H)     BUN 04/16/2023 38 (H)     Creatinine 04/16/2023 0.97     Sodium 04/16/2023 142     Potassium 04/16/2023 5.1     Chloride 04/16/2023 108 (H)     CO2 04/16/2023 24.0     Calcium 04/16/2023 10.0     Total Protein 04/16/2023 6.4     Albumin 04/16/2023 4.4     ALT (SGPT) 04/16/2023 12     AST (SGOT) 04/16/2023 13     Alkaline Phosphatase 04/16/2023 69     Total Bilirubin 04/16/2023 0.8     Globulin 04/16/2023 2.0     A/G Ratio 04/16/2023 2.2     BUN/Creatinine Ratio 04/16/2023 39.2 (H)     Anion Gap 04/16/2023 10.0     eGFR 04/16/2023 58.5 (L)     Protime 04/16/2023 10.8     INR 04/16/2023 1.05     PTT 04/16/2023 27.4 (L)     proBNP 04/16/2023 3,856.0 (H)     HS Troponin T 04/16/2023 25 (H)     Magnesium 04/16/2023 2.0     TSH 04/16/2023 0.661     WBC 04/16/2023 9.10     RBC 04/16/2023 3.33 (L)     Hemoglobin 04/16/2023 10.9 (L)     Hematocrit 04/16/2023 32.0 (L)     MCV 04/16/2023 96.2     MCH 04/16/2023 32.7     MCHC 04/16/2023 34.0     RDW 04/16/2023 16.0 (H)     RDW-SD 04/16/2023 56.9 (H)     MPV 04/16/2023 8.5     Platelets 04/16/2023 363     Neutrophil % 04/16/2023 69.5     Lymphocyte % 04/16/2023 17.3 (L)     Monocyte % 04/16/2023 10.9     Eosinophil % 04/16/2023 1.0     Basophil % 04/16/2023 1.3     Neutrophils, Absolute 04/16/2023 6.30     Lymphocytes, Absolute 04/16/2023 1.60     Monocytes, Absolute 04/16/2023 1.00 (H)     Eosinophils, Absolute 04/16/2023 0.10     Basophils, Absolute 04/16/2023 0.10     nRBC 04/16/2023 0.2     HS Troponin T 04/16/2023 24 (H)     Troponin T Delta 04/16/2023 -1     Glucose 04/16/2023 232 (H)     Glucose 04/16/2023 113 (H)     Glucose 04/17/2023 136 (H)     BUN 04/17/2023 39 (H)     Creatinine 04/17/2023 1.00     Sodium 04/17/2023 144     Potassium 04/17/2023 4.3     Chloride 04/17/2023 108 (H)     CO2 04/17/2023 25.0     Calcium 04/17/2023 9.7     Total Protein 04/17/2023 6.0     Albumin  04/17/2023 4.2     ALT (SGPT) 04/17/2023 12     AST (SGOT) 04/17/2023 13     Alkaline Phosphatase 04/17/2023 67     Total Bilirubin 04/17/2023 0.6     Globulin 04/17/2023 1.8     A/G Ratio 04/17/2023 2.3     BUN/Creatinine Ratio 04/17/2023 39.0 (H)     Anion Gap 04/17/2023 11.0     eGFR 04/17/2023 56.4 (L)     Magnesium 04/17/2023 2.0     Phosphorus 04/17/2023 4.2     WBC 04/17/2023 6.40     RBC 04/17/2023 3.05 (L)     Hemoglobin 04/17/2023 9.9 (L)     Hematocrit 04/17/2023 29.9 (L)     MCV 04/17/2023 97.9 (H)     MCH 04/17/2023 32.5     MCHC 04/17/2023 33.2     RDW 04/17/2023 15.9 (H)     RDW-SD 04/17/2023 54.7 (H)     MPV 04/17/2023 8.5     Platelets 04/17/2023 307     Neutrophil % 04/17/2023 57.5     Lymphocyte % 04/17/2023 26.2     Monocyte % 04/17/2023 12.0     Eosinophil % 04/17/2023 2.4     Basophil % 04/17/2023 1.9 (H)     Neutrophils, Absolute 04/17/2023 3.70     Lymphocytes, Absolute 04/17/2023 1.70     Monocytes, Absolute 04/17/2023 0.80     Eosinophils, Absolute 04/17/2023 0.20     Basophils, Absolute 04/17/2023 0.10     nRBC 04/17/2023 0.1     Glucose 04/17/2023 138 (H)     QT Interval 04/17/2023 458     Glucose 04/17/2023 131 (H)     Glucose 04/17/2023 150 (H)     Glucose 04/18/2023 133 (H)     BUN 04/18/2023 33 (H)     Creatinine 04/18/2023 1.04 (H)     Sodium 04/18/2023 140     Potassium 04/18/2023 4.2     Chloride 04/18/2023 103     CO2 04/18/2023 24.0     Calcium 04/18/2023 9.6     Total Protein 04/18/2023 6.3     Albumin 04/18/2023 4.5     ALT (SGPT) 04/18/2023 13     AST (SGOT) 04/18/2023 16     Alkaline Phosphatase 04/18/2023 74     Total Bilirubin 04/18/2023 0.7     Globulin 04/18/2023 1.8     A/G Ratio 04/18/2023 2.5     BUN/Creatinine Ratio 04/18/2023 31.7 (H)     Anion Gap 04/18/2023 13.0     eGFR 04/18/2023 53.8 (L)     Magnesium 04/18/2023 2.0     Phosphorus 04/18/2023 4.4     WBC 04/18/2023 6.60     RBC 04/18/2023 3.17 (L)     Hemoglobin 04/18/2023 10.2 (L)     Hematocrit 04/18/2023 30.4  (L)     MCV 04/18/2023 96.0     MCH 04/18/2023 32.3     MCHC 04/18/2023 33.7     RDW 04/18/2023 15.4     RDW-SD 04/18/2023 54.7 (H)     MPV 04/18/2023 9.0     Platelets 04/18/2023 337     Neutrophil % 04/18/2023 64.1     Lymphocyte % 04/18/2023 19.7     Monocyte % 04/18/2023 11.8     Eosinophil % 04/18/2023 2.4     Basophil % 04/18/2023 2.0 (H)     Neutrophils, Absolute 04/18/2023 4.20     Lymphocytes, Absolute 04/18/2023 1.30     Monocytes, Absolute 04/18/2023 0.80     Eosinophils, Absolute 04/18/2023 0.20     Basophils, Absolute 04/18/2023 0.10     nRBC 04/18/2023 0.3 (H)     QT Interval 04/18/2023 473     Glucose 04/18/2023 133 (H)     Glucose 04/18/2023 121 (H)    Clinical Support on 01/16/2023   Component Date Value    WBC 01/16/2023 8.08     RBC 01/16/2023 3.45 (L)     Hemoglobin 01/16/2023 10.8 (L)     Hematocrit 01/16/2023 33.3 (L)     MCV 01/16/2023 96.5     MCH 01/16/2023 31.3     MCHC 01/16/2023 32.4     RDW 01/16/2023 13.5     RDW-SD 01/16/2023 46.9     MPV 01/16/2023 11.1     Platelets 01/16/2023 402     Glucose 01/16/2023 119 (H)     BUN 01/16/2023 16     Creatinine 01/16/2023 0.91     Sodium 01/16/2023 144     Potassium 01/16/2023 4.2     Chloride 01/16/2023 105     CO2 01/16/2023 25.4     Calcium 01/16/2023 9.9     Total Protein 01/16/2023 6.4     Albumin 01/16/2023 4.8     ALT (SGPT) 01/16/2023 14     AST (SGOT) 01/16/2023 14     Alkaline Phosphatase 01/16/2023 78     Total Bilirubin 01/16/2023 0.8     Globulin 01/16/2023 1.6     A/G Ratio 01/16/2023 3.0     BUN/Creatinine Ratio 01/16/2023 17.6     Anion Gap 01/16/2023 13.6     eGFR 01/16/2023 63.1     Total Cholesterol 01/16/2023 214 (H)     Triglycerides 01/16/2023 134     HDL Cholesterol 01/16/2023 45     LDL Cholesterol  01/16/2023 145 (H)     VLDL Cholesterol 01/16/2023 24     LDL/HDL Ratio 01/16/2023 3.16     Hemoglobin A1C 01/16/2023 6.3 (H)      Recent labs reviewed and interpreted for clinical significance and application            Level  of Care:           Ketan Alonso MD  06/13/23  .

## 2023-06-13 NOTE — TELEPHONE ENCOUNTER
"Caller: Laure Leung \"Addie\"    Relationship: Self    Best call back number: [phone]    What medication are you requesting: METRONIDAZOLE GEL     What are your current symptoms: N/A    How long have you been experiencing symptoms: N/A    Have you had these symptoms before:    [x] Yes  [] No    Have you been treated for these symptoms before:   [x] Yes  [] No    If a prescription is needed, what is your preferred pharmacy and phone number: [facility] - [facility] [address] - [phone] [facility] [phone]      Additional notes: PATIENT CALLED AND SAID HER INSURANCE CO-PAY WAS TOO HIGH FOR THE CREAM VERSION OF THIS MEDICATION, SHE IS WANTING TO SEE IF SHE CAN GET THE GEL VERSION SENT TO [facility]   "

## 2023-06-14 ENCOUNTER — PREP FOR SURGERY (OUTPATIENT)
Dept: CARDIOLOGY | Facility: CLINIC | Age: 83
End: 2023-06-14

## 2023-06-14 ENCOUNTER — OFFICE VISIT (OUTPATIENT)
Dept: CARDIOLOGY | Facility: CLINIC | Age: 83
End: 2023-06-14
Payer: MEDICARE

## 2023-06-14 VITALS
BODY MASS INDEX: 33.75 KG/M2 | SYSTOLIC BLOOD PRESSURE: 143 MMHG | WEIGHT: 190.5 LBS | OXYGEN SATURATION: 95 % | HEART RATE: 67 BPM | DIASTOLIC BLOOD PRESSURE: 62 MMHG | HEIGHT: 63 IN

## 2023-06-14 DIAGNOSIS — I48.19 PERSISTENT ATRIAL FIBRILLATION: Primary | ICD-10-CM

## 2023-06-14 DIAGNOSIS — I48.0 PAF (PAROXYSMAL ATRIAL FIBRILLATION): Primary | ICD-10-CM

## 2023-06-14 DIAGNOSIS — I48.4 ATYPICAL ATRIAL FLUTTER: ICD-10-CM

## 2023-06-14 RX ORDER — DIGOXIN 250 MCG
250 TABLET ORAL DAILY PRN
Qty: 30 TABLET | Refills: 11 | Status: SHIPPED | OUTPATIENT
Start: 2023-06-14

## 2023-06-14 RX ORDER — MAGNESIUM 200 MG
TABLET ORAL
COMMUNITY

## 2023-06-15 ENCOUNTER — TELEPHONE (OUTPATIENT)
Dept: CARDIOLOGY | Facility: CLINIC | Age: 83
End: 2023-06-15
Payer: MEDICARE

## 2023-06-15 NOTE — TELEPHONE ENCOUNTER
Pharmacy sent a fax wanting prescription clarification for Lanoxin 0.25mg. pharmacy would like to know if it is okay to fill prescription. There system shows a reaction with Amiodarone 200mg.

## 2023-06-15 NOTE — PROGRESS NOTES
HP      Name: Laure Leung ADMIT: (Not on file)   : 1940  PCP: Sarita Esparza APRN    MRN: [id number] LOS: 0 days   AGE/SEX: 83 y.o. female  ROOM: Room/bed info not found     Chief Complaint   Patient presents with    Consult       Subjective        History of present illness  Laure Leung is an 83-year-old female patient who has history of atrial flutter, previous ablation of atypical left atrial flutter by Dr. Senior on 2021.  Patient has been having issues with recurrent arrhythmia for some time now.  She did have a cardioversion in 2022.  Also in 2023, she was admitted to the hospital for the same and she converted to sinus rhythm with IV amiodarone.  She also has had issues with bradycardia.  Most currently she is on amiodarone 200 once a day along with Eliquis for stroke prevention.      Past Medical History:   Diagnosis Date    Atrial fibrillation     CHF (congestive heart failure)     Hypertension     Primary osteoarthritis of both knees 2022     Past Surgical History:   Procedure Laterality Date    CARDIAC ELECTROPHYSIOLOGY PROCEDURE N/A 2021    Procedure: Ablation atrial flutter;  Surgeon: Antonio Senior MD;  Location: [facility] INVASIVE LOCATION;  Service: Cardiovascular;  Laterality: N/A;    CATARACT EXTRACTION       Family History   Problem Relation Age of Onset    No Known Problems Mother     No Known Problems Father     Atrial fibrillation Sister     Diabetes Sister     No Known Problems Maternal Aunt     No Known Problems Maternal Uncle     No Known Problems Paternal Aunt     No Known Problems Paternal Uncle     No Known Problems Maternal Grandmother     No Known Problems Maternal Grandfather     No Known Problems Paternal Grandmother     No Known Problems Paternal Grandfather     Heart attack Neg Hx     Heart disease Neg Hx     Heart failure Neg Hx     Hyperlipidemia Neg Hx     Hypertension Neg Hx      Social History     Tobacco Use    Smoking  status: Former     Packs/day: 0.50     Years: 12.00     Pack years: 6.00     Types: Cigarettes     Start date:      Quit date:      Years since quittin.4    Smokeless tobacco: Never   Vaping Use    Vaping Use: Never used   Substance Use Topics    Alcohol use: No    Drug use: Never     (Not in a hospital admission)    Allergies:  Patient has no known allergies.    Review of systems    Constitutional: Negative.    Respiratory and cardiovascular: As detailed in HPI section.  Gastrointestinal: Negative for constipation, nausea and vomiting negative for abdominal distention, abdominal pain and diarrhea.   Genitourinary: Negative for difficulty urinating and flank pain.   Musculoskeletal: Negative for arthralgias, joint swelling and myalgias.   Skin: Negative for color change, rash and wound.   Neurological: Negative for dizziness, syncope, weakness and headaches.   Hematological: Negative for adenopathy.   Psychiatric/Behavioral: Negative for confusion.   All other systems reviewed and are negative.    Physical Exam  VITALS REVIEWED    General:      well developed, in no acute distress.    Head:      normocephalic and atraumatic.    Eyes:      PERRL/EOM intact, conjunctiva and sclera clear with out nystagmus.    Neck:      no masses, thyromegaly,  trachea central with normal respiratory effort and PMI displaced laterally  Lungs:      Clear to auscultation bilaterally  Heart:       Regular rhythm  Msk:      no deformity or scoliosis noted of thoracic or lumbar spine.    Pulses:      pulses normal in all 4 extremities.    Extremities:       No lower extremity edema  Neurologic:      no focal deficits.   alert oriented x3  Skin:      intact without lesions or rashes.    Psych:      alert and cooperative; normal mood and affect; normal attention span and concentration.      Result Review :               Pertinent cardiac workup    EKG 2023 sinus rhythm with first-degree AV block  EKG 2023 atrial  fibrillation.  EKG 6/3/2021 atypical atrial flutter.      Procedures        Assessment and Plan      Laure Leung is an 83-year-old female patient who is here today for rhythm management options.  Patient has had atrial fibrillation and atrial flutter for several years.    She did have an ablation by Dr. Ahuja in June 2021, the arrhythmia at that time was atypical atrial flutter, ablation of perimitral flutter with a line between the anterior mitral annulus and anterior scar in the LA.  Also CTI line.  Patient however had multiple recurrences of A-fib requiring cardioversion, also amiodarone.    Currently she is in sinus rhythm.  I had a long discussion with the patient about rhythm management options.  She is interested in having another ablation done.  Meanwhile I will give her a prescription for digoxin to take in the event of tachycardia since she is frustrated and does not want to go to the ER again.  We will schedule her for redo ablation next available.    Diagnoses and all orders for this visit:    1. PAF (paroxysmal atrial fibrillation) (Primary)    2. Atypical atrial flutter    Other orders  -     digoxin (LANOXIN) 250 MCG tablet; Take 1 tablet by mouth Daily As Needed (tachycardia).  Dispense: 30 tablet; Refill: 11           No follow-ups on file.  Patient was given instructions and counseling regarding her condition or for health maintenance advice. Please see specific information pulled into the AVS if appropriate.

## 2023-06-16 NOTE — TELEPHONE ENCOUNTER
Ok to fill the digoxin. The digoxin is as needed basis for tachycardia. Patient to take 1 tablet of digoxin when she feels heart rate increasing. Continue amiodarone.  We will make changes after the ablation.

## 2023-06-19 NOTE — TELEPHONE ENCOUNTER
CALLED THE PHARMACY BACK TO [facility] OF THIS INFORMATION SO THAT THEY CAN FILL THE RX FOR THE PATIENT.

## 2023-06-20 PROBLEM — I48.19 PERSISTENT ATRIAL FIBRILLATION: Status: ACTIVE | Noted: 2023-06-20

## 2023-06-21 ENCOUNTER — TELEPHONE (OUTPATIENT)
Dept: FAMILY MEDICINE CLINIC | Facility: CLINIC | Age: 83
End: 2023-06-21

## 2023-06-21 NOTE — TELEPHONE ENCOUNTER
Sarita previously ok'd pt to have gel form of medication, spoke to pharmacy tech and gave verbal for gel

## 2023-06-21 NOTE — TELEPHONE ENCOUNTER
Pharmacy Name:  [facility]    Pharmacy representative name: JETHRO    Pharmacy representative phone number: [phone]*    What medication are you calling in regards to:     metroNIDAZOLE (METROCREAM) 0.75 % cream     What question does the pharmacy have: JETHRO WITH [facility] CALLING STATING THAT THE PATIENT IS REQUESTING A GEL FORM NOT A CREAM, AND NEEDING TO KNOW IF PROVIDER WILL APPROVE TO BE CHANGED TO GEL FORM.    Who is the provider that prescribed the medication: MIRZA CAR    Additional notes: REQUEST A CALL BACK TO [facility]. USE REF# [id number] WHEN CALLING.

## 2023-08-07 DIAGNOSIS — I10 ESSENTIAL HYPERTENSION: ICD-10-CM

## 2023-08-07 NOTE — TELEPHONE ENCOUNTER
"Caller: Laure Leung \"Addie\"    Relationship: Self    Best call back number: [phone]    Requested Prescriptions:   Requested Prescriptions     Pending Prescriptions Disp Refills    potassium chloride 10 MEQ CR tablet 180 tablet 0     Sig: Take 1 tablet by mouth 2 (Two) Times a Day.        Pharmacy where request should be sent: [facility] - [place] [address] [address] [facility] - [phone] [facility] [phone]      Last office visit with prescribing clinician: 2/20/2023   Last telemedicine visit with prescribing clinician: Visit date not found   Next office visit with prescribing clinician: Visit date not found         Does the patient have less than a 3 day supply:  [x] Yes  [] No    Would you like a call back once the refill request has been completed: [x] Yes [] No    If the office needs to give you a call back, can they leave a voicemail: [x] Yes [] No    Marilee Crook Rep   08/07/23 09:32 EDT       "

## 2023-08-09 ENCOUNTER — LAB (OUTPATIENT)
Dept: LAB | Facility: HOSPITAL | Age: 83
End: 2023-08-09
Payer: MEDICARE

## 2023-08-09 DIAGNOSIS — I48.19 PERSISTENT ATRIAL FIBRILLATION: ICD-10-CM

## 2023-08-09 DIAGNOSIS — I10 ESSENTIAL HYPERTENSION: ICD-10-CM

## 2023-08-09 LAB
ALBUMIN SERPL-MCNC: 4.1 G/DL (ref 3.5–5.2)
ALBUMIN/GLOB SERPL: 2.3 G/DL
ALP SERPL-CCNC: 74 U/L (ref 39–117)
ALT SERPL W P-5'-P-CCNC: 12 U/L (ref 1–33)
ANION GAP SERPL CALCULATED.3IONS-SCNC: 11.5 MMOL/L (ref 5–15)
AST SERPL-CCNC: 14 U/L (ref 1–32)
BASOPHILS # BLD AUTO: 0.08 10*3/MM3 (ref 0–0.2)
BASOPHILS NFR BLD AUTO: 1.5 % (ref 0–1.5)
BILIRUB SERPL-MCNC: 0.9 MG/DL (ref 0–1.2)
BUN SERPL-MCNC: 26 MG/DL (ref 8–23)
BUN/CREAT SERPL: 23.2 (ref 7–25)
CALCIUM SPEC-SCNC: 9.6 MG/DL (ref 8.6–10.5)
CHLORIDE SERPL-SCNC: 104 MMOL/L (ref 98–107)
CO2 SERPL-SCNC: 27.5 MMOL/L (ref 22–29)
CREAT SERPL-MCNC: 1.12 MG/DL (ref 0.57–1)
DEPRECATED RDW RBC AUTO: 50.2 FL (ref 37–54)
EGFRCR SERPLBLD CKD-EPI 2021: 49.2 ML/MIN/1.73
EOSINOPHIL # BLD AUTO: 0.08 10*3/MM3 (ref 0–0.4)
EOSINOPHIL NFR BLD AUTO: 1.5 % (ref 0.3–6.2)
ERYTHROCYTE [DISTWIDTH] IN BLOOD BY AUTOMATED COUNT: 14.1 % (ref 12.3–15.4)
GLOBULIN UR ELPH-MCNC: 1.8 GM/DL
GLUCOSE SERPL-MCNC: 118 MG/DL (ref 65–99)
HCT VFR BLD AUTO: 29 % (ref 34–46.6)
HGB BLD-MCNC: 9.7 G/DL (ref 12–15.9)
IMM GRANULOCYTES # BLD AUTO: 0.02 10*3/MM3 (ref 0–0.05)
IMM GRANULOCYTES NFR BLD AUTO: 0.4 % (ref 0–0.5)
INR PPP: 1.09 (ref 0.93–1.1)
LYMPHOCYTES # BLD AUTO: 1.05 10*3/MM3 (ref 0.7–3.1)
LYMPHOCYTES NFR BLD AUTO: 19.8 % (ref 19.6–45.3)
MAGNESIUM SERPL-MCNC: 2.2 MG/DL (ref 1.6–2.4)
MCH RBC QN AUTO: 33 PG (ref 26.6–33)
MCHC RBC AUTO-ENTMCNC: 33.4 G/DL (ref 31.5–35.7)
MCV RBC AUTO: 98.6 FL (ref 79–97)
MONOCYTES # BLD AUTO: 0.67 10*3/MM3 (ref 0.1–0.9)
MONOCYTES NFR BLD AUTO: 12.6 % (ref 5–12)
NEUTROPHILS NFR BLD AUTO: 3.4 10*3/MM3 (ref 1.7–7)
NEUTROPHILS NFR BLD AUTO: 64.2 % (ref 42.7–76)
NRBC BLD AUTO-RTO: 0.4 /100 WBC (ref 0–0.2)
PLATELET # BLD AUTO: 347 10*3/MM3 (ref 140–450)
PMV BLD AUTO: 10.7 FL (ref 6–12)
POTASSIUM SERPL-SCNC: 4.5 MMOL/L (ref 3.5–5.2)
PROT SERPL-MCNC: 5.9 G/DL (ref 6–8.5)
PROTHROMBIN TIME: 11.6 SECONDS (ref 9.6–11.7)
RBC # BLD AUTO: 2.94 10*6/MM3 (ref 3.77–5.28)
SODIUM SERPL-SCNC: 143 MMOL/L (ref 136–145)
WBC NRBC COR # BLD: 5.3 10*3/MM3 (ref 3.4–10.8)

## 2023-08-09 PROCEDURE — 85025 COMPLETE CBC W/AUTO DIFF WBC: CPT

## 2023-08-09 PROCEDURE — 80053 COMPREHEN METABOLIC PANEL: CPT

## 2023-08-09 PROCEDURE — 85610 PROTHROMBIN TIME: CPT

## 2023-08-09 PROCEDURE — 83735 ASSAY OF MAGNESIUM: CPT

## 2023-08-09 PROCEDURE — 36415 COLL VENOUS BLD VENIPUNCTURE: CPT

## 2023-08-09 RX ORDER — POTASSIUM CHLORIDE 750 MG/1
10 TABLET, FILM COATED, EXTENDED RELEASE ORAL 2 TIMES DAILY
Qty: 180 TABLET | Refills: 0 | OUTPATIENT
Start: 2023-08-09

## 2023-08-09 NOTE — TELEPHONE ENCOUNTER
"    Caller: Laure Leung \"Addie\"    Relationship: Self    Best call back number: [phone]    Requested Prescriptions:   Requested Prescriptions     Pending Prescriptions Disp Refills    potassium chloride 10 MEQ CR tablet 180 tablet 0     Sig: Take 1 tablet by mouth 2 (Two) Times a Day.        Pharmacy where request should be sent: [facility] - [place] [address] [address] [facility] [phone] [facility] [phone]      Last office visit with prescribing clinician: 2/20/2023   Last telemedicine visit with prescribing clinician: Visit date not found   Next office visit with prescribing clinician: Visit date not found     Additional details provided by patient: PATIENT STATED THAT SHE HAD CALLED EARLIER AND REQUESTED A REFILL BUT HAS NOT HEARD ANYTHING. PATIENT HAS 10 DAYS OF MEDICATION.    Does the patient have less than a 3 day supply:  [] Yes  [x] No    Would you like a call back once the refill request has been completed: [x] Yes [] No    If the office needs to give you a call back, can they leave a voicemail: [] Yes [x] No    Marilee Velasquez Rep   08/09/23 08:45 EDT         "

## 2023-08-11 ENCOUNTER — ANESTHESIA (OUTPATIENT)
Dept: CARDIOLOGY | Facility: HOSPITAL | Age: 83
End: 2023-08-11
Payer: MEDICARE

## 2023-08-11 ENCOUNTER — ANESTHESIA EVENT (OUTPATIENT)
Dept: CARDIOLOGY | Facility: HOSPITAL | Age: 83
End: 2023-08-11
Payer: MEDICARE

## 2023-08-11 ENCOUNTER — HOSPITAL ENCOUNTER (OUTPATIENT)
Facility: HOSPITAL | Age: 83
Discharge: HOME | End: 2023-08-12
Attending: INTERNAL MEDICINE | Admitting: INTERNAL MEDICINE
Payer: MEDICARE

## 2023-08-11 DIAGNOSIS — I48.19 PERSISTENT ATRIAL FIBRILLATION: ICD-10-CM

## 2023-08-11 PROBLEM — I48.91 A-FIB: Status: ACTIVE | Noted: 2023-08-11

## 2023-08-11 LAB
ACT BLD: 227 SECONDS (ref 89–137)
ACT BLD: 257 SECONDS (ref 89–137)
ACT BLD: 299 SECONDS (ref 89–137)
ACT BLD: 317 SECONDS (ref 89–137)
ACT BLD: 323 SECONDS (ref 89–137)
GLUCOSE BLDC GLUCOMTR-MCNC: 126 MG/DL (ref 70–105)
GLUCOSE BLDC GLUCOMTR-MCNC: 152 MG/DL (ref 70–105)

## 2023-08-11 PROCEDURE — C1766 INTRO/SHEATH,STRBLE,NON-PEEL: HCPCS | Performed by: INTERNAL MEDICINE

## 2023-08-11 PROCEDURE — 63710000001 APIXABAN 5 MG TABLET: Performed by: INTERNAL MEDICINE

## 2023-08-11 PROCEDURE — C1730 CATH, EP, 19 OR FEW ELECT: HCPCS | Performed by: INTERNAL MEDICINE

## 2023-08-11 PROCEDURE — A9270 NON-COVERED ITEM OR SERVICE: HCPCS | Performed by: INTERNAL MEDICINE

## 2023-08-11 PROCEDURE — 25010000002 ONDANSETRON PER 1 MG: Performed by: NURSE ANESTHETIST, CERTIFIED REGISTERED

## 2023-08-11 PROCEDURE — 63710000001 AMIODARONE 200 MG TABLET: Performed by: INTERNAL MEDICINE

## 2023-08-11 PROCEDURE — 25010000002 FENTANYL CITRATE (PF) 100 MCG/2ML SOLUTION: Performed by: NURSE ANESTHETIST, CERTIFIED REGISTERED

## 2023-08-11 PROCEDURE — 25010000002 SUGAMMADEX 200 MG/2ML SOLUTION: Performed by: NURSE ANESTHETIST, CERTIFIED REGISTERED

## 2023-08-11 PROCEDURE — 93657 TX L/R ATRIAL FIB ADDL: CPT | Performed by: INTERNAL MEDICINE

## 2023-08-11 PROCEDURE — 63710000001 METFORMIN ER 500 MG TABLET SUSTAINED-RELEASE 24 HOUR: Performed by: INTERNAL MEDICINE

## 2023-08-11 PROCEDURE — 25010000002 HEPARIN (PORCINE) PER 1000 UNITS: Performed by: NURSE ANESTHETIST, CERTIFIED REGISTERED

## 2023-08-11 PROCEDURE — 93656 COMPRE EP EVAL ABLTJ ATR FIB: CPT | Performed by: INTERNAL MEDICINE

## 2023-08-11 PROCEDURE — 25010000002 PROTAMINE SULFATE PER 10 MG: Performed by: NURSE ANESTHETIST, CERTIFIED REGISTERED

## 2023-08-11 PROCEDURE — C1769 GUIDE WIRE: HCPCS | Performed by: INTERNAL MEDICINE

## 2023-08-11 PROCEDURE — 25010000002 PHENYLEPHRINE 10 MG/ML SOLUTION: Performed by: NURSE ANESTHETIST, CERTIFIED REGISTERED

## 2023-08-11 PROCEDURE — C1732 CATH, EP, DIAG/ABL, 3D/VECT: HCPCS | Performed by: INTERNAL MEDICINE

## 2023-08-11 PROCEDURE — S0260 H&P FOR SURGERY: HCPCS | Performed by: INTERNAL MEDICINE

## 2023-08-11 PROCEDURE — 85347 COAGULATION TIME ACTIVATED: CPT

## 2023-08-11 PROCEDURE — 63710000001 MELATONIN 5 MG TABLET: Performed by: INTERNAL MEDICINE

## 2023-08-11 PROCEDURE — C1894 INTRO/SHEATH, NON-LASER: HCPCS | Performed by: INTERNAL MEDICINE

## 2023-08-11 PROCEDURE — 25010000002 PROPOFOL 200 MG/20ML EMULSION: Performed by: NURSE ANESTHETIST, CERTIFIED REGISTERED

## 2023-08-11 PROCEDURE — 63710000001 POTASSIUM CHLORIDE 10 MEQ TABLET CONTROLLED-RELEASE: Performed by: INTERNAL MEDICINE

## 2023-08-11 PROCEDURE — 25010000002 HYDROMORPHONE 1 MG/ML SOLUTION: Performed by: NURSE ANESTHETIST, CERTIFIED REGISTERED

## 2023-08-11 PROCEDURE — C1759 CATH, INTRA ECHOCARDIOGRAPHY: HCPCS | Performed by: INTERNAL MEDICINE

## 2023-08-11 PROCEDURE — 63710000001 FUROSEMIDE 40 MG TABLET: Performed by: INTERNAL MEDICINE

## 2023-08-11 PROCEDURE — 25010000002 HEPARIN (PORCINE) 25000-0.45 UT/250ML-% SOLUTION: Performed by: NURSE ANESTHETIST, CERTIFIED REGISTERED

## 2023-08-11 PROCEDURE — 63710000001 METOPROLOL TARTRATE 25 MG TABLET: Performed by: INTERNAL MEDICINE

## 2023-08-11 PROCEDURE — 63710000001 ALLOPURINOL 300 MG TABLET: Performed by: INTERNAL MEDICINE

## 2023-08-11 PROCEDURE — 82948 REAGENT STRIP/BLOOD GLUCOSE: CPT

## 2023-08-11 PROCEDURE — 63710000001 LISINOPRIL 5 MG TABLET: Performed by: INTERNAL MEDICINE

## 2023-08-11 RX ORDER — NITROGLYCERIN 0.4 MG/1
0.4 TABLET SUBLINGUAL
Status: DISCONTINUED | OUTPATIENT
Start: 2023-08-11 | End: 2023-08-12 | Stop reason: HOSPADM

## 2023-08-11 RX ORDER — CHOLECALCIFEROL (VITAMIN D3) 125 MCG
10 CAPSULE ORAL NIGHTLY
Status: DISCONTINUED | OUTPATIENT
Start: 2023-08-11 | End: 2023-08-12 | Stop reason: HOSPADM

## 2023-08-11 RX ORDER — OXYCODONE HYDROCHLORIDE 5 MG/1
5 TABLET ORAL ONCE AS NEEDED
Status: DISCONTINUED | OUTPATIENT
Start: 2023-08-11 | End: 2023-08-11 | Stop reason: HOSPADM

## 2023-08-11 RX ORDER — EPHEDRINE SULFATE 5 MG/ML
5 INJECTION INTRAVENOUS ONCE AS NEEDED
Status: DISCONTINUED | OUTPATIENT
Start: 2023-08-11 | End: 2023-08-11 | Stop reason: HOSPADM

## 2023-08-11 RX ORDER — HEPARIN SODIUM 1000 [USP'U]/ML
INJECTION, SOLUTION INTRAVENOUS; SUBCUTANEOUS AS NEEDED
Status: DISCONTINUED | OUTPATIENT
Start: 2023-08-11 | End: 2023-08-11 | Stop reason: SURG

## 2023-08-11 RX ORDER — NALOXONE HCL 0.4 MG/ML
0.4 VIAL (ML) INJECTION AS NEEDED
Status: DISCONTINUED | OUTPATIENT
Start: 2023-08-11 | End: 2023-08-11 | Stop reason: HOSPADM

## 2023-08-11 RX ORDER — AMIODARONE HYDROCHLORIDE 200 MG/1
200 TABLET ORAL DAILY
Status: DISCONTINUED | OUTPATIENT
Start: 2023-08-11 | End: 2023-08-12 | Stop reason: HOSPADM

## 2023-08-11 RX ORDER — PROPOFOL 10 MG/ML
INJECTION, EMULSION INTRAVENOUS AS NEEDED
Status: DISCONTINUED | OUTPATIENT
Start: 2023-08-11 | End: 2023-08-11 | Stop reason: SURG

## 2023-08-11 RX ORDER — HEPARIN SODIUM 10000 [USP'U]/100ML
INJECTION, SOLUTION INTRAVENOUS CONTINUOUS PRN
Status: DISCONTINUED | OUTPATIENT
Start: 2023-08-11 | End: 2023-08-11 | Stop reason: SURG

## 2023-08-11 RX ORDER — SODIUM CHLORIDE 9 MG/ML
30 INJECTION, SOLUTION INTRAVENOUS CONTINUOUS
Status: DISCONTINUED | OUTPATIENT
Start: 2023-08-11 | End: 2023-08-12 | Stop reason: HOSPADM

## 2023-08-11 RX ORDER — HYDRALAZINE HYDROCHLORIDE 20 MG/ML
5 INJECTION INTRAMUSCULAR; INTRAVENOUS
Status: DISCONTINUED | OUTPATIENT
Start: 2023-08-11 | End: 2023-08-11 | Stop reason: HOSPADM

## 2023-08-11 RX ORDER — LABETALOL HYDROCHLORIDE 5 MG/ML
5 INJECTION, SOLUTION INTRAVENOUS
Status: DISCONTINUED | OUTPATIENT
Start: 2023-08-11 | End: 2023-08-11 | Stop reason: HOSPADM

## 2023-08-11 RX ORDER — DIPHENHYDRAMINE HYDROCHLORIDE 50 MG/ML
12.5 INJECTION INTRAMUSCULAR; INTRAVENOUS ONCE AS NEEDED
Status: DISCONTINUED | OUTPATIENT
Start: 2023-08-11 | End: 2023-08-11 | Stop reason: HOSPADM

## 2023-08-11 RX ORDER — SODIUM CHLORIDE 9 MG/ML
INJECTION, SOLUTION INTRAVENOUS CONTINUOUS PRN
Status: DISCONTINUED | OUTPATIENT
Start: 2023-08-11 | End: 2023-08-11 | Stop reason: SURG

## 2023-08-11 RX ORDER — OXYCODONE HYDROCHLORIDE 5 MG/1
10 TABLET ORAL EVERY 4 HOURS PRN
Status: DISCONTINUED | OUTPATIENT
Start: 2023-08-11 | End: 2023-08-11 | Stop reason: HOSPADM

## 2023-08-11 RX ORDER — LIDOCAINE HYDROCHLORIDE 20 MG/ML
INJECTION, SOLUTION EPIDURAL; INFILTRATION; INTRACAUDAL; PERINEURAL AS NEEDED
Status: DISCONTINUED | OUTPATIENT
Start: 2023-08-11 | End: 2023-08-11 | Stop reason: SURG

## 2023-08-11 RX ORDER — LISINOPRIL 5 MG/1
5 TABLET ORAL DAILY
Status: DISCONTINUED | OUTPATIENT
Start: 2023-08-11 | End: 2023-08-12 | Stop reason: HOSPADM

## 2023-08-11 RX ORDER — PROTAMINE SULFATE 10 MG/ML
INJECTION, SOLUTION INTRAVENOUS AS NEEDED
Status: DISCONTINUED | OUTPATIENT
Start: 2023-08-11 | End: 2023-08-11 | Stop reason: SURG

## 2023-08-11 RX ORDER — IPRATROPIUM BROMIDE AND ALBUTEROL SULFATE 2.5; .5 MG/3ML; MG/3ML
3 SOLUTION RESPIRATORY (INHALATION) ONCE AS NEEDED
Status: DISCONTINUED | OUTPATIENT
Start: 2023-08-11 | End: 2023-08-11 | Stop reason: HOSPADM

## 2023-08-11 RX ORDER — FUROSEMIDE 40 MG/1
40 TABLET ORAL DAILY
Status: DISCONTINUED | OUTPATIENT
Start: 2023-08-11 | End: 2023-08-12 | Stop reason: HOSPADM

## 2023-08-11 RX ORDER — PHENYLEPHRINE HYDROCHLORIDE 10 MG/ML
INJECTION INTRAVENOUS AS NEEDED
Status: DISCONTINUED | OUTPATIENT
Start: 2023-08-11 | End: 2023-08-11 | Stop reason: SURG

## 2023-08-11 RX ORDER — METFORMIN HYDROCHLORIDE 500 MG/1
500 TABLET, EXTENDED RELEASE ORAL 2 TIMES DAILY WITH MEALS
Status: DISCONTINUED | OUTPATIENT
Start: 2023-08-11 | End: 2023-08-12 | Stop reason: HOSPADM

## 2023-08-11 RX ORDER — ONDANSETRON 2 MG/ML
INJECTION INTRAMUSCULAR; INTRAVENOUS AS NEEDED
Status: DISCONTINUED | OUTPATIENT
Start: 2023-08-11 | End: 2023-08-11 | Stop reason: SURG

## 2023-08-11 RX ORDER — EPHEDRINE SULFATE 5 MG/ML
INJECTION INTRAVENOUS AS NEEDED
Status: DISCONTINUED | OUTPATIENT
Start: 2023-08-11 | End: 2023-08-11 | Stop reason: SURG

## 2023-08-11 RX ORDER — ROCURONIUM BROMIDE 10 MG/ML
INJECTION, SOLUTION INTRAVENOUS AS NEEDED
Status: DISCONTINUED | OUTPATIENT
Start: 2023-08-11 | End: 2023-08-11 | Stop reason: SURG

## 2023-08-11 RX ORDER — ALLOPURINOL 300 MG/1
300 TABLET ORAL DAILY
Status: DISCONTINUED | OUTPATIENT
Start: 2023-08-11 | End: 2023-08-12 | Stop reason: HOSPADM

## 2023-08-11 RX ORDER — FLUMAZENIL 0.1 MG/ML
0.2 INJECTION INTRAVENOUS AS NEEDED
Status: DISCONTINUED | OUTPATIENT
Start: 2023-08-11 | End: 2023-08-11 | Stop reason: HOSPADM

## 2023-08-11 RX ORDER — FENTANYL CITRATE 50 UG/ML
INJECTION, SOLUTION INTRAMUSCULAR; INTRAVENOUS AS NEEDED
Status: DISCONTINUED | OUTPATIENT
Start: 2023-08-11 | End: 2023-08-11 | Stop reason: SURG

## 2023-08-11 RX ORDER — DIPHENHYDRAMINE HYDROCHLORIDE 50 MG/ML
12.5 INJECTION INTRAMUSCULAR; INTRAVENOUS
Status: DISCONTINUED | OUTPATIENT
Start: 2023-08-11 | End: 2023-08-11 | Stop reason: HOSPADM

## 2023-08-11 RX ORDER — ONDANSETRON 2 MG/ML
4 INJECTION INTRAMUSCULAR; INTRAVENOUS ONCE AS NEEDED
Status: DISCONTINUED | OUTPATIENT
Start: 2023-08-11 | End: 2023-08-11 | Stop reason: HOSPADM

## 2023-08-11 RX ORDER — POTASSIUM CHLORIDE 750 MG/1
10 TABLET, FILM COATED, EXTENDED RELEASE ORAL 2 TIMES DAILY
Status: DISCONTINUED | OUTPATIENT
Start: 2023-08-11 | End: 2023-08-12 | Stop reason: HOSPADM

## 2023-08-11 RX ADMIN — EPHEDRINE SULFATE 5 MG: 5 INJECTION INTRAVENOUS at 09:08

## 2023-08-11 RX ADMIN — FENTANYL CITRATE 25 MCG: 50 INJECTION, SOLUTION INTRAMUSCULAR; INTRAVENOUS at 09:16

## 2023-08-11 RX ADMIN — ROCURONIUM BROMIDE 10 MG: 10 INJECTION, SOLUTION INTRAVENOUS at 10:27

## 2023-08-11 RX ADMIN — METFORMIN HYDROCHLORIDE 500 MG: 500 TABLET, EXTENDED RELEASE ORAL at 18:19

## 2023-08-11 RX ADMIN — PHENYLEPHRINE HYDROCHLORIDE 100 MCG: 10 INJECTION INTRAVENOUS at 09:09

## 2023-08-11 RX ADMIN — FENTANYL CITRATE 50 MCG: 50 INJECTION, SOLUTION INTRAMUSCULAR; INTRAVENOUS at 08:18

## 2023-08-11 RX ADMIN — EPHEDRINE SULFATE 5 MG: 5 INJECTION INTRAVENOUS at 09:05

## 2023-08-11 RX ADMIN — HEPARIN SODIUM 1730 UNITS/HR: 10000 INJECTION, SOLUTION INTRAVENOUS at 09:05

## 2023-08-11 RX ADMIN — ROCURONIUM BROMIDE 20 MG: 10 INJECTION, SOLUTION INTRAVENOUS at 09:12

## 2023-08-11 RX ADMIN — METOPROLOL TARTRATE 25 MG: 25 TABLET, FILM COATED ORAL at 20:52

## 2023-08-11 RX ADMIN — HEPARIN SODIUM 7000 UNITS: 1000 INJECTION, SOLUTION INTRAVENOUS; SUBCUTANEOUS at 09:27

## 2023-08-11 RX ADMIN — HYDROMORPHONE HYDROCHLORIDE 0.5 MG: 1 INJECTION, SOLUTION INTRAMUSCULAR; INTRAVENOUS; SUBCUTANEOUS at 11:14

## 2023-08-11 RX ADMIN — PROPOFOL 120 MG: 10 INJECTION, EMULSION INTRAVENOUS at 08:18

## 2023-08-11 RX ADMIN — HEPARIN SODIUM 1530 UNITS/HR: 10000 INJECTION, SOLUTION INTRAVENOUS at 08:51

## 2023-08-11 RX ADMIN — APIXABAN 5 MG: 5 TABLET, FILM COATED ORAL at 20:49

## 2023-08-11 RX ADMIN — HEPARIN SODIUM 6000 UNITS: 1000 INJECTION, SOLUTION INTRAVENOUS; SUBCUTANEOUS at 09:04

## 2023-08-11 RX ADMIN — PHENYLEPHRINE HYDROCHLORIDE 100 MCG: 10 INJECTION INTRAVENOUS at 08:35

## 2023-08-11 RX ADMIN — PHENYLEPHRINE HYDROCHLORIDE 100 MCG: 10 INJECTION INTRAVENOUS at 09:05

## 2023-08-11 RX ADMIN — ROCURONIUM BROMIDE 40 MG: 10 INJECTION, SOLUTION INTRAVENOUS at 08:18

## 2023-08-11 RX ADMIN — ONDANSETRON 4 MG: 2 INJECTION INTRAMUSCULAR; INTRAVENOUS at 10:45

## 2023-08-11 RX ADMIN — HEPARIN SODIUM 2000 UNITS: 1000 INJECTION, SOLUTION INTRAVENOUS; SUBCUTANEOUS at 10:17

## 2023-08-11 RX ADMIN — ALLOPURINOL 300 MG: 300 TABLET ORAL at 14:33

## 2023-08-11 RX ADMIN — ROCURONIUM BROMIDE 10 MG: 10 INJECTION, SOLUTION INTRAVENOUS at 09:55

## 2023-08-11 RX ADMIN — LISINOPRIL 5 MG: 5 TABLET ORAL at 14:34

## 2023-08-11 RX ADMIN — EPHEDRINE SULFATE 5 MG: 5 INJECTION INTRAVENOUS at 08:31

## 2023-08-11 RX ADMIN — HEPARIN SODIUM 9300 UNITS: 1000 INJECTION, SOLUTION INTRAVENOUS; SUBCUTANEOUS at 08:45

## 2023-08-11 RX ADMIN — FENTANYL CITRATE 25 MCG: 50 INJECTION, SOLUTION INTRAMUSCULAR; INTRAVENOUS at 10:47

## 2023-08-11 RX ADMIN — HEPARIN SODIUM 3000 UNITS: 1000 INJECTION, SOLUTION INTRAVENOUS; SUBCUTANEOUS at 09:51

## 2023-08-11 RX ADMIN — FUROSEMIDE 40 MG: 40 TABLET ORAL at 14:33

## 2023-08-11 RX ADMIN — Medication 10 MG: at 20:49

## 2023-08-11 RX ADMIN — SODIUM CHLORIDE: 9 INJECTION, SOLUTION INTRAVENOUS at 08:11

## 2023-08-11 RX ADMIN — SODIUM CHLORIDE: 9 INJECTION, SOLUTION INTRAVENOUS at 10:03

## 2023-08-11 RX ADMIN — POTASSIUM CHLORIDE 10 MEQ: 750 TABLET, EXTENDED RELEASE ORAL at 20:49

## 2023-08-11 RX ADMIN — SUGAMMADEX 400 MG: 100 INJECTION, SOLUTION INTRAVENOUS at 10:46

## 2023-08-11 RX ADMIN — PROTAMINE SULFATE 50 MG: 10 INJECTION, SOLUTION INTRAVENOUS at 10:41

## 2023-08-11 RX ADMIN — LIDOCAINE HYDROCHLORIDE 80 MG: 20 INJECTION, SOLUTION EPIDURAL; INFILTRATION; INTRACAUDAL; PERINEURAL at 08:18

## 2023-08-11 RX ADMIN — AMIODARONE HYDROCHLORIDE 200 MG: 200 TABLET ORAL at 14:33

## 2023-08-11 RX ADMIN — EPHEDRINE SULFATE 5 MG: 5 INJECTION INTRAVENOUS at 08:34

## 2023-08-11 RX ADMIN — METOPROLOL TARTRATE 25 MG: 25 TABLET, FILM COATED ORAL at 14:33

## 2023-08-11 RX ADMIN — SODIUM CHLORIDE 30 ML/HR: 9 INJECTION, SOLUTION INTRAVENOUS at 07:03

## 2023-08-11 NOTE — ANESTHESIA PROCEDURE NOTES
Airway  Urgency: elective    Date/Time: 8/11/2023 8:20 AM  Airway not difficult    General Information and Staff    Patient location during procedure: OR    Indications and Patient Condition  Indications for airway management: airway protection    Preoxygenated: yes  MILS not maintained throughout  Mask difficulty assessment: 1 - vent by mask    Final Airway Details  Final airway type: endotracheal airway      Successful airway: ETT  Cuffed: yes   Successful intubation technique: video laryngoscopy  Facilitating devices/methods: intubating stylet  Blade: Danielle  Blade size: 3  ETT size (mm): 7.5  Cormack-Lehane Classification: grade I - full view of glottis  Placement verified by: chest auscultation and capnometry   Measured from: lips  ETT/EBT  to lips (cm): 21  Number of attempts at approach: 1  Assessment: lips, teeth, and gum same as pre-op and atraumatic intubation

## 2023-08-11 NOTE — H&P
History of present illness  Laure Leung is an 83-year-old female patient who has history of atrial flutter, previous ablation of atypical left atrial flutter by Dr. Senior on 6/23/2021.  Patient has been having issues with recurrent arrhythmia for some time now.  She did have a cardioversion in September 23, 2022.  Also in April 2023, she was admitted to the hospital for the same and she converted to sinus rhythm with IV amiodarone.  She also has had issues with bradycardia.  Most currently she is on amiodarone 200 once a day along with Eliquis for stroke prevention.        Medical History        Past Medical History:   Diagnosis Date    Atrial fibrillation      CHF (congestive heart failure)      Hypertension      Primary osteoarthritis of both knees 5/25/2022         Surgical History         Past Surgical History:   Procedure Laterality Date    CARDIAC ELECTROPHYSIOLOGY PROCEDURE N/A 6/23/2021     Procedure: Ablation atrial flutter;  Surgeon: Antonio Senior MD;  Location: [facility] INVASIVE LOCATION;  Service: Cardiovascular;  Laterality: N/A;    CATARACT EXTRACTION                   Family History   Problem Relation Age of Onset    No Known Problems Mother      No Known Problems Father      Atrial fibrillation Sister      Diabetes Sister      No Known Problems Maternal Aunt      No Known Problems Maternal Uncle      No Known Problems Paternal Aunt      No Known Problems Paternal Uncle      No Known Problems Maternal Grandmother      No Known Problems Maternal Grandfather      No Known Problems Paternal Grandmother      No Known Problems Paternal Grandfather      Heart attack Neg Hx      Heart disease Neg Hx      Heart failure Neg Hx      Hyperlipidemia Neg Hx      Hypertension Neg Hx        Social History            Tobacco Use    Smoking status: Former       Packs/day: 0.50       Years: 12.00       Pack years: 6.00       Types: Cigarettes       Start date: 1961       Quit date: 1973       Years since  quittin.4    Smokeless tobacco: Never   Vaping Use    Vaping Use: Never used   Substance Use Topics    Alcohol use: No    Drug use: Never        Prescriptions Prior to Admission   (Not in a hospital admission)      Allergies:  Patient has no known allergies.     Review of systems     Constitutional: Negative.    Respiratory and cardiovascular: As detailed in HPI section.  Gastrointestinal: Negative for constipation, nausea and vomiting negative for abdominal distention, abdominal pain and diarrhea.   Genitourinary: Negative for difficulty urinating and flank pain.   Musculoskeletal: Negative for arthralgias, joint swelling and myalgias.   Skin: Negative for color change, rash and wound.   Neurological: Negative for dizziness, syncope, weakness and headaches.   Hematological: Negative for adenopathy.   Psychiatric/Behavioral: Negative for confusion.   All other systems reviewed and are negative.     Physical Exam  VITALS REVIEWED     General:      well developed, in no acute distress.    Head:      normocephalic and atraumatic.    Eyes:      PERRL/EOM intact, conjunctiva and sclera clear with out nystagmus.    Neck:      no masses, thyromegaly,  trachea central with normal respiratory effort and PMI displaced laterally  Lungs:      Clear to auscultation bilaterally  Heart:       Regular rhythm  Msk:      no deformity or scoliosis noted of thoracic or lumbar spine.    Pulses:      pulses normal in all 4 extremities.    Extremities:       No lower extremity edema  Neurologic:      no focal deficits.   alert oriented x3  Skin:      intact without lesions or rashes.    Psych:      alert and cooperative; normal mood and affect; normal attention span and concentration.             Result Review    :                    Pertinent cardiac workup     EKG 2023 sinus rhythm with first-degree AV block  EKG 2023 atrial fibrillation.  EKG 6/3/2021 atypical atrial flutter.        Procedures            Assessment       Assessment and Plan       Laure Leung is an 83-year-old female patient who is here today for rhythm management options.  Patient has had atrial fibrillation and atrial flutter for several years.    She did have an ablation by Dr. Ahuja in June 2021, the arrhythmia at that time was atypical atrial flutter, ablation of perimitral flutter with a line between the anterior mitral annulus and anterior scar in the LA.  Also CTI line.  Patient however had multiple recurrences of A-fib requiring cardioversion, also amiodarone.    Currently she is in sinus rhythm.  I had a long discussion with the patient about rhythm management options.  She is interested in having another ablation done.  Meanwhile I will give her a prescription for digoxin to take in the event of tachycardia since she is frustrated and does not want to go to the ER again.  We will schedule her for redo ablation next available.     Diagnoses and all orders for this visit:     1. PAF (paroxysmal atrial fibrillation) (Primary)     2. Atypical atrial flutter     Other orders  -     digoxin (LANOXIN) 250 MCG tablet; Take 1 tablet by mouth Daily As Needed (tachycardia).  Dispense: 30 tablet; Refill: 11              No follow-ups on file.  Patient was given instructions and counseling regarding her condition or for health maintenance advice. Please see specific information pulled into the AVS if appropriate.

## 2023-08-11 NOTE — ANESTHESIA POSTPROCEDURE EVALUATION
Patient: Laure Leung    Procedure Summary       Date: 08/11/23 Room / Location: [facility] /  [facility] IN[facility]    Anesthesia Start: 0811 Anesthesia Stop: 1107    Procedure: Ablation atrial fibrillation, cryo, rep emailed Diagnosis:       Persistent atrial fibrillation      (aFib)    Providers: Claude Leyva MD Provider: Tru Brewster MD    Anesthesia Type: general ASA Status: 3            Anesthesia Type: general    Vitals  Vitals Value Taken Time   /54 08/11/23 1210   Temp 97.9 øF (36.6 øC) 08/11/23 1210   Pulse 70 08/11/23 1210   Resp 16 08/11/23 1210   SpO2 98 % 08/11/23 1210           Post Anesthesia Care and Evaluation    Patient location during evaluation: PACU  Patient participation: complete - patient participated  Level of consciousness: awake  Pain scale: See nurse's notes for pain score.  Pain management: adequate    Airway patency: patent  Anesthetic complications: No anesthetic complications  PONV Status: none  Cardiovascular status: acceptable  Respiratory status: acceptable and spontaneous ventilation  Hydration status: acceptable    Comments: Patient seen and examined postoperatively; vital signs stable; SpO2 greater than or equal to 90%; cardiopulmonary status stable; nausea/vomiting adequately controlled; pain adequately controlled; no apparent anesthesia complications; patient discharged from anesthesia care when discharge criteria were met

## 2023-08-11 NOTE — ANESTHESIA PREPROCEDURE EVALUATION
Anesthesia Evaluation     Patient summary reviewed and Nursing notes reviewed   NPO Solid Status: > 8 hours  NPO Liquid Status: > 8 hours           Airway   Dental      Pulmonary    Cardiovascular     (+) hypertensiondysrhythmias Atrial Fib, CHF       Neuro/Psych  GI/Hepatic/Renal/Endo    (+) obesity, diabetes mellitus    Musculoskeletal     Abdominal    Substance History      OB/GYN          Other   arthritis,     ROS/Med Hx Other: ú  Left ventricular systolic function is low normal. Left ventricular ejection fraction appears to be 46 - 50%.  ú  Left ventricular diastolic function was indeterminate.  ú  The left atrial cavity is severely dilated.  ú  Left atrial volume is severely increased.  ú  The right atrial cavity is moderately  dilated.  ú  Estimated right ventricular systolic pressure from tricuspid regurgitation is mildly elevated (35-45 mmHg).  ú  Mild pulmonary hypertension is present.                  Anesthesia Plan    ASA 3     general     intravenous induction     Anesthetic plan, risks, benefits, and alternatives have been provided, discussed and informed consent has been obtained with: patient.    Plan discussed with CRNA.    CODE STATUS:

## 2023-08-12 VITALS
HEIGHT: 62 IN | RESPIRATION RATE: 18 BRPM | OXYGEN SATURATION: 92 % | TEMPERATURE: 98.3 F | SYSTOLIC BLOOD PRESSURE: 119 MMHG | BODY MASS INDEX: 34.04 KG/M2 | DIASTOLIC BLOOD PRESSURE: 32 MMHG | WEIGHT: 184.97 LBS | HEART RATE: 64 BPM

## 2023-08-12 PROBLEM — Z86.79 S/P ABLATION OF ATRIAL FIBRILLATION: Status: ACTIVE | Noted: 2023-08-12

## 2023-08-12 PROBLEM — Z98.890 S/P ABLATION OF ATRIAL FIBRILLATION: Status: ACTIVE | Noted: 2023-08-12

## 2023-08-12 PROCEDURE — 63710000001 AMIODARONE 200 MG TABLET: Performed by: INTERNAL MEDICINE

## 2023-08-12 PROCEDURE — A9270 NON-COVERED ITEM OR SERVICE: HCPCS | Performed by: INTERNAL MEDICINE

## 2023-08-12 PROCEDURE — 63710000001 FUROSEMIDE 40 MG TABLET: Performed by: INTERNAL MEDICINE

## 2023-08-12 PROCEDURE — 63710000001 LISINOPRIL 5 MG TABLET: Performed by: INTERNAL MEDICINE

## 2023-08-12 PROCEDURE — 63710000001 METFORMIN ER 500 MG TABLET SUSTAINED-RELEASE 24 HOUR: Performed by: INTERNAL MEDICINE

## 2023-08-12 PROCEDURE — 99238 HOSP IP/OBS DSCHRG MGMT 30/<: CPT | Performed by: INTERNAL MEDICINE

## 2023-08-12 PROCEDURE — 63710000001 METOPROLOL TARTRATE 25 MG TABLET: Performed by: INTERNAL MEDICINE

## 2023-08-12 PROCEDURE — 63710000001 ALLOPURINOL 300 MG TABLET: Performed by: INTERNAL MEDICINE

## 2023-08-12 PROCEDURE — 63710000001 POTASSIUM CHLORIDE 10 MEQ TABLET CONTROLLED-RELEASE: Performed by: INTERNAL MEDICINE

## 2023-08-12 PROCEDURE — 63710000001 APIXABAN 5 MG TABLET: Performed by: INTERNAL MEDICINE

## 2023-08-12 RX ADMIN — APIXABAN 5 MG: 5 TABLET, FILM COATED ORAL at 08:27

## 2023-08-12 RX ADMIN — LISINOPRIL 5 MG: 5 TABLET ORAL at 08:27

## 2023-08-12 RX ADMIN — POTASSIUM CHLORIDE 10 MEQ: 750 TABLET, EXTENDED RELEASE ORAL at 08:27

## 2023-08-12 RX ADMIN — METFORMIN HYDROCHLORIDE 500 MG: 500 TABLET, EXTENDED RELEASE ORAL at 08:27

## 2023-08-12 RX ADMIN — METOPROLOL TARTRATE 25 MG: 25 TABLET, FILM COATED ORAL at 08:27

## 2023-08-12 RX ADMIN — AMIODARONE HYDROCHLORIDE 200 MG: 200 TABLET ORAL at 08:27

## 2023-08-12 RX ADMIN — FUROSEMIDE 40 MG: 40 TABLET ORAL at 08:27

## 2023-08-12 RX ADMIN — ALLOPURINOL 300 MG: 300 TABLET ORAL at 08:27

## 2023-08-12 NOTE — PLAN OF CARE
Problem: Adult Inpatient Plan of Care  Goal: Absence of Hospital-Acquired Illness or Injury  Intervention: Identify and Manage Fall Risk  Recent Flowsheet Documentation  Taken 8/12/2023 0400 by Roberta Chisholm LPN  Safety Promotion/Fall Prevention:   activity supervised   clutter free environment maintained   assistive device/personal items within reach   fall prevention program maintained   gait belt   lighting adjusted   mobility aid in reach   muscle strengthening facilitated   nonskid shoes/slippers when out of bed   room organization consistent   safety round/check completed  Taken 8/12/2023 0200 by Roberta Chisholm LPN  Safety Promotion/Fall Prevention:   activity supervised   assistive device/personal items within reach   clutter free environment maintained   fall prevention program maintained   gait belt   lighting adjusted   mobility aid in reach   muscle strengthening facilitated   nonskid shoes/slippers when out of bed   room organization consistent   safety round/check completed  Taken 8/12/2023 0000 by Roberta Chisholm LPN  Safety Promotion/Fall Prevention:   activity supervised   assistive device/personal items within reach   clutter free environment maintained   fall prevention program maintained   gait belt   lighting adjusted   mobility aid in reach   muscle strengthening facilitated   nonskid shoes/slippers when out of bed   room organization consistent   safety round/check completed  Taken 8/11/2023 2200 by Roberta Chisholm LPN  Safety Promotion/Fall Prevention:   activity supervised   clutter free environment maintained   assistive device/personal items within reach   fall prevention program maintained   gait belt   lighting adjusted   mobility aid in reach   muscle strengthening facilitated   nonskid shoes/slippers when out of bed   room organization consistent   safety round/check completed  Taken 8/11/2023 2000 by Roberta Chisholm LPN  Safety Promotion/Fall Prevention:   activity supervised    clutter free environment maintained   assistive device/personal items within reach   fall prevention program maintained   gait belt   lighting adjusted   mobility aid in reach   muscle strengthening facilitated   nonskid shoes/slippers when out of bed   room organization consistent   safety round/check completed  Intervention: Prevent Skin Injury  Recent Flowsheet Documentation  Taken 8/12/2023 0400 by Roberta Chisholm LPN  Body Position:   position changed independently   weight shifting  Skin Protection:   adhesive use limited   incontinence pads utilized   transparent dressing maintained   tubing/devices free from skin contact  Taken 8/12/2023 0200 by Roberta Chisholm LPN  Body Position:   position changed independently   weight shifting  Skin Protection:   adhesive use limited   incontinence pads utilized   transparent dressing maintained   tubing/devices free from skin contact  Taken 8/12/2023 0000 by Roberta Chisholm LPN  Body Position:   position changed independently   weight shifting  Skin Protection:   adhesive use limited   incontinence pads utilized   transparent dressing maintained   tubing/devices free from skin contact  Taken 8/11/2023 2200 by Roberta Chisholm LPN  Body Position:   position changed independently   weight shifting  Skin Protection:   adhesive use limited   incontinence pads utilized   skin sealant/moisture barrier applied   transparent dressing maintained   tubing/devices free from skin contact  Taken 8/11/2023 2000 by Roberta Chisholm LPN  Body Position:   position changed independently   weight shifting  Skin Protection:   adhesive use limited   hydrocolloids used   skin sealant/moisture barrier applied   transparent dressing maintained   tubing/devices free from skin contact  Intervention: Prevent and Manage VTE (Venous Thromboembolism) Risk  Recent Flowsheet Documentation  Taken 8/12/2023 0400 by Roberta Chisholm LPN  Activity Management: activity minimized  Taken 8/12/2023 0200 by  Stepro, Belana, LPN  Activity Management: activity minimized  Taken 8/12/2023 0000 by Roberta Chisholm LPN  Activity Management: activity minimized  Taken 8/11/2023 2200 by Roberta Chisholm LPN  Activity Management: back to bed  Taken 8/11/2023 2000 by Roberta Chisholm LPN  Activity Management: activity minimized  VTE Prevention/Management:   bilateral   compression stockings off   sequential compression devices off  Range of Motion: active ROM (range of motion) encouraged  Intervention: Prevent Infection  Recent Flowsheet Documentation  Taken 8/12/2023 0400 by Roberta Chisholm LPN  Infection Prevention:   environmental surveillance performed   equipment surfaces disinfected   hand hygiene promoted   rest/sleep promoted   single patient room provided  Taken 8/12/2023 0200 by Roberta Chisholm LPN  Infection Prevention:   environmental surveillance performed   hand hygiene promoted   equipment surfaces disinfected   rest/sleep promoted   single patient room provided  Taken 8/12/2023 0000 by Roberta Chisholm LPN  Infection Prevention:   environmental surveillance performed   equipment surfaces disinfected   hand hygiene promoted   rest/sleep promoted   single patient room provided  Taken 8/11/2023 2200 by Roberta Chisholm LPN  Infection Prevention:   environmental surveillance performed   equipment surfaces disinfected   hand hygiene promoted   rest/sleep promoted   single patient room provided  Taken 8/11/2023 2000 by Roberta Chisholm LPN  Infection Prevention:   environmental surveillance performed   equipment surfaces disinfected   hand hygiene promoted   rest/sleep promoted   single patient room provided  Goal: Optimal Comfort and Wellbeing  Intervention: Monitor Pain and Promote Comfort  Recent Flowsheet Documentation  Taken 8/12/2023 0400 by Roberta Chisholm LPN  Pain Management Interventions:   care clustered   quiet environment facilitated  Taken 8/12/2023 0000 by Roberta Chisholm LPN  Pain Management Interventions:    care clustered   quiet environment facilitated  Taken 8/11/2023 2000 by Roberta Chisholm LPN  Pain Management Interventions:   care clustered   pain management plan reviewed with patient/caregiver   quiet environment facilitated  Intervention: Provide Person-Centered Care  Recent Flowsheet Documentation  Taken 8/11/2023 2000 by Roberta Chisholm LPN  Trust Relationship/Rapport:   care explained   choices provided   emotional support provided   empathic listening provided   questions answered   questions encouraged   reassurance provided   thoughts/feelings acknowledged     Problem: Fall Injury Risk  Goal: Absence of Fall and Fall-Related Injury  Intervention: Identify and Manage Contributors  Recent Flowsheet Documentation  Taken 8/12/2023 0400 by Roberta Chisholm LPN  Medication Review/Management: medications reviewed  Taken 8/12/2023 0200 by Roberta Chisholm LPN  Medication Review/Management: medications reviewed  Taken 8/12/2023 0000 by Roberta Chisholm LPN  Medication Review/Management: medications reviewed  Taken 8/11/2023 2200 by Roberta Chisholm LPN  Medication Review/Management: medications reviewed  Taken 8/11/2023 2000 by Roberta Chisholm LPN  Medication Review/Management: medications reviewed  Self-Care Promotion:   independence encouraged   BADL personal objects within reach   safe use of adaptive equipment encouraged  Intervention: Promote Injury-Free Environment  Recent Flowsheet Documentation  Taken 8/12/2023 0400 by Roberta Chisholm LPN  Safety Promotion/Fall Prevention:   activity supervised   clutter free environment maintained   assistive device/personal items within reach   fall prevention program maintained   gait belt   lighting adjusted   mobility aid in reach   muscle strengthening facilitated   nonskid shoes/slippers when out of bed   room organization consistent   safety round/check completed  Taken 8/12/2023 0200 by Roberta Chisholm LPN  Safety Promotion/Fall Prevention:   activity supervised    assistive device/personal items within reach   clutter free environment maintained   fall prevention program maintained   gait belt   lighting adjusted   mobility aid in reach   muscle strengthening facilitated   nonskid shoes/slippers when out of bed   room organization consistent   safety round/check completed  Taken 8/12/2023 0000 by Roberta Chisholm LPN  Safety Promotion/Fall Prevention:   activity supervised   assistive device/personal items within reach   clutter free environment maintained   fall prevention program maintained   gait belt   lighting adjusted   mobility aid in reach   muscle strengthening facilitated   nonskid shoes/slippers when out of bed   room organization consistent   safety round/check completed  Taken 8/11/2023 2200 by Roberta Chisholm LPN  Safety Promotion/Fall Prevention:   activity supervised   clutter free environment maintained   assistive device/personal items within reach   fall prevention program maintained   gait belt   lighting adjusted   mobility aid in reach   muscle strengthening facilitated   nonskid shoes/slippers when out of bed   room organization consistent   safety round/check completed  Taken 8/11/2023 2000 by Roberta Chisholm LPN  Safety Promotion/Fall Prevention:   activity supervised   clutter free environment maintained   assistive device/personal items within reach   fall prevention program maintained   gait belt   lighting adjusted   mobility aid in reach   muscle strengthening facilitated   nonskid shoes/slippers when out of bed   room organization consistent   safety round/check completed     Problem: Skin Injury Risk Increased  Goal: Skin Health and Integrity  Intervention: Optimize Skin Protection  Recent Flowsheet Documentation  Taken 8/12/2023 0400 by Roberta Chisholm LPN  Pressure Reduction Techniques:   frequent weight shift encouraged   rest period provided between sit times   weight shift assistance provided   heels elevated off bed  Head of Bed (HOB)  Positioning: HOB at 60-90 degrees  Pressure Reduction Devices:   alternating pressure pump (ADD)   pressure-redistributing mattress utilized   positioning supports utilized  Skin Protection:   adhesive use limited   incontinence pads utilized   transparent dressing maintained   tubing/devices free from skin contact  Taken 8/12/2023 0200 by Roberta Chisholm LPN  Pressure Reduction Techniques:   frequent weight shift encouraged   heels elevated off bed   rest period provided between sit times   weight shift assistance provided  Head of Bed (HOB) Positioning: HOB at 20-30 degrees  Pressure Reduction Devices:   pressure-redistributing mattress utilized   positioning supports utilized   alternating pressure pump (ADD)  Skin Protection:   adhesive use limited   incontinence pads utilized   transparent dressing maintained   tubing/devices free from skin contact  Taken 8/12/2023 0000 by Roberta Chisholm LPN  Pressure Reduction Techniques:   frequent weight shift encouraged   heels elevated off bed   rest period provided between sit times   weight shift assistance provided  Head of Bed (HOB) Positioning: HOB at 20-30 degrees  Pressure Reduction Devices:   pressure-redistributing mattress utilized   positioning supports utilized   alternating pressure pump (ADD)  Skin Protection:   adhesive use limited   incontinence pads utilized   transparent dressing maintained   tubing/devices free from skin contact  Taken 8/11/2023 2200 by Roberta Chisholm LPN  Pressure Reduction Techniques:   frequent weight shift encouraged   heels elevated off bed   rest period provided between sit times   weight shift assistance provided  Head of Bed (HOB) Positioning: (per pt request)   HOB at 20-30 degrees   other (see comments)  Pressure Reduction Devices:   alternating pressure pump (ADD)   pressure-redistributing mattress utilized   positioning supports utilized   heel offloading device utilized  Skin Protection:   adhesive use limited    incontinence pads utilized   skin sealant/moisture barrier applied   transparent dressing maintained   tubing/devices free from skin contact  Taken 8/11/2023 2000 by Roberta Chisholm LPN  Pressure Reduction Techniques:   frequent weight shift encouraged   heels elevated off bed   rest period provided between sit times   weight shift assistance provided  Head of Bed (HOB) Positioning: HOB at 60-90 degrees  Pressure Reduction Devices:   alternating pressure pump (ADD)   pressure-redistributing mattress utilized   positioning supports utilized   heel offloading device utilized  Skin Protection:   adhesive use limited   hydrocolloids used   skin sealant/moisture barrier applied   transparent dressing maintained   tubing/devices free from skin contact   Goal Outcome Evaluation:            Patient is alert and oriented x4, and currently on 2L of O2 per nasal cannula. Patient had a cardiac ablasion on 8/11. Patient has had no complaints of pain at location site, just mild tenderness. Patient's dressing at insertion site of procedure is clean, dry, and intact.

## 2023-08-12 NOTE — DISCHARGE SUMMARY
Date of Discharge:  8/12/2023    Discharge Diagnosis:   Active Hospital Problems    Diagnosis  POA    **Persistent atrial fibrillation [I48.19]  Unknown    A-fib [I48.91]  Yes      Resolved Hospital Problems   No resolved problems to display.       Presenting Problem/History of Present Illness  Persistent atrial fibrillation [I48.19]  A-fib [I48.91]            History of present illness  Laure Leung is an 83-year-old female patient who has history of atrial flutter, previous ablation of atypical left atrial flutter by Dr. Senior on 6/23/2021.  Patient has been having issues with recurrent arrhythmia for some time now.  She did have a cardioversion in September 23, 2022.  Also in April 2023, she was admitted to the hospital for the same and she converted to sinus rhythm with IV amiodarone.  She also has had issues with bradycardia.  Most currently she is on amiodarone 200 once a day along with Eliquis for stroke prevention           Assessment and Plan       Laure Leung is an 83-year-old female patient who is here today for rhythm management options.  Patient has had atrial fibrillation and atrial flutter for several years.    She did have an ablation by Dr. Ahuja in June 2021, the arrhythmia at that time was atypical atrial flutter, ablation of perimitral flutter with a line between the anterior mitral annulus and anterior scar in the LA.  Also CTI line.  Patient however had multiple recurrences of A-fib requiring cardioversion, also amiodarone.    Currently she is in sinus rhythm.  I had a long discussion with the patient about rhythm management options.  She is interested in having another ablation done.  Meanwhile I will give her a prescription for digoxin to take in the event of tachycardia since she is frustrated and does not want to go to the ER again.  We will schedule her for redo ablation next available.     Diagnoses and all orders for this visit:     1. PAF (paroxysmal atrial fibrillation)  (Primary)     2. Atypical atrial flutter     Other orders  -     digoxin (LANOXIN) 250 MCG tablet; Take 1 tablet by mouth Daily As Needed (tachycardia).  Dispense: 30 tablet; Refill: 11              No follow-ups on file.  Patient was given instructions and counseling regarding her condition or for health maintenance advice. Please see specific information pulled into the AVS if appropriate.  Hospital Course  Patient was brought for ablation did well was monitored overnight is being discharged home to follow-up with EP clinic    Procedures Performed  Procedure(s):  Ablation atrial fibrillation, cryo, rep emailed       Consults:   Consults       No orders found for last 30 day(s).              Ejection Fraction  No results found for: EF    Echo EF Estimated  Lab Results   Component Value Date    ECHOEFEST 50.0 07/13/2023       Nuclear Stress Ejection Fraction  No components found for: NUCEF    Cath Ejection Fraction Quantitative  No results found for: CATHEF    Condition on Discharge: Stable    Physical Exam at Discharge :    Vital Signs  Temp:  [97 øF (36.1 øC)-98.6 øF (37 øC)] 98 øF (36.7 øC)  Heart Rate:  [59-73] 59  Resp:  [10-19] 18  BP: (127-185)/(35-64) 128/39  General:  Appears in no acute distress  Eyes: Sclera is anicteric,  conjunctiva is clear   HEENT:  No JVD. Thyroid not visibly enlarged. No mucosal pallor or cyanosis  Respiratory: Respirations regular and unlabored at rest.  CTA  Cardiovascular: S1,S2 Regular rate and rhythm. No murmur, rub or gallop auscultated.  . No pretibial pitting edema  Gastrointestinal: Abdomen nondistended, soft  Musculoskeletal:  No abnormal movements  Extremities: No digital clubbing or cyanosis  Skin: Color pink. Skin warm and dry to touch. No rashes  No xanthoma  Neuro: Alert and awake, no lateralizing deficits appreciated        Discharge Disposition Home      Discharge Medications     Discharge Medications        ASK your doctor about these medications         Instructions Start Date   acetaminophen 650 MG 8 hr tablet  Commonly known as: TYLENOL   650 mg, Oral, 2 Times Daily PRN      allopurinol 300 MG tablet  Commonly known as: Zyloprim   300 mg, Oral, Daily      amiodarone 200 MG tablet  Commonly known as: PACERONE   200 mg, Oral, Daily      apixaban 5 MG tablet tablet  Commonly known as: ELIQUIS   5 mg, Oral, Every 12 Hours Scheduled      BLOOD SUGAR BALANCE PO   2 tablets, Oral, Daily      ferrous sulfate 325 (65 FE) MG tablet   325 mg, Oral, Daily With Breakfast      furosemide 40 MG tablet  Commonly known as: LASIX   40 mg, Oral, Daily      GLUCOSAMINE CHONDR 1500 COMPLX PO   2 tablets, Oral, Daily      lisinopril 5 MG tablet  Commonly known as: PRINIVIL,ZESTRIL   5 mg, Oral, Daily      Magnesium 200 MG tablet   Oral      Melatonin 10 MG tablet   1 tablet, Oral, Nightly      metFORMIN  MG 24 hr tablet  Commonly known as: GLUCOPHAGE-XR   TAKE 1 TABLET TWICE A DAY. DISCONTINUE METFORMIN IR   DUE TO SIDE EFFECTS      metoprolol tartrate 25 MG tablet  Commonly known as: LOPRESSOR   25 mg, Oral, 2 Times Daily      metroNIDAZOLE 0.75 % cream  Commonly known as: METROCREAM   1 application , Topical, 2 Times Daily      multivitamin with minerals tablet tablet   1 tablet, Oral, Daily, Liver support, otc      potassium chloride 10 MEQ CR tablet   10 mEq, Oral, 2 Times Daily      PROBIOTIC PO   1 capsule, Oral, Daily      VITAMIN D (CHOLECALCIFEROL) PO   5,000 Units, Oral, Daily               Discharge Diet: Cardiac diet    Activity at Discharge: Activity as tolerated    Follow-up Appointments: Follow-up with me in 1 month  Future Appointments   Date Time Provider Department Center   9/12/2023 10:15 AM Ketan Alonso MD [facility]   11/27/2023 11:30 AM NURSE/MA [facility]   12/4/2023 11:30 AM Sarita Esparza APRN [facility]              Sergio Andrew MD  08/12/23  04:07 EDT    Time: Discharge Time Spent:30

## 2023-08-14 ENCOUNTER — TELEPHONE (OUTPATIENT)
Dept: CARDIOLOGY | Facility: CLINIC | Age: 83
End: 2023-08-14
Payer: MEDICARE

## 2023-08-14 RX ORDER — POTASSIUM CHLORIDE 750 MG/1
10 TABLET, FILM COATED, EXTENDED RELEASE ORAL 2 TIMES DAILY
Qty: 180 TABLET | Refills: 0 | Status: SHIPPED | OUTPATIENT
Start: 2023-08-14

## 2023-08-14 NOTE — TELEPHONE ENCOUNTER
Advised patient per protocol she may drive 3 days after procedure. Patient would like to know if she can get bandage wet and when can she take it off? Also her feet and hand are swelling.

## 2023-08-14 NOTE — TELEPHONE ENCOUNTER
Spoke with pt. Ok to take bandage off groin. Wait to drive for a few more days. Pt having some edema in feet and ankles. Instructed patient to take 1/2 a lasix pill and an extra potassium today. Encouraged to keep feet elevated when sitting.    Will also make 1 mo f/u appt for post ablation.

## 2023-08-14 NOTE — TELEPHONE ENCOUNTER
ROMELIA IS NO LONGER A PATIENT OF DR. MORRISSEY  SHE SEES DR. JOSEPH STARK REQUEST HAS BEEN RE-ROUTED TO [place]

## 2023-08-14 NOTE — CASE MANAGEMENT/SOCIAL WORK
Case Management Discharge Note      Final Note: Routine Home         Selected Continued Care - Discharged on 8/12/2023 Admission date: 8/11/2023 - Discharge disposition: Home or Self Care           Transportation Services  Private: Car    Final Discharge Disposition Code: 01 - home or self-care

## 2023-08-17 ENCOUNTER — TELEPHONE (OUTPATIENT)
Dept: CARDIOLOGY | Facility: CLINIC | Age: 83
End: 2023-08-17
Payer: MEDICARE

## 2023-08-17 NOTE — TELEPHONE ENCOUNTER
Today is the first day she has really been up and moving around since ablation. She went outside of the deck and did a few things. She came back in and heart rate was 123. She has not been able to sleep.  Swelling in ankles has went down some, but not a lot.

## 2023-08-21 NOTE — TELEPHONE ENCOUNTER
Spoke with patient.  Swelling is better in her lower legs.  She still gets some edema after being up and walking around.  Her heart heart rate did get elevated some with activity and she felt like she was in A-fib again.  But has not had any more episodes.  Patient to call back if edema worsens or has more frequent episodes of accelerated heart rate.

## 2023-08-24 ENCOUNTER — OFFICE VISIT (OUTPATIENT)
Dept: FAMILY MEDICINE CLINIC | Facility: CLINIC | Age: 83
End: 2023-08-24
Payer: MEDICARE

## 2023-08-24 VITALS
HEART RATE: 74 BPM | HEIGHT: 62 IN | DIASTOLIC BLOOD PRESSURE: 80 MMHG | WEIGHT: 186.4 LBS | SYSTOLIC BLOOD PRESSURE: 122 MMHG | BODY MASS INDEX: 34.3 KG/M2 | OXYGEN SATURATION: 96 %

## 2023-08-24 DIAGNOSIS — Z98.890 S/P ABLATION OF ATRIAL FIBRILLATION: ICD-10-CM

## 2023-08-24 DIAGNOSIS — Z86.79 S/P ABLATION OF ATRIAL FIBRILLATION: ICD-10-CM

## 2023-08-24 DIAGNOSIS — I48.0 PAF (PAROXYSMAL ATRIAL FIBRILLATION): Primary | ICD-10-CM

## 2023-08-24 DIAGNOSIS — R60.0 LOWER EXTREMITY EDEMA: ICD-10-CM

## 2023-08-24 DIAGNOSIS — G47.09 OTHER INSOMNIA: ICD-10-CM

## 2023-08-24 RX ORDER — LANCETS 30 GAUGE
EACH MISCELLANEOUS
COMMUNITY
Start: 2023-06-19

## 2023-08-24 RX ORDER — POTASSIUM CHLORIDE 750 MG/1
10 TABLET, FILM COATED, EXTENDED RELEASE ORAL 2 TIMES DAILY
Qty: 180 TABLET | Refills: 0 | Status: SHIPPED | OUTPATIENT
Start: 2023-08-24

## 2023-08-24 RX ORDER — HYDROXYZINE HYDROCHLORIDE 25 MG/1
25 TABLET, FILM COATED ORAL NIGHTLY PRN
Qty: 45 TABLET | Refills: 0 | Status: SHIPPED | OUTPATIENT
Start: 2023-08-24

## 2023-08-24 RX ORDER — METFORMIN HYDROCHLORIDE 500 MG/1
500 TABLET, EXTENDED RELEASE ORAL 2 TIMES DAILY
Qty: 180 TABLET | Refills: 1 | Status: SHIPPED | OUTPATIENT
Start: 2023-08-24

## 2023-08-24 RX ORDER — BLOOD SUGAR DIAGNOSTIC
STRIP MISCELLANEOUS
COMMUNITY
Start: 2023-06-19

## 2023-08-24 RX ORDER — ISOPROPYL ALCOHOL 0.75 G/1
SWAB TOPICAL
COMMUNITY
Start: 2023-06-19

## 2023-08-24 NOTE — PROGRESS NOTES
Chief Complaint  Chief Complaint   Patient presents with    Hospital Follow Up Visit     Heart Ablation            Subjective          Laure Leung presents to [facility] for   History of Present Illness    Atrial fibrillation, status post ablation 2021 with recurrence 2022 and conversion to sinus rhythm on medical therapy.  She presented to [facility] [place] ED on 4/16/23 and 5/25/23 with recurrence of A-fib with RVR and acute diastolic CHF.  She is unable to tolerate high-dose amiodarone or beta-blockers due to bradycardia. On 8/11/2023 patient underwent elective cryoablation per Dr. Leyva, procedure went well she was discharged home the next day.  She is on amiodarone 200 mg daily, Eliquis 5 mg twice daily, can not tolerate metoprolol 25 mg twice daily states it drops her HR to the 50's.  She did take metoprolol 1 time when her heart rate went up to 160, reports it did decrease the rate quickly back to normal.  She reports her ankles/legs are staying swollen, she wears copper knee braces bilaterally, takes lasix daily.     She also complains of insomnia, she takes melatonin 10 mg nightly, reports she frequently wakes up throughout the night and is unable to fall back asleep.  She occasionally takes a nap during the day but when the temperature outdoors is not too hot she is outdoors working in her yard most of the day.       The following portions of the patient's history were reviewed and updated as appropriate: allergies, current medications, past family history, past medical history, past social history, past surgical history and problem list.    Past Medical History:   Diagnosis Date    Atrial fibrillation     CHF (congestive heart failure)     Diabetes     Hypertension     Primary osteoarthritis of both knees 05/25/2022     Past Surgical History:   Procedure Laterality Date    CARDIAC ELECTROPHYSIOLOGY PROCEDURE N/A 6/23/2021    Procedure: Ablation atrial flutter;  Surgeon:  Antonio Senior MD;  Location: [facility] CATH INVASIVE LOCATION;  Service: Cardiovascular;  Laterality: N/A;    CARDIAC ELECTROPHYSIOLOGY PROCEDURE N/A 2023    Procedure: Ablation atrial fibrillation, cryo, rep emailed;  Surgeon: Claude Leyva MD;  Location: [facility] CATH INVASIVE LOCATION;  Service: Cardiovascular;  Laterality: N/A;    CATARACT EXTRACTION       Family History   Problem Relation Age of Onset    No Known Problems Mother     No Known Problems Father     Atrial fibrillation Sister     Diabetes Sister     No Known Problems Maternal Aunt     No Known Problems Maternal Uncle     No Known Problems Paternal Aunt     No Known Problems Paternal Uncle     No Known Problems Maternal Grandmother     No Known Problems Maternal Grandfather     No Known Problems Paternal Grandmother     No Known Problems Paternal Grandfather     Heart attack Neg Hx     Heart disease Neg Hx     Heart failure Neg Hx     Hyperlipidemia Neg Hx     Hypertension Neg Hx      Social History     Tobacco Use    Smoking status: Former     Packs/day: 0.50     Years: 12.00     Pack years: 6.00     Types: Cigarettes     Start date:      Quit date:      Years since quittin.6    Smokeless tobacco: Never   Substance Use Topics    Alcohol use: No       Current Outpatient Medications:     acetaminophen (TYLENOL) 650 MG 8 hr tablet, Take 1 tablet by mouth 2 (Two) Times a Day As Needed for Mild Pain., Disp: , Rfl:     Alcohol Swabs (B-D SINGLE USE SWABS REGULAR) pads, , Disp: , Rfl:     allopurinol (Zyloprim) 300 MG tablet, Take 1 tablet by mouth Daily., Disp: 90 tablet, Rfl: 1    amiodarone (PACERONE) 200 MG tablet, Take 1 tablet by mouth Daily., Disp: 90 tablet, Rfl: 0    apixaban (ELIQUIS) 5 MG tablet tablet, Take 1 tablet by mouth Every 12 (Twelve) Hours., Disp: 180 tablet, Rfl: 1    ferrous sulfate 325 (65 FE) MG tablet, Take 1 tablet by mouth Daily With Breakfast., Disp: 90 tablet, Rfl: 1    furosemide (LASIX) 40 MG tablet,  "Take 1 tablet by mouth Daily., Disp: 90 tablet, Rfl: 1    Glucosamine-Chondroit-Vit C-Mn (GLUCOSAMINE CHONDR 1500 COMPLX PO), Take 2 tablets by mouth Daily., Disp: , Rfl:     Lancets (OneTouch Delica Plus [id number]) misc, , Disp: , Rfl:     lisinopril (PRINIVIL,ZESTRIL) 5 MG tablet, Take 1 tablet by mouth Daily., Disp: 90 tablet, Rfl: 1    Magnesium 200 MG tablet, Take  by mouth., Disp: , Rfl:     Melatonin 10 MG tablet, Take 1 tablet by mouth Every Night., Disp: , Rfl:     metFORMIN ER (GLUCOPHAGE-XR) 500 MG 24 hr tablet, Take 1 tablet by mouth 2 (Two) Times a Day., Disp: 180 tablet, Rfl: 1    metoprolol tartrate (LOPRESSOR) 25 MG tablet, Take 1 tablet by mouth 2 (Two) Times a Day., Disp: 60 tablet, Rfl: 3    metroNIDAZOLE (METROCREAM) 0.75 % cream, Apply 1 application topically to the appropriate area as directed 2 (Two) Times a Day., Disp: 45 g, Rfl: 2    Misc Natural Products (BLOOD SUGAR BALANCE PO), Take 2 tablets by mouth Daily., Disp: , Rfl:     multivitamin with minerals tablet tablet, Take 1 tablet by mouth Daily. Liver support, otc, Disp: , Rfl:     OneTouch Verio test strip, , Disp: , Rfl:     potassium chloride 10 MEQ CR tablet, Take 1 tablet by mouth 2 (Two) Times a Day., Disp: 180 tablet, Rfl: 0    Probiotic Product (PROBIOTIC PO), Take 1 capsule by mouth Daily., Disp: , Rfl:     VITAMIN D, CHOLECALCIFEROL, PO, Take 5,000 Units by mouth Daily., Disp: , Rfl:     hydrOXYzine (ATARAX) 25 MG tablet, Take 1 tablet by mouth At Night As Needed (insomnia)., Disp: 45 tablet, Rfl: 0    Objective   Vital Signs:   /80   Pulse 74   Ht 157.5 cm (62\")   Wt 84.6 kg (186 lb 6.4 oz)   SpO2 96%   BMI 34.09 kg/mý           Physical Exam  Constitutional:       General: She is not in acute distress.     Appearance: Normal appearance. She is well-developed. She is obese. She is not ill-appearing or diaphoretic.   HENT:      Head: Normocephalic.   Eyes:      Conjunctiva/sclera: Conjunctivae normal.      Pupils: " Pupils are equal, round, and reactive to light.   Neck:      Thyroid: No thyromegaly.      Vascular: No JVD.   Cardiovascular:      Rate and Rhythm: Normal rate and regular rhythm.      Heart sounds: Murmur heard.   Pulmonary:      Effort: Pulmonary effort is normal. No respiratory distress.      Breath sounds: Normal breath sounds. No wheezing or rhonchi.   Abdominal:      General: Bowel sounds are normal. There is no distension.      Palpations: Abdomen is soft.      Tenderness: There is no abdominal tenderness.   Musculoskeletal:         General: Swelling (BLE +1) and tenderness (B knees, mod avila rom, wearing copper knee braces) present. Normal range of motion.      Cervical back: Normal range of motion and neck supple. No tenderness.   Lymphadenopathy:      Cervical: No cervical adenopathy.   Skin:     General: Skin is warm and dry.      Coloration: Skin is not jaundiced.      Findings: No erythema or rash.   Neurological:      General: No focal deficit present.      Mental Status: She is alert and oriented to person, place, and time. Mental status is at baseline.      Sensory: No sensory deficit.      Motor: Weakness present.      Gait: Gait abnormal (Uses walker for mobility).   Psychiatric:         Mood and Affect: Mood normal.         Behavior: Behavior normal.         Thought Content: Thought content normal.         Judgment: Judgment normal.        Result Review :     No visits with results within 7 Day(s) from this visit.   Latest known visit with results is:   Admission on 08/11/2023, Discharged on 08/12/2023   Component Date Value Ref Range Status    Glucose 08/11/2023 126 (H)  70 - 105 mg/dL Final    Serial Number: [id number]:  [id number]    Glucose 08/11/2023 152 (H)  70 - 105 mg/dL Final    Serial Number: [id number]:  [id number]    Activated Clotting Time  08/11/2023 227 (H)  89 - 137 Seconds Final    Serial Number: [id number]:  [id number]    Activated Clotting Time  08/11/2023 257 (H)   89 - 137 Seconds Final    Serial Number: [id number]:  [id number]    Activated Clotting Time  08/11/2023 299 (H)  89 - 137 Seconds Final    Serial Number: [id number]:  [id number]    Activated Clotting Time  08/11/2023 317 (H)  89 - 137 Seconds Final    Serial Number: [id number]:  [id number]    Activated Clotting Time  08/11/2023 323 (H)  89 - 137 Seconds Final    Serial Number: [id number]:  [id number]                          Assessment and Plan    Diagnoses and all orders for this visit:    1. PAF (paroxysmal atrial fibrillation) (Primary)    2. S/P ablation of atrial fibrillation    3. Lower extremity edema    4. Other insomnia    Other orders  -     hydrOXYzine (ATARAX) 25 MG tablet; Take 1 tablet by mouth At Night As Needed (insomnia).  Dispense: 45 tablet; Refill: 0  -     metFORMIN ER (GLUCOPHAGE-XR) 500 MG 24 hr tablet; Take 1 tablet by mouth 2 (Two) Times a Day.  Dispense: 180 tablet; Refill: 1      Overall doing well, has had 1 episode of tachycardia/A-fib since ablation  Can not tolerate metoprolol d/t alec, she did use for  recently and quickly decreased the rate.  Recommend to continue to use prn for HR over 110  Continue amio and eliquis  Rec extra lasix 1/2 tab on MWF, elevate as much as possible  Rec trial of hydroxyzine if wakes up through the night, cont melatonin nightly  Follow-up with cardiology 9/6/2023  Refill metformin  We will plan diabetic panel and urine micro at lab appointment 11/27/2023    I spent 30 minutes caring for Laure Leung on this date of service. This time includes time spent by me in the following activities: preparing for the visit, reviewing tests, performing a medically appropriate examination and/or evaluation , counseling and educating the patient/family/caregiver, ordering medications, tests, or procedures and documenting information in the medical record        Follow Up     Return for Next scheduled follow up 12/4/23, earlier if needed.  Patient was given  instructions and counseling regarding her condition or for health maintenance advice. Please see specific information pulled into the AVS if appropriate.        Part of this note may be an electronic transcription/translation of spoken language to printed text using the Dragon Dictation System

## 2023-09-06 ENCOUNTER — OFFICE VISIT (OUTPATIENT)
Dept: CARDIOLOGY | Facility: CLINIC | Age: 83
End: 2023-09-06
Payer: MEDICARE

## 2023-09-06 VITALS
SYSTOLIC BLOOD PRESSURE: 137 MMHG | DIASTOLIC BLOOD PRESSURE: 54 MMHG | HEIGHT: 62 IN | OXYGEN SATURATION: 95 % | HEART RATE: 68 BPM | WEIGHT: 189 LBS | BODY MASS INDEX: 34.78 KG/M2

## 2023-09-06 DIAGNOSIS — I48.0 PAF (PAROXYSMAL ATRIAL FIBRILLATION): ICD-10-CM

## 2023-09-06 DIAGNOSIS — I10 ESSENTIAL HYPERTENSION: ICD-10-CM

## 2023-09-06 DIAGNOSIS — I48.4 ATYPICAL ATRIAL FLUTTER: Primary | ICD-10-CM

## 2023-09-06 PROCEDURE — 3078F DIAST BP <80 MM HG: CPT | Performed by: INTERNAL MEDICINE

## 2023-09-06 PROCEDURE — 93000 ELECTROCARDIOGRAM COMPLETE: CPT | Performed by: INTERNAL MEDICINE

## 2023-09-06 PROCEDURE — 3075F SYST BP GE 130 - 139MM HG: CPT | Performed by: INTERNAL MEDICINE

## 2023-09-06 PROCEDURE — 99213 OFFICE O/P EST LOW 20 MIN: CPT | Performed by: INTERNAL MEDICINE

## 2023-09-06 PROCEDURE — 1160F RVW MEDS BY RX/DR IN RCRD: CPT | Performed by: INTERNAL MEDICINE

## 2023-09-06 PROCEDURE — 1159F MED LIST DOCD IN RCRD: CPT | Performed by: INTERNAL MEDICINE

## 2023-09-06 NOTE — PROGRESS NOTES
Progress note      Name: Laure Leung ADMIT: (Not on file)   : 1940  PCP: Sarita Esparza APRN    MRN: [id number] LOS: 0 days   AGE/SEX: 83 y.o. female  ROOM: Room/bed info not found     Chief Complaint   Patient presents with    Follow-up     Post ablation       Subjective       History of present illness  Laure Leung is an 83-year-old female patient was history of atrial flutter, previous ablation of atypical left atrial flutter by Dr. Ahuja on 2021.  Patient had recurrence of arrhythmia and had a cardioversion in 2022 and then in 2023, at that time she was admitted to the hospital and converted to sinus rhythm with IV amiodarone.  Redo A-fib ablation was done on 2023 with PVI and posterior wall isolation.    Past Medical History:   Diagnosis Date    Atrial fibrillation     CHF (congestive heart failure)     Diabetes     Hypertension     Primary osteoarthritis of both knees 2022     Past Surgical History:   Procedure Laterality Date    CARDIAC ELECTROPHYSIOLOGY PROCEDURE N/A 2021    Procedure: Ablation atrial flutter;  Surgeon: Antonio Senior MD;  Location: [facility] CATH INVASIVE LOCATION;  Service: Cardiovascular;  Laterality: N/A;    CARDIAC ELECTROPHYSIOLOGY PROCEDURE N/A 2023    Procedure: Ablation atrial fibrillation, cryo, rep emailed;  Surgeon: Claude Leyva MD;  Location: [facility] CATH INVASIVE LOCATION;  Service: Cardiovascular;  Laterality: N/A;    CATARACT EXTRACTION       Family History   Problem Relation Age of Onset    No Known Problems Mother     No Known Problems Father     Atrial fibrillation Sister     Diabetes Sister     No Known Problems Maternal Aunt     No Known Problems Maternal Uncle     No Known Problems Paternal Aunt     No Known Problems Paternal Uncle     No Known Problems Maternal Grandmother     No Known Problems Maternal Grandfather     No Known Problems Paternal Grandmother     No Known Problems Paternal Grandfather      Heart attack Neg Hx     Heart disease Neg Hx     Heart failure Neg Hx     Hyperlipidemia Neg Hx     Hypertension Neg Hx      Social History     Tobacco Use    Smoking status: Former     Packs/day: 0.50     Years: 12.00     Pack years: 6.00     Types: Cigarettes     Start date:      Quit date:      Years since quittin.7    Smokeless tobacco: Never   Vaping Use    Vaping Use: Never used   Substance Use Topics    Alcohol use: No    Drug use: Never       Current Outpatient Medications:     acetaminophen (TYLENOL) 650 MG 8 hr tablet, Take 1 tablet by mouth 2 (Two) Times a Day As Needed for Mild Pain., Disp: , Rfl:     Alcohol Swabs (B-D SINGLE USE SWABS REGULAR) pads, , Disp: , Rfl:     allopurinol (Zyloprim) 300 MG tablet, Take 1 tablet by mouth Daily., Disp: 90 tablet, Rfl: 1    amiodarone (PACERONE) 200 MG tablet, Take 1 tablet by mouth Daily., Disp: 90 tablet, Rfl: 0    apixaban (ELIQUIS) 5 MG tablet tablet, Take 1 tablet by mouth Every 12 (Twelve) Hours., Disp: 180 tablet, Rfl: 1    B Complex Vitamins (VITAMIN B COMPLEX PO), Take  by mouth., Disp: , Rfl:     furosemide (LASIX) 40 MG tablet, Take 1 tablet by mouth Daily., Disp: 90 tablet, Rfl: 1    Glucosamine-Chondroit-Vit C-Mn (GLUCOSAMINE CHONDR 1500 COMPLX PO), Take 2 tablets by mouth Daily., Disp: , Rfl:     Lancets (OneTouch Delica Plus [id number]) misc, , Disp: , Rfl:     lisinopril (PRINIVIL,ZESTRIL) 5 MG tablet, Take 1 tablet by mouth Daily., Disp: 90 tablet, Rfl: 1    Magnesium 200 MG tablet, Take  by mouth., Disp: , Rfl:     Melatonin 10 MG tablet, Take 1 tablet by mouth Every Night., Disp: , Rfl:     metFORMIN ER (GLUCOPHAGE-XR) 500 MG 24 hr tablet, Take 1 tablet by mouth 2 (Two) Times a Day., Disp: 180 tablet, Rfl: 1    metoprolol tartrate (LOPRESSOR) 25 MG tablet, Take 1 tablet by mouth 2 (Two) Times a Day., Disp: 60 tablet, Rfl: 3    metroNIDAZOLE (METROCREAM) 0.75 % cream, Apply 1 application topically to the appropriate area as  directed 2 (Two) Times a Day., Disp: 45 g, Rfl: 2    Misc Natural Products (BLOOD SUGAR BALANCE PO), Take 2 tablets by mouth Daily., Disp: , Rfl:     multivitamin with minerals tablet tablet, Take 1 tablet by mouth Daily. Liver support, otc, Disp: , Rfl:     OneTouch Verio test strip, , Disp: , Rfl:     potassium chloride 10 MEQ CR tablet, Take 1 tablet by mouth 2 (Two) Times a Day., Disp: 180 tablet, Rfl: 0    Probiotic Product (PROBIOTIC PO), Take 1 capsule by mouth Daily., Disp: , Rfl:     VITAMIN D, CHOLECALCIFEROL, PO, Take 5,000 Units by mouth Daily., Disp: , Rfl:     ferrous sulfate 325 (65 FE) MG tablet, Take 1 tablet by mouth Daily With Breakfast., Disp: 90 tablet, Rfl: 1    hydrOXYzine (ATARAX) 25 MG tablet, Take 1 tablet by mouth At Night As Needed (insomnia). (Patient not taking: Reported on 9/6/2023), Disp: 45 tablet, Rfl: 0    potassium chloride 10 MEQ CR tablet, Take 1 tablet by mouth 2 (Two) Times a Day., Disp: 180 tablet, Rfl: 0  Allergies:  Patient has no known allergies.      Physical Exam  VITALS REVIEWED    General:      well developed, in no acute distress.    Head:      normocephalic and atraumatic.    Eyes:      PERRL/EOM intact, conjunctiva and sclera clear with out nystagmus.    Neck:      no masses, thyromegaly,  trachea central with normal respiratory effort and PMI displaced laterally  Lungs:      Clear to auscultation bilaterally  Heart:       Regular rate and rhythm  Msk:      no deformity or scoliosis noted of thoracic or lumbar spine.    Pulses:      pulses normal in all 4 extremities.    Extremities:       No lower extremity edema  Neurologic:      no focal deficits.   alert oriented x3  Skin:      intact without lesions or rashes.    Psych:      alert and cooperative; normal mood and affect; normal attention span and concentration.      Result Review :               Pertinent cardiac workup    EKG 5/31/2023 sinus rhythm with first-degree AV block  EKG 4/16/2023 atrial  fibrillation.  EKG 6/3/2021 atypical atrial flutter.        ECG 12 Lead    Date/Time: 9/6/2023 12:16 PM  Performed by: Claude Leyva MD  Authorized by: Claude Leyva MD   Comparison: compared with previous ECG   Similar to previous ECG  Rhythm: atrial fibrillation  Rate: normal  BPM: 59  Conduction: conduction normal  ST Segments: ST segments normal  QRS axis: normal  Other findings: non-specific ST-T wave changes            Assessment and Plan      Laure Leung is an 83-year-old female patient who is here today for rhythm management options.  Patient has had atrial fibrillation and atrial flutter for several years.    She did have an ablation by Dr. Ahuja in June 2021, the arrhythmia at that time was atypical atrial flutter, ablation of perimitral flutter with a line between the anterior mitral annulus and anterior scar in the LA.  Also CTI line.  Patient had multiple recurrence of A-fib and she was symptomatic with that for that redo A-fib ablation was done by completing the PVI and posterior wall isolation on 8/11/2023.  Patient is feeling better now, her heart rate is no longer elevated, today's EKG shows questionable A-fib but the physical exam her pulse was regular, it could have been sinus with PACs.  In any case she has not noticed any fast heart rates and her breathing is better.  At this time I would make any changes to her therapy, we will see her in follow-up in 6 months.  Diagnoses and all orders for this visit:    1. Atypical atrial flutter (Primary)    2. PAF (paroxysmal atrial fibrillation)    3. Essential hypertension    Other orders  -     ECG 12 Lead           Return in about 6 months (around 3/6/2024).  Patient was given instructions and counseling regarding her condition or for health maintenance advice. Please see specific information pulled into the AVS if appropriate.

## 2023-09-11 DIAGNOSIS — E11.9 TYPE 2 DIABETES MELLITUS WITHOUT COMPLICATION, WITHOUT LONG-TERM CURRENT USE OF INSULIN: Primary | ICD-10-CM

## 2023-09-11 NOTE — TELEPHONE ENCOUNTER
"Caller: Laure Leung \"Addie\"    Relationship: Self    Best call back number: [phone]     Requested Prescriptions:   Requested Prescriptions     Pending Prescriptions Disp Refills    Lancets (OneTouch Delica Plus [id number]) misc      OneTouch Verio test strip       Sig: Use as instructed    Alcohol Swabs (B-D SINGLE USE SWABS REGULAR) pads          Pharmacy where request should be sent: [facility] - [address] [phone] [facility] [phone]      Last office visit with prescribing clinician: 8/24/2023   Last telemedicine visit with prescribing clinician: Visit date not found   Next office visit with prescribing clinician: 12/4/2023     Additional details provided by patient: PATIENT HAS AT LEAST A WEEK SUPPLY LEFT    Does the patient have less than a 3 day supply:  [] Yes  [x] No    Would you like a call back once the refill request has been completed: [] Yes [x] No    If the office needs to give you a call back, can they leave a voicemail: [] Yes [x] No    Marilee Dougherty Rep   09/11/23 10:21 EDT         "

## 2023-09-12 ENCOUNTER — OFFICE VISIT (OUTPATIENT)
Dept: CARDIOLOGY | Facility: CLINIC | Age: 83
End: 2023-09-12
Payer: MEDICARE

## 2023-09-12 VITALS
HEART RATE: 73 BPM | RESPIRATION RATE: 18 BRPM | BODY MASS INDEX: 33.13 KG/M2 | SYSTOLIC BLOOD PRESSURE: 134 MMHG | HEIGHT: 63 IN | WEIGHT: 187 LBS | DIASTOLIC BLOOD PRESSURE: 71 MMHG

## 2023-09-12 DIAGNOSIS — I10 ESSENTIAL HYPERTENSION: ICD-10-CM

## 2023-09-12 DIAGNOSIS — I10 PRIMARY HYPERTENSION: ICD-10-CM

## 2023-09-12 DIAGNOSIS — I48.4 ATYPICAL ATRIAL FLUTTER: Primary | ICD-10-CM

## 2023-09-12 DIAGNOSIS — I48.0 PAF (PAROXYSMAL ATRIAL FIBRILLATION): ICD-10-CM

## 2023-09-12 PROCEDURE — 3075F SYST BP GE 130 - 139MM HG: CPT | Performed by: INTERNAL MEDICINE

## 2023-09-12 PROCEDURE — 99214 OFFICE O/P EST MOD 30 MIN: CPT | Performed by: INTERNAL MEDICINE

## 2023-09-12 PROCEDURE — 3078F DIAST BP <80 MM HG: CPT | Performed by: INTERNAL MEDICINE

## 2023-09-12 RX ORDER — LANCETS 30 GAUGE
1 EACH MISCELLANEOUS DAILY
Qty: 100 EACH | Refills: 3 | Status: SHIPPED | OUTPATIENT
Start: 2023-09-12

## 2023-09-12 RX ORDER — ISOPROPYL ALCOHOL 0.75 G/1
SWAB TOPICAL
Qty: 100 EACH | Refills: 3 | Status: SHIPPED | OUTPATIENT
Start: 2023-09-12

## 2023-09-12 RX ORDER — DILTIAZEM HYDROCHLORIDE 120 MG/1
120 CAPSULE, EXTENDED RELEASE ORAL DAILY
Qty: 30 CAPSULE | Refills: 11 | Status: SHIPPED | OUTPATIENT
Start: 2023-09-12 | End: 2023-09-13 | Stop reason: SDUPTHER

## 2023-09-12 RX ORDER — BLOOD SUGAR DIAGNOSTIC
STRIP MISCELLANEOUS
Qty: 100 EACH | Refills: 3 | Status: SHIPPED | OUTPATIENT
Start: 2023-09-12

## 2023-09-13 ENCOUNTER — TELEPHONE (OUTPATIENT)
Dept: CARDIOLOGY | Facility: CLINIC | Age: 83
End: 2023-09-13
Payer: MEDICARE

## 2023-09-13 DIAGNOSIS — E11.9 TYPE 2 DIABETES MELLITUS WITHOUT COMPLICATION, WITHOUT LONG-TERM CURRENT USE OF INSULIN: ICD-10-CM

## 2023-09-13 RX ORDER — DILTIAZEM HYDROCHLORIDE 120 MG/1
120 CAPSULE, EXTENDED RELEASE ORAL DAILY
Qty: 90 CAPSULE | Refills: 3 | Status: SHIPPED | OUTPATIENT
Start: 2023-09-13

## 2023-09-13 RX ORDER — LANCETS 30 GAUGE
EACH MISCELLANEOUS
Qty: 100 EACH | Refills: 2 | OUTPATIENT
Start: 2023-09-13

## 2023-09-13 RX ORDER — ISOPROPYL ALCOHOL 0.75 G/1
SWAB TOPICAL
Qty: 100 EACH | Refills: 2 | OUTPATIENT
Start: 2023-09-13

## 2023-09-13 RX ORDER — DILTIAZEM HYDROCHLORIDE 120 MG/1
120 CAPSULE, EXTENDED RELEASE ORAL DAILY
Qty: 30 CAPSULE | Refills: 11 | Status: CANCELLED | OUTPATIENT
Start: 2023-09-13

## 2023-09-13 NOTE — TELEPHONE ENCOUNTER
Caller: [facility] - [place], [place] - [address] - [phone] [facility] [phone] FX    Relationship: Pharmacy    Best call back number: [phone]    Requested Prescriptions:   Requested Prescriptions     Pending Prescriptions Disp Refills    dilTIAZem XR (DILACOR XR) 120 MG 24 hr capsule 30 capsule 11     Sig: Take 1 capsule by mouth Daily.        Pharmacy where request should be sent: [facility] - [place], [place] - [address] - [phone] [facility] [phone] FX     Last office visit with prescribing clinician: 9/12/2023   Last telemedicine visit with prescribing clinician: Visit date not found   Next office visit with prescribing clinician: 12/26/2023     Additional details provided by patient: PHARMACY HAS PRESCRIPTION REFLECTING 240 MG FOR MEDICATION. PLEASE SEND CORRECTION PRESCRIPTION FOR REFILL.    Does the patient have less than a 3 day supply:  [] Yes  [x] No    Would you like a call back once the refill request has been completed: [] Yes [] No    If the office needs to give you a call back, can they leave a voicemail: [] Yes [] No    Marilee Goldman Rep   09/13/23 09:02 EDT

## 2023-09-27 NOTE — PROGRESS NOTES
Cardiology Clinic Note  Ketan Alonso MD, PhD    Subjective:     Encounter Date:09/12/2023      Patient ID: Laure Leung is a 83 y.o. female.    Chief Complaint:  Chief Complaint   Patient presents with    Follow-up       HPI:    I the pleasure to see this 83-year-old female as a new patient today wishing to switch cardiac care to our clinic.  She has a history of paroxysmal atrial fibrillation flutter, essential hypertension and diabetes.  She had an echo in May 2022 that demonstrated EF of 60%, mild MR and TR, mild aortic valve stenosis with calcified valve, concentric LVH mildly, normal RV with grade 1 diastolic dysfunction.  She appears to have a tumultuous time with recurrent A-fib and flutter despite ablation in the past.  She is currently on amiodarone 200 daily,, Eliquis twice daily as well as rate control on twice daily beta-blocker for any recurrent tachycardia.  She is status post ablation of atypical flutter in 2019 status post ablation in 2021 with recurrence in 2022 status post repeat ablation August 2023, she did also had a history of iron deficiency anemia.  We discussed for any recurrence she may need AV darnell ablation and pacemaker if recurrent or refractory to medical therapy.  She is very symptomatic in setting of atrial fibrillation previously      Review of systems otherwise negative x14 point review of systems except as mentioned above     Historical data copied forward from previous encounters in EMR including the history, exam, and assessment/plan has been reviewed and is unchanged unless noted otherwise.     Cardiac medicines reviewed with risk, benefits, and necessity of each discussed.     Risk and benefit of cardiac testing reviewed including death heart attack stroke pain bleeding infection need for vascular /cardiovascular surgery were discussed and the patient      Objective:         Vitals reviewed below, blood pressure goal less than 135 systolic  Physical Exam  Regular rate and  "rhythm no rubs gallops heave or lift  2 out of 6 systolic ejection murmur left sternal border  No clubbing cyanosis or significant edema  No carotid bruits or JVD  Soft nontender nondistended  Clear to auscultation  Skin is warm and dry  Intact grossly    Assessment:      Paroxysmal atrial fibrillation/atrial flutter  Risk factors with diabetes  Essential hypertension  Chronic diastolic CHF  Osteoarthritis history  Former smoker quit remotely 1973 continue abstinence  Morbid obesity     Continue amiodarone 200 daily  Eliquis 5 mg twice daily for stroke risk reduction  Diltiazem at 120 daily  Status post A-fib ablation August, follow-up BP  Stop lisinopril without primary indication, no evidence of heart failure, preserved EF  Metoprolol twice daily  Potassium replacement  Lasix daily for volume control     No evidence of any exertional chest pain no indication for ischemic evaluation  Most recent 2D echo EF 60% with mild valvular insufficiency and mild aortic valve stenosis, unchanged on exam     See her back in 90 days      Objective:         /71 (BP Location: Left arm, Patient Position: Sitting)   Pulse 73   Resp 18   Ht 160 cm (63\")   Wt 84.8 kg (187 lb)   BMI 33.13 kg/m²                The pleasure to be involved in this patient's cardiovascular care.  Please call with any questions or concerns  Ketan Alonso MD, PhD    Most recent EKG as reviewed and interpreted by me:  Procedures     Most recent echo as reviewed and interpreted by me:  Results for orders placed during the hospital encounter of 07/13/23    Adult Transthoracic Echo Complete W/ Cont if Necessary Per Protocol    Interpretation Summary    Left ventricular systolic function is low normal. Left ventricular ejection fraction appears to be 46 - 50%.    Left ventricular diastolic function was indeterminate.    The left atrial cavity is severely dilated.    Left atrial volume is severely increased.    The right atrial cavity is moderately  " dilated.    Estimated right ventricular systolic pressure from tricuspid regurgitation is mildly elevated (35-45 mmHg).    Mild pulmonary hypertension is present.      Most recent stress test as reviewed and interpreted by me:      Most recent cardiac catheterization as reviewed interpreted by me:  No results found for this or any previous visit.    The following portions of the patient's history were reviewed and updated as appropriate: allergies, current medications, past family history, past medical history, past social history, past surgical history, and problem list.      ROS:  14 point review of systems negative except as mentioned above    Current Outpatient Medications:     acetaminophen (TYLENOL) 650 MG 8 hr tablet, Take 1 tablet by mouth 2 (Two) Times a Day As Needed for Mild Pain., Disp: , Rfl:     allopurinol (Zyloprim) 300 MG tablet, Take 1 tablet by mouth Daily., Disp: 90 tablet, Rfl: 1    amiodarone (PACERONE) 200 MG tablet, Take 1 tablet by mouth Daily., Disp: 90 tablet, Rfl: 0    apixaban (ELIQUIS) 5 MG tablet tablet, Take 1 tablet by mouth Every 12 (Twelve) Hours., Disp: 180 tablet, Rfl: 1    B Complex Vitamins (VITAMIN B COMPLEX PO), Take  by mouth., Disp: , Rfl:     furosemide (LASIX) 40 MG tablet, Take 1 tablet by mouth Daily., Disp: 90 tablet, Rfl: 1    Glucosamine-Chondroit-Vit C-Mn (GLUCOSAMINE CHONDR 1500 COMPLX PO), Take 2 tablets by mouth Daily., Disp: , Rfl:     Magnesium 200 MG tablet, Take  by mouth., Disp: , Rfl:     metFORMIN ER (GLUCOPHAGE-XR) 500 MG 24 hr tablet, Take 1 tablet by mouth 2 (Two) Times a Day., Disp: 180 tablet, Rfl: 1    metoprolol tartrate (LOPRESSOR) 25 MG tablet, Take 1 tablet by mouth 2 (Two) Times a Day. (Patient taking differently: Take 1 tablet by mouth As Needed.), Disp: 60 tablet, Rfl: 3    metroNIDAZOLE (METROCREAM) 0.75 % cream, Apply 1 application topically to the appropriate area as directed 2 (Two) Times a Day., Disp: 45 g, Rfl: 2    Misc Natural Products  (BLOOD SUGAR BALANCE PO), Take 2 tablets by mouth Daily., Disp: , Rfl:     multivitamin with minerals tablet tablet, Take 1 tablet by mouth Daily. Liver support, otc, Disp: , Rfl:     potassium chloride 10 MEQ CR tablet, Take 1 tablet by mouth 2 (Two) Times a Day., Disp: 180 tablet, Rfl: 0    Probiotic Product (PROBIOTIC PO), Take 1 capsule by mouth Daily., Disp: , Rfl:     VITAMIN D, CHOLECALCIFEROL, PO, Take 5,000 Units by mouth Daily., Disp: , Rfl:     Alcohol Swabs (B-D SINGLE USE SWABS REGULAR) pads, Use as directed for blood glucose checks, Disp: 100 each, Rfl: 3    dilTIAZem XR (DILACOR XR) 120 MG 24 hr capsule, Take 1 capsule by mouth Daily., Disp: 90 capsule, Rfl: 3    hydrOXYzine (ATARAX) 25 MG tablet, Take 1 tablet by mouth At Night As Needed (insomnia). (Patient not taking: Reported on 9/6/2023), Disp: 45 tablet, Rfl: 0    Lancets (OneTouch Delica Plus [id number]) misc, Apply 1 each topically to the appropriate area as directed Daily. To check blood sugar, Disp: 100 each, Rfl: 3    Melatonin 10 MG tablet, Take 1 tablet by mouth Every Night. (Patient not taking: Reported on 9/12/2023), Disp: , Rfl:     OneTouch Verio test strip, Use as instructed to check blood sugar once daily, Disp: 100 each, Rfl: 3    Problem List:  Patient Active Problem List   Diagnosis    Atypical atrial flutter    Essential hypertension    Palpitations    PAF (paroxysmal atrial fibrillation)    DM (diabetes mellitus), type 2    Gout    Primary osteoarthritis of both knees    Morbid obesity    Obesity (BMI 30.0-34.9)    Atrial fibrillation with rapid ventricular response    Atrial flutter with rapid ventricular response    Preoperative cardiovascular examination    Persistent atrial fibrillation    A-fib    S/P ablation of atrial fibrillation     Past Medical History:  Past Medical History:   Diagnosis Date    Atrial fibrillation     CHF (congestive heart failure)     Diabetes     Hypertension     Primary osteoarthritis of both knees  2022     Past Surgical History:  Past Surgical History:   Procedure Laterality Date    CARDIAC ELECTROPHYSIOLOGY PROCEDURE N/A 2021    Procedure: Ablation atrial flutter;  Surgeon: Antonio Senior MD;  Location: [facility] CATH INVASIVE LOCATION;  Service: Cardiovascular;  Laterality: N/A;    CARDIAC ELECTROPHYSIOLOGY PROCEDURE N/A 2023    Procedure: Ablation atrial fibrillation, cryo, rep emailed;  Surgeon: Claude Leyva MD;  Location: [facility] CATH INVASIVE LOCATION;  Service: Cardiovascular;  Laterality: N/A;    CATARACT EXTRACTION       Social History:  Social History     Socioeconomic History    Marital status:    Tobacco Use    Smoking status: Former     Packs/day: 0.50     Years: 12.00     Pack years: 6.00     Types: Cigarettes     Start date:      Quit date:      Years since quittin.7    Smokeless tobacco: Never   Vaping Use    Vaping Use: Never used   Substance and Sexual Activity    Alcohol use: No    Drug use: Never    Sexual activity: Defer     Allergies:  No Known Allergies  Immunizations:    There is no immunization history on file for this patient.         In-Office Procedure(s):  No orders to display        ASCVD RIsk Score::  The ASCVD Risk score (Zuhair HOPPER, et al., 2019) failed to calculate for the following reasons:    The 2019 ASCVD risk score is only valid for ages 40 to 79    Imaging:    Results for orders placed during the hospital encounter of 23    XR Chest 1 View    Narrative  XR CHEST 1 VW    Date of Exam: 2023 12:00 PM EDT    Indication: palpitation    Comparison: 2023    Findings:  Heart size and pulmonary vasculature are within normal limits. Lungs clear. Costophrenic angle sharp    Impression  Impression:  No active cardiopulmonary disease      Electronically Signed: Steven Skinner  2023 12:17 PM EDT  Workstation ID: [id number]               Lab Review:   Admission on 2023, Discharged on 2023   Component Date Value     Glucose 08/11/2023 126 (H)     Glucose 08/11/2023 152 (H)     Activated Clotting Time  08/11/2023 227 (H)     Activated Clotting Time  08/11/2023 257 (H)     Activated Clotting Time  08/11/2023 299 (H)     Activated Clotting Time  08/11/2023 317 (H)     Activated Clotting Time  08/11/2023 323 (H)    Lab on 08/09/2023   Component Date Value    Glucose 08/09/2023 118 (H)     BUN 08/09/2023 26 (H)     Creatinine 08/09/2023 1.12 (H)     Sodium 08/09/2023 143     Potassium 08/09/2023 4.5     Chloride 08/09/2023 104     CO2 08/09/2023 27.5     Calcium 08/09/2023 9.6     Total Protein 08/09/2023 5.9 (L)     Albumin 08/09/2023 4.1     ALT (SGPT) 08/09/2023 12     AST (SGOT) 08/09/2023 14     Alkaline Phosphatase 08/09/2023 74     Total Bilirubin 08/09/2023 0.9     Globulin 08/09/2023 1.8     A/G Ratio 08/09/2023 2.3     BUN/Creatinine Ratio 08/09/2023 23.2     Anion Gap 08/09/2023 11.5     eGFR 08/09/2023 49.2 (L)     Protime 08/09/2023 11.6     INR 08/09/2023 1.09     Magnesium 08/09/2023 2.2     WBC 08/09/2023 5.30     RBC 08/09/2023 2.94 (L)     Hemoglobin 08/09/2023 9.7 (L)     Hematocrit 08/09/2023 29.0 (L)     MCV 08/09/2023 98.6 (H)     MCH 08/09/2023 33.0     MCHC 08/09/2023 33.4     RDW 08/09/2023 14.1     RDW-SD 08/09/2023 50.2     MPV 08/09/2023 10.7     Platelets 08/09/2023 347     Neutrophil % 08/09/2023 64.2     Lymphocyte % 08/09/2023 19.8     Monocyte % 08/09/2023 12.6 (H)     Eosinophil % 08/09/2023 1.5     Basophil % 08/09/2023 1.5     Immature Grans % 08/09/2023 0.4     Neutrophils, Absolute 08/09/2023 3.40     Lymphocytes, Absolute 08/09/2023 1.05     Monocytes, Absolute 08/09/2023 0.67     Eosinophils, Absolute 08/09/2023 0.08     Basophils, Absolute 08/09/2023 0.08     Immature Grans, Absolute 08/09/2023 0.02     nRBC 08/09/2023 0.4 (H)    Hospital Outpatient Visit on 07/13/2023   Component Date Value    LVIDd 07/13/2023 4.9     LVIDs 07/13/2023 2.8     IVSd 07/13/2023 0.87     LVPWd 07/13/2023 1.05      FS 07/13/2023 43.4     IVS/LVPW 07/13/2023 0.83     ESV(cubed) 07/13/2023 22.0     LV Sys Vol (BSA correcte* 07/13/2023 35.5     EDV(cubed) 07/13/2023 121.1     LV Gaspar Vol (BSA correct* 07/13/2023 63.6     LVOT area 07/13/2023 3.1     LV mass(C)d 07/13/2023 169.0     LVOT diam 07/13/2023 1.98     EDV(MOD-sp4) 07/13/2023 120.3     ESV(MOD-sp4) 07/13/2023 67.2     SV(MOD-sp4) 07/13/2023 53.1     SI(MOD-sp4) 07/13/2023 28.1     EF(MOD-sp4) 07/13/2023 44.2     MV E max donald 07/13/2023 39.6     MV A max donald 07/13/2023 33.2     MV dec time 07/13/2023 0.17     MV E/A 07/13/2023 1.19     SV(LVOT) 07/13/2023 78.5     RVIDd 07/13/2023 4.3     LV V1 max 07/13/2023 101.3     LV V1 max PG 07/13/2023 4.1     LV V1 mean PG 07/13/2023 2.6     LV V1 VTI 07/13/2023 25.6     Ao pk donald 07/13/2023 169.1     Ao max PG 07/13/2023 11.4     Ao mean PG 07/13/2023 6.0     Ao V2 VTI 07/13/2023 36.1     TREMAINE(I,D) 07/13/2023 2.17     AI P1/2t 07/13/2023 523.5     MV max PG 07/13/2023 9.7     MV mean PG 07/13/2023 4.2     MV V2 VTI 07/13/2023 24.4     MVA(VTI) 07/13/2023 3.2     MV dec slope 07/13/2023 461.0     MR max donald 07/13/2023 529.5     MR max PG 07/13/2023 112.2     TR max donald 07/13/2023 310.4     TR max PG 07/13/2023 38.5     RVSP(TR) 07/13/2023 41.5     RAP systole 07/13/2023 3.0     RV V1 max PG 07/13/2023 2.02     RV V1 max 07/13/2023 71.1     RV V1 VTI 07/13/2023 14.3     PA V2 max 07/13/2023 92.9     PA acc time 07/13/2023 0.09     Ao root diam 07/13/2023 3.0     ACS 07/13/2023 1.77     EF(MOD-bp) 07/13/2023 44.0     Echo EF Estimated 07/13/2023 50.0    Admission on 05/25/2023, Discharged on 05/25/2023   Component Date Value    QT Interval 05/25/2023 307     Glucose 05/25/2023 198 (H)     BUN 05/25/2023 26 (H)     Creatinine 05/25/2023 1.18 (H)     Sodium 05/25/2023 139     Potassium 05/25/2023 4.8     Chloride 05/25/2023 102     CO2 05/25/2023 24.0     Calcium 05/25/2023 10.0     Total Protein 05/25/2023 6.6     Albumin  05/25/2023 4.5     ALT (SGPT) 05/25/2023 13     AST (SGOT) 05/25/2023 16     Alkaline Phosphatase 05/25/2023 76     Total Bilirubin 05/25/2023 1.1     Globulin 05/25/2023 2.1     A/G Ratio 05/25/2023 2.1     BUN/Creatinine Ratio 05/25/2023 22.0     Anion Gap 05/25/2023 13.0     eGFR 05/25/2023 46.2 (L)     HS Troponin T 05/25/2023 26 (H)     TSH 05/25/2023 0.578     Magnesium 05/25/2023 2.2     WBC 05/25/2023 7.20     RBC 05/25/2023 3.20 (L)     Hemoglobin 05/25/2023 10.5 (L)     Hematocrit 05/25/2023 31.6 (L)     MCV 05/25/2023 98.7 (H)     MCH 05/25/2023 32.7     MCHC 05/25/2023 33.2     RDW 05/25/2023 15.6 (H)     RDW-SD 05/25/2023 53.8     MPV 05/25/2023 8.4     Platelets 05/25/2023 392     Neutrophil % 05/25/2023 77.8 (H)     Lymphocyte % 05/25/2023 10.6 (L)     Monocyte % 05/25/2023 9.3     Eosinophil % 05/25/2023 0.5     Basophil % 05/25/2023 1.8 (H)     Neutrophils, Absolute 05/25/2023 5.60     Lymphocytes, Absolute 05/25/2023 0.80     Monocytes, Absolute 05/25/2023 0.70     Eosinophils, Absolute 05/25/2023 0.00     Basophils, Absolute 05/25/2023 0.10     nRBC 05/25/2023 0.3 (H)     QT Interval 05/25/2023 368     QT Interval 05/25/2023 370    Admission on 04/16/2023, Discharged on 04/18/2023   Component Date Value    QT Interval 04/16/2023 342     Glucose 04/16/2023 130 (H)     BUN 04/16/2023 38 (H)     Creatinine 04/16/2023 0.97     Sodium 04/16/2023 142     Potassium 04/16/2023 5.1     Chloride 04/16/2023 108 (H)     CO2 04/16/2023 24.0     Calcium 04/16/2023 10.0     Total Protein 04/16/2023 6.4     Albumin 04/16/2023 4.4     ALT (SGPT) 04/16/2023 12     AST (SGOT) 04/16/2023 13     Alkaline Phosphatase 04/16/2023 69     Total Bilirubin 04/16/2023 0.8     Globulin 04/16/2023 2.0     A/G Ratio 04/16/2023 2.2     BUN/Creatinine Ratio 04/16/2023 39.2 (H)     Anion Gap 04/16/2023 10.0     eGFR 04/16/2023 58.5 (L)     Protime 04/16/2023 10.8     INR 04/16/2023 1.05     PTT 04/16/2023 27.4 (L)     proBNP  04/16/2023 3,856.0 (H)     HS Troponin T 04/16/2023 25 (H)     Magnesium 04/16/2023 2.0     TSH 04/16/2023 0.661     WBC 04/16/2023 9.10     RBC 04/16/2023 3.33 (L)     Hemoglobin 04/16/2023 10.9 (L)     Hematocrit 04/16/2023 32.0 (L)     MCV 04/16/2023 96.2     MCH 04/16/2023 32.7     MCHC 04/16/2023 34.0     RDW 04/16/2023 16.0 (H)     RDW-SD 04/16/2023 56.9 (H)     MPV 04/16/2023 8.5     Platelets 04/16/2023 363     Neutrophil % 04/16/2023 69.5     Lymphocyte % 04/16/2023 17.3 (L)     Monocyte % 04/16/2023 10.9     Eosinophil % 04/16/2023 1.0     Basophil % 04/16/2023 1.3     Neutrophils, Absolute 04/16/2023 6.30     Lymphocytes, Absolute 04/16/2023 1.60     Monocytes, Absolute 04/16/2023 1.00 (H)     Eosinophils, Absolute 04/16/2023 0.10     Basophils, Absolute 04/16/2023 0.10     nRBC 04/16/2023 0.2     HS Troponin T 04/16/2023 24 (H)     Troponin T Delta 04/16/2023 -1     Glucose 04/16/2023 232 (H)     Glucose 04/16/2023 113 (H)     Glucose 04/17/2023 136 (H)     BUN 04/17/2023 39 (H)     Creatinine 04/17/2023 1.00     Sodium 04/17/2023 144     Potassium 04/17/2023 4.3     Chloride 04/17/2023 108 (H)     CO2 04/17/2023 25.0     Calcium 04/17/2023 9.7     Total Protein 04/17/2023 6.0     Albumin 04/17/2023 4.2     ALT (SGPT) 04/17/2023 12     AST (SGOT) 04/17/2023 13     Alkaline Phosphatase 04/17/2023 67     Total Bilirubin 04/17/2023 0.6     Globulin 04/17/2023 1.8     A/G Ratio 04/17/2023 2.3     BUN/Creatinine Ratio 04/17/2023 39.0 (H)     Anion Gap 04/17/2023 11.0     eGFR 04/17/2023 56.4 (L)     Magnesium 04/17/2023 2.0     Phosphorus 04/17/2023 4.2     WBC 04/17/2023 6.40     RBC 04/17/2023 3.05 (L)     Hemoglobin 04/17/2023 9.9 (L)     Hematocrit 04/17/2023 29.9 (L)     MCV 04/17/2023 97.9 (H)     MCH 04/17/2023 32.5     MCHC 04/17/2023 33.2     RDW 04/17/2023 15.9 (H)     RDW-SD 04/17/2023 54.7 (H)     MPV 04/17/2023 8.5     Platelets 04/17/2023 307     Neutrophil % 04/17/2023 57.5     Lymphocyte %  04/17/2023 26.2     Monocyte % 04/17/2023 12.0     Eosinophil % 04/17/2023 2.4     Basophil % 04/17/2023 1.9 (H)     Neutrophils, Absolute 04/17/2023 3.70     Lymphocytes, Absolute 04/17/2023 1.70     Monocytes, Absolute 04/17/2023 0.80     Eosinophils, Absolute 04/17/2023 0.20     Basophils, Absolute 04/17/2023 0.10     nRBC 04/17/2023 0.1     Glucose 04/17/2023 138 (H)     QT Interval 04/17/2023 458     Glucose 04/17/2023 131 (H)     Glucose 04/17/2023 150 (H)     Glucose 04/18/2023 133 (H)     BUN 04/18/2023 33 (H)     Creatinine 04/18/2023 1.04 (H)     Sodium 04/18/2023 140     Potassium 04/18/2023 4.2     Chloride 04/18/2023 103     CO2 04/18/2023 24.0     Calcium 04/18/2023 9.6     Total Protein 04/18/2023 6.3     Albumin 04/18/2023 4.5     ALT (SGPT) 04/18/2023 13     AST (SGOT) 04/18/2023 16     Alkaline Phosphatase 04/18/2023 74     Total Bilirubin 04/18/2023 0.7     Globulin 04/18/2023 1.8     A/G Ratio 04/18/2023 2.5     BUN/Creatinine Ratio 04/18/2023 31.7 (H)     Anion Gap 04/18/2023 13.0     eGFR 04/18/2023 53.8 (L)     Magnesium 04/18/2023 2.0     Phosphorus 04/18/2023 4.4     WBC 04/18/2023 6.60     RBC 04/18/2023 3.17 (L)     Hemoglobin 04/18/2023 10.2 (L)     Hematocrit 04/18/2023 30.4 (L)     MCV 04/18/2023 96.0     MCH 04/18/2023 32.3     MCHC 04/18/2023 33.7     RDW 04/18/2023 15.4     RDW-SD 04/18/2023 54.7 (H)     MPV 04/18/2023 9.0     Platelets 04/18/2023 337     Neutrophil % 04/18/2023 64.1     Lymphocyte % 04/18/2023 19.7     Monocyte % 04/18/2023 11.8     Eosinophil % 04/18/2023 2.4     Basophil % 04/18/2023 2.0 (H)     Neutrophils, Absolute 04/18/2023 4.20     Lymphocytes, Absolute 04/18/2023 1.30     Monocytes, Absolute 04/18/2023 0.80     Eosinophils, Absolute 04/18/2023 0.20     Basophils, Absolute 04/18/2023 0.10     nRBC 04/18/2023 0.3 (H)     QT Interval 04/18/2023 473     Glucose 04/18/2023 133 (H)     Glucose 04/18/2023 121 (H)      Recent labs reviewed and interpreted for  clinical significance and application            Level of Care:           Ktean Alonso MD  09/26/23  .

## 2023-10-09 DIAGNOSIS — I48.4 ATYPICAL ATRIAL FLUTTER: ICD-10-CM

## 2023-10-09 DIAGNOSIS — I48.91 ATRIAL FIBRILLATION WITH RVR: ICD-10-CM

## 2023-10-09 RX ORDER — AMIODARONE HYDROCHLORIDE 200 MG/1
200 TABLET ORAL DAILY
Qty: 90 TABLET | Refills: 1 | Status: SHIPPED | OUTPATIENT
Start: 2023-10-09

## 2023-10-09 RX ORDER — ALLOPURINOL 300 MG/1
300 TABLET ORAL DAILY
Qty: 90 TABLET | Refills: 1 | Status: SHIPPED | OUTPATIENT
Start: 2023-10-09

## 2023-11-22 DIAGNOSIS — E11.9 TYPE 2 DIABETES MELLITUS WITHOUT COMPLICATION, WITHOUT LONG-TERM CURRENT USE OF INSULIN: Primary | ICD-10-CM

## 2023-11-22 DIAGNOSIS — I10 ESSENTIAL HYPERTENSION: ICD-10-CM

## 2023-11-27 ENCOUNTER — CLINICAL SUPPORT (OUTPATIENT)
Dept: FAMILY MEDICINE CLINIC | Facility: CLINIC | Age: 83
End: 2023-11-27
Payer: MEDICARE

## 2023-11-27 DIAGNOSIS — I10 ESSENTIAL HYPERTENSION: ICD-10-CM

## 2023-11-27 DIAGNOSIS — E11.69 TYPE 2 DIABETES MELLITUS WITH OTHER SPECIFIED COMPLICATION, UNSPECIFIED WHETHER LONG TERM INSULIN USE: Primary | ICD-10-CM

## 2023-11-27 PROCEDURE — 36415 COLL VENOUS BLD VENIPUNCTURE: CPT | Performed by: NURSE PRACTITIONER

## 2023-11-27 PROCEDURE — 83036 HEMOGLOBIN GLYCOSYLATED A1C: CPT | Performed by: NURSE PRACTITIONER

## 2023-11-27 PROCEDURE — 85027 COMPLETE CBC AUTOMATED: CPT | Performed by: NURSE PRACTITIONER

## 2023-11-27 PROCEDURE — 80061 LIPID PANEL: CPT | Performed by: NURSE PRACTITIONER

## 2023-11-27 PROCEDURE — 80053 COMPREHEN METABOLIC PANEL: CPT | Performed by: NURSE PRACTITIONER

## 2023-11-27 NOTE — PROGRESS NOTES
Venipuncture Blood Specimen Collection  Venipuncture performed in the right arm by Leeann Koo MA with good hemostasis. Patient tolerated the procedure well without complications.   11/27/23   Leeann Koo MA

## 2023-11-28 LAB
ALBUMIN SERPL-MCNC: 4.7 G/DL (ref 3.5–5.2)
ALBUMIN/GLOB SERPL: 2.5 G/DL
ALP SERPL-CCNC: 99 U/L (ref 39–117)
ALT SERPL W P-5'-P-CCNC: 18 U/L (ref 1–33)
ANION GAP SERPL CALCULATED.3IONS-SCNC: 14 MMOL/L (ref 5–15)
AST SERPL-CCNC: 20 U/L (ref 1–32)
BILIRUB SERPL-MCNC: 1 MG/DL (ref 0–1.2)
BUN SERPL-MCNC: 24 MG/DL (ref 8–23)
BUN/CREAT SERPL: 22.2 (ref 7–25)
CALCIUM SPEC-SCNC: 10.3 MG/DL (ref 8.6–10.5)
CHLORIDE SERPL-SCNC: 102 MMOL/L (ref 98–107)
CHOLEST SERPL-MCNC: 194 MG/DL (ref 0–200)
CO2 SERPL-SCNC: 25 MMOL/L (ref 22–29)
CREAT SERPL-MCNC: 1.08 MG/DL (ref 0.57–1)
DEPRECATED RDW RBC AUTO: 48.9 FL (ref 37–54)
EGFRCR SERPLBLD CKD-EPI 2021: 51.4 ML/MIN/1.73
ERYTHROCYTE [DISTWIDTH] IN BLOOD BY AUTOMATED COUNT: 13.9 % (ref 12.3–15.4)
GLOBULIN UR ELPH-MCNC: 1.9 GM/DL
GLUCOSE SERPL-MCNC: 132 MG/DL (ref 65–99)
HBA1C MFR BLD: 6.2 % (ref 4.8–5.6)
HCT VFR BLD AUTO: 29.4 % (ref 34–46.6)
HDLC SERPL-MCNC: 54 MG/DL (ref 40–60)
HGB BLD-MCNC: 10 G/DL (ref 12–15.9)
LDLC SERPL CALC-MCNC: 126 MG/DL (ref 0–100)
LDLC/HDLC SERPL: 2.31 {RATIO}
MCH RBC QN AUTO: 33.3 PG (ref 26.6–33)
MCHC RBC AUTO-ENTMCNC: 34 G/DL (ref 31.5–35.7)
MCV RBC AUTO: 98 FL (ref 79–97)
PLATELET # BLD AUTO: 338 10*3/MM3 (ref 140–450)
PMV BLD AUTO: 10.9 FL (ref 6–12)
POTASSIUM SERPL-SCNC: 5.1 MMOL/L (ref 3.5–5.2)
PROT SERPL-MCNC: 6.6 G/DL (ref 6–8.5)
RBC # BLD AUTO: 3 10*6/MM3 (ref 3.77–5.28)
SODIUM SERPL-SCNC: 141 MMOL/L (ref 136–145)
TRIGL SERPL-MCNC: 77 MG/DL (ref 0–150)
VLDLC SERPL-MCNC: 14 MG/DL (ref 5–40)
WBC NRBC COR # BLD AUTO: 7.17 10*3/MM3 (ref 3.4–10.8)

## 2023-12-04 ENCOUNTER — OFFICE VISIT (OUTPATIENT)
Dept: FAMILY MEDICINE CLINIC | Facility: CLINIC | Age: 83
End: 2023-12-04
Payer: MEDICARE

## 2023-12-04 VITALS
TEMPERATURE: 98 F | HEIGHT: 63 IN | RESPIRATION RATE: 18 BRPM | WEIGHT: 191.4 LBS | BODY MASS INDEX: 33.91 KG/M2 | SYSTOLIC BLOOD PRESSURE: 117 MMHG | HEART RATE: 58 BPM | OXYGEN SATURATION: 98 % | DIASTOLIC BLOOD PRESSURE: 76 MMHG

## 2023-12-04 DIAGNOSIS — I48.91 ATRIAL FIBRILLATION WITH RAPID VENTRICULAR RESPONSE: ICD-10-CM

## 2023-12-04 DIAGNOSIS — E11.69 TYPE 2 DIABETES MELLITUS WITH OTHER SPECIFIED COMPLICATION, UNSPECIFIED WHETHER LONG TERM INSULIN USE: Primary | ICD-10-CM

## 2023-12-04 DIAGNOSIS — R53.83 FATIGUE, UNSPECIFIED TYPE: ICD-10-CM

## 2023-12-04 DIAGNOSIS — G56.01 CARPAL TUNNEL SYNDROME OF RIGHT WRIST: ICD-10-CM

## 2023-12-04 DIAGNOSIS — G47.09 OTHER INSOMNIA: ICD-10-CM

## 2023-12-04 DIAGNOSIS — I48.0 PAF (PAROXYSMAL ATRIAL FIBRILLATION): ICD-10-CM

## 2023-12-04 DIAGNOSIS — D64.9 ANEMIA, UNSPECIFIED TYPE: ICD-10-CM

## 2023-12-04 DIAGNOSIS — I10 ESSENTIAL HYPERTENSION: ICD-10-CM

## 2023-12-04 DIAGNOSIS — M10.9 GOUT, UNSPECIFIED CAUSE, UNSPECIFIED CHRONICITY, UNSPECIFIED SITE: ICD-10-CM

## 2023-12-04 PROCEDURE — 82043 UR ALBUMIN QUANTITATIVE: CPT | Performed by: NURSE PRACTITIONER

## 2023-12-04 PROCEDURE — 82570 ASSAY OF URINE CREATININE: CPT | Performed by: NURSE PRACTITIONER

## 2023-12-04 RX ORDER — METFORMIN HYDROCHLORIDE 500 MG/1
500 TABLET, EXTENDED RELEASE ORAL 2 TIMES DAILY
Qty: 180 TABLET | Refills: 1 | Status: SHIPPED | OUTPATIENT
Start: 2023-12-04

## 2023-12-04 RX ORDER — AMIODARONE HYDROCHLORIDE 200 MG/1
200 TABLET ORAL DAILY
Qty: 90 TABLET | Refills: 1 | Status: SHIPPED | OUTPATIENT
Start: 2023-12-04

## 2023-12-04 RX ORDER — DILTIAZEM HYDROCHLORIDE 120 MG/1
120 CAPSULE, EXTENDED RELEASE ORAL DAILY
Qty: 90 CAPSULE | Refills: 1 | Status: SHIPPED | OUTPATIENT
Start: 2023-12-04

## 2023-12-04 RX ORDER — LANCETS 30 GAUGE
1 EACH MISCELLANEOUS DAILY
Qty: 100 EACH | Refills: 3 | Status: SHIPPED | OUTPATIENT
Start: 2023-12-04

## 2023-12-04 RX ORDER — FUROSEMIDE 40 MG/1
40 TABLET ORAL DAILY
Qty: 90 TABLET | Refills: 1 | Status: SHIPPED | OUTPATIENT
Start: 2023-12-04

## 2023-12-04 RX ORDER — ALLOPURINOL 300 MG/1
300 TABLET ORAL DAILY
Qty: 90 TABLET | Refills: 1 | Status: SHIPPED | OUTPATIENT
Start: 2023-12-04

## 2023-12-04 RX ORDER — POTASSIUM CHLORIDE 750 MG/1
10 TABLET, FILM COATED, EXTENDED RELEASE ORAL 2 TIMES DAILY
Qty: 180 TABLET | Refills: 1 | Status: SHIPPED | OUTPATIENT
Start: 2023-12-04

## 2023-12-04 RX ORDER — HYDROXYZINE HYDROCHLORIDE 10 MG/1
10 TABLET, FILM COATED ORAL NIGHTLY PRN
Qty: 90 TABLET | Refills: 1 | Status: SHIPPED | OUTPATIENT
Start: 2023-12-04

## 2023-12-04 RX ORDER — FERROUS SULFATE 325(65) MG
325 TABLET ORAL
Qty: 90 TABLET | Refills: 1 | Status: SHIPPED | OUTPATIENT
Start: 2023-12-04

## 2023-12-04 NOTE — PROGRESS NOTES
Chief Complaint  Chief Complaint   Patient presents with    Diabetes     Home blood sugar 120. Eye exam up to date- [facility] 03/2023    Hypertension    Atrial Fibrillation           Subjective          Laure Leung presents to [facility] for   History of Present Illness    Atrial fibrillation, patient is on Eliquis, amiodarone routinely and metoprolol only as needed, she can not tolerate metoprolol on a regular basis due to bradycardia.  On 8/11/2023 patient underwent elective cryoablation per Dr. Leyva. She reports having 18 episodes of afib since ablation, with -140    Diabetes mellitus type II, feels stable on meds, denies any signs/symptoms of hyper/hypoglycemia, blurry vision, polydipsia, polyuria, nocturia, and unintentional weight loss    Gout, patient is on allopurinol daily    Insomnia, can't fall asleep, hydrox 25mg causes too much drowsiness.     Right hand tingling, has a hx of computer work, drove, plays on phone.      Here to review labs      The following portions of the patient's history were reviewed and updated as appropriate: allergies, current medications, past family history, past medical history, past social history, past surgical history and problem list.    Past Medical History:   Diagnosis Date    Atrial fibrillation     CHF (congestive heart failure)     Diabetes     Hypertension     Primary osteoarthritis of both knees 05/25/2022     Past Surgical History:   Procedure Laterality Date    CARDIAC ELECTROPHYSIOLOGY PROCEDURE N/A 6/23/2021    Procedure: Ablation atrial flutter;  Surgeon: Antonio Senior MD;  Location: [facility] CATH INVASIVE LOCATION;  Service: Cardiovascular;  Laterality: N/A;    CARDIAC ELECTROPHYSIOLOGY PROCEDURE N/A 8/11/2023    Procedure: Ablation atrial fibrillation, cryo, rep emailed;  Surgeon: Claude Leyva MD;  Location: [facility] CATH INVASIVE LOCATION;  Service: Cardiovascular;  Laterality: N/A;    CATARACT  EXTRACTION       Family History   Problem Relation Age of Onset    No Known Problems Mother     No Known Problems Father     Atrial fibrillation Sister     Diabetes Sister     No Known Problems Maternal Aunt     No Known Problems Maternal Uncle     No Known Problems Paternal Aunt     No Known Problems Paternal Uncle     No Known Problems Maternal Grandmother     No Known Problems Maternal Grandfather     No Known Problems Paternal Grandmother     No Known Problems Paternal Grandfather     Heart attack Neg Hx     Heart disease Neg Hx     Heart failure Neg Hx     Hyperlipidemia Neg Hx     Hypertension Neg Hx      Social History     Tobacco Use    Smoking status: Former     Packs/day: 0.50     Years: 12.00     Additional pack years: 0.00     Total pack years: 6.00     Types: Cigarettes     Start date:      Quit date:      Years since quittin.9    Smokeless tobacco: Never   Substance Use Topics    Alcohol use: No       Current Outpatient Medications:     acetaminophen (TYLENOL) 650 MG 8 hr tablet, Take 1 tablet by mouth 2 (Two) Times a Day As Needed for Mild Pain., Disp: , Rfl:     Alcohol Swabs (B-D SINGLE USE SWABS REGULAR) pads, Use as directed for blood glucose checks, Disp: 100 each, Rfl: 3    allopurinol (ZYLOPRIM) 300 MG tablet, Take 1 tablet by mouth Daily., Disp: 90 tablet, Rfl: 1    amiodarone (PACERONE) 200 MG tablet, Take 1 tablet by mouth Daily., Disp: 90 tablet, Rfl: 1    apixaban (ELIQUIS) 5 MG tablet tablet, Take 1 tablet by mouth Every 12 (Twelve) Hours., Disp: 180 tablet, Rfl: 1    B Complex Vitamins (VITAMIN B COMPLEX PO), Take  by mouth., Disp: , Rfl:     Coenzyme Q10-Vitamin E (QUNOL ULTRA COQ10 PO), Take 1 tablet by mouth Every Night., Disp: , Rfl:     dilTIAZem XR (DILACOR XR) 120 MG 24 hr capsule, Take 1 capsule by mouth Daily., Disp: 90 capsule, Rfl: 1    furosemide (LASIX) 40 MG tablet, Take 1 tablet by mouth Daily., Disp: 90 tablet, Rfl: 1    Glucosamine-Chondroit-Vit C-Mn  "(GLUCOSAMINE CHONDR 1500 COMPLX PO), Take 2 tablets by mouth Daily., Disp: , Rfl:     hydrOXYzine (ATARAX) 10 MG tablet, Take 1 tablet by mouth At Night As Needed (insomnia)., Disp: 90 tablet, Rfl: 1    Lancets (OneTouch Delica Plus [id number]) misc, Apply 1 each topically to the appropriate area as directed Daily. To check blood sugar, Disp: 100 each, Rfl: 3    Magnesium 200 MG tablet, Take  by mouth., Disp: , Rfl:     metFORMIN ER (GLUCOPHAGE-XR) 500 MG 24 hr tablet, Take 1 tablet by mouth 2 (Two) Times a Day., Disp: 180 tablet, Rfl: 1    metoprolol tartrate (LOPRESSOR) 25 MG tablet, Take 1 tablet by mouth 2 (Two) Times a Day. (Patient taking differently: Take 1 tablet by mouth As Needed.), Disp: 60 tablet, Rfl: 3    metroNIDAZOLE (METROCREAM) 0.75 % cream, Apply 1 application topically to the appropriate area as directed 2 (Two) Times a Day., Disp: 45 g, Rfl: 2    Misc Natural Products (BLOOD SUGAR BALANCE PO), Take 2 tablets by mouth Daily., Disp: , Rfl:     multivitamin with minerals tablet tablet, Take 1 tablet by mouth Daily. Liver support, otc, Disp: , Rfl:     OneTouch Verio test strip, Use as instructed to check blood sugar once daily, Disp: 100 each, Rfl: 3    potassium chloride 10 MEQ CR tablet, Take 1 tablet by mouth 2 (Two) Times a Day., Disp: 180 tablet, Rfl: 1    Probiotic Product (PROBIOTIC PO), Take 1 capsule by mouth Daily., Disp: , Rfl:     VITAMIN D, CHOLECALCIFEROL, PO, Take 5,000 Units by mouth Daily., Disp: , Rfl:     ferrous sulfate 325 (65 FE) MG tablet, Take 1 tablet by mouth Daily With Breakfast., Disp: 90 tablet, Rfl: 1    Objective   Vital Signs:   /76 (BP Location: Left arm, Patient Position: Sitting, Cuff Size: Adult)   Pulse 58   Temp 98 °F (36.7 °C) (Oral)   Resp 18   Ht 160 cm (63\")   Wt 86.8 kg (191 lb 6.4 oz)   SpO2 98%   BMI 33.90 kg/m²           Physical Exam  Constitutional:       General: She is not in acute distress.     Appearance: Normal appearance. She is " well-developed. She is obese. She is not ill-appearing or diaphoretic.   HENT:      Head: Normocephalic.   Eyes:      Conjunctiva/sclera: Conjunctivae normal.      Pupils: Pupils are equal, round, and reactive to light.   Neck:      Thyroid: No thyromegaly.      Vascular: No JVD.   Cardiovascular:      Rate and Rhythm: Normal rate and regular rhythm. Rhythm irregular.      Heart sounds: Murmur heard.   Pulmonary:      Effort: Pulmonary effort is normal. No respiratory distress.      Breath sounds: Normal breath sounds. No wheezing or rhonchi.   Abdominal:      General: Bowel sounds are normal. There is no distension.      Palpations: Abdomen is soft.      Tenderness: There is no abdominal tenderness.   Musculoskeletal:         General: No swelling or tenderness. Normal range of motion.      Cervical back: Normal range of motion and neck supple. No tenderness.   Lymphadenopathy:      Cervical: No cervical adenopathy.   Skin:     General: Skin is warm and dry.      Coloration: Skin is not jaundiced.      Findings: No erythema or rash.   Neurological:      General: No focal deficit present.      Mental Status: She is alert and oriented to person, place, and time. Mental status is at baseline.      Sensory: No sensory deficit.      Motor: Weakness present.      Gait: Gait abnormal (uses walker for mobility).   Psychiatric:         Mood and Affect: Mood normal.         Behavior: Behavior normal.         Thought Content: Thought content normal.         Judgment: Judgment normal.          Result Review :     Office Visit on 12/04/2023   Component Date Value Ref Range Status    Microalbumin/Creatinine Ratio 12/04/2023 34.0 (H)  0.0 - 29.0 mg/g Final    Creatinine, Urine 12/04/2023 129.6  mg/dL Final    Microalbumin, Urine 12/04/2023 4.4  mg/dL Final                              Assessment and Plan    Diagnoses and all orders for this visit:    1. Type 2 diabetes mellitus with other specified complication, unspecified  whether long term insulin use (Primary)  -     Microalbumin / Creatinine Urine Ratio - Urine, Clean Catch  -     Lancets (OneTouch Delica Plus [id number]) misc; Apply 1 each topically to the appropriate area as directed Daily. To check blood sugar  Dispense: 100 each; Refill: 3  -     metFORMIN ER (GLUCOPHAGE-XR) 500 MG 24 hr tablet; Take 1 tablet by mouth 2 (Two) Times a Day.  Dispense: 180 tablet; Refill: 1    2. Essential hypertension  -     furosemide (LASIX) 40 MG tablet; Take 1 tablet by mouth Daily.  Dispense: 90 tablet; Refill: 1  -     potassium chloride 10 MEQ CR tablet; Take 1 tablet by mouth 2 (Two) Times a Day.  Dispense: 180 tablet; Refill: 1    3. Atrial fibrillation with rapid ventricular response  -     amiodarone (PACERONE) 200 MG tablet; Take 1 tablet by mouth Daily.  Dispense: 90 tablet; Refill: 1  -     apixaban (ELIQUIS) 5 MG tablet tablet; Take 1 tablet by mouth Every 12 (Twelve) Hours.  Dispense: 180 tablet; Refill: 1  -     dilTIAZem XR (DILACOR XR) 120 MG 24 hr capsule; Take 1 capsule by mouth Daily.  Dispense: 90 capsule; Refill: 1  -     hydrOXYzine (ATARAX) 10 MG tablet; Take 1 tablet by mouth At Night As Needed (insomnia).  Dispense: 90 tablet; Refill: 1    4. Gout, unspecified cause, unspecified chronicity, unspecified site  -     allopurinol (ZYLOPRIM) 300 MG tablet; Take 1 tablet by mouth Daily.  Dispense: 90 tablet; Refill: 1    5. Other insomnia    6. PAF (paroxysmal atrial fibrillation)    7. Carpal tunnel syndrome of right wrist    8. Anemia, unspecified type    9. Fatigue, unspecified type    Other orders  -     ferrous sulfate 325 (65 FE) MG tablet; Take 1 tablet by mouth Daily With Breakfast.  Dispense: 90 tablet; Refill: 1      Conditions stable, rf meds as above  Labs reviewed and essentially stable  Start iron  F/u cardiology as directed      I spent 30 minutes caring for Laure Leung on this date of service. This time includes time spent by me in the following  activities: preparing for the visit, reviewing tests, performing a medically appropriate examination and/or evaluation , counseling and educating the patient/family/caregiver, ordering medications, tests, or procedures and documenting information in the medical record        Follow Up     Return in about 6 months (around 6/4/2024) for Medicare Wellness. HTN panel and [id number] prior to appt.  Patient was given instructions and counseling regarding her condition or for health maintenance advice. Please see specific information pulled into the AVS if appropriate.        Part of this note may be an electronic transcription/translation of spoken language to printed text using the Dragon Dictation System

## 2023-12-05 LAB
ALBUMIN UR-MCNC: 4.4 MG/DL
CREAT UR-MCNC: 129.6 MG/DL
MICROALBUMIN/CREAT UR: 34 MG/G (ref 0–29)

## 2023-12-25 DIAGNOSIS — I10 ESSENTIAL HYPERTENSION: ICD-10-CM

## 2023-12-26 RX ORDER — FUROSEMIDE 40 MG/1
40 TABLET ORAL DAILY
Qty: 90 TABLET | Refills: 1 | OUTPATIENT
Start: 2023-12-26

## 2024-01-16 ENCOUNTER — TELEPHONE (OUTPATIENT)
Dept: CARDIOLOGY | Facility: CLINIC | Age: 84
End: 2024-01-16

## 2024-01-16 NOTE — TELEPHONE ENCOUNTER
Please add patient on  CEC day 2/8/24 at 11:30  Note: establish care - Afib//prev card: Ketan Alonso MD    Thanks,  Sisi

## 2024-01-16 NOTE — TELEPHONE ENCOUNTER
"      Caller: Laure Leung \"Addie\"    Relationship: Self    Best call back number: [phone]     Who is your current provider: DR. RUIZ     Is your current provider offboarding? NO    Who would you like your new provider to be: DR. PELAYO    What are your reasons for transferring care: PATIENT IS NOT HAPPY WITH DR. RUIZ'S SERVICES.     Additional notes:            "

## 2024-01-31 DIAGNOSIS — E11.69 TYPE 2 DIABETES MELLITUS WITH OTHER SPECIFIED COMPLICATION, UNSPECIFIED WHETHER LONG TERM INSULIN USE: ICD-10-CM

## 2024-01-31 DIAGNOSIS — I10 ESSENTIAL HYPERTENSION: ICD-10-CM

## 2024-01-31 RX ORDER — POTASSIUM CHLORIDE 750 MG/1
10 TABLET, FILM COATED, EXTENDED RELEASE ORAL 2 TIMES DAILY
Qty: 180 TABLET | Refills: 1 | Status: SHIPPED | OUTPATIENT
Start: 2024-01-31

## 2024-01-31 RX ORDER — METFORMIN HYDROCHLORIDE 500 MG/1
500 TABLET, EXTENDED RELEASE ORAL 2 TIMES DAILY
Qty: 180 TABLET | Refills: 1 | Status: SHIPPED | OUTPATIENT
Start: 2024-01-31

## 2024-01-31 NOTE — TELEPHONE ENCOUNTER
"Caller: Laure Leung \"Addie\"    Relationship: Self    Best call back number: [phone]     Requested Prescriptions:   Requested Prescriptions     Pending Prescriptions Disp Refills    potassium chloride 10 MEQ CR tablet 180 tablet 1     Sig: Take 1 tablet by mouth 2 (Two) Times a Day.    metFORMIN ER (GLUCOPHAGE-XR) 500 MG 24 hr tablet 180 tablet 1     Sig: Take 1 tablet by mouth 2 (Two) Times a Day.        Pharmacy where request should be sent: [facility], [address] [phone] [place] [phone] FX     Last office visit with prescribing clinician: 12/4/2023   Last telemedicine visit with prescribing clinician: Visit date not found   Next office visit with prescribing clinician: 6/4/2024     Additional details provided by patient: PATIENT WAS TOLD BY PHARMACY PRESCRIPTIONS WILL NEED RE-WRITTEN    Marilee Lion Rep   01/31/24 09:20 EST   "

## 2024-02-08 ENCOUNTER — OFFICE VISIT (OUTPATIENT)
Dept: CARDIOLOGY | Facility: CLINIC | Age: 84
End: 2024-02-08
Payer: MEDICARE

## 2024-02-08 VITALS
HEIGHT: 63 IN | BODY MASS INDEX: 32.96 KG/M2 | SYSTOLIC BLOOD PRESSURE: 132 MMHG | WEIGHT: 186 LBS | HEART RATE: 110 BPM | DIASTOLIC BLOOD PRESSURE: 84 MMHG

## 2024-02-08 DIAGNOSIS — Z98.890 S/P ABLATION OF ATRIAL FIBRILLATION: ICD-10-CM

## 2024-02-08 DIAGNOSIS — E11.69 TYPE 2 DIABETES MELLITUS WITH OTHER SPECIFIED COMPLICATION, UNSPECIFIED WHETHER LONG TERM INSULIN USE: ICD-10-CM

## 2024-02-08 DIAGNOSIS — Z86.79 S/P ABLATION OF ATRIAL FIBRILLATION: ICD-10-CM

## 2024-02-08 DIAGNOSIS — R00.1 BRADYCARDIA: ICD-10-CM

## 2024-02-08 DIAGNOSIS — I48.91 ATRIAL FIBRILLATION WITH RVR: Primary | ICD-10-CM

## 2024-02-08 DIAGNOSIS — I10 PRIMARY HYPERTENSION: ICD-10-CM

## 2024-02-08 NOTE — PROGRESS NOTES
Date of Office Visit: 2024  Encounter Provider: Genevieve Coker MD  Place of Service: [facility] CARDIOLOGY  Patient Name: Laure Leung  :1940      Patient ID:  Laure Leung is a 84 y.o. female is here for atrial fibrillation          History of Present Illness    She has a history of atrial fibrillation, atrial flutter, essential hypertension, gout, iron deficiency anemia, diabetes mellitus type 2.      Her sister has atrial fibrillation, hypertension and diabetes.  She is , has no children, is retired, quit smoking , has 2 cups of coffee per day, no alcohol and no drugs.    She had an ablation done 2021 with Dr. Senior, ablation of atypical atrial flutter/perimitral flutter and typical right atrial flutter.  2023, she was admitted with atrial fibrillation and converted to sinus rhythm with IV amiodarone.  She had redo atrial fibrillation ablation with PVI and posterior wall isolation 2023.  Echocardiogram done 2023 shows ejection ration 46/50% with severe left atrial enlargement, moderate right atrial enlargement, mild elevation of RVSP, mild mitral insufficiency, mild to moderate aortic insufficiency, mild tricuspid insufficiency, mild pulmonic insufficiency.  She has seen Dr. Alonso who suggested that she might need AV darnell ablation.    Labs done 2023 showed creatinine 1.08, potassium 5.1, glucose 132, otherwise normal CMP, hemoglobin A1c 6.2%, total cholesterol 194, HDL 54, , VLDL 14, triglycerides 77, hemoglobin 10, otherwise normal CBC.    She does not feel well.  Her heart rate at home when she checks it runs 130s.  When it gets above 140s she feels pretty bad with dizziness but in the 130s, she is weak and fatigued.  Then she will take a metoprolol and her heart rate will drop to the 40s and she feels very nervous and lifeless when this happens.  Her heart rate is really almost never controlled.  This got worse since  her ablation 8/2023.  She does not like amiodarone as she knows it has some pretty significant side effects and despite being on these medications, she never feels well.  She lives in her own home, she does use a walker, she does all of her activities of daily living.  She does still drive.     Has had no syncope or falls.  She has no chest pain or pressure.  She has mild chronic short windedness.  She takes her medications as directed.    Past Medical History:   Diagnosis Date    Atrial fibrillation     CHF (congestive heart failure)     Diabetes     Hypertension     Primary osteoarthritis of both knees 05/25/2022         Past Surgical History:   Procedure Laterality Date    CARDIAC ELECTROPHYSIOLOGY PROCEDURE N/A 6/23/2021    Procedure: Ablation atrial flutter;  Surgeon: Antonio Senior MD;  Location: [facility] CATH INVASIVE LOCATION;  Service: Cardiovascular;  Laterality: N/A;    CARDIAC ELECTROPHYSIOLOGY PROCEDURE N/A 8/11/2023    Procedure: Ablation atrial fibrillation, cryo, rep emailed;  Surgeon: Claude Leyva MD;  Location: [facility] CATH INVASIVE LOCATION;  Service: Cardiovascular;  Laterality: N/A;    CATARACT EXTRACTION         Current Outpatient Medications on File Prior to Visit   Medication Sig Dispense Refill    Alcohol Swabs (B-D SINGLE USE SWABS REGULAR) pads Use as directed for blood glucose checks 100 each 3    allopurinol (ZYLOPRIM) 300 MG tablet Take 1 tablet by mouth Daily. 90 tablet 1    amiodarone (PACERONE) 200 MG tablet Take 1 tablet by mouth Daily. 90 tablet 1    apixaban (ELIQUIS) 5 MG tablet tablet Take 1 tablet by mouth Every 12 (Twelve) Hours. 180 tablet 1    B Complex Vitamins (VITAMIN B COMPLEX PO) Take  by mouth.      dilTIAZem XR (DILACOR XR) 120 MG 24 hr capsule Take 1 capsule by mouth Daily. 90 capsule 1    furosemide (LASIX) 40 MG tablet Take 1 tablet by mouth Daily. 90 tablet 1    Lancets (OneTouch Delica Plus [id number]) misc Apply 1 each topically to the appropriate area  as directed Daily. To check blood sugar 100 each 3    Magnesium 200 MG tablet Take  by mouth.      metFORMIN ER (GLUCOPHAGE-XR) 500 MG 24 hr tablet Take 1 tablet by mouth 2 (Two) Times a Day. 180 tablet 1    metoprolol tartrate (LOPRESSOR) 25 MG tablet Take 1 tablet by mouth 2 (Two) Times a Day. (Patient taking differently: Take 1 tablet by mouth As Needed.) 60 tablet 3    metroNIDAZOLE (METROCREAM) 0.75 % cream Apply 1 application topically to the appropriate area as directed 2 (Two) Times a Day. 45 g 2    OneTouch Verio test strip Use as instructed to check blood sugar once daily 100 each 3    potassium chloride 10 MEQ CR tablet Take 1 tablet by mouth 2 (Two) Times a Day. 180 tablet 1    VITAMIN D, CHOLECALCIFEROL, PO Take 5,000 Units by mouth Daily.      acetaminophen (TYLENOL) 650 MG 8 hr tablet Take 1 tablet by mouth 2 (Two) Times a Day As Needed for Mild Pain. (Patient not taking: Reported on 2/8/2024)      ferrous sulfate 325 (65 FE) MG tablet Take 1 tablet by mouth Daily With Breakfast. (Patient not taking: Reported on 2/8/2024) 90 tablet 1    [DISCONTINUED] Coenzyme Q10-Vitamin E (QUNOL ULTRA COQ10 PO) Take 1 tablet by mouth Every Night. (Patient not taking: Reported on 2/8/2024)      [DISCONTINUED] Glucosamine-Chondroit-Vit C-Mn (GLUCOSAMINE CHONDR 1500 COMPLX PO) Take 2 tablets by mouth Daily. (Patient not taking: Reported on 2/8/2024)      [DISCONTINUED] hydrOXYzine (ATARAX) 10 MG tablet Take 1 tablet by mouth At Night As Needed (insomnia). (Patient not taking: Reported on 2/8/2024) 90 tablet 1    [DISCONTINUED] Misc Natural Products (BLOOD SUGAR BALANCE PO) Take 2 tablets by mouth Daily. (Patient not taking: Reported on 2/8/2024)      [DISCONTINUED] multivitamin with minerals tablet tablet Take 1 tablet by mouth Daily. Liver support, otc (Patient not taking: Reported on 2/8/2024)      [DISCONTINUED] Probiotic Product (PROBIOTIC PO) Take 1 capsule by mouth Daily. (Patient not taking: Reported on  "2024)       No current facility-administered medications on file prior to visit.       Social History     Socioeconomic History    Marital status:     Number of children: 0   Tobacco Use    Smoking status: Former     Packs/day: 0.50     Years: 12.00     Additional pack years: 0.00     Total pack years: 6.00     Types: Cigarettes     Start date:      Quit date:      Years since quittin.1     Passive exposure: Past    Smokeless tobacco: Never    Tobacco comments:     Caffeine: 2 cups per day   Vaping Use    Vaping Use: Never used   Substance and Sexual Activity    Alcohol use: No    Drug use: Never    Sexual activity: Defer             Procedures    ECG 12 Lead    Date/Time: 2024 12:28 PM  Performed by: Genevieve Coker MD    Authorized by: Genevieve Coker MD  Comparison: compared with previous ECG   Similar to previous ECG  Rhythm: atrial fibrillation    Clinical impression: abnormal EKG              Objective:      Vitals:    24 1216   BP: 132/84   Pulse: 110   Weight: 84.4 kg (186 lb)   Height: 160 cm (63\")     Body mass index is 32.95 kg/m².    Vitals reviewed.   Constitutional:       General: Not in acute distress.     Appearance: Not diaphoretic.   Neck:      Vascular: No carotid bruit or JVD.   Pulmonary:      Effort: Pulmonary effort is normal.      Breath sounds: Normal breath sounds.   Cardiovascular:      Tachycardia present. Irregularly irregular rhythm.      No gallop.    Pulses:     Intact distal pulses.      Carotid: 2+ bilaterally.     Radial: 2+ bilaterally.     Dorsalis pedis: 2+ bilaterally.     Posterior tibial: 2+ bilaterally.  Edema:     Peripheral edema absent.   Neurological:      Cranial Nerves: No cranial nerve deficit.         Lab Review:       Assessment:      Diagnosis Plan   1. Atrial fibrillation with RVR  [facility]      2. Primary hypertension        3. S/P ablation of atrial fibrillation  [facility] " Tele[facility]      4. Type 2 diabetes mellitus with other specified complication, unspecified whether long term insulin use        5. Bradycardia  [facility]        Atrial fibrillation with chronic RVR heart rates in the 130s to 140s, poorly controlled on amiodarone and diltiazem.  When she takes metoprolol, her heart rate dropped to the 40s.  She has tachybradycardia syndrome.  Echo shows severe left atrial enlargement.  Her LV EF is slightly decreased I think likely due to A-fib RVR.  Recommended pacemaker with AV node ablation.  Stop amiodarone.  Hypertension, under fair control  Diabetes mellitus hype 2     Plan:       See back in 3 months, refer to EP for pacemaker and AV node ablation.  Plan of care discussed with the patient and she is amenable to proceed.

## 2024-03-06 ENCOUNTER — TELEPHONE (OUTPATIENT)
Dept: CARDIOLOGY | Facility: CLINIC | Age: 84
End: 2024-03-06
Payer: MEDICARE

## 2024-03-06 NOTE — TELEPHONE ENCOUNTER
RM patient:    Her monitor showed she was in Afib 23% of the study. She has an appointment with Dr. Villatoro next week.     Thanks!  MIRZA Kc

## 2024-03-07 NOTE — TELEPHONE ENCOUNTER
I spoke with Laure Leung and updated pt on results/recommendations from provider.  Pt verbalized understanding and has no further questions at this time.    Thank you,    Karen CM, RN  Triage [facility]  03/07/24 08:20 EST

## 2024-03-18 DIAGNOSIS — I10 ESSENTIAL HYPERTENSION: ICD-10-CM

## 2024-03-18 DIAGNOSIS — E11.9 TYPE 2 DIABETES MELLITUS WITHOUT COMPLICATION, WITHOUT LONG-TERM CURRENT USE OF INSULIN: ICD-10-CM

## 2024-03-18 NOTE — TELEPHONE ENCOUNTER
"Caller: Laure Leung \"Addie\"    Relationship: Self    Best call back number:[phone]     Requested Prescriptions:   Requested Prescriptions     Pending Prescriptions Disp Refills    furosemide (LASIX) 40 MG tablet 90 tablet 1     Sig: Take 1 tablet by mouth Daily.    OneTouch Verio test strip 100 each 3     Sig: Use as instructed to check blood sugar once daily    Alcohol Swabs (B-D SINGLE USE SWABS REGULAR) pads 100 each 3     Sig: Use as directed for blood glucose checks        [facility], [address] [phone] [facility] [phone]      Last office visit with prescribing clinician: 12/4/2023   Last telemedicine visit with prescribing clinician: Visit date not found   Next office visit with prescribing clinician: 6/4/2024     Additional details provided by patient: PATIENT CALLING STATING THAT THE PHARMACY IS TELLING HER SHE HAS NO REFILLS AND THEY ARE NEEDING A NEW PRESCRIPTION     Does the patient have less than a 3 day supply:  [] Yes  [x] No    Would you like a call back once the refill request has been completed: [] Yes [x] No    If the office needs to give you a call back, can they leave a voicemail: [] Yes [x] No    Marilee Chen Rep   03/18/24 11:09 EDT         "

## 2024-03-19 RX ORDER — BLOOD SUGAR DIAGNOSTIC
STRIP MISCELLANEOUS
Qty: 100 EACH | Refills: 3 | Status: SHIPPED | OUTPATIENT
Start: 2024-03-19

## 2024-03-19 RX ORDER — ISOPROPYL ALCOHOL 0.75 G/1
SWAB TOPICAL
Qty: 100 EACH | Refills: 3 | Status: SHIPPED | OUTPATIENT
Start: 2024-03-19

## 2024-03-19 RX ORDER — FUROSEMIDE 40 MG/1
40 TABLET ORAL DAILY
Qty: 90 TABLET | Refills: 1 | Status: SHIPPED | OUTPATIENT
Start: 2024-03-19

## 2024-03-22 ENCOUNTER — TELEPHONE (OUTPATIENT)
Dept: CARDIOLOGY | Facility: CLINIC | Age: 84
End: 2024-03-22
Payer: MEDICARE

## 2024-03-22 NOTE — TELEPHONE ENCOUNTER
I spoke with pt and let her know that they are working on trying to get her in next week.  Verbalized understanding.    Latesha Burris, RN  Triage RN  03/22/24 16:15 EDT

## 2024-03-22 NOTE — TELEPHONE ENCOUNTER
We can see if we can get her added on next week but will be Monday before we know if that is possible

## 2024-03-22 NOTE — TELEPHONE ENCOUNTER
Pt called in to triage this morning.  She states that she has long standing hx of A fib and history of ablations x2.  She states that she is in A Fib and HR has been running 120-140's since 3/16.  She also states that she had an appointment with Dr. Villatoro on 3/14 that was rescheduled to 5/21.  She saw Dr. Cokre 2/8 and had a 14 day monitor at that time.  She states that she feels anxious/nervous/weak, but denies any SOA/CP or other symptoms at this time.  She continues to take eliqius 5mg Q12, and has not missed any doses.  She also continues to take diltiazem 120mg daily.  She has stopped metoprolol 25mg twice a day secondary to HR was getting down to 40's at times.    Recommendations?    Thanks so much,  Latesha Burris, RN  Triage RN  03/22/24 10:21 EDT

## 2024-03-29 ENCOUNTER — TELEPHONE (OUTPATIENT)
Dept: CARDIOLOGY | Facility: CLINIC | Age: 84
End: 2024-03-29

## 2024-03-29 NOTE — TELEPHONE ENCOUNTER
"  Caller: Laure Leung \"Addie\"    Relationship: Self    Best call back number: [phone]    What is the best time to reach you: ANYTIME    Who are you requesting to speak with (clinical staff, provider,  specific staff member): CLINICAL        What was the call regarding: PT HEART RATE HAS BEEN UP FOR 14 DAYS.   HR IS RANGING FROM 145-127. WHAT SHOULD SHE DO? DOES SHE NEED TO BE ON MEDICATION TILL SHE SEE'S DR LIZARRAGA ON 04/09/2024?   BP HAS BEEN ELEVATED ALSO.  PLEASE CALL TO DISCUSS WITH PT    Is it okay if the provider responds through [employer]: NO      "

## 2024-03-29 NOTE — TELEPHONE ENCOUNTER
Called and spoke with patient. Went over recommendations from Dr. Coker. She verbalized understanding.    Thank you,    Ashlie Wellington, RN  Triage [facility]  03/29/24 16:23 EDT

## 2024-03-29 NOTE — TELEPHONE ENCOUNTER
Dr. Coker,    Called and spoke with patient. Her HR was elevated last night when she went to sleep, but this morning her HR is 145 bpm. She said she doesn't feel that bad, but she can tell her heart rate is fast. She is currently taking diltiazem. She has metoprolol ordered, but she does not take this because her HRs go down to the 40s when she does.    She does not have an appt with Dr. Villatoro until 4-9-24. Do you have any recommendations for her to do in the mean time?    Thank you,    Ashlie Wellington, RN  Triage [facility] [place]  03/29/24 10:44 EDT

## 2024-04-03 NOTE — TELEPHONE ENCOUNTER
Can you have her come into the office tomorrow to see me please.  SUDHIR Valtrex Pregnancy And Lactation Text: this medication is Pregnancy Category B and is considered safe during pregnancy. This medication is not directly found in breast milk but it's metabolite acyclovir is present. Gabapentin Counseling: I discussed with the patient the risks of gabapentin including but not limited to dizziness, somnolence, fatigue and ataxia. Tazorac Counseling:  Patient advised that medication is irritating and drying.  Patient may need to apply sparingly and wash off after an hour before eventually leaving it on overnight.  The patient verbalized understanding of the proper use and possible adverse effects of tazorac.  All of the patient's questions and concerns were addressed. Spironolactone Counseling: Patient advised regarding risks of diarrhea, abdominal pain, hyperkalemia, birth defects (for female patients), liver toxicity and renal toxicity. The patient may need blood work to monitor liver and kidney function and potassium levels while on therapy. The patient verbalized understanding of the proper use and possible adverse effects of spironolactone.  All of the patient's questions and concerns were addressed. Rhofade Counseling: Rhofade is a topical medication which can decrease superficial blood flow where applied. Side effects are uncommon and include stinging, redness and allergic reactions. Wartpeel Counseling:  I discussed with the patient the risks of Wartpeel including but not limited to erythema, scaling, itching, weeping, crusting, and pain. Fluconazole Pregnancy And Lactation Text: This medication is Pregnancy Category C and it isn't know if it is safe during pregnancy. It is also excreted in breast milk. Bactrim Pregnancy And Lactation Text: This medication is Pregnancy Category D and is known to cause fetal risk.  It is also excreted in breast milk. Topical Retinoid counseling:  Patient advised to apply a pea-sized amount only at bedtime and wait 30 minutes after washing their face before applying.  If too drying, patient may add a non-comedogenic moisturizer. The patient verbalized understanding of the proper use and possible adverse effects of retinoids.  All of the patient's questions and concerns were addressed. Cosentyx Pregnancy And Lactation Text: This medication is Pregnancy Category B and is considered safe during pregnancy. It is unknown if this medication is excreted in breast milk. High Dose Vitamin A Counseling: Side effects reviewed, pt to contact office should one occur. Thalidomide Counseling: I discussed with the patient the risks of thalidomide including but not limited to birth defects, anxiety, weakness, chest pain, dizziness, cough and severe allergy. Ketoconazole Pregnancy And Lactation Text: This medication is Pregnancy Category C and it isn't know if it is safe during pregnancy. It is also excreted in breast milk and breast feeding isn't recommended. Odomzo Counseling- I discussed with the patient the risks of Odomzo including but not limited to nausea, vomiting, diarrhea, constipation, weight loss, changes in the sense of taste, decreased appetite, muscle spasms, and hair loss.  The patient verbalized understanding of the proper use and possible adverse effects of Odomzo.  All of the patient's questions and concerns were addressed. Methotrexate Counseling:  Patient counseled regarding adverse effects of methotrexate including but not limited to nausea, vomiting, abnormalities in liver function tests. Patients may develop mouth sores, rash, diarrhea, and abnormalities in blood counts. The patient understands that monitoring is required including LFT's and blood counts.  There is a rare possibility of scarring of the liver and lung problems that can occur when taking methotrexate. Persistent nausea, loss of appetite, pale stools, dark urine, cough, and shortness of breath should be reported immediately. Patient advised to discontinue methotrexate treatment at least three months before attempting to become pregnant.  I discussed the need for folate supplements while taking methotrexate.  These supplements can decrease side effects during methotrexate treatment. The patient verbalized understanding of the proper use and possible adverse effects of methotrexate.  All of the patient's questions and concerns were addressed. Eucrisa Pregnancy And Lactation Text: This medication has not been assigned a Pregnancy Risk Category but animal studies failed to show danger with the topical medication. It is unknown if the medication is excreted in breast milk. Libtayo Pregnancy And Lactation Text: This medication is contraindicated in pregnancy and when breast feeding. Terbinafine Pregnancy And Lactation Text: This medication is Pregnancy Category B and is considered safe during pregnancy. It is also excreted in breast milk and breast feeding isn't recommended. Zyclara Counseling:  I discussed with the patient the risks of imiquimod including but not limited to erythema, scaling, itching, weeping, crusting, and pain.  Patient understands that the inflammatory response to imiquimod is variable from person to person and was educated regarded proper titration schedule.  If flu-like symptoms develop, patient knows to discontinue the medication and contact us. Hydroxyzine Counseling: Patient advised that the medication is sedating and not to drive a car after taking this medication.  Patient informed of potential adverse effects including but not limited to dry mouth, urinary retention, and blurry vision.  The patient verbalized understanding of the proper use and possible adverse effects of hydroxyzine.  All of the patient's questions and concerns were addressed. Rhofade Pregnancy And Lactation Text: This medication has not been assigned a Pregnancy Risk Category. It is unknown if the medication is excreted in breast milk. Cephalexin Pregnancy And Lactation Text: This medication is Pregnancy Category B and considered safe during pregnancy.  It is also excreted in breast milk but can be used safely for shorter doses. Rifampin Pregnancy And Lactation Text: This medication is Pregnancy Category C and it isn't know if it is safe during pregnancy. It is also excreted in breast milk and should not be used if you are breast feeding. Otezla Counseling: The side effects of Otezla were discussed with the patient, including but not limited to worsening or new depression, weight loss, diarrhea, nausea, upper respiratory tract infection, and headache. Patient instructed to call the office should any adverse effect occur.  The patient verbalized understanding of the proper use and possible adverse effects of Otezla.  All the patient's questions and concerns were addressed. Use Enhanced Medication Counseling?: No Metronidazole Counseling:  I discussed with the patient the risks of metronidazole including but not limited to seizures, nausea/vomiting, a metallic taste in the mouth, nausea/vomiting and severe allergy. Oxybutynin Pregnancy And Lactation Text: This medication is Pregnancy Category B and is considered safe during pregnancy. It is unknown if it is excreted in breast milk. Doxycycline Counseling:  Patient counseled regarding possible photosensitivity and increased risk for sunburn.  Patient instructed to avoid sunlight, if possible.  When exposed to sunlight, patients should wear protective clothing, sunglasses, and sunscreen.  The patient was instructed to call the office immediately if the following severe adverse effects occur:  hearing changes, easy bruising/bleeding, severe headache, or vision changes.  The patient verbalized understanding of the proper use and possible adverse effects of doxycycline.  All of the patient's questions and concerns were addressed. Doxepin Counseling:  Patient advised that the medication is sedating and not to drive a car after taking this medication. Patient informed of potential adverse effects including but not limited to dry mouth, urinary retention, and blurry vision.  The patient verbalized understanding of the proper use and possible adverse effects of doxepin.  All of the patient's questions and concerns were addressed. Benzoyl Peroxide Pregnancy And Lactation Text: This medication is Pregnancy Category C. It is unknown if benzoyl peroxide is excreted in breast milk. Griseofulvin Counseling:  I discussed with the patient the risks of griseofulvin including but not limited to photosensitivity, cytopenia, liver damage, nausea/vomiting and severe allergy.  The patient understands that this medication is best absorbed when taken with a fatty meal (e.g., ice cream or french fries). Albendazole Pregnancy And Lactation Text: This medication is Pregnancy Category C and it isn't known if it is safe during pregnancy. It is also excreted in breast milk. Dapsone Pregnancy And Lactation Text: This medication is Pregnancy Category C and is not considered safe during pregnancy or breast feeding. Cosentyx Counseling:  I discussed with the patient the risks of Cosentyx including but not limited to worsening of Crohn's disease, immunosuppression, allergic reactions and infections.  The patient understands that monitoring is required including a PPD at baseline and must alert us or the primary physician if symptoms of infection or other concerning signs are noted. SSKI Counseling:  I discussed with the patient the risks of SSKI including but not limited to thyroid abnormalities, metallic taste, GI upset, fever, headache, acne, arthralgias, paraesthesias, lymphadenopathy, easy bleeding, arrhythmias, and allergic reaction. Glycopyrrolate Pregnancy And Lactation Text: This medication is Pregnancy Category B and is considered safe during pregnancy. It is unknown if it is excreted breast milk. 5-Fu Counseling: 5-Fluorouracil Counseling:  I discussed with the patient the risks of 5-fluorouracil including but not limited to erythema, scaling, itching, weeping, crusting, and pain. Cyclosporine Pregnancy And Lactation Text: This medication is Pregnancy Category C and it isn't know if it is safe during pregnancy. This medication is excreted in breast milk. Drysol Pregnancy And Lactation Text: This medication is considered safe during pregnancy and breast feeding. Xeljanz Counseling: I discussed with the patient the risks of Xeljanz therapy including increased risk of infection, liver issues, headache, diarrhea, or cold symptoms. Live vaccines should be avoided. They were instructed to call if they have any problems. Enbrel Counseling:  I discussed with the patient the risks of etanercept including but not limited to myelosuppression, immunosuppression, autoimmune hepatitis, demyelinating diseases, lymphoma, and infections.  The patient understands that monitoring is required including a PPD at baseline and must alert us or the primary physician if symptoms of infection or other concerning signs are noted. Azithromycin Counseling:  I discussed with the patient the risks of azithromycin including but not limited to GI upset, allergic reaction, drug rash, diarrhea, and yeast infections. Niacinamide Counseling: I recommended taking niacin or niacinamide, also know as vitamin B3, twice daily. Recent evidence suggests that taking vitamin B3 (500 mg twice daily) can reduce the risk of actinic keratoses and non-melanoma skin cancers. Side effects of vitamin B3 include flushing and headache. Cellcept Counseling:  I discussed with the patient the risks of mycophenolate mofetil including but not limited to infection/immunosuppression, GI upset, hypokalemia, hypercholesterolemia, bone marrow suppression, lymphoproliferative disorders, malignancy, GI ulceration/bleed/perforation, colitis, interstitial lung disease, kidney failure, progressive multifocal leukoencephalopathy, and birth defects.  The patient understands that monitoring is required including a baseline creatinine and regular CBC testing. In addition, patient must alert us immediately if symptoms of infection or other concerning signs are noted. Picato Pregnancy And Lactation Text: This medication is Pregnancy Category C. It is unknown if this medication is excreted in breast milk. Birth Control Pills Counseling: Birth Control Pill Counseling: I discussed with the patient the potential side effects of OCPs including but not limited to increased risk of stroke, heart attack, thrombophlebitis, deep venous thrombosis, hepatic adenomas, breast changes, GI upset, headaches, and depression.  The patient verbalized understanding of the proper use and possible adverse effects of OCPs. All of the patient's questions and concerns were addressed. Oxybutynin Counseling:  I discussed with the patient the risks of oxybutynin including but not limited to skin rash, drowsiness, dry mouth, difficulty urinating, and blurred vision. Terbinafine Counseling: Patient counseling regarding adverse effects of terbinafine including but not limited to headache, diarrhea, rash, upset stomach, liver function test abnormalities, itching, taste/smell disturbance, nausea, abdominal pain, and flatulence.  There is a rare possibility of liver failure that can occur when taking terbinafine.  The patient understands that a baseline LFT and kidney function test may be required. The patient verbalized understanding of the proper use and possible adverse effects of terbinafine.  All of the patient's questions and concerns were addressed. Topical Sulfur Applications Pregnancy And Lactation Text: This medication is Pregnancy Category C and has an unknown safety profile during pregnancy. It is unknown if this topical medication is excreted in breast milk. Humira Counseling:  I discussed with the patient the risks of adalimumab including but not limited to myelosuppression, immunosuppression, autoimmune hepatitis, demyelinating diseases, lymphoma, and serious infections.  The patient understands that monitoring is required including a PPD at baseline and must alert us or the primary physician if symptoms of infection or other concerning signs are noted. Picato Counseling:  I discussed with the patient the risks of Picato including but not limited to erythema, scaling, itching, weeping, crusting, and pain. Finasteride Pregnancy And Lactation Text: This medication is absolutely contraindicated during pregnancy. It is unknown if it is excreted in breast milk. Hydroquinone Counseling:  Patient advised that medication may result in skin irritation, lightening (hypopigmentation), dryness, and burning.  In the event of skin irritation, the patient was advised to reduce the amount of the drug applied or use it less frequently.  Rarely, spots that are treated with hydroquinone can become darker (pseudoochronosis).  Should this occur, patient instructed to stop medication and call the office. The patient verbalized understanding of the proper use and possible adverse effects of hydroquinone.  All of the patient's questions and concerns were addressed. Cephalexin Counseling: I counseled the patient regarding use of cephalexin as an antibiotic for prophylactic and/or therapeutic purposes. Cephalexin (commonly prescribed under brand name Keflex) is a cephalosporin antibiotic which is active against numerous classes of bacteria, including most skin bacteria. Side effects may include nausea, diarrhea, gastrointestinal upset, rash, hives, yeast infections, and in rare cases, hepatitis, kidney disease, seizures, fever, confusion, neurologic symptoms, and others. Patients with severe allergies to penicillin medications are cautioned that there is about a 10% incidence of cross-reactivity with cephalosporins. When possible, patients with penicillin allergies should use alternatives to cephalosporins for antibiotic therapy. Dupixent Counseling: I discussed with the patient the risks of dupilumab including but not limited to eye infection and irritation, cold sores, injection site reactions, worsening of asthma, allergic reactions and increased risk of parasitic infection.  Live vaccines should be avoided while taking dupilumab. Dupilumab will also interact with certain medications such as warfarin and cyclosporine. The patient understands that monitoring is required and they must alert us or the primary physician if symptoms of infection or other concerning signs are noted. Tazorac Pregnancy And Lactation Text: This medication is not safe during pregnancy. It is unknown if this medication is excreted in breast milk. Infliximab Counseling:  I discussed with the patient the risks of infliximab including but not limited to myelosuppression, immunosuppression, autoimmune hepatitis, demyelinating diseases, lymphoma, and serious infections.  The patient understands that monitoring is required including a PPD at baseline and must alert us or the primary physician if symptoms of infection or other concerning signs are noted. Arava Counseling:  Patient counseled regarding adverse effects of Arava including but not limited to nausea, vomiting, abnormalities in liver function tests. Patients may develop mouth sores, rash, diarrhea, and abnormalities in blood counts. The patient understands that monitoring is required including LFTs and blood counts.  There is a rare possibility of scarring of the liver and lung problems that can occur when taking methotrexate. Persistent nausea, loss of appetite, pale stools, dark urine, cough, and shortness of breath should be reported immediately. Patient advised to discontinue Arava treatment and consult with a physician prior to attempting conception. The patient will have to undergo a treatment to eliminate Arava from the body prior to conception. 5-Fu Pregnancy And Lactation Text: This medication is Pregnancy Category X and contraindicated in pregnancy and in women who may become pregnant. It is unknown if this medication is excreted in breast milk. Albendazole Counseling:  I discussed with the patient the risks of albendazole including but not limited to cytopenia, kidney damage, nausea/vomiting and severe allergy.  The patient understands that this medication is being used in an off-label manner. Xelsterlingz Pregnancy And Lactation Text: This medication is Pregnancy Category D and is not considered safe during pregnancy.  The risk during breast feeding is also uncertain. Finasteride Male Counseling: Finasteride Counseling:  I discussed with the patient the risks of use of finasteride including but not limited to decreased libido, decreased ejaculate volume, gynecomastia, and depression. Women should not handle medication.  All of the patient's questions and concerns were addressed. Propranolol Counseling:  I discussed with the patient the risks of propranolol including but not limited to low heart rate, low blood pressure, low blood sugar, restlessness and increased cold sensitivity. They should call the office if they experience any of these side effects. Cyclophosphamide Pregnancy And Lactation Text: This medication is Pregnancy Category D and it isn't considered safe during pregnancy. This medication is excreted in breast milk. Taltz Pregnancy And Lactation Text: The risk during pregnancy and breastfeeding is uncertain with this medication. Rituxan Counseling:  I discussed with the patient the risks of Rituxan infusions. Side effects can include infusion reactions, severe drug rashes including mucocutaneous reactions, reactivation of latent hepatitis and other infections and rarely progressive multifocal leukoencephalopathy.  All of the patient's questions and concerns were addressed. Doxycycline Pregnancy And Lactation Text: This medication is Pregnancy Category D and not consider safe during pregnancy. It is also excreted in breast milk but is considered safe for shorter treatment courses. Cimzia Pregnancy And Lactation Text: This medication crosses the placenta but can be considered safe in certain situations. Cimzia may be excreted in breast milk. Taltz Counseling: I discussed with the patient the risks of ixekizumab including but not limited to immunosuppression, serious infections, worsening of inflammatory bowel disease and drug reactions.  The patient understands that monitoring is required including a PPD at baseline and must alert us or the primary physician if symptoms of infection or other concerning signs are noted. Rifampin Counseling: I discussed with the patient the risks of rifampin including but not limited to liver damage, kidney damage, red-orange body fluids, nausea/vomiting and severe allergy. Calcipotriene Pregnancy And Lactation Text: This medication has not been proven safe during pregnancy. It is unknown if this medication is excreted in breast milk. Ivermectin Counseling:  Patient instructed to take medication on an empty stomach with a full glass of water.  Patient informed of potential adverse effects including but not limited to nausea, diarrhea, dizziness, itching, and swelling of the extremities or lymph nodes.  The patient verbalized understanding of the proper use and possible adverse effects of ivermectin.  All of the patient's questions and concerns were addressed. Colchicine Pregnancy And Lactation Text: This medication is Pregnancy Category C and isn't considered safe during pregnancy. It is excreted in breast milk. Bexarotene Pregnancy And Lactation Text: This medication is Pregnancy Category X and should not be given to women who are pregnant or may become pregnant. This medication should not be used if you are breast feeding. Erivedge Pregnancy And Lactation Text: This medication is Pregnancy Category X and is absolutely contraindicated during pregnancy. It is unknown if it is excreted in breast milk. Tranexamic Acid Counseling:  Patient advised of the small risk of bleeding problems with tranexamic acid. They were also instructed to call if they developed any nausea, vomiting or diarrhea. All of the patient's questions and concerns were addressed. Sski Pregnancy And Lactation Text: This medication is Pregnancy Category D and isn't considered safe during pregnancy. It is excreted in breast milk. Protopic Pregnancy And Lactation Text: This medication is Pregnancy Category C. It is unknown if this medication is excreted in breast milk when applied topically. Minoxidil Counseling: Minoxidil is a topical medication which can increase blood flow where it is applied. It is uncertain how this medication increases hair growth. Side effects are uncommon and include stinging and allergic reactions. Bexarotene Counseling:  I discussed with the patient the risks of bexarotene including but not limited to hair loss, dry lips/skin/eyes, liver abnormalities, hyperlipidemia, pancreatitis, depression/suicidal ideation, photosensitivity, drug rash/allergic reactions, hypothyroidism, anemia, leukopenia, infection, cataracts, and teratogenicity.  Patient understands that they will need regular blood tests to check lipid profile, liver function tests, white blood cell count, thyroid function tests and pregnancy test if applicable. Solaraze Counseling:  I discussed with the patient the risks of Solaraze including but not limited to erythema, scaling, itching, weeping, crusting, and pain. Protopic Counseling: Patient may experience a mild burning sensation during topical application. Protopic is not approved in children less than 2 years of age. There have been case reports of hematologic and skin malignancies in patients using topical calcineurin inhibitors although causality is questionable. Cimetidine Counseling:  I discussed with the patient the risks of Cimetidine including but not limited to gynecomastia, headache, diarrhea, nausea, drowsiness, arrhythmias, pancreatitis, skin rashes, psychosis, bone marrow suppression and kidney toxicity. Dupixent Pregnancy And Lactation Text: This medication likely crosses the placenta but the risk for the fetus is uncertain. This medication is excreted in breast milk. Glycopyrrolate Counseling:  I discussed with the patient the risks of glycopyrrolate including but not limited to skin rash, drowsiness, dry mouth, difficulty urinating, and blurred vision. Tremfya Counseling: I discussed with the patient the risks of guselkumab including but not limited to immunosuppression, serious infections, and drug reactions.  The patient understands that monitoring is required including a PPD at baseline and must alert us or the primary physician if symptoms of infection or other concerning signs are noted. Eucrisa Counseling: Patient may experience a mild burning sensation during topical application. Eucrisa is not approved in children less than 2 years of age. Doxepin Pregnancy And Lactation Text: This medication is Pregnancy Category C and it isn't known if it is safe during pregnancy. It is also excreted in breast milk and breast feeding isn't recommended. Clindamycin Pregnancy And Lactation Text: This medication can be used in pregnancy if certain situations. Clindamycin is also present in breast milk. Siliq Counseling:  I discussed with the patient the risks of Siliq including but not limited to new or worsening depression, suicidal thoughts and behavior, immunosuppression, malignancy, posterior leukoencephalopathy syndrome, and serious infections.  The patient understands that monitoring is required including a PPD at baseline and must alert us or the primary physician if symptoms of infection or other concerning signs are noted. There is also a special program designed to monitor depression which is required with Siliq. Dapsone Counseling: I discussed with the patient the risks of dapsone including but not limited to hemolytic anemia, agranulocytosis, rashes, methemoglobinemia, kidney failure, peripheral neuropathy, headaches, GI upset, and liver toxicity.  Patients who start dapsone require monitoring including baseline LFTs and weekly CBCs for the first month, then every month thereafter.  The patient verbalized understanding of the proper use and possible adverse effects of dapsone.  All of the patient's questions and concerns were addressed. Detail Level: Simple Cellcept Pregnancy And Lactation Text: This medication is Pregnancy Category D and isn't considered safe during pregnancy. It is unknown if this medication is excreted in breast milk. Topical Sulfur Applications Counseling: Topical Sulfur Counseling: Patient counseled that this medication may cause skin irritation or allergic reactions.  In the event of skin irritation, the patient was advised to reduce the amount of the drug applied or use it less frequently.   The patient verbalized understanding of the proper use and possible adverse effects of topical sulfur application.  All of the patient's questions and concerns were addressed. Opioid Counseling: I discussed with the patient the potential side effects of opioids including but not limited to addiction, altered mental status, and depression. I stressed avoiding alcohol, benzodiazepines, muscle relaxants and sleep aids unless specifically okayed by a physician. The patient verbalized understanding of the proper use and possible adverse effects of opioids. All of the patient's questions and concerns were addressed. They were instructed to flush the remaining pills down the toilet if they did not need them for pain. Nsaids Pregnancy And Lactation Text: These medications are considered safe up to 30 weeks gestation. It is excreted in breast milk. Griseofulvin Pregnancy And Lactation Text: This medication is Pregnancy Category X and is known to cause serious birth defects. It is unknown if this medication is excreted in breast milk but breast feeding should be avoided. Minocycline Counseling: Patient advised regarding possible photosensitivity and discoloration of the teeth, skin, lips, tongue and gums.  Patient instructed to avoid sunlight, if possible.  When exposed to sunlight, patients should wear protective clothing, sunglasses, and sunscreen.  The patient was instructed to call the office immediately if the following severe adverse effects occur:  hearing changes, easy bruising/bleeding, severe headache, or vision changes.  The patient verbalized understanding of the proper use and possible adverse effects of minocycline.  All of the patient's questions and concerns were addressed. Erivedge Counseling- I discussed with the patient the risks of Erivedge including but not limited to nausea, vomiting, diarrhea, constipation, weight loss, changes in the sense of taste, decreased appetite, muscle spasms, and hair loss.  The patient verbalized understanding of the proper use and possible adverse effects of Erivedge.  All of the patient's questions and concerns were addressed. Skyrizi Counseling: I discussed with the patient the risks of risankizumab-rzaa including but not limited to immunosuppression, and serious infections.  The patient understands that monitoring is required including a PPD at baseline and must alert us or the primary physician if symptoms of infection or other concerning signs are noted. Valtrex Counseling: I discussed with the patient the risks of valacyclovir including but not limited to kidney damage, nausea, vomiting and severe allergy.  The patient understands that if the infection seems to be worsening or is not improving, they are to call. Tranexamic Acid Pregnancy And Lactation Text: It is unknown if this medication is safe during pregnancy or breast feeding. Birth Control Pills Pregnancy And Lactation Text: This medication should be avoided if pregnant and for the first 30 days post-partum. Methotrexate Pregnancy And Lactation Text: This medication is Pregnancy Category X and is known to cause fetal harm. This medication is excreted in breast milk. Clofazimine Counseling:  I discussed with the patient the risks of clofazimine including but not limited to skin and eye pigmentation, liver damage, nausea/vomiting, gastrointestinal bleeding and allergy. Calcipotriene Counseling:  I discussed with the patient the risks of calcipotriene including but not limited to erythema, scaling, itching, and irritation. Acitretin Pregnancy And Lactation Text: This medication is Pregnancy Category X and should not be given to women who are pregnant or may become pregnant in the future. This medication is excreted in breast milk. Xolair Counseling:  Patient informed of potential adverse effects including but not limited to fever, muscle aches, rash and allergic reactions.  The patient verbalized understanding of the proper use and possible adverse effects of Xolair.  All of the patient's questions and concerns were addressed. Libtayo Counseling- I discussed with the patient the risks of Libtayo including but not limited to nausea, vomiting, diarrhea, and bone or muscle pain.  The patient verbalized understanding of the proper use and possible adverse effects of Libtayo.  All of the patient's questions and concerns were addressed. Spironolactone Pregnancy And Lactation Text: This medication can cause feminization of the male fetus and should be avoided during pregnancy. The active metabolite is also found in breast milk. Drysol Counseling:  I discussed with the patient the risks of drysol/aluminum chloride including but not limited to skin rash, itching, irritation, burning. Sarecycline Counseling: Patient advised regarding possible photosensitivity and discoloration of the teeth, skin, lips, tongue and gums.  Patient instructed to avoid sunlight, if possible.  When exposed to sunlight, patients should wear protective clothing, sunglasses, and sunscreen.  The patient was instructed to call the office immediately if the following severe adverse effects occur:  hearing changes, easy bruising/bleeding, severe headache, or vision changes.  The patient verbalized understanding of the proper use and possible adverse effects of sarecycline.  All of the patient's questions and concerns were addressed. Azathioprine Counseling:  I discussed with the patient the risks of azathioprine including but not limited to myelosuppression, immunosuppression, hepatotoxicity, lymphoma, and infections.  The patient understands that monitoring is required including baseline LFTs, Creatinine, possible TPMP genotyping and weekly CBCs for the first month and then every 2 weeks thereafter.  The patient verbalized understanding of the proper use and possible adverse effects of azathioprine.  All of the patient's questions and concerns were addressed. Sarecycline Pregnancy And Lactation Text: This medication is Pregnancy Category D and not consider safe during pregnancy. It is also excreted in breast milk. Erythromycin Counseling:  I discussed with the patient the risks of erythromycin including but not limited to GI upset, allergic reaction, drug rash, diarrhea, increase in liver enzymes, and yeast infections. Cimzia Counseling:  I discussed with the patient the risks of Cimzia including but not limited to immunosuppression, allergic reactions and infections.  The patient understands that monitoring is required including a PPD at baseline and must alert us or the primary physician if symptoms of infection or other concerning signs are noted. Colchicine Counseling:  Patient counseled regarding adverse effects including but not limited to stomach upset (nausea, vomiting, stomach pain, or diarrhea).  Patient instructed to limit alcohol consumption while taking this medication.  Colchicine may reduce blood counts especially with prolonged use.  The patient understands that monitoring of kidney function and blood counts may be required, especially at baseline. The patient verbalized understanding of the proper use and possible adverse effects of colchicine.  All of the patient's questions and concerns were addressed. Solaraze Pregnancy And Lactation Text: This medication is Pregnancy Category B and is considered safe. There is some data to suggest avoiding during the third trimester. It is unknown if this medication is excreted in breast milk. Niacinamide Pregnancy And Lactation Text: These medications are considered safe during pregnancy. Fluconazole Counseling:  Patient counseled regarding adverse effects of fluconazole including but not limited to headache, diarrhea, nausea, upset stomach, liver function test abnormalities, taste disturbance, and stomach pain.  There is a rare possibility of liver failure that can occur when taking fluconazole.  The patient understands that monitoring of LFTs and kidney function test may be required, especially at baseline. The patient verbalized understanding of the proper use and possible adverse effects of fluconazole.  All of the patient's questions and concerns were addressed. Imiquimod Counseling:  I discussed with the patient the risks of imiquimod including but not limited to erythema, scaling, itching, weeping, crusting, and pain.  Patient understands that the inflammatory response to imiquimod is variable from person to person and was educated regarded proper titration schedule.  If flu-like symptoms develop, patient knows to discontinue the medication and contact us. Hydroxychloroquine Counseling:  I discussed with the patient that a baseline ophthalmologic exam is needed at the start of therapy and every year thereafter while on therapy. A CBC may also be warranted for monitoring.  The side effects of this medication were discussed with the patient, including but not limited to agranulocytosis, aplastic anemia, seizures, rashes, retinopathy, and liver toxicity. Patient instructed to call the office should any adverse effect occur.  The patient verbalized understanding of the proper use and possible adverse effects of Plaquenil.  All the patient's questions and concerns were addressed. Benzoyl Peroxide Counseling: Patient counseled that medicine may cause skin irritation and bleach clothing.  In the event of skin irritation, the patient was advised to reduce the amount of the drug applied or use it less frequently.   The patient verbalized understanding of the proper use and possible adverse effects of benzoyl peroxide.  All of the patient's questions and concerns were addressed. Opioid Pregnancy And Lactation Text: These medications can lead to premature delivery and should be avoided during pregnancy. These medications are also present in breast milk in small amounts. Azithromycin Pregnancy And Lactation Text: This medication is considered safe during pregnancy and is also secreted in breast milk. Clindamycin Counseling: I counseled the patient regarding use of clindamycin as an antibiotic for prophylactic and/or therapeutic purposes. Clindamycin is active against numerous classes of bacteria, including skin bacteria. Side effects may include nausea, diarrhea, gastrointestinal upset, rash, hives, yeast infections, and in rare cases, colitis. Acitretin Counseling:  I discussed with the patient the risks of acitretin including but not limited to hair loss, dry lips/skin/eyes, liver damage, hyperlipidemia, depression/suicidal ideation, photosensitivity.  Serious rare side effects can include but are not limited to pancreatitis, pseudotumor cerebri, bony changes, clot formation/stroke/heart attack.  Patient understands that alcohol is contraindicated since it can result in liver toxicity and significantly prolong the elimination of the drug by many years. Stelara Counseling:  I discussed with the patient the risks of ustekinumab including but not limited to immunosuppression, malignancy, posterior leukoencephalopathy syndrome, and serious infections.  The patient understands that monitoring is required including a PPD at baseline and must alert us or the primary physician if symptoms of infection or other concerning signs are noted. Tetracycline Counseling: Patient counseled regarding possible photosensitivity and increased risk for sunburn.  Patient instructed to avoid sunlight, if possible.  When exposed to sunlight, patients should wear protective clothing, sunglasses, and sunscreen.  The patient was instructed to call the office immediately if the following severe adverse effects occur:  hearing changes, easy bruising/bleeding, severe headache, or vision changes.  The patient verbalized understanding of the proper use and possible adverse effects of tetracycline.  All of the patient's questions and concerns were addressed. Patient understands to avoid pregnancy while on therapy due to potential birth defects. Carac Counseling:  I discussed with the patient the risks of Carac including but not limited to erythema, scaling, itching, weeping, crusting, and pain. Topical Clindamycin Counseling: Patient counseled that this medication may cause skin irritation or allergic reactions.  In the event of skin irritation, the patient was advised to reduce the amount of the drug applied or use it less frequently.   The patient verbalized understanding of the proper use and possible adverse effects of clindamycin.  All of the patient's questions and concerns were addressed. Isotretinoin Pregnancy And Lactation Text: This medication is Pregnancy Category X and is considered extremely dangerous during pregnancy. It is unknown if it is excreted in breast milk. Rituxan Pregnancy And Lactation Text: This medication is Pregnancy Category C and it isn't know if it is safe during pregnancy. It is unknown if this medication is excreted in breast milk but similar antibodies are known to be excreted. Otezla Pregnancy And Lactation Text: This medication is Pregnancy Category C and it isn't known if it is safe during pregnancy. It is unknown if it is excreted in breast milk. Nsaids Counseling: NSAID Counseling: I discussed with the patient that NSAIDs should be taken with food. Prolonged use of NSAIDs can result in the development of stomach ulcers.  Patient advised to stop taking NSAIDs if abdominal pain occurs.  The patient verbalized understanding of the proper use and possible adverse effects of NSAIDs.  All of the patient's questions and concerns were addressed. Hydroxychloroquine Pregnancy And Lactation Text: This medication has been shown to cause fetal harm but it isn't assigned a Pregnancy Risk Category. There are small amounts excreted in breast milk. Isotretinoin Counseling: Patient should get monthly blood tests, not donate blood, not drive at night if vision affected, not share medication, and not undergo elective surgery for 6 months after tx completed. Side effects reviewed, pt to contact office should one occur. Propranolol Pregnancy And Lactation Text: This medication is Pregnancy Category C and it isn't known if it is safe during pregnancy. It is excreted in breast milk. Bactrim Counseling:  I discussed with the patient the risks of sulfa antibiotics including but not limited to GI upset, allergic reaction, drug rash, diarrhea, dizziness, photosensitivity, and yeast infections.  Rarely, more serious reactions can occur including but not limited to aplastic anemia, agranulocytosis, methemoglobinemia, blood dyscrasias, liver or kidney failure, lung infiltrates or desquamative/blistering drug rashes. Itraconazole Counseling:  I discussed with the patient the risks of itraconazole including but not limited to liver damage, nausea/vomiting, neuropathy, and severe allergy.  The patient understands that this medication is best absorbed when taken with acidic beverages such as non-diet cola or ginger ale.  The patient understands that monitoring is required including baseline LFTs and repeat LFTs at intervals.  The patient understands that they are to contact us or the primary physician if concerning signs are noted. Erythromycin Pregnancy And Lactation Text: This medication is Pregnancy Category B and is considered safe during pregnancy. It is also excreted in breast milk. Metronidazole Pregnancy And Lactation Text: This medication is Pregnancy Category B and considered safe during pregnancy.  It is also excreted in breast milk. Ilumya Counseling: I discussed with the patient the risks of tildrakizumab including but not limited to immunosuppression, malignancy, posterior leukoencephalopathy syndrome, and serious infections.  The patient understands that monitoring is required including a PPD at baseline and must alert us or the primary physician if symptoms of infection or other concerning signs are noted. Hydroxyzine Pregnancy And Lactation Text: This medication is not safe during pregnancy and should not be taken. It is also excreted in breast milk and breast feeding isn't recommended. Prednisone Counseling:  I discussed with the patient the risks of prolonged use of prednisone including but not limited to weight gain, insomnia, osteoporosis, mood changes, diabetes, susceptibility to infection, glaucoma and high blood pressure.  In cases where prednisone use is prolonged, patients should be monitored with blood pressure checks, serum glucose levels and an eye exam.  Additionally, the patient may need to be placed on GI prophylaxis, PCP prophylaxis, and calcium and vitamin D supplementation and/or a bisphosphonate.  The patient verbalized understanding of the proper use and the possible adverse effects of prednisone.  All of the patient's questions and concerns were addressed. Elidel Counseling: Patient may experience a mild burning sensation during topical application. Elidel is not approved in children less than 2 years of age. There have been case reports of hematologic and skin malignancies in patients using topical calcineurin inhibitors although causality is questionable. Quinolones Counseling:  I discussed with the patient the risks of fluoroquinolones including but not limited to GI upset, allergic reaction, drug rash, diarrhea, dizziness, photosensitivity, yeast infections, liver function test abnormalities, tendonitis/tendon rupture. Cyclophosphamide Counseling:  I discussed with the patient the risks of cyclophosphamide including but not limited to hair loss, hormonal abnormalities, decreased fertility, abdominal pain, diarrhea, nausea and vomiting, bone marrow suppression and infection. The patient understands that monitoring is required while taking this medication. Mirvaso Counseling: Mirvaso is a topical medication which can decrease superficial blood flow where applied. Side effects are uncommon and include stinging, redness and allergic reactions. Simponi Counseling:  I discussed with the patient the risks of golimumab including but not limited to myelosuppression, immunosuppression, autoimmune hepatitis, demyelinating diseases, lymphoma, and serious infections.  The patient understands that monitoring is required including a PPD at baseline and must alert us or the primary physician if symptoms of infection or other concerning signs are noted. High Dose Vitamin A Pregnancy And Lactation Text: High dose vitamin A therapy is contraindicated during pregnancy and breast feeding. Cyclosporine Counseling:  I discussed with the patient the risks of cyclosporine including but not limited to hypertension, gingival hyperplasia,myelosuppression, immunosuppression, liver damage, kidney damage, neurotoxicity, lymphoma, and serious infections. The patient understands that monitoring is required including baseline blood pressure, CBC, CMP, lipid panel and uric acid, and then 1-2 times monthly CMP and blood pressure. Xolair Pregnancy And Lactation Text: This medication is Pregnancy Category B and is considered safe during pregnancy. This medication is excreted in breast milk. Ketoconazole Counseling:   Patient counseled regarding improving absorption with orange juice.  Adverse effects include but are not limited to breast enlargement, headache, diarrhea, nausea, upset stomach, liver function test abnormalities, taste disturbance, and stomach pain.  There is a rare possibility of liver failure that can occur when taking ketoconazole. The patient understands that monitoring of LFTs may be required, especially at baseline. The patient verbalized understanding of the proper use and possible adverse effects of ketoconazole.  All of the patient's questions and concerns were addressed.

## 2024-04-09 ENCOUNTER — TELEPHONE (OUTPATIENT)
Dept: CARDIOLOGY | Facility: CLINIC | Age: 84
End: 2024-04-09

## 2024-04-09 ENCOUNTER — TELEPHONE (OUTPATIENT)
Dept: CARDIOLOGY | Facility: CLINIC | Age: 84
End: 2024-04-09
Payer: MEDICARE

## 2024-04-09 NOTE — TELEPHONE ENCOUNTER
Patient called and stated that she had several appointments with  and they all have been cancelled.  She is concerned with it keep getting cancelled.  She stated that her appointment was today and they called today and cancelled.  She is wanting to be referred to someone else that will actually see her.  Please advise.    Sisi

## 2024-04-09 NOTE — TELEPHONE ENCOUNTER
Will see what Yana/Jess can do--unfortunately NCIHOL[facility] is here by himself until Thursday and had emergent procedures added on today.

## 2024-04-19 ENCOUNTER — TRANSCRIBE ORDERS (OUTPATIENT)
Dept: CARDIOLOGY | Facility: CLINIC | Age: 84
End: 2024-04-19
Payer: MEDICARE

## 2024-04-19 ENCOUNTER — HOSPITAL ENCOUNTER (OUTPATIENT)
Dept: CARDIOLOGY | Facility: HOSPITAL | Age: 84
Discharge: HOME | End: 2024-04-19
Payer: MEDICARE

## 2024-04-19 ENCOUNTER — OFFICE VISIT (OUTPATIENT)
Age: 84
End: 2024-04-19
Payer: MEDICARE

## 2024-04-19 VITALS
WEIGHT: 186 LBS | BODY MASS INDEX: 32.96 KG/M2 | HEIGHT: 63 IN | HEART RATE: 136 BPM | DIASTOLIC BLOOD PRESSURE: 74 MMHG | SYSTOLIC BLOOD PRESSURE: 138 MMHG

## 2024-04-19 DIAGNOSIS — Z13.6 SCREENING FOR ISCHEMIC HEART DISEASE: ICD-10-CM

## 2024-04-19 DIAGNOSIS — I48.4 ATYPICAL ATRIAL FLUTTER: Primary | ICD-10-CM

## 2024-04-19 DIAGNOSIS — I44.2 AV BLOCK, COMPLETE: ICD-10-CM

## 2024-04-19 DIAGNOSIS — Z01.810 PRE-OPERATIVE CARDIOVASCULAR EXAMINATION: ICD-10-CM

## 2024-04-19 DIAGNOSIS — Z01.810 PRE-OPERATIVE CARDIOVASCULAR EXAMINATION: Primary | ICD-10-CM

## 2024-04-19 DIAGNOSIS — I48.0 PAF (PAROXYSMAL ATRIAL FIBRILLATION): ICD-10-CM

## 2024-04-19 LAB
ANION GAP SERPL CALCULATED.3IONS-SCNC: 12.6 MMOL/L (ref 5–15)
BASOPHILS # BLD AUTO: 0.12 10*3/MM3 (ref 0–0.2)
BASOPHILS NFR BLD AUTO: 1.6 % (ref 0–1.5)
BUN SERPL-MCNC: 29 MG/DL (ref 8–23)
BUN/CREAT SERPL: 25.2 (ref 7–25)
CALCIUM SPEC-SCNC: 10.1 MG/DL (ref 8.6–10.5)
CHLORIDE SERPL-SCNC: 106 MMOL/L (ref 98–107)
CO2 SERPL-SCNC: 22.4 MMOL/L (ref 22–29)
CREAT SERPL-MCNC: 1.15 MG/DL (ref 0.57–1)
DEPRECATED RDW RBC AUTO: 46.6 FL (ref 37–54)
EGFRCR SERPLBLD CKD-EPI 2021: 47.4 ML/MIN/1.73
EOSINOPHIL # BLD AUTO: 0.07 10*3/MM3 (ref 0–0.4)
EOSINOPHIL NFR BLD AUTO: 1 % (ref 0.3–6.2)
ERYTHROCYTE [DISTWIDTH] IN BLOOD BY AUTOMATED COUNT: 13.9 % (ref 12.3–15.4)
GLUCOSE SERPL-MCNC: 169 MG/DL (ref 65–99)
HCT VFR BLD AUTO: 33.7 % (ref 34–46.6)
HGB BLD-MCNC: 11 G/DL (ref 12–15.9)
IMM GRANULOCYTES # BLD AUTO: 0.03 10*3/MM3 (ref 0–0.05)
IMM GRANULOCYTES NFR BLD AUTO: 0.4 % (ref 0–0.5)
LYMPHOCYTES # BLD AUTO: 1.45 10*3/MM3 (ref 0.7–3.1)
LYMPHOCYTES NFR BLD AUTO: 19.8 % (ref 19.6–45.3)
MCH RBC QN AUTO: 30.6 PG (ref 26.6–33)
MCHC RBC AUTO-ENTMCNC: 32.6 G/DL (ref 31.5–35.7)
MCV RBC AUTO: 93.9 FL (ref 79–97)
MONOCYTES # BLD AUTO: 0.73 10*3/MM3 (ref 0.1–0.9)
MONOCYTES NFR BLD AUTO: 9.9 % (ref 5–12)
NEUTROPHILS NFR BLD AUTO: 4.94 10*3/MM3 (ref 1.7–7)
NEUTROPHILS NFR BLD AUTO: 67.3 % (ref 42.7–76)
NRBC BLD AUTO-RTO: 0.3 /100 WBC (ref 0–0.2)
PLATELET # BLD AUTO: 446 10*3/MM3 (ref 140–450)
PMV BLD AUTO: 10.3 FL (ref 6–12)
POTASSIUM SERPL-SCNC: 4.6 MMOL/L (ref 3.5–5.2)
RBC # BLD AUTO: 3.59 10*6/MM3 (ref 3.77–5.28)
SODIUM SERPL-SCNC: 141 MMOL/L (ref 136–145)
WBC NRBC COR # BLD AUTO: 7.34 10*3/MM3 (ref 3.4–10.8)

## 2024-04-19 PROCEDURE — 80048 BASIC METABOLIC PNL TOTAL CA: CPT | Performed by: INTERNAL MEDICINE

## 2024-04-19 PROCEDURE — 36415 COLL VENOUS BLD VENIPUNCTURE: CPT

## 2024-04-19 PROCEDURE — 94760 N-INVAS EAR/PLS OXIMETRY 1: CPT

## 2024-04-19 PROCEDURE — 85025 COMPLETE CBC W/AUTO DIFF WBC: CPT | Performed by: INTERNAL MEDICINE

## 2024-04-19 NOTE — H&P (VIEW-ONLY)
Date of Office Visit: 2024  Encounter Provider: Naif Salgado MD  Place of Service: [facility] CARDIOLOGY  Patient Name: Laure Leung  : 1940    Subjective:     Encounter Date:2024      Patient ID: Laure Leung is a 84 y.o. female who has a cc of  who has pers Atrial flutter and she has had multiple LA ablations. Last in August. Redo PVs, LA posterior wall. She's had a previous anterior line.     She's in pers atrial flutter today. She's mostly tachycardic.    She has fatigue and REYES and feels bad. She has a walker today     Past Medical History:   Diagnosis Date    Atrial fibrillation     CHF (congestive heart failure)     Diabetes     Hypertension     Primary osteoarthritis of both knees 2022       Social History     Socioeconomic History    Marital status:     Number of children: 0   Tobacco Use    Smoking status: Former     Current packs/day: 0.00     Average packs/day: 0.5 packs/day for 12.0 years (6.0 ttl pk-yrs)     Types: Cigarettes     Start date:      Quit date:      Years since quittin.3     Passive exposure: Past    Smokeless tobacco: Never    Tobacco comments:     Caffeine: 2 cups per day   Vaping Use    Vaping status: Never Used   Substance and Sexual Activity    Alcohol use: No    Drug use: Never    Sexual activity: Defer       Family History   Problem Relation Age of Onset    No Known Problems Mother     No Known Problems Father     Hypertension Sister     Atrial fibrillation Sister     Diabetes Sister     No Known Problems Maternal Aunt     No Known Problems Maternal Uncle     No Known Problems Paternal Aunt     No Known Problems Paternal Uncle     No Known Problems Maternal Grandmother     No Known Problems Maternal Grandfather     No Known Problems Paternal Grandmother     No Known Problems Paternal Grandfather     Heart attack Neg Hx     Heart disease Neg Hx     Heart failure Neg Hx     Hyperlipidemia Neg Hx        Review of  "Systems   Constitutional: Negative for fever and night sweats.   HENT:  Negative for ear pain and stridor.    Eyes:  Negative for discharge and visual halos.   Cardiovascular:  Negative for cyanosis.   Respiratory:  Negative for hemoptysis and sputum production.    Hematologic/Lymphatic: Negative for adenopathy.   Skin:  Negative for nail changes and unusual hair distribution.   Musculoskeletal:  Positive for arthritis and joint pain. Negative for gout and joint swelling.   Gastrointestinal:  Negative for bowel incontinence and flatus.   Genitourinary:  Negative for dysuria and flank pain.   Neurological:  Positive for loss of balance. Negative for seizures and tremors.   Psychiatric/Behavioral:  Negative for altered mental status. The patient is not nervous/anxious.             Objective:     Vitals:    04/19/24 0935   BP: 138/74   Pulse: (!) 136   Weight: 84.4 kg (186 lb)   Height: 160 cm (63\")         Eyes:      General:         Right eye: No discharge.         Left eye: No discharge.   HENT:      Head: Normocephalic and atraumatic.   Neck:      Thyroid: No thyromegaly.      Vascular: No JVD.   Pulmonary:      Effort: Pulmonary effort is normal.      Breath sounds: Normal breath sounds. No rales.   Cardiovascular:      Tachycardia present. Regular rhythm.      No gallop.    Edema:     Peripheral edema absent.   Abdominal:      General: Bowel sounds are normal.      Palpations: Abdomen is soft.      Tenderness: There is no abdominal tenderness.   Musculoskeletal: Normal range of motion.         General: No deformity. Skin:     General: Skin is warm and dry.      Findings: No erythema.   Neurological:      Mental Status: Alert and oriented to person, place, and time.      Motor: Normal muscle tone.   Psychiatric:         Behavior: Behavior normal.         Thought Content: Thought content normal.           ECG 12 Lead    Date/Time: 4/19/2024 10:15 AM  Performed by: Naif Salgado MD    Authorized by: Naomi, " MD Naif  Comparison: compared with previous ECG   Similar to previous ECG  Rhythm: atrial flutter          Lab Review:       Assessment:          Diagnosis Plan   1. Atypical atrial flutter        2. PAF (paroxysmal atrial fibrillation)               Plan:     I think we should do LB area pacer and av node ablation    I could probably get this as its likely a MI flutter but there is no guarantee and she is 84 years old and has LA dilation     Risks discussed

## 2024-04-19 NOTE — PROGRESS NOTES
Date of Office Visit: 2024  Encounter Provider: Naif Salgado MD  Place of Service: [facility] CARDIOLOGY  Patient Name: Laure Leung  : 1940    Subjective:     Encounter Date:2024      Patient ID: Laure Leung is a 84 y.o. female who has a cc of  who has pers Atrial flutter and she has had multiple LA ablations. Last in August. Redo PVs, LA posterior wall. She's had a previous anterior line.     She's in pers atrial flutter today. She's mostly tachycardic.    She has fatigue and REYES and feels bad. She has a walker today     Past Medical History:   Diagnosis Date    Atrial fibrillation     CHF (congestive heart failure)     Diabetes     Hypertension     Primary osteoarthritis of both knees 2022       Social History     Socioeconomic History    Marital status:     Number of children: 0   Tobacco Use    Smoking status: Former     Current packs/day: 0.00     Average packs/day: 0.5 packs/day for 12.0 years (6.0 ttl pk-yrs)     Types: Cigarettes     Start date:      Quit date:      Years since quittin.3     Passive exposure: Past    Smokeless tobacco: Never    Tobacco comments:     Caffeine: 2 cups per day   Vaping Use    Vaping status: Never Used   Substance and Sexual Activity    Alcohol use: No    Drug use: Never    Sexual activity: Defer       Family History   Problem Relation Age of Onset    No Known Problems Mother     No Known Problems Father     Hypertension Sister     Atrial fibrillation Sister     Diabetes Sister     No Known Problems Maternal Aunt     No Known Problems Maternal Uncle     No Known Problems Paternal Aunt     No Known Problems Paternal Uncle     No Known Problems Maternal Grandmother     No Known Problems Maternal Grandfather     No Known Problems Paternal Grandmother     No Known Problems Paternal Grandfather     Heart attack Neg Hx     Heart disease Neg Hx     Heart failure Neg Hx     Hyperlipidemia Neg Hx        Review of  "Systems   Constitutional: Negative for fever and night sweats.   HENT:  Negative for ear pain and stridor.    Eyes:  Negative for discharge and visual halos.   Cardiovascular:  Negative for cyanosis.   Respiratory:  Negative for hemoptysis and sputum production.    Hematologic/Lymphatic: Negative for adenopathy.   Skin:  Negative for nail changes and unusual hair distribution.   Musculoskeletal:  Positive for arthritis and joint pain. Negative for gout and joint swelling.   Gastrointestinal:  Negative for bowel incontinence and flatus.   Genitourinary:  Negative for dysuria and flank pain.   Neurological:  Positive for loss of balance. Negative for seizures and tremors.   Psychiatric/Behavioral:  Negative for altered mental status. The patient is not nervous/anxious.             Objective:     Vitals:    04/19/24 0935   BP: 138/74   Pulse: (!) 136   Weight: 84.4 kg (186 lb)   Height: 160 cm (63\")         Eyes:      General:         Right eye: No discharge.         Left eye: No discharge.   HENT:      Head: Normocephalic and atraumatic.   Neck:      Thyroid: No thyromegaly.      Vascular: No JVD.   Pulmonary:      Effort: Pulmonary effort is normal.      Breath sounds: Normal breath sounds. No rales.   Cardiovascular:      Tachycardia present. Regular rhythm.      No gallop.    Edema:     Peripheral edema absent.   Abdominal:      General: Bowel sounds are normal.      Palpations: Abdomen is soft.      Tenderness: There is no abdominal tenderness.   Musculoskeletal: Normal range of motion.         General: No deformity. Skin:     General: Skin is warm and dry.      Findings: No erythema.   Neurological:      Mental Status: Alert and oriented to person, place, and time.      Motor: Normal muscle tone.   Psychiatric:         Behavior: Behavior normal.         Thought Content: Thought content normal.           ECG 12 Lead    Date/Time: 4/19/2024 10:15 AM  Performed by: Naif Salgado MD    Authorized by: Naomi, " MD Naif  Comparison: compared with previous ECG   Similar to previous ECG  Rhythm: atrial flutter          Lab Review:       Assessment:          Diagnosis Plan   1. Atypical atrial flutter        2. PAF (paroxysmal atrial fibrillation)               Plan:     I think we should do LB area pacer and av node ablation    I could probably get this as its likely a MI flutter but there is no guarantee and she is 84 years old and has LA dilation     Risks discussed

## 2024-04-23 ENCOUNTER — HOSPITAL ENCOUNTER (OUTPATIENT)
Facility: HOSPITAL | Age: 84
Discharge: HOME | End: 2024-04-24
Attending: INTERNAL MEDICINE | Admitting: INTERNAL MEDICINE
Payer: MEDICARE

## 2024-04-23 DIAGNOSIS — I44.2 AV BLOCK, COMPLETE: ICD-10-CM

## 2024-04-23 DIAGNOSIS — I48.91 ATRIAL FIBRILLATION WITH RAPID VENTRICULAR RESPONSE: ICD-10-CM

## 2024-04-23 LAB
GLUCOSE BLDC GLUCOMTR-MCNC: 129 MG/DL (ref 70–130)
GLUCOSE BLDC GLUCOMTR-MCNC: 328 MG/DL (ref 70–130)

## 2024-04-23 PROCEDURE — 25810000003 SODIUM CHLORIDE 0.9 % SOLUTION: Performed by: INTERNAL MEDICINE

## 2024-04-23 PROCEDURE — 93005 ELECTROCARDIOGRAM TRACING: CPT | Performed by: INTERNAL MEDICINE

## 2024-04-23 PROCEDURE — C1898 LEAD, PMKR, OTHER THAN TRANS: HCPCS | Performed by: INTERNAL MEDICINE

## 2024-04-23 PROCEDURE — C1785 PMKR, DUAL, RATE-RESP: HCPCS | Performed by: INTERNAL MEDICINE

## 2024-04-23 PROCEDURE — C1894 INTRO/SHEATH, NON-LASER: HCPCS | Performed by: INTERNAL MEDICINE

## 2024-04-23 PROCEDURE — 63710000001 METOPROLOL TARTRATE 25 MG TABLET: Performed by: INTERNAL MEDICINE

## 2024-04-23 PROCEDURE — A9270 NON-COVERED ITEM OR SERVICE: HCPCS | Performed by: INTERNAL MEDICINE

## 2024-04-23 PROCEDURE — 82948 REAGENT STRIP/BLOOD GLUCOSE: CPT

## 2024-04-23 PROCEDURE — C1887 CATHETER, GUIDING: HCPCS | Performed by: INTERNAL MEDICINE

## 2024-04-23 PROCEDURE — 25010000002 MIDAZOLAM PER 1 MG: Performed by: INTERNAL MEDICINE

## 2024-04-23 PROCEDURE — 63710000001 FUROSEMIDE 40 MG TABLET: Performed by: INTERNAL MEDICINE

## 2024-04-23 PROCEDURE — 25010000002 FENTANYL CITRATE (PF) 50 MCG/ML SOLUTION: Performed by: INTERNAL MEDICINE

## 2024-04-23 PROCEDURE — 93010 ELECTROCARDIOGRAM REPORT: CPT | Performed by: INTERNAL MEDICINE

## 2024-04-23 PROCEDURE — 63710000001 DILTIAZEM CD 120 MG CAPSULE SUSTAINED-RELEASE 24 HR: Performed by: INTERNAL MEDICINE

## 2024-04-23 PROCEDURE — 25810000003 SODIUM CHLORIDE 0.9 % SOLUTION 250 ML FLEX CONT: Performed by: INTERNAL MEDICINE

## 2024-04-23 PROCEDURE — 25010000002 VANCOMYCIN HCL 1.25 G RECONSTITUTED SOLUTION 1 EACH VIAL: Performed by: INTERNAL MEDICINE

## 2024-04-23 PROCEDURE — G0378 HOSPITAL OBSERVATION PER HR: HCPCS

## 2024-04-23 PROCEDURE — 33208 INSRT HEART PM ATRIAL & VENT: CPT | Performed by: INTERNAL MEDICINE

## 2024-04-23 PROCEDURE — 63710000001 METFORMIN ER 500 MG TABLET SUSTAINED-RELEASE 24 HOUR: Performed by: INTERNAL MEDICINE

## 2024-04-23 PROCEDURE — 63710000001 ALLOPURINOL 300 MG TABLET: Performed by: INTERNAL MEDICINE

## 2024-04-23 PROCEDURE — 63710000001 POTASSIUM CHLORIDE 10 MEQ TABLET CONTROLLED-RELEASE: Performed by: INTERNAL MEDICINE

## 2024-04-23 DEVICE — IMPLANTABLE DEVICE: Type: IMPLANTABLE DEVICE | Status: FUNCTIONAL

## 2024-04-23 DEVICE — LD PM CAPSUREFIX NOVUS IS1 MEDTR 407652: Type: IMPLANTABLE DEVICE | Status: FUNCTIONAL

## 2024-04-23 DEVICE — GEN PM AZURE XT SURESCAN DR MRI: Type: IMPLANTABLE DEVICE | Status: FUNCTIONAL

## 2024-04-23 RX ORDER — ACETAMINOPHEN 325 MG/1
650 TABLET ORAL EVERY 4 HOURS PRN
Status: DISCONTINUED | OUTPATIENT
Start: 2024-04-23 | End: 2024-04-24 | Stop reason: HOSPADM

## 2024-04-23 RX ORDER — FENTANYL CITRATE 50 UG/ML
INJECTION, SOLUTION INTRAMUSCULAR; INTRAVENOUS
Status: DISCONTINUED | OUTPATIENT
Start: 2024-04-23 | End: 2024-04-23 | Stop reason: HOSPADM

## 2024-04-23 RX ORDER — HYDROCODONE BITARTRATE AND ACETAMINOPHEN 5; 325 MG/1; MG/1
1 TABLET ORAL EVERY 4 HOURS PRN
Status: DISCONTINUED | OUTPATIENT
Start: 2024-04-23 | End: 2024-04-24 | Stop reason: HOSPADM

## 2024-04-23 RX ORDER — MIDAZOLAM HYDROCHLORIDE 1 MG/ML
INJECTION INTRAMUSCULAR; INTRAVENOUS
Status: DISCONTINUED | OUTPATIENT
Start: 2024-04-23 | End: 2024-04-23 | Stop reason: HOSPADM

## 2024-04-23 RX ORDER — SODIUM CHLORIDE 0.9 % (FLUSH) 0.9 %
10 SYRINGE (ML) INJECTION AS NEEDED
Status: DISCONTINUED | OUTPATIENT
Start: 2024-04-23 | End: 2024-04-23 | Stop reason: HOSPADM

## 2024-04-23 RX ORDER — METHOHEXITAL IN WATER/PF 100MG/10ML
SYRINGE (ML) INTRAVENOUS
Status: DISCONTINUED | OUTPATIENT
Start: 2024-04-23 | End: 2024-04-23 | Stop reason: HOSPADM

## 2024-04-23 RX ORDER — SODIUM CHLORIDE 0.9 % (FLUSH) 0.9 %
10 SYRINGE (ML) INJECTION AS NEEDED
Status: DISCONTINUED | OUTPATIENT
Start: 2024-04-23 | End: 2024-04-24 | Stop reason: HOSPADM

## 2024-04-23 RX ORDER — POTASSIUM CHLORIDE 750 MG/1
10 TABLET, FILM COATED, EXTENDED RELEASE ORAL 2 TIMES DAILY
Status: DISCONTINUED | OUTPATIENT
Start: 2024-04-23 | End: 2024-04-24 | Stop reason: HOSPADM

## 2024-04-23 RX ORDER — SODIUM CHLORIDE 9 MG/ML
40 INJECTION, SOLUTION INTRAVENOUS AS NEEDED
Status: DISCONTINUED | OUTPATIENT
Start: 2024-04-23 | End: 2024-04-23 | Stop reason: HOSPADM

## 2024-04-23 RX ORDER — SODIUM CHLORIDE 9 MG/ML
40 INJECTION, SOLUTION INTRAVENOUS AS NEEDED
Status: DISCONTINUED | OUTPATIENT
Start: 2024-04-23 | End: 2024-04-24 | Stop reason: HOSPADM

## 2024-04-23 RX ORDER — FUROSEMIDE 40 MG/1
40 TABLET ORAL DAILY
Status: DISCONTINUED | OUTPATIENT
Start: 2024-04-23 | End: 2024-04-24 | Stop reason: HOSPADM

## 2024-04-23 RX ORDER — METOPROLOL TARTRATE 1 MG/ML
INJECTION, SOLUTION INTRAVENOUS
Status: DISCONTINUED | OUTPATIENT
Start: 2024-04-23 | End: 2024-04-23 | Stop reason: HOSPADM

## 2024-04-23 RX ORDER — SODIUM CHLORIDE 0.9 % (FLUSH) 0.9 %
10 SYRINGE (ML) INJECTION EVERY 12 HOURS SCHEDULED
Status: DISCONTINUED | OUTPATIENT
Start: 2024-04-23 | End: 2024-04-24 | Stop reason: HOSPADM

## 2024-04-23 RX ORDER — SODIUM CHLORIDE 9 MG/ML
75 INJECTION, SOLUTION INTRAVENOUS CONTINUOUS
Status: DISCONTINUED | OUTPATIENT
Start: 2024-04-23 | End: 2024-04-24 | Stop reason: HOSPADM

## 2024-04-23 RX ORDER — SODIUM CHLORIDE 0.9 % (FLUSH) 0.9 %
10 SYRINGE (ML) INJECTION EVERY 12 HOURS SCHEDULED
Status: DISCONTINUED | OUTPATIENT
Start: 2024-04-23 | End: 2024-04-23 | Stop reason: HOSPADM

## 2024-04-23 RX ORDER — ACETAMINOPHEN 650 MG/1
650 SUPPOSITORY RECTAL EVERY 4 HOURS PRN
Status: DISCONTINUED | OUTPATIENT
Start: 2024-04-23 | End: 2024-04-24 | Stop reason: HOSPADM

## 2024-04-23 RX ORDER — ALLOPURINOL 300 MG/1
300 TABLET ORAL DAILY
Status: DISCONTINUED | OUTPATIENT
Start: 2024-04-23 | End: 2024-04-24 | Stop reason: HOSPADM

## 2024-04-23 RX ORDER — METFORMIN HYDROCHLORIDE 500 MG/1
500 TABLET, EXTENDED RELEASE ORAL 2 TIMES DAILY
Status: DISCONTINUED | OUTPATIENT
Start: 2024-04-23 | End: 2024-04-24 | Stop reason: HOSPADM

## 2024-04-23 RX ORDER — DILTIAZEM HYDROCHLORIDE 120 MG/1
120 CAPSULE, COATED, EXTENDED RELEASE ORAL
Status: DISCONTINUED | OUTPATIENT
Start: 2024-04-23 | End: 2024-04-24 | Stop reason: HOSPADM

## 2024-04-23 RX ADMIN — Medication 10 ML: at 13:00

## 2024-04-23 RX ADMIN — SODIUM CHLORIDE 75 ML/HR: 9 INJECTION, SOLUTION INTRAVENOUS at 13:06

## 2024-04-23 RX ADMIN — METFORMIN HYDROCHLORIDE 500 MG: 500 TABLET, EXTENDED RELEASE ORAL at 20:48

## 2024-04-23 RX ADMIN — VANCOMYCIN HYDROCHLORIDE 1250 MG: 1.25 INJECTION, POWDER, LYOPHILIZED, FOR SOLUTION INTRAVENOUS at 13:45

## 2024-04-23 RX ADMIN — Medication 10 ML: at 20:48

## 2024-04-23 RX ADMIN — ALLOPURINOL 300 MG: 300 TABLET ORAL at 20:48

## 2024-04-23 RX ADMIN — DILTIAZEM HYDROCHLORIDE 120 MG: 120 CAPSULE, EXTENDED RELEASE ORAL at 20:47

## 2024-04-23 RX ADMIN — POTASSIUM CHLORIDE 10 MEQ: 750 TABLET, EXTENDED RELEASE ORAL at 20:47

## 2024-04-23 RX ADMIN — METOPROLOL TARTRATE 25 MG: 25 TABLET, FILM COATED ORAL at 20:48

## 2024-04-23 RX ADMIN — FUROSEMIDE 40 MG: 40 TABLET ORAL at 20:48

## 2024-04-23 NOTE — Clinical Note
All Patches/Pads removed. Skin Intact. Admission Reconciliation is Completed  Discharge Reconciliation is Not Complete Admission Reconciliation is Completed  Discharge Reconciliation is Completed

## 2024-04-23 NOTE — INTERVAL H&P NOTE
H&P reviewed. The patient was examined and there are no changes to the H&P.  Today we do the pacer then later the av node ablation

## 2024-04-23 NOTE — DISCHARGE INSTRUCTIONS
"[facility]   [phone]    Post Pacemaker / Defibrillator Implant Instructions      1.  The dressing may be removed the next day.    2. If steri-strips were used, they should not be removed. Allow them to \"fall off\".      3. You may shower after the dressing is removed. Do not allow shower water to hit directly on incision.    4. No lotion/powder/ointment/cream on incision until it is healed.    5. Gently wash incision daily with soap and water and pat dry.    6. You may reapply a dressing if there is drainage, otherwise leave your incision open to air. If you reapply a dressing, please notify the pacemaker clinic.    7. No heavy lifting, pulling, or pushing.    8. Do not raise the affected arm over your head for a minimum of 1 month.    9. The pacemaker clinic will contact you (usually within 1 business day) to schedule a pacemaker/incision check. The check is usually done 7-10 days post-implant. If you have not heard from the pacemaker clinic within 3 days, please call the office.    10.  No driving until seen at your follow up appointment    11. Please call the office if you experience any of the following:   bleeding or drainage from your incision   swelling, redness, or opening of your incision   fever or chills   pain not relieved with medication   chest pain or difficulty breathing   lightheadness    12. For defibrillator patients only: If you receive a shock from your device, please call the office. If you receive 2 or more shocks within a 24 hour period OR if you receive 1 shock and feel poorly, you should be evaluated in the emergency room. Please DO NOT DRIVE if you have received a shock until your device has been checked.  "

## 2024-04-24 ENCOUNTER — READMISSION MANAGEMENT (OUTPATIENT)
Dept: CALL CENTER | Facility: HOSPITAL | Age: 84
End: 2024-04-24
Payer: MEDICARE

## 2024-04-24 VITALS
RESPIRATION RATE: 18 BRPM | WEIGHT: 186 LBS | BODY MASS INDEX: 32.96 KG/M2 | HEART RATE: 68 BPM | OXYGEN SATURATION: 98 % | TEMPERATURE: 97.3 F | SYSTOLIC BLOOD PRESSURE: 129 MMHG | HEIGHT: 63 IN | DIASTOLIC BLOOD PRESSURE: 63 MMHG

## 2024-04-24 LAB
GLUCOSE BLDC GLUCOMTR-MCNC: 130 MG/DL (ref 70–130)
QT INTERVAL: 346 MS
QT INTERVAL: 477 MS
QTC INTERVAL: 489 MS
QTC INTERVAL: 526 MS

## 2024-04-24 PROCEDURE — 93010 ELECTROCARDIOGRAM REPORT: CPT | Performed by: INTERNAL MEDICINE

## 2024-04-24 PROCEDURE — 63710000001 METFORMIN ER 500 MG TABLET SUSTAINED-RELEASE 24 HOUR: Performed by: INTERNAL MEDICINE

## 2024-04-24 PROCEDURE — 63710000001 FUROSEMIDE 40 MG TABLET: Performed by: INTERNAL MEDICINE

## 2024-04-24 PROCEDURE — A9270 NON-COVERED ITEM OR SERVICE: HCPCS | Performed by: INTERNAL MEDICINE

## 2024-04-24 PROCEDURE — 63710000001 METOPROLOL TARTRATE 25 MG TABLET: Performed by: INTERNAL MEDICINE

## 2024-04-24 PROCEDURE — 82948 REAGENT STRIP/BLOOD GLUCOSE: CPT

## 2024-04-24 PROCEDURE — G0378 HOSPITAL OBSERVATION PER HR: HCPCS

## 2024-04-24 PROCEDURE — 93005 ELECTROCARDIOGRAM TRACING: CPT | Performed by: INTERNAL MEDICINE

## 2024-04-24 PROCEDURE — 99238 HOSP IP/OBS DSCHRG MGMT 30/<: CPT | Performed by: PHYSICIAN ASSISTANT

## 2024-04-24 PROCEDURE — 63710000001 ALLOPURINOL 300 MG TABLET: Performed by: INTERNAL MEDICINE

## 2024-04-24 PROCEDURE — 63710000001 POTASSIUM CHLORIDE 10 MEQ TABLET CONTROLLED-RELEASE: Performed by: INTERNAL MEDICINE

## 2024-04-24 PROCEDURE — 63710000001 DILTIAZEM CD 120 MG CAPSULE SUSTAINED-RELEASE 24 HR: Performed by: INTERNAL MEDICINE

## 2024-04-24 RX ORDER — DILTIAZEM HYDROCHLORIDE 120 MG/1
120 CAPSULE, COATED, EXTENDED RELEASE ORAL
Qty: 30 CAPSULE | Refills: 2 | Status: SHIPPED | OUTPATIENT
Start: 2024-04-24 | End: 2024-05-01 | Stop reason: SDUPTHER

## 2024-04-24 RX ADMIN — Medication 10 ML: at 09:12

## 2024-04-24 RX ADMIN — FUROSEMIDE 40 MG: 40 TABLET ORAL at 09:08

## 2024-04-24 RX ADMIN — DILTIAZEM HYDROCHLORIDE 120 MG: 120 CAPSULE, EXTENDED RELEASE ORAL at 09:08

## 2024-04-24 RX ADMIN — POTASSIUM CHLORIDE 10 MEQ: 750 TABLET, EXTENDED RELEASE ORAL at 09:08

## 2024-04-24 RX ADMIN — METFORMIN HYDROCHLORIDE 500 MG: 500 TABLET, EXTENDED RELEASE ORAL at 09:08

## 2024-04-24 RX ADMIN — ALLOPURINOL 300 MG: 300 TABLET ORAL at 09:08

## 2024-04-24 RX ADMIN — METOPROLOL TARTRATE 25 MG: 25 TABLET, FILM COATED ORAL at 09:08

## 2024-04-24 NOTE — CASE MANAGEMENT/SOCIAL WORK
"Continued Stay Note  [facility]     Patient Name: Laure Leung  MRN: [id number]  Today's Date: 4/24/2024    Admit Date: 4/23/2024    Plan: Home via [facility]   Discharge Plan       Row Name 04/24/24 0935       Plan    Plan Home via [facility]    Plan Comments S/W pleasant patient at bedside and she advised she needed a hospital dishcarge transportation set-up for her through \"[facility]\" (which is provided through her [employer] [facility] insurance).  [facility] phone number is, [phone].  CCP called [facility] at 9:37 AM and spoke with Dejah and scheduled Pt transport home to [place], [place].  Pt trip number is [phone].  Ride is anticipated in next, \"2-3 hours\", per Dejah.  Pt denies any other dishcarge needs.  CCP following...........Diana GARCIA/LARON                   Discharge Codes    No documentation.                 Expected Discharge Date and Time       Expected Discharge Date Expected Discharge Time    Apr 24, 2024               Diana Loyola RN    "

## 2024-04-24 NOTE — DISCHARGE SUMMARY
Electrophysiology Follow-Up Note      Patient Name: Laure Leung  Age/Sex: 84 y.o. female  : 1940  MRN: [id number]    Date of Admission: 2024  Day of Service: 24       Chief Complaint: Pacemaker implantation     HPI:   Laure Leung is a 84 y.o. female who presented to hospital for a pacemaker implantation. She has a chronic history of persistent atrial flutter with multiple LA ablation in the  past. The most recent ablation was in August with redo PV and LA posterior wall.     She was seen in clinic 2024 and was having fatigue, dyspnea and was in atrial flutter. PPM was recommended. Initially we had plans for a single lead PPM and possible AVN ablation but the patient converted to SR during placement of PPM.     S/p dual chamber LB RV lead PPM 2024.     Follow up:  24 This morning she is doing well. No events noted on the monitor overnight. Reviewed pacemaker instructions with her at the bedside.       Temp:  [97.3 °F (36.3 °C)-98 °F (36.7 °C)] 97.3 °F (36.3 °C)  Heart Rate:  [] 68  Resp:  [8-22] 18  BP: (108-169)/(50-96) 129/63     PHYSICAL EXAM    General: 84 y.o. female No acute distress, laying in bed. Alert and Oriented.   Neck: No JVD or carotid bruit  Lungs: Clear to ausculation bilaterally, symmetric  Heart: Regular rate and rhythm with no overt murmurs, rubs or gallops. Normal S1 and S2. Device well seated in pocket, no surrounding hematoma.   Abdomen: soft, non-tender  Extremities: No lower extremity edema or cyanosis.   Neuo: no lateralizing defects.        Telemetry/EK24: AP rhythm               I personally viewed and interpreted the patient's EKG/Telemetry data.      Lab Review:   Results from last 7 days   Lab Units 24  1118   SODIUM mmol/L 141   POTASSIUM mmol/L 4.6   CHLORIDE mmol/L 106   CO2 mmol/L 22.4   BUN mg/dL 29*   CREATININE mg/dL 1.15*   GLUCOSE mg/dL 169*   CALCIUM mg/dL 10.1     Results from last 7 days   Lab Units  04/19/24  1118   WBC 10*3/mm3 7.34   HEMOGLOBIN g/dL 11.0*   HEMATOCRIT % 33.7*   PLATELETS 10*3/mm3 446                       Current Medications:   Scheduled Meds:allopurinol, 300 mg, Oral, Daily  apixaban, 5 mg, Oral, Q12H  dilTIAZem CD, 120 mg, Oral, Q24H  furosemide, 40 mg, Oral, Daily  metFORMIN ER, 500 mg, Oral, BID  metoprolol tartrate, 25 mg, Oral, BID  potassium chloride, 10 mEq, Oral, BID  sodium chloride, 10 mL, Intravenous, Q12H          Assessment /Plan:  Persistent atrial fibrillation/flutter- hx of multiple ablations. s/p dual chamber LB RV pacemaker 4/24/2024. Normal device function this morning on interrogation. Plan on incision and device check in 1 week in office then follow up with APRN in 1 month. Resume apixaban on 4/25/2024. Start taking daily metoprolol.  She converted to SR during case and dual chamber PPM was placed. For now, continue cardizem, and metoprolol. If she has reoccurance of AF we will consider AVN ablation in future.     Molina Galvez PA-C  04/24/24  07:53 EDT

## 2024-04-24 NOTE — PLAN OF CARE
Problem: Adult Inpatient Plan of Care  Goal: Plan of Care Review  Outcome: Ongoing, Progressing   Goal Outcome Evaluation:  VSS. RA/2L nc prn. A&Ox4. SR. S/P PPM placement yesterday. Incision c/d/I. No c/o pain over night. Pt rested throughout the night with no issues or complaints.

## 2024-04-24 NOTE — OUTREACH NOTE
Prep Survey      Flowsheet Row Responses   [facility] patient discharged from? [place]   Is LACE score < 7 ? Yes   Eligibility [facility]   Date of Admission 04/23/24   Date of Discharge 04/24/24   Discharge Disposition Home or Self Care   Discharge diagnosis AV block, complete, pacemaker implanted   Does the patient have one of the following disease processes/diagnoses(primary or secondary)? General Surgery   Does the patient have Home health ordered? No   Is there a DME ordered? No   Prep survey completed? Yes            Swati GONZALEZ - Registered Nurse

## 2024-04-25 ENCOUNTER — TRANSITIONAL CARE MANAGEMENT TELEPHONE ENCOUNTER (OUTPATIENT)
Dept: CALL CENTER | Facility: HOSPITAL | Age: 84
End: 2024-04-25
Payer: MEDICARE

## 2024-04-25 NOTE — OUTREACH NOTE
Call Center TCM Note      Flowsheet Row Responses   [facility] patient discharged from? [place]   Does the patient have one of the following disease processes/diagnoses(primary or secondary)? General Surgery   TCM attempt successful? Yes   Call start time 1410   Call end time 1420   Discharge diagnosis AV block, complete, pacemaker implanted   Person spoke with today (if not patient) and relationship pt   Meds reviewed with patient/caregiver? Yes   Is the patient having any side effects they believe may be caused by any medication additions or changes? No   Does the patient have all medications related to this admission filled (includes all antibiotics, pain medications, etc.) Yes   Is the patient taking all medications as directed (includes completed medication regime)? Yes   Comments Cardiology 5/1/24 (device check),  5/24/24   Does the patient have an appointment with their PCP within 7-14 days of discharge? Yes  [4/30/2024  8:30 AM]   Psychosocial issues? No   Did the patient receive a copy of their discharge instructions? Yes   Nursing interventions Reviewed instructions with patient   What is the patient's perception of their health status since discharge? Improving   Is the patient /caregiver able to teach back basic post-op care? Take showers only when approved by MD-sponge bathe until then, No tub bath, swimming, or hot tub until instructed by MD, Keep incision areas clean,dry and protected, Lifting as instructed by MD in discharge instructions   Is the patient/caregiver able to teach back signs and symptoms of incisional infection? Increased redness, swelling or pain at the incisonal site, Increased drainage or bleeding, Incisional warmth, Pus or odor from incision, Fever   Is the patient/caregiver able to teach back steps to recovery at home? Rest and rebuild strength, gradually increase activity, Eat a well-balance diet   If the patient is a current smoker, are they able to teach back resources  for cessation? Not a smoker   Is the patient/caregiver able to teach back the hierarchy of who to call/visit for symptoms/problems? PCP, Specialist, Home health nurse, Urgent Care, ED, 911 Yes   TCM call completed? Yes   Wrap up additional comments Pt denies any chest pain, irregular heartbeats. Reviewed AVS/meds with pt. Pt verified PCP, cardiology fu appts.   Call end time 1420   Would this patient benefit from a Referral to [facility] Social Work? No   Is the patient interested in additional calls from an ambulatory ? No            Angelina Alfonso RN    4/25/2024, 14:24 EDT

## 2024-05-01 ENCOUNTER — TELEPHONE (OUTPATIENT)
Age: 84
End: 2024-05-01
Payer: MEDICARE

## 2024-05-01 ENCOUNTER — CLINICAL SUPPORT NO REQUIREMENTS (OUTPATIENT)
Age: 84
End: 2024-05-01
Payer: MEDICARE

## 2024-05-01 DIAGNOSIS — I48.0 PAF (PAROXYSMAL ATRIAL FIBRILLATION): Primary | ICD-10-CM

## 2024-05-01 DIAGNOSIS — I44.2 AV BLOCK, COMPLETE: Primary | ICD-10-CM

## 2024-05-01 RX ORDER — DILTIAZEM HYDROCHLORIDE 120 MG/1
120 CAPSULE, COATED, EXTENDED RELEASE ORAL
Qty: 90 CAPSULE | Refills: 2 | Status: SHIPPED | OUTPATIENT
Start: 2024-05-01

## 2024-05-01 NOTE — TELEPHONE ENCOUNTER
Pt said she has noticed some swelling in legs since starting diltiazem 120mg. Saw pt today for inc ck and no swelling noted. She said it is usually in evenings. Told pt to monitor this and keep us updated.    She also said she will need refill sent to her mail order pharmacy as she was only given 30 pills at discharge from hospital. Has appt with [facility] 5/24/24 for 1 month post new implant.    0 AF since implant on device. Original plan was for AVN ablation but no events/issues at this time.

## 2024-05-01 NOTE — TELEPHONE ENCOUNTER
Called patient and discussed, only at night. Unlikely to be the medication. She will continue the cardizem for now and will contact us again if symptoms are worse.     Rx sent to pharmacy,

## 2024-05-02 ENCOUNTER — TELEPHONE (OUTPATIENT)
Dept: CARDIOLOGY | Facility: CLINIC | Age: 84
End: 2024-05-02
Payer: MEDICARE

## 2024-05-02 NOTE — TELEPHONE ENCOUNTER
"  Caller: Laure Leung \"Addie\"    Relationship: Self    Best call back number: [phone]    What is the best time to reach you: ANY    Who are you requesting to speak with (clinical staff, provider,  specific staff member): CLINICAL        What was the call regarding: PATIENT STATED THAT SHE WAS RELEASED TO DRIVE TODAY AND SHE WANTS TO KNOW IF IT IS OK FOR HER TO PULL HERSELF UP IN HER PICKUP TRUCK TO GET IN. PATIENT HAD A [employer] PACEMAKER IMPLANTED BY DR. SHAIKH. PLEASE CONTACT PATIENT TO ADVISE. THANK YOU.    Is it okay if the provider responds through [facility]art: CALL    ”   "

## 2024-05-02 NOTE — TELEPHONE ENCOUNTER
Pt called today and said lower legs were swollen today when she woke up. Should she continue meds or hold off? She is seeing you on 5/24

## 2024-05-03 NOTE — TELEPHONE ENCOUNTER
Reviewed recommendations with patient, verbalized understanding, will call with any further questions or complaints.    Latesha Burris RN  Triage Nurse  05/03/24 08:37 EDT

## 2024-05-08 PROBLEM — I48.91 A-FIB: Status: RESOLVED | Noted: 2023-08-11 | Resolved: 2024-05-08

## 2024-05-08 PROBLEM — Z01.810 PREOPERATIVE CARDIOVASCULAR EXAMINATION: Status: RESOLVED | Noted: 2023-01-17 | Resolved: 2024-05-08

## 2024-05-17 ENCOUNTER — TELEPHONE (OUTPATIENT)
Dept: CARDIOLOGY | Facility: CLINIC | Age: 84
End: 2024-05-17
Payer: MEDICARE

## 2024-05-17 DIAGNOSIS — I48.0 PAF (PAROXYSMAL ATRIAL FIBRILLATION): ICD-10-CM

## 2024-05-17 RX ORDER — DILTIAZEM HYDROCHLORIDE 120 MG/1
120 CAPSULE, COATED, EXTENDED RELEASE ORAL
Qty: 30 CAPSULE | Refills: 0 | Status: SHIPPED | OUTPATIENT
Start: 2024-05-17

## 2024-05-17 NOTE — TELEPHONE ENCOUNTER
"Caller: Laure Leung \"Addie\"    Relationship: Self    Best call back number: [phone]    Requested Prescriptions:   Requested Prescriptions     Pending Prescriptions Disp Refills    dilTIAZem CD (CARDIZEM CD) 120 MG 24 hr capsule 90 capsule 2     Sig: Take 1 capsule by mouth Daily.        Pharmacy where request should be sent: [facility] [id number] [id number] AT [facility] [address] [place] [id number] - [phone] [facility] [phone]      Last office visit with prescribing clinician: Visit date not found   Last telemedicine visit with prescribing clinician: Visit date not found   Next office visit with prescribing clinician: 5/24/2024     Additional details provided by patient: PT SAID SHE KEEPS GETTING DILTIAZEM ER SENT IN INSTEAD OF THE DILTIAZEM CD.     Does the patient have less than a 3 day supply:  [x] Yes  [] No    Would you like a call back once the refill request has been completed: [x] Yes [] No    If the office needs to give you a call back, can they leave a voicemail: [x] Yes [] No    Marilee Awad Rep   05/17/24 10:16 EDT   "

## 2024-05-20 ENCOUNTER — TELEPHONE (OUTPATIENT)
Dept: CARDIOLOGY | Facility: CLINIC | Age: 84
End: 2024-05-20

## 2024-05-20 NOTE — TELEPHONE ENCOUNTER
"    Caller: Laure Leung \"Addie\"    Relationship to patient: Self    Best call back number: [phone]    Patient is needing: PT NEEDING TO SPEAK WITH YEN, STATES ITS ABOUT MEDICATION. NO FURTHER INFO GIVING.     "

## 2024-05-20 NOTE — TELEPHONE ENCOUNTER
Spoke with patient regarding medication.     Need to call [facility] to get prescription straight through them.

## 2024-05-24 ENCOUNTER — CLINICAL SUPPORT NO REQUIREMENTS (OUTPATIENT)
Age: 84
End: 2024-05-24
Payer: MEDICARE

## 2024-05-24 ENCOUNTER — OFFICE VISIT (OUTPATIENT)
Age: 84
End: 2024-05-24
Payer: MEDICARE

## 2024-05-24 VITALS
BODY MASS INDEX: 34.38 KG/M2 | HEART RATE: 76 BPM | DIASTOLIC BLOOD PRESSURE: 74 MMHG | HEIGHT: 63 IN | SYSTOLIC BLOOD PRESSURE: 136 MMHG | WEIGHT: 194 LBS

## 2024-05-24 DIAGNOSIS — I48.11 LONGSTANDING PERSISTENT ATRIAL FIBRILLATION: Primary | ICD-10-CM

## 2024-05-24 DIAGNOSIS — Z95.0 PRESENCE OF CARDIAC PACEMAKER: ICD-10-CM

## 2024-05-24 DIAGNOSIS — I44.2 AV BLOCK, COMPLETE: ICD-10-CM

## 2024-05-24 DIAGNOSIS — I44.2 AV BLOCK, COMPLETE: Primary | ICD-10-CM

## 2024-05-24 PROBLEM — I48.4 ATYPICAL ATRIAL FLUTTER: Status: RESOLVED | Noted: 2019-08-14 | Resolved: 2024-05-24

## 2024-05-24 PROBLEM — R00.2 PALPITATIONS: Status: RESOLVED | Noted: 2021-06-09 | Resolved: 2024-05-24

## 2024-05-24 PROBLEM — I48.19 PERSISTENT ATRIAL FIBRILLATION: Status: RESOLVED | Noted: 2023-06-20 | Resolved: 2024-05-24

## 2024-05-24 NOTE — H&P (VIEW-ONLY)
ELECTROPHYSIOLOGY   Date of Office Visit: 2024  Patient Name: Laure Leung  : 1940  Encounter Provider: Molina Galvez PA-C  Primary Cardiologist: Dr. Deutsch/ [place]  Electrophysiologist: Dr. Salgado  CHIEF COMPLAINT / REASON FOR OFFICE VISIT     Atrial Fibrillation w/RVR; AV block, complete; and Pacemaker Check  Hospital follow-up, pacemaker follow up    HISTORY OF PRESENT ILLNESS     This is a 84 y.o. year old female who presents to [facility] CARDIOLOGY for a for a procedure follow up.     She has a chronic history of persistent atrial flutter with multiple LA ablation in the past. The most recent ablation was in August with redo PV and LA posterior wall.     She was seen in office in April and was in persistent atrial flutter with predominant tachycardia.  Recommendations were made for a pace and ablate strategy.    S/p dual-chamber RV-LV lead 2024    Device interrogation today shows normal device function with 19.6% atrial paced beats and 93.9% RV paced beats.  No events were noted.  Her histogram showed her average heart rate is falling into the 120s.  Rate response was turned off and she was switched to DDD mode.    She called us 2 weeks after implant saying that she was having persistent tachycardia and was starting to get mild symptoms of volume overload.  We increased her Lasix for a few days and the symptoms improved.  Her heart rates have still predominantly been in the 120s at home when she is checking her readings.      She denies any episodes of dizziness, lightheadedness, near-syncope or shortness of breath.  She has been taking additional Lasix as needed for lower extremity edema.    She has chronic insomnia we discussed sleep hygiene practices.    Blood pressure at home has been in the 120/60s.     Apixaban for AC, denies bleeding.     PMHx: Persistent atrial flutter with multiple ablations, sick sinus syndrome s/p pacemaker 2024    PHYSICAL  "EXAMINATION     Vital Signs:  /74   Pulse 76   Ht 160 cm (63\")   Wt 88 kg (194 lb)   BMI 34.37 kg/m²   Estimated body mass index is 34.37 kg/m² as calculated from the following:    Height as of this encounter: 160 cm (63\").    Weight as of this encounter: 88 kg (194 lb).               Physical Exam  Constitutional:       Appearance: Normal appearance.   HENT:      Head: Normocephalic and atraumatic.   Cardiovascular:      Rate and Rhythm: Normal rate and regular rhythm.      Pulses: Normal pulses.      Heart sounds: Normal heart sounds.      Comments: Pacemaker pocket well seated with no surrounding hematoma.   Pulmonary:      Effort: Pulmonary effort is normal.      Breath sounds: Normal breath sounds.   Musculoskeletal:      Right lower leg: No edema.      Left lower leg: No edema.   Skin:     General: Skin is warm and dry.   Neurological:      General: No focal deficit present.      Mental Status: She is alert and oriented to person, place, and time.          Cardiac Testing/Results     Cardiac Testing:   - Echo 7/13/2023: EF of 46 to 50% with indeterminate diastolic function.  Severely dilated left atrium.  Moderately dilated right atrium.  RVSP 35 to 45 mmHg. Mild pulm HTN      Result Review :  The following data was reviewed by: Molina Galvez PA-C on 05/24/2024:    Lipid Panel          11/27/2023    11:35   Lipid Panel   Total Cholesterol 194    Triglycerides 77    HDL Cholesterol 54    VLDL Cholesterol 14    LDL Cholesterol  126    LDL/HDL Ratio 2.31       Lab Results   Component Value Date     04/19/2024     11/27/2023    K 4.6 04/19/2024    K 5.1 11/27/2023     04/19/2024     11/27/2023    CO2 22.4 04/19/2024    CO2 25.0 11/27/2023    BUN 29 (H) 04/19/2024    BUN 24 (H) 11/27/2023    CREATININE 1.15 (H) 04/19/2024    CREATININE 1.08 (H) 11/27/2023    EGFRIFNONA 55 (L) 06/24/2021    EGFRIFNONA 61 06/21/2021    GLUCOSE 169 (H) 04/19/2024    GLUCOSE 132 (H) 11/27/2023 "    CALCIUM 10.1 04/19/2024    CALCIUM 10.3 11/27/2023    ALBUMIN 4.7 11/27/2023    ALBUMIN 4.1 08/09/2023    AST 20 11/27/2023    AST 14 08/09/2023    ALT 18 11/27/2023    ALT 12 08/09/2023     Lab Results   Component Value Date    WBC 7.34 04/19/2024    WBC 7.17 11/27/2023    HGB 11.0 (L) 04/19/2024    HGB 10.0 (L) 11/27/2023    HCT 33.7 (L) 04/19/2024    HCT 29.4 (L) 11/27/2023    MCV 93.9 04/19/2024    MCV 98.0 (H) 11/27/2023     04/19/2024     11/27/2023     Lab Results   Component Value Date    PROBNP 3,856.0 (H) 04/16/2023    PROBNP 4,000.0 (H) 09/20/2022     Lab Results   Component Value Date    TROPONINT 26 (H) 05/25/2023     Lab Results   Component Value Date    TSH 0.578 05/25/2023    TSH 0.661 04/16/2023                 ECG 12 Lead    Date/Time: 5/24/2024 10:04 AM  Performed by: Molina Steve PA-C    Authorized by: Molina Steve PA-C  Comparison: compared with previous ECG from 4/24/2024  Rhythm: paced  Rate: normal  BPM: 76  QRS axis: normal  Comments: AS- rhythm, qtc 494, no acute T wave changes                ASSESSMENT & PLAN       Diagnoses and all orders for this visit:    1. Longstanding persistent atrial fibrillation (Primary)  -     ECG 12 Lead  History of numerous ablations in the past with the most recent being in 2023 with Dr. So  She had been in persistent atrial fibrillation until pacemaker implantation.  She spontaneously converted to sinus rhythm while being implanted.  She has maintained sinus rhythm since.  Will discontinue her diltiazem due to complaints of GI upset and continue metoprolol 25 mg twice daily  Continue apixaban 5 mg twice daily for anticoagulation.  Denies bleeding complications  Will continue to monitor her device interrogations for recurrent A-fib.  Initially our plan was to consider an AV node ablation but we will hold off on this at this point  2. AV block, complete  Normal device function on interrogation today.  Her heart rates  have been sustaining up into the 120s with rate response.  Mode settings were changed to DDD  3. s/p dual chamber LV-RV lead pacemaker 4/2024  Home monitoring in place.  She has 93.9% RV paced beats      Follow Up:  Return in about 3 months (around 8/24/2024) for Dr. Florentino Willis.  Patient was given instructions and counseling regarding her condition or for health maintenance advice. Please contact office if worsening symptoms or proceed to ER when appropriate.      Molina Galvez PA-C  05/24/24  10:36 EDT    MEDICATIONS         Discharge Medications            Accurate as of May 24, 2024 10:36 AM. If you have any questions, ask your nurse or doctor.                Continue These Medications        Instructions Start Date   allopurinol 300 MG tablet  Commonly known as: ZYLOPRIM   300 mg, Oral, Daily      B-D SINGLE USE SWABS REGULAR pads   Use as directed for blood glucose checks      Eliquis 5 MG tablet tablet  Generic drug: apixaban   5 mg, Oral, Every 12 Hours Scheduled, Start taking the evening of 4/24/2024      furosemide 40 MG tablet  Commonly known as: LASIX   40 mg, Oral, Daily      Magnesium 200 MG tablet   2 tablets, Oral, Daily      metFORMIN  MG 24 hr tablet  Commonly known as: GLUCOPHAGE-XR   500 mg, Oral, 2 Times Daily      metoprolol tartrate 25 MG tablet  Commonly known as: LOPRESSOR   25 mg, Oral, 2 Times Daily      OneTouch Delica Plus [id number] misc   1 each, Topical, Daily, To check blood sugar      OneTouch Verio test strip  Generic drug: glucose blood   Use as instructed to check blood sugar once daily      potassium chloride 10 MEQ CR tablet   10 mEq, Oral, 2 Times Daily      VITAMIN B COMPLEX PO   1 tablet, Oral, Daily      VITAMIN D (CHOLECALCIFEROL) PO   5,000 Units, Oral, Daily                   **Dragon Disclaimer: This note was dictated using an electronic transcription. The electronic translation of spoken language may permit erroneous, or at times, nonsensical words or  phrases to be inadvertently transcribed. Although I have reviewed the note for such errors, some may still exist.

## 2024-05-24 NOTE — PROGRESS NOTES
ELECTROPHYSIOLOGY   Date of Office Visit: 2024  Patient Name: Laure Leung  : 1940  Encounter Provider: Molina Galvez PA-C  Primary Cardiologist: Dr. Deutsch/ [place]  Electrophysiologist: Dr. Salgado  CHIEF COMPLAINT / REASON FOR OFFICE VISIT     Atrial Fibrillation w/RVR; AV block, complete; and Pacemaker Check  Hospital follow-up, pacemaker follow up    HISTORY OF PRESENT ILLNESS     This is a 84 y.o. year old female who presents to [facility] CARDIOLOGY for a for a procedure follow up.     She has a chronic history of persistent atrial flutter with multiple LA ablation in the past. The most recent ablation was in August with redo PV and LA posterior wall.     She was seen in office in April and was in persistent atrial flutter with predominant tachycardia.  Recommendations were made for a pace and ablate strategy.    S/p dual-chamber RV-LV lead 2024    Device interrogation today shows normal device function with 19.6% atrial paced beats and 93.9% RV paced beats.  No events were noted.  Her histogram showed her average heart rate is falling into the 120s.  Rate response was turned off and she was switched to DDD mode.    She called us 2 weeks after implant saying that she was having persistent tachycardia and was starting to get mild symptoms of volume overload.  We increased her Lasix for a few days and the symptoms improved.  Her heart rates have still predominantly been in the 120s at home when she is checking her readings.      She denies any episodes of dizziness, lightheadedness, near-syncope or shortness of breath.  She has been taking additional Lasix as needed for lower extremity edema.    She has chronic insomnia we discussed sleep hygiene practices.    Blood pressure at home has been in the 120/60s.     Apixaban for AC, denies bleeding.     PMHx: Persistent atrial flutter with multiple ablations, sick sinus syndrome s/p pacemaker 2024    PHYSICAL  "EXAMINATION     Vital Signs:  /74   Pulse 76   Ht 160 cm (63\")   Wt 88 kg (194 lb)   BMI 34.37 kg/m²   Estimated body mass index is 34.37 kg/m² as calculated from the following:    Height as of this encounter: 160 cm (63\").    Weight as of this encounter: 88 kg (194 lb).               Physical Exam  Constitutional:       Appearance: Normal appearance.   HENT:      Head: Normocephalic and atraumatic.   Cardiovascular:      Rate and Rhythm: Normal rate and regular rhythm.      Pulses: Normal pulses.      Heart sounds: Normal heart sounds.      Comments: Pacemaker pocket well seated with no surrounding hematoma.   Pulmonary:      Effort: Pulmonary effort is normal.      Breath sounds: Normal breath sounds.   Musculoskeletal:      Right lower leg: No edema.      Left lower leg: No edema.   Skin:     General: Skin is warm and dry.   Neurological:      General: No focal deficit present.      Mental Status: She is alert and oriented to person, place, and time.          Cardiac Testing/Results     Cardiac Testing:   - Echo 7/13/2023: EF of 46 to 50% with indeterminate diastolic function.  Severely dilated left atrium.  Moderately dilated right atrium.  RVSP 35 to 45 mmHg. Mild pulm HTN      Result Review :  The following data was reviewed by: Molina Galvez PA-C on 05/24/2024:    Lipid Panel          11/27/2023    11:35   Lipid Panel   Total Cholesterol 194    Triglycerides 77    HDL Cholesterol 54    VLDL Cholesterol 14    LDL Cholesterol  126    LDL/HDL Ratio 2.31       Lab Results   Component Value Date     04/19/2024     11/27/2023    K 4.6 04/19/2024    K 5.1 11/27/2023     04/19/2024     11/27/2023    CO2 22.4 04/19/2024    CO2 25.0 11/27/2023    BUN 29 (H) 04/19/2024    BUN 24 (H) 11/27/2023    CREATININE 1.15 (H) 04/19/2024    CREATININE 1.08 (H) 11/27/2023    EGFRIFNONA 55 (L) 06/24/2021    EGFRIFNONA 61 06/21/2021    GLUCOSE 169 (H) 04/19/2024    GLUCOSE 132 (H) 11/27/2023 "    CALCIUM 10.1 04/19/2024    CALCIUM 10.3 11/27/2023    ALBUMIN 4.7 11/27/2023    ALBUMIN 4.1 08/09/2023    AST 20 11/27/2023    AST 14 08/09/2023    ALT 18 11/27/2023    ALT 12 08/09/2023     Lab Results   Component Value Date    WBC 7.34 04/19/2024    WBC 7.17 11/27/2023    HGB 11.0 (L) 04/19/2024    HGB 10.0 (L) 11/27/2023    HCT 33.7 (L) 04/19/2024    HCT 29.4 (L) 11/27/2023    MCV 93.9 04/19/2024    MCV 98.0 (H) 11/27/2023     04/19/2024     11/27/2023     Lab Results   Component Value Date    PROBNP 3,856.0 (H) 04/16/2023    PROBNP 4,000.0 (H) 09/20/2022     Lab Results   Component Value Date    TROPONINT 26 (H) 05/25/2023     Lab Results   Component Value Date    TSH 0.578 05/25/2023    TSH 0.661 04/16/2023                 ECG 12 Lead    Date/Time: 5/24/2024 10:04 AM  Performed by: Molina Steve PA-C    Authorized by: Molina Steve PA-C  Comparison: compared with previous ECG from 4/24/2024  Rhythm: paced  Rate: normal  BPM: 76  QRS axis: normal  Comments: AS- rhythm, qtc 494, no acute T wave changes                ASSESSMENT & PLAN       Diagnoses and all orders for this visit:    1. Longstanding persistent atrial fibrillation (Primary)  -     ECG 12 Lead  History of numerous ablations in the past with the most recent being in 2023 with Dr. So  She had been in persistent atrial fibrillation until pacemaker implantation.  She spontaneously converted to sinus rhythm while being implanted.  She has maintained sinus rhythm since.  Will discontinue her diltiazem due to complaints of GI upset and continue metoprolol 25 mg twice daily  Continue apixaban 5 mg twice daily for anticoagulation.  Denies bleeding complications  Will continue to monitor her device interrogations for recurrent A-fib.  Initially our plan was to consider an AV node ablation but we will hold off on this at this point  2. AV block, complete  Normal device function on interrogation today.  Her heart rates  have been sustaining up into the 120s with rate response.  Mode settings were changed to DDD  3. s/p dual chamber LV-RV lead pacemaker 4/2024  Home monitoring in place.  She has 93.9% RV paced beats      Follow Up:  Return in about 3 months (around 8/24/2024) for Dr. Florentino Willis.  Patient was given instructions and counseling regarding her condition or for health maintenance advice. Please contact office if worsening symptoms or proceed to ER when appropriate.      Molina Galvez PA-C  05/24/24  10:36 EDT    MEDICATIONS         Discharge Medications            Accurate as of May 24, 2024 10:36 AM. If you have any questions, ask your nurse or doctor.                Continue These Medications        Instructions Start Date   allopurinol 300 MG tablet  Commonly known as: ZYLOPRIM   300 mg, Oral, Daily      B-D SINGLE USE SWABS REGULAR pads   Use as directed for blood glucose checks      Eliquis 5 MG tablet tablet  Generic drug: apixaban   5 mg, Oral, Every 12 Hours Scheduled, Start taking the evening of 4/24/2024      furosemide 40 MG tablet  Commonly known as: LASIX   40 mg, Oral, Daily      Magnesium 200 MG tablet   2 tablets, Oral, Daily      metFORMIN  MG 24 hr tablet  Commonly known as: GLUCOPHAGE-XR   500 mg, Oral, 2 Times Daily      metoprolol tartrate 25 MG tablet  Commonly known as: LOPRESSOR   25 mg, Oral, 2 Times Daily      OneTouch Delica Plus [id number] misc   1 each, Topical, Daily, To check blood sugar      OneTouch Verio test strip  Generic drug: glucose blood   Use as instructed to check blood sugar once daily      potassium chloride 10 MEQ CR tablet   10 mEq, Oral, 2 Times Daily      VITAMIN B COMPLEX PO   1 tablet, Oral, Daily      VITAMIN D (CHOLECALCIFEROL) PO   5,000 Units, Oral, Daily                   **Dragon Disclaimer: This note was dictated using an electronic transcription. The electronic translation of spoken language may permit erroneous, or at times, nonsensical words or  phrases to be inadvertently transcribed. Although I have reviewed the note for such errors, some may still exist.

## 2024-05-28 ENCOUNTER — LAB (OUTPATIENT)
Dept: FAMILY MEDICINE CLINIC | Facility: CLINIC | Age: 84
End: 2024-05-28
Payer: MEDICARE

## 2024-05-28 DIAGNOSIS — I10 ESSENTIAL HYPERTENSION: ICD-10-CM

## 2024-05-28 DIAGNOSIS — D64.9 ANEMIA, UNSPECIFIED TYPE: ICD-10-CM

## 2024-05-28 DIAGNOSIS — M10.9 GOUT, UNSPECIFIED CAUSE, UNSPECIFIED CHRONICITY, UNSPECIFIED SITE: ICD-10-CM

## 2024-05-28 DIAGNOSIS — E11.9 TYPE 2 DIABETES MELLITUS WITHOUT COMPLICATION, WITHOUT LONG-TERM CURRENT USE OF INSULIN: Primary | ICD-10-CM

## 2024-05-28 PROCEDURE — 84550 ASSAY OF BLOOD/URIC ACID: CPT | Performed by: NURSE PRACTITIONER

## 2024-05-28 PROCEDURE — 83036 HEMOGLOBIN GLYCOSYLATED A1C: CPT | Performed by: NURSE PRACTITIONER

## 2024-05-28 PROCEDURE — 84443 ASSAY THYROID STIM HORMONE: CPT | Performed by: NURSE PRACTITIONER

## 2024-05-28 PROCEDURE — 80053 COMPREHEN METABOLIC PANEL: CPT | Performed by: NURSE PRACTITIONER

## 2024-05-28 PROCEDURE — 80061 LIPID PANEL: CPT | Performed by: NURSE PRACTITIONER

## 2024-05-28 PROCEDURE — 36415 COLL VENOUS BLD VENIPUNCTURE: CPT | Performed by: NURSE PRACTITIONER

## 2024-05-28 PROCEDURE — 85027 COMPLETE CBC AUTOMATED: CPT | Performed by: NURSE PRACTITIONER

## 2024-05-29 LAB
ALBUMIN SERPL-MCNC: 4.2 G/DL (ref 3.5–5.2)
ALBUMIN/GLOB SERPL: 2.2 G/DL
ALP SERPL-CCNC: 97 U/L (ref 39–117)
ALT SERPL W P-5'-P-CCNC: 21 U/L (ref 1–33)
ANION GAP SERPL CALCULATED.3IONS-SCNC: 11 MMOL/L (ref 5–15)
AST SERPL-CCNC: 15 U/L (ref 1–32)
BILIRUB SERPL-MCNC: 1.7 MG/DL (ref 0–1.2)
BUN SERPL-MCNC: 19 MG/DL (ref 8–23)
BUN/CREAT SERPL: 15.4 (ref 7–25)
CALCIUM SPEC-SCNC: 9.4 MG/DL (ref 8.6–10.5)
CHLORIDE SERPL-SCNC: 107 MMOL/L (ref 98–107)
CHOLEST SERPL-MCNC: 175 MG/DL (ref 0–200)
CO2 SERPL-SCNC: 27 MMOL/L (ref 22–29)
CREAT SERPL-MCNC: 1.23 MG/DL (ref 0.57–1)
DEPRECATED RDW RBC AUTO: 51.6 FL (ref 37–54)
EGFRCR SERPLBLD CKD-EPI 2021: 43.7 ML/MIN/1.73
ERYTHROCYTE [DISTWIDTH] IN BLOOD BY AUTOMATED COUNT: 14.3 % (ref 12.3–15.4)
GLOBULIN UR ELPH-MCNC: 1.9 GM/DL
GLUCOSE SERPL-MCNC: 126 MG/DL (ref 65–99)
HBA1C MFR BLD: 6.3 % (ref 4.8–5.6)
HCT VFR BLD AUTO: 31.2 % (ref 34–46.6)
HDLC SERPL-MCNC: 46 MG/DL (ref 40–60)
HGB BLD-MCNC: 10.2 G/DL (ref 12–15.9)
LDLC SERPL CALC-MCNC: 113 MG/DL (ref 0–100)
LDLC/HDLC SERPL: 2.42 {RATIO}
MCH RBC QN AUTO: 32.6 PG (ref 26.6–33)
MCHC RBC AUTO-ENTMCNC: 32.7 G/DL (ref 31.5–35.7)
MCV RBC AUTO: 99.7 FL (ref 79–97)
PLATELET # BLD AUTO: 304 10*3/MM3 (ref 140–450)
PMV BLD AUTO: 10.7 FL (ref 6–12)
POTASSIUM SERPL-SCNC: 4.6 MMOL/L (ref 3.5–5.2)
PROT SERPL-MCNC: 6.1 G/DL (ref 6–8.5)
RBC # BLD AUTO: 3.13 10*6/MM3 (ref 3.77–5.28)
SODIUM SERPL-SCNC: 145 MMOL/L (ref 136–145)
TRIGL SERPL-MCNC: 89 MG/DL (ref 0–150)
TSH SERPL DL<=0.05 MIU/L-ACNC: 0.8 UIU/ML (ref 0.27–4.2)
URATE SERPL-MCNC: 5.3 MG/DL (ref 2.4–5.7)
VLDLC SERPL-MCNC: 16 MG/DL (ref 5–40)
WBC NRBC COR # BLD AUTO: 5.77 10*3/MM3 (ref 3.4–10.8)

## 2024-05-30 ENCOUNTER — TELEPHONE (OUTPATIENT)
Age: 84
End: 2024-05-30
Payer: MEDICARE

## 2024-05-30 DIAGNOSIS — I48.91 ATRIAL FIBRILLATION WITH RAPID VENTRICULAR RESPONSE: Primary | ICD-10-CM

## 2024-05-30 NOTE — TELEPHONE ENCOUNTER
Patient calling to speak with you.    You saw her in the office last week, made some med changes and she is concerned because her heart rate right now is 149.    She can be reached at [phone].

## 2024-05-30 NOTE — TELEPHONE ENCOUNTER
Called pt, she is back in AF with RVR. She has fatigue and an anxious feeling in chest.     Start taking metoprolol 50 mg in AM and 25 mg in Pm    Will talk with FRANK about doing AV node ablation

## 2024-06-03 NOTE — TELEPHONE ENCOUNTER
Pt called to get procedure scheduled for AVN ablation. Told her we would get back to her asap.    Thanks!

## 2024-06-04 ENCOUNTER — TELEPHONE (OUTPATIENT)
Dept: CARDIOLOGY | Facility: CLINIC | Age: 84
End: 2024-06-04
Payer: MEDICARE

## 2024-06-04 ENCOUNTER — OFFICE VISIT (OUTPATIENT)
Dept: FAMILY MEDICINE CLINIC | Facility: CLINIC | Age: 84
End: 2024-06-04
Payer: MEDICARE

## 2024-06-04 VITALS
TEMPERATURE: 98.5 F | OXYGEN SATURATION: 96 % | WEIGHT: 188.4 LBS | HEART RATE: 57 BPM | SYSTOLIC BLOOD PRESSURE: 118 MMHG | DIASTOLIC BLOOD PRESSURE: 72 MMHG | HEIGHT: 63 IN | BODY MASS INDEX: 33.38 KG/M2

## 2024-06-04 DIAGNOSIS — I48.0 PAF (PAROXYSMAL ATRIAL FIBRILLATION): ICD-10-CM

## 2024-06-04 DIAGNOSIS — R53.83 FATIGUE, UNSPECIFIED TYPE: ICD-10-CM

## 2024-06-04 DIAGNOSIS — M79.672 BILATERAL FOOT PAIN: ICD-10-CM

## 2024-06-04 DIAGNOSIS — M79.671 BILATERAL FOOT PAIN: ICD-10-CM

## 2024-06-04 DIAGNOSIS — M10.9 GOUTY ARTHRITIS: ICD-10-CM

## 2024-06-04 DIAGNOSIS — R60.0 BILATERAL LOWER EXTREMITY EDEMA: ICD-10-CM

## 2024-06-04 DIAGNOSIS — E11.9 TYPE 2 DIABETES MELLITUS WITHOUT COMPLICATION, WITHOUT LONG-TERM CURRENT USE OF INSULIN: Primary | ICD-10-CM

## 2024-06-04 DIAGNOSIS — H61.21 IMPACTED CERUMEN OF RIGHT EAR: ICD-10-CM

## 2024-06-04 DIAGNOSIS — N18.32 STAGE 3B CHRONIC KIDNEY DISEASE: ICD-10-CM

## 2024-06-04 DIAGNOSIS — D64.9 ANEMIA, UNSPECIFIED TYPE: ICD-10-CM

## 2024-06-04 PROCEDURE — 1159F MED LIST DOCD IN RCRD: CPT | Performed by: NURSE PRACTITIONER

## 2024-06-04 PROCEDURE — 3074F SYST BP LT 130 MM HG: CPT | Performed by: NURSE PRACTITIONER

## 2024-06-04 PROCEDURE — 3078F DIAST BP <80 MM HG: CPT | Performed by: NURSE PRACTITIONER

## 2024-06-04 PROCEDURE — 1160F RVW MEDS BY RX/DR IN RCRD: CPT | Performed by: NURSE PRACTITIONER

## 2024-06-04 PROCEDURE — 99214 OFFICE O/P EST MOD 30 MIN: CPT | Performed by: NURSE PRACTITIONER

## 2024-06-04 PROCEDURE — 1126F AMNT PAIN NOTED NONE PRSNT: CPT | Performed by: NURSE PRACTITIONER

## 2024-06-04 RX ORDER — FERROUS SULFATE 325(65) MG
325 TABLET ORAL
Qty: 90 TABLET | Refills: 1 | Status: SHIPPED | OUTPATIENT
Start: 2024-06-04

## 2024-06-04 RX ORDER — DAPAGLIFLOZIN 10 MG/1
1 TABLET, FILM COATED ORAL DAILY
Qty: 90 TABLET | Refills: 1 | Status: SHIPPED | OUTPATIENT
Start: 2024-06-04

## 2024-06-04 RX ORDER — POLYETHYLENE GLYCOL 3350 17 G/17G
17 POWDER, FOR SOLUTION ORAL DAILY
Qty: 850 G | Refills: 2 | Status: SHIPPED | OUTPATIENT
Start: 2024-06-04

## 2024-06-04 NOTE — PROGRESS NOTES
Chief Complaint  Chief Complaint   Patient presents with    Follow-up     Go over lab results.            Subjective          Laure Leung presents to [facility] for   History of Present Illness    Patient presents for 6-month follow-up and to review labs collected 5/28/2024.  She declined Medicare wellness visit    HTN, stable on meds and takes as directed, denies chest pain, headache, shortness of air, palpitations and swelling of extremities.     Atrial fibrillation, patient is on Eliquis, amiodarone has been discontinued, metoprolol is now 50mg in am and 25mg at night. She states HR is anywhere from .     -8/11/2023 patient underwent elective cryoablation per Dr. Leyva.  Had multiple episodes of afib following ablation, with -140.    -seen in cardiology clinic on 4/19/2024 with fatigue, dyspnea and atrial flutter.  PPM recommended,   -Electively admitted on 4/23/2024 with dual-chamber PPM implantation on 4/24/2024.      Diabetes mellitus type II, last hemoglobin A1c 6.2, feels stable on meds, denies any signs/symptoms of hyper/hypoglycemia, blurry vision, polydipsia, polyuria, nocturia, and unintentional weight loss     Chronic anemia with CKD, she was started on iron at the last visit but is currently not taking    Gout, patient is on allopurinol daily, denies gout flare up, she c/o both feet being sore      She c/o R ear pain      The following portions of the patient's history were reviewed and updated as appropriate: allergies, current medications, past family history, past medical history, past social history, past surgical history and problem list.    Past Medical History:   Diagnosis Date    Atrial fibrillation     CHF (congestive heart failure)     Diabetes     Hypertension     Insomnia 2024    Primary osteoarthritis of both knees 05/25/2022     Past Surgical History:   Procedure Laterality Date    CARDIAC ELECTROPHYSIOLOGY PROCEDURE N/A 06/23/2021     Procedure: Ablation atrial flutter;  Surgeon: Antonio Senior MD;  Location:  [facility];  Service: Cardiovascular;  Laterality: N/A;    CARDIAC ELECTROPHYSIOLOGY PROCEDURE N/A 2023    Procedure: Ablation atrial fibrillation, cryo, rep emailed;  Surgeon: Claude Leyva MD;  Location:  [facility];  Service: Cardiovascular;  Laterality: N/A;    CATARACT EXTRACTION WITH INTRAOCULAR LENS IMPLANT Bilateral 2018    PACEMAKER IMPLANTATION N/A 2024    Procedure: PPM SC RV Implant MEDT.;  Surgeon: Naif Salgado MD;  Location:  [facility];  Service: Cardiovascular;  Laterality: N/A;     Family History   Problem Relation Age of Onset    No Known Problems Mother     No Known Problems Father     Hypertension Sister     Atrial fibrillation Sister     Diabetes Sister     No Known Problems Maternal Aunt     No Known Problems Maternal Uncle     No Known Problems Paternal Aunt     No Known Problems Paternal Uncle     No Known Problems Maternal Grandmother     No Known Problems Maternal Grandfather     No Known Problems Paternal Grandmother     No Known Problems Paternal Grandfather     Heart attack Neg Hx     Heart disease Neg Hx     Heart failure Neg Hx     Hyperlipidemia Neg Hx      Social History     Tobacco Use    Smoking status: Former     Current packs/day: 0.00     Average packs/day: 0.5 packs/day for 12.0 years (6.0 ttl pk-yrs)     Types: Cigarettes     Start date:      Quit date:      Years since quittin.4     Passive exposure: Past    Smokeless tobacco: Never    Tobacco comments:     Caffeine: 2 cups per day   Substance Use Topics    Alcohol use: No       Current Outpatient Medications:     Alcohol Swabs (B-D SINGLE USE SWABS REGULAR) pads, Use as directed for blood glucose checks, Disp: 100 each, Rfl: 3    apixaban (ELIQUIS) 5 MG tablet tablet, Take 1 tablet by mouth Every 12 (Twelve) Hours. Start taking the evening of 2024, Disp:  "180 tablet, Rfl: 1    B Complex Vitamins (VITAMIN B COMPLEX PO), Take 1 tablet by mouth Daily., Disp: , Rfl:     furosemide (LASIX) 40 MG tablet, Take 1 tablet by mouth Daily., Disp: 90 tablet, Rfl: 1    Lancets (OneTouch Delica Plus Yzqlam10H) misc, Apply 1 each topically to the appropriate area as directed Daily. To check blood sugar, Disp: 100 each, Rfl: 3    Magnesium 200 MG tablet, Take 2 tablets by mouth Daily., Disp: , Rfl:     metFORMIN ER (GLUCOPHAGE-XR) 500 MG 24 hr tablet, Take 1 tablet by mouth 2 (Two) Times a Day., Disp: 180 tablet, Rfl: 1    metoprolol tartrate (LOPRESSOR) 25 MG tablet, Take 1 tablet by mouth 2 (Two) Times a Day., Disp: 60 tablet, Rfl: 2    OneTouch Verio test strip, Use as instructed to check blood sugar once daily, Disp: 100 each, Rfl: 3    potassium chloride 10 MEQ CR tablet, Take 1 tablet by mouth 2 (Two) Times a Day., Disp: 180 tablet, Rfl: 1    VITAMIN D, CHOLECALCIFEROL, PO, Take 5,000 Units by mouth Daily., Disp: , Rfl:     carbamide peroxide (Debrox) 6.5 % otic solution, Administer 5 drops to the right ear 2 (Two) Times a Day. 5- 7 days if needed for ear wax impaction, Disp: 22 mL, Rfl: 0    dapagliflozin Propanediol (Farxiga) 10 MG tablet, Take 10 mg by mouth Daily., Disp: 90 tablet, Rfl: 1    ferrous sulfate 325 (65 FE) MG tablet, Take 1 tablet by mouth Daily With Breakfast., Disp: 90 tablet, Rfl: 1    Ibuprofen 3 %, Gabapentin 10 %, Baclofen 2 %, lidocaine 4 %, Apply 1-2 g topically to the appropriate area as directed 3 (Three) to 4 (Four) times daily., Disp: 90 g, Rfl: 0    polyethylene glycol (MiraLax) 17 GM/SCOOP powder, Take 17 g by mouth Daily., Disp: 850 g, Rfl: 2    Objective   Vital Signs:   /72 (BP Location: Left arm, Patient Position: Sitting, Cuff Size: Adult)   Pulse 57   Temp 98.5 °F (36.9 °C) (Temporal)   Ht 160 cm (63\")   Wt 85.5 kg (188 lb 6.4 oz)   SpO2 96%   BMI 33.37 kg/m²           Physical Exam  Constitutional:       General: She is not in " acute distress.     Appearance: Normal appearance. She is well-developed. She is not ill-appearing or diaphoretic.   HENT:      Head: Normocephalic.      Right Ear: There is impacted cerumen.      Left Ear: Tympanic membrane and ear canal normal.   Eyes:      Conjunctiva/sclera: Conjunctivae normal.      Pupils: Pupils are equal, round, and reactive to light.   Neck:      Thyroid: No thyromegaly.      Vascular: No JVD.   Cardiovascular:      Rate and Rhythm: Normal rate and regular rhythm.      Heart sounds: Normal heart sounds. No murmur heard.     Comments: RRR, 60 bpm with PPM RUC  Pulmonary:      Effort: Pulmonary effort is normal. No respiratory distress.      Breath sounds: Normal breath sounds. No wheezing or rhonchi.   Abdominal:      General: Bowel sounds are normal. There is no distension.      Palpations: Abdomen is soft.      Tenderness: There is no abdominal tenderness.   Musculoskeletal:         General: Swelling (trace BLE) and tenderness (B foot pain, multi joint pain, mild avila rom) present. Normal range of motion.      Cervical back: Normal range of motion and neck supple. No tenderness.   Lymphadenopathy:      Cervical: No cervical adenopathy.   Skin:     General: Skin is warm and dry.      Coloration: Skin is not jaundiced.      Findings: No erythema or rash.   Neurological:      General: No focal deficit present.      Mental Status: She is alert and oriented to person, place, and time. Mental status is at baseline.      Sensory: No sensory deficit.      Motor: Weakness present.      Gait: Gait abnormal.   Psychiatric:         Mood and Affect: Mood normal.         Behavior: Behavior normal.         Thought Content: Thought content normal.         Judgment: Judgment normal.          Result Review :     No visits with results within 7 Day(s) from this visit.   Latest known visit with results is:   Orders Only on 05/28/2024   Component Date Value Ref Range Status    Total Cholesterol 05/28/2024 175   0 - 200 mg/dL Final    Triglycerides 05/28/2024 89  0 - 150 mg/dL Final    HDL Cholesterol 05/28/2024 46  40 - 60 mg/dL Final    LDL Cholesterol  05/28/2024 113 (H)  0 - 100 mg/dL Final    VLDL Cholesterol 05/28/2024 16  5 - 40 mg/dL Final    LDL/HDL Ratio 05/28/2024 2.42   Final    Glucose 05/28/2024 126 (H)  65 - 99 mg/dL Final    BUN 05/28/2024 19  8 - 23 mg/dL Final    Creatinine 05/28/2024 1.23 (H)  0.57 - 1.00 mg/dL Final    Sodium 05/28/2024 145  136 - 145 mmol/L Final    Potassium 05/28/2024 4.6  3.5 - 5.2 mmol/L Final    Chloride 05/28/2024 107  98 - 107 mmol/L Final    CO2 05/28/2024 27.0  22.0 - 29.0 mmol/L Final    Calcium 05/28/2024 9.4  8.6 - 10.5 mg/dL Final    Total Protein 05/28/2024 6.1  6.0 - 8.5 g/dL Final    Albumin 05/28/2024 4.2  3.5 - 5.2 g/dL Final    ALT (SGPT) 05/28/2024 21  1 - 33 U/L Final    AST (SGOT) 05/28/2024 15  1 - 32 U/L Final    Alkaline Phosphatase 05/28/2024 97  39 - 117 U/L Final    Total Bilirubin 05/28/2024 1.7 (H)  0.0 - 1.2 mg/dL Final    Globulin 05/28/2024 1.9  gm/dL Final    A/G Ratio 05/28/2024 2.2  g/dL Final    BUN/Creatinine Ratio 05/28/2024 15.4  7.0 - 25.0 Final    Anion Gap 05/28/2024 11.0  5.0 - 15.0 mmol/L Final    eGFR 05/28/2024 43.7 (L)  >60.0 mL/min/1.73 Final    WBC 05/28/2024 5.77  3.40 - 10.80 10*3/mm3 Final    RBC 05/28/2024 3.13 (L)  3.77 - 5.28 10*6/mm3 Final    Hemoglobin 05/28/2024 10.2 (L)  12.0 - 15.9 g/dL Final    Hematocrit 05/28/2024 31.2 (L)  34.0 - 46.6 % Final    MCV 05/28/2024 99.7 (H)  79.0 - 97.0 fL Final    MCH 05/28/2024 32.6  26.6 - 33.0 pg Final    MCHC 05/28/2024 32.7  31.5 - 35.7 g/dL Final    RDW 05/28/2024 14.3  12.3 - 15.4 % Final    RDW-SD 05/28/2024 51.6  37.0 - 54.0 fl Final    MPV 05/28/2024 10.7  6.0 - 12.0 fL Final    Platelets 05/28/2024 304  140 - 450 10*3/mm3 Final    TSH 05/28/2024 0.795  0.270 - 4.200 uIU/mL Final    Hemoglobin A1C 05/28/2024 6.30 (H)  4.80 - 5.60 % Final    Uric Acid 05/28/2024 5.3  2.4 - 5.7 mg/dL  Final                  BMI is >= 30 and <35. (Class 1 Obesity). The following options were offered after discussion;: exercise counseling/recommendations and nutrition counseling/recommendations           Assessment and Plan    Diagnoses and all orders for this visit:    1. Type 2 diabetes mellitus without complication, without long-term current use of insulin (Primary)  Comments:  hgba1c 6.3  d/t CKD will D/c metformin start farxiga if ins will cover  Orders:  -     dapagliflozin Propanediol (Farxiga) 10 MG tablet; Take 10 mg by mouth Daily.  Dispense: 90 tablet; Refill: 1  -     Ibuprofen 3 %, Gabapentin 10 %, Baclofen 2 %, lidocaine 4 %; Apply 1-2 g topically to the appropriate area as directed 3 (Three) to 4 (Four) times daily.  Dispense: 90 g; Refill: 0    2. Stage 3b chronic kidney disease  Comments:  increase water intake  d/c metformin, start farxiga  Orders:  -     dapagliflozin Propanediol (Farxiga) 10 MG tablet; Take 10 mg by mouth Daily.  Dispense: 90 tablet; Refill: 1    3. Anemia, unspecified type  Comments:  start iron  start miralax daily    4. Fatigue, unspecified type    5. PAF (paroxysmal atrial fibrillation)    6. Gouty arthritis  Comments:  uric acid normal, will try d/c allopurinol    7. Bilateral lower extremity edema  Comments:  likely d/t irregular HR   cont lasix/kcl and metop    8. Bilateral foot pain  Comments:  try rx alt cream prn to B feet  Orders:  -     Ibuprofen 3 %, Gabapentin 10 %, Baclofen 2 %, lidocaine 4 %; Apply 1-2 g topically to the appropriate area as directed 3 (Three) to 4 (Four) times daily.  Dispense: 90 g; Refill: 0    9. Impacted cerumen of right ear    Other orders  -     ferrous sulfate 325 (65 FE) MG tablet; Take 1 tablet by mouth Daily With Breakfast.  Dispense: 90 tablet; Refill: 1  -     polyethylene glycol (MiraLax) 17 GM/SCOOP powder; Take 17 g by mouth Daily.  Dispense: 850 g; Refill: 2  -     carbamide peroxide (Debrox) 6.5 % otic solution; Administer 5  drops to the right ear 2 (Two) Times a Day. 5- 7 days if needed for ear wax impaction  Dispense: 22 mL; Refill: 0        I spent 35 minutes caring for Laure Leung on this date of service. This time includes time spent by me in the following activities: preparing for the visit, reviewing tests, performing a medically appropriate examination and/or evaluation , counseling and educating the patient/family/caregiver, ordering medications, tests, or procedures and documenting information in the medical record        Follow Up     Return in about 6 months (around 12/4/2024) for Recheck, earlier prn. hypertension panel prior to appointment.  Patient was given instructions and counseling regarding her condition or for health maintenance advice. Please see specific information pulled into the AVS if appropriate.        Part of this note may be an electronic transcription/translation of spoken language to printed text using the Dragon Dictation System

## 2024-06-04 NOTE — TELEPHONE ENCOUNTER
Pt is scheduled for AV Node Ablation on 6/20/24 says she doesn't have transportation other than public transport. She said last time when she had pacemaker she stayed overnight to accommodate her . She says she will need to do this again.    Thanks Ann Marie

## 2024-06-05 ENCOUNTER — TELEPHONE (OUTPATIENT)
Dept: FAMILY MEDICINE CLINIC | Facility: CLINIC | Age: 84
End: 2024-06-05

## 2024-06-05 NOTE — TELEPHONE ENCOUNTER
"Caller: Laure Leung \"Addie\"    Relationship: Self    Best call back number: [phone]    Which medication are you concerned about: FARXIGA     Who prescribed you this medication: MELLISA PORRAS     When did you start taking this medication: N/A    What are your concerns: PATIENT CALLED AND SAID THAT [facility] HAS THIS ON HOLD BECAUSE THEY NEED CLARIFICATION ON THE DOSAGE FROM MELLISA PORRAS    How long have you had these concerns: N/A  "

## 2024-06-05 NOTE — TELEPHONE ENCOUNTER
Spoke with [facility]. No hold on medication. Stated that insurance is only paying for Brand name only. Prescription will be dispensed with a $84 copay. Spoke with Joanna.

## 2024-06-05 NOTE — TELEPHONE ENCOUNTER
Addie called into office stating that she called [facility] to inform them that she could not afford cost. Stated they had already shipped medication and could not cancel. She wanted to know if there is a different medication available

## 2024-06-12 NOTE — TELEPHONE ENCOUNTER
Spoke with Addie. She stated that she can not afford the copay and has read that the medication is worse on her kidneys and should not be taking it with a diuretic. Tried to explain that the medication is suppose to be safer for kidneys.    Drug formulary shows tier 2 for generic/ brand name farxiga. Other formulary is Jardiance, Synjardy, Xigduo, which are tier 2 also

## 2024-06-13 NOTE — TELEPHONE ENCOUNTER
[place] online [place] for [place] and [place] patient [place] stating patient eligible. Patient called and informed

## 2024-06-14 NOTE — TELEPHONE ENCOUNTER
Addie called stating that her sister is taking glipizide with good results and is wanting to know if she can take it instead of farxiga.

## 2024-06-20 ENCOUNTER — HOSPITAL ENCOUNTER (OUTPATIENT)
Facility: HOSPITAL | Age: 84
Setting detail: OBSERVATION
Discharge: HOME | End: 2024-06-21
Attending: INTERNAL MEDICINE | Admitting: INTERNAL MEDICINE
Payer: MEDICARE

## 2024-06-20 DIAGNOSIS — I48.91 ATRIAL FIBRILLATION WITH RAPID VENTRICULAR RESPONSE: ICD-10-CM

## 2024-06-20 PROBLEM — I49.9 ARRHYTHMIA: Status: ACTIVE | Noted: 2024-06-20

## 2024-06-20 LAB — GLUCOSE BLDC GLUCOMTR-MCNC: 140 MG/DL (ref 70–130)

## 2024-06-20 PROCEDURE — 93286 PERI-PX EVAL PM/LDLS PM IP: CPT | Performed by: INTERNAL MEDICINE

## 2024-06-20 PROCEDURE — A9270 NON-COVERED ITEM OR SERVICE: HCPCS | Performed by: INTERNAL MEDICINE

## 2024-06-20 PROCEDURE — G0378 HOSPITAL OBSERVATION PER HR: HCPCS

## 2024-06-20 PROCEDURE — 25810000003 SODIUM CHLORIDE 0.9 % SOLUTION: Performed by: INTERNAL MEDICINE

## 2024-06-20 PROCEDURE — 63710000001 FUROSEMIDE 40 MG TABLET: Performed by: INTERNAL MEDICINE

## 2024-06-20 PROCEDURE — C1893 INTRO/SHEATH, FIXED,NON-PEEL: HCPCS | Performed by: INTERNAL MEDICINE

## 2024-06-20 PROCEDURE — 93005 ELECTROCARDIOGRAM TRACING: CPT | Performed by: INTERNAL MEDICINE

## 2024-06-20 PROCEDURE — 93650 ICAR CATH ABLTJ AV NODE FUNC: CPT | Performed by: INTERNAL MEDICINE

## 2024-06-20 PROCEDURE — 63710000001 FERROUS SULFATE 325 (65 FE) MG TABLET: Performed by: INTERNAL MEDICINE

## 2024-06-20 PROCEDURE — 25010000002 FENTANYL CITRATE (PF) 50 MCG/ML SOLUTION: Performed by: INTERNAL MEDICINE

## 2024-06-20 PROCEDURE — 63710000001 ALLOPURINOL 300 MG TABLET: Performed by: INTERNAL MEDICINE

## 2024-06-20 PROCEDURE — 25010000002 MIDAZOLAM PER 1 MG: Performed by: INTERNAL MEDICINE

## 2024-06-20 PROCEDURE — 63710000001 APIXABAN 5 MG TABLET: Performed by: INTERNAL MEDICINE

## 2024-06-20 PROCEDURE — 63710000001 METFORMIN ER 500 MG TABLET SUSTAINED-RELEASE 24 HOUR: Performed by: INTERNAL MEDICINE

## 2024-06-20 PROCEDURE — 63710000001 POTASSIUM CHLORIDE 10 MEQ TABLET CONTROLLED-RELEASE: Performed by: INTERNAL MEDICINE

## 2024-06-20 PROCEDURE — 93010 ELECTROCARDIOGRAM REPORT: CPT | Performed by: INTERNAL MEDICINE

## 2024-06-20 PROCEDURE — C1732 CATH, EP, DIAG/ABL, 3D/VECT: HCPCS | Performed by: INTERNAL MEDICINE

## 2024-06-20 PROCEDURE — 82948 REAGENT STRIP/BLOOD GLUCOSE: CPT

## 2024-06-20 RX ORDER — NITROGLYCERIN 0.4 MG/1
0.4 TABLET SUBLINGUAL
Status: DISCONTINUED | OUTPATIENT
Start: 2024-06-20 | End: 2024-06-20 | Stop reason: HOSPADM

## 2024-06-20 RX ORDER — FERROUS SULFATE 325(65) MG
325 TABLET ORAL
Status: DISCONTINUED | OUTPATIENT
Start: 2024-06-20 | End: 2024-06-21 | Stop reason: HOSPADM

## 2024-06-20 RX ORDER — ALLOPURINOL 300 MG/1
300 TABLET ORAL DAILY
Status: DISCONTINUED | OUTPATIENT
Start: 2024-06-20 | End: 2024-06-21 | Stop reason: HOSPADM

## 2024-06-20 RX ORDER — FUROSEMIDE 40 MG/1
40 TABLET ORAL DAILY
Status: DISCONTINUED | OUTPATIENT
Start: 2024-06-20 | End: 2024-06-21 | Stop reason: HOSPADM

## 2024-06-20 RX ORDER — MULTIVIT WITH MINERALS/LUTEIN
1000 TABLET ORAL DAILY
COMMUNITY

## 2024-06-20 RX ORDER — METFORMIN HYDROCHLORIDE 500 MG/1
500 TABLET, EXTENDED RELEASE ORAL 2 TIMES DAILY
Status: DISCONTINUED | OUTPATIENT
Start: 2024-06-20 | End: 2024-06-21 | Stop reason: HOSPADM

## 2024-06-20 RX ORDER — SODIUM CHLORIDE 0.9 % (FLUSH) 0.9 %
10 SYRINGE (ML) INJECTION AS NEEDED
Status: DISCONTINUED | OUTPATIENT
Start: 2024-06-20 | End: 2024-06-20 | Stop reason: HOSPADM

## 2024-06-20 RX ORDER — MIDAZOLAM HYDROCHLORIDE 1 MG/ML
INJECTION INTRAMUSCULAR; INTRAVENOUS
Status: DISCONTINUED | OUTPATIENT
Start: 2024-06-20 | End: 2024-06-20 | Stop reason: HOSPADM

## 2024-06-20 RX ORDER — ACETAMINOPHEN 650 MG/1
650 SUPPOSITORY RECTAL EVERY 4 HOURS PRN
Status: DISCONTINUED | OUTPATIENT
Start: 2024-06-20 | End: 2024-06-21 | Stop reason: HOSPADM

## 2024-06-20 RX ORDER — SODIUM CHLORIDE 9 MG/ML
75 INJECTION, SOLUTION INTRAVENOUS CONTINUOUS
Status: DISCONTINUED | OUTPATIENT
Start: 2024-06-20 | End: 2024-06-20

## 2024-06-20 RX ORDER — POTASSIUM CHLORIDE 750 MG/1
10 TABLET, FILM COATED, EXTENDED RELEASE ORAL 2 TIMES DAILY
Status: DISCONTINUED | OUTPATIENT
Start: 2024-06-20 | End: 2024-06-21 | Stop reason: HOSPADM

## 2024-06-20 RX ORDER — FENTANYL CITRATE 50 UG/ML
INJECTION, SOLUTION INTRAMUSCULAR; INTRAVENOUS
Status: DISCONTINUED | OUTPATIENT
Start: 2024-06-20 | End: 2024-06-20 | Stop reason: HOSPADM

## 2024-06-20 RX ORDER — ACETAMINOPHEN 325 MG/1
650 TABLET ORAL EVERY 4 HOURS PRN
Status: DISCONTINUED | OUTPATIENT
Start: 2024-06-20 | End: 2024-06-21 | Stop reason: HOSPADM

## 2024-06-20 RX ORDER — ALLOPURINOL 300 MG/1
300 TABLET ORAL DAILY
COMMUNITY

## 2024-06-20 RX ORDER — METHOHEXITAL IN WATER/PF 100MG/10ML
SYRINGE (ML) INTRAVENOUS
Status: DISCONTINUED | OUTPATIENT
Start: 2024-06-20 | End: 2024-06-20 | Stop reason: HOSPADM

## 2024-06-20 RX ADMIN — POTASSIUM CHLORIDE 10 MEQ: 750 TABLET, EXTENDED RELEASE ORAL at 20:53

## 2024-06-20 RX ADMIN — FERROUS SULFATE TAB 325 MG (65 MG ELEMENTAL FE) 325 MG: 325 (65 FE) TAB at 15:46

## 2024-06-20 RX ADMIN — ALLOPURINOL 300 MG: 300 TABLET ORAL at 16:56

## 2024-06-20 RX ADMIN — FUROSEMIDE 40 MG: 40 TABLET ORAL at 15:46

## 2024-06-20 RX ADMIN — APIXABAN 5 MG: 5 TABLET, FILM COATED ORAL at 20:53

## 2024-06-20 RX ADMIN — SODIUM CHLORIDE 75 ML/HR: 9 INJECTION, SOLUTION INTRAVENOUS at 10:12

## 2024-06-20 RX ADMIN — METFORMIN HYDROCHLORIDE 500 MG: 500 TABLET, EXTENDED RELEASE ORAL at 21:59

## 2024-06-20 NOTE — INTERVAL H&P NOTE
H&P reviewed. The patient was examined and there are no changes to the H&P.  Except that she has had more AF -- when it comes it is fast. We will plan AV node ablation to stop the intermittent tachycardiia

## 2024-06-20 NOTE — Clinical Note
The DP pulses are +2 bilaterally. The PT pulses are +1 bilaterally. Clear sight finger cuff utilized for hemodynamic monitoring during procedure

## 2024-06-20 NOTE — Clinical Note
Hemostasis started on the right femoral vein. Manual pressure applied to vessel. Manual pressure was held by Yandy. Manual pressure was held for 10 min. Hemostasis achieved successfully.

## 2024-06-20 NOTE — Clinical Note
History     Chief Complaint   Patient presents with     Fever     HPI    History obtained from grandmother.    Yasmin is a 4 year old F who presents at  9:38 AM with grandmother for 1 day history of fever and runny nose. Symptoms started lat night after patient had 3 episodes of vomiting after dinner, developed a fever, and was holding her head as if she had a headache. Family did not check her temperature with a thermometer but she felt very warm to touch. Patient also started developing congestion and a runny nose. She was not able to eat or drink anything after onset of symptoms due to feeling poorly and nausea. She continues to make wet diapers. Family tried giving her liquid tylenol at 11 pm and again at 2 am but she refused it and would spit it out.   The vomiting was non-bilious and non-bloody and contained the food she had eaten the previous night. She did not complain of any abdominal pain or diarrhea. She also did not have any shortness of breath or chest pains.  Patient has a 2 year old brother who is experiencing similar symptoms for the past 3 days and the parents have taken him to another ED while grandma brought Yasmin here.     Due to language barrier, a video  was present during the history-taking and subsequent discussion (and for part of the physical exam) with this patient.      PMHx:  Past Medical History:   Diagnosis Date     Bronchiolitis 2018     Normal  (single liveborn) 2017     History reviewed. No pertinent surgical history.  These were reviewed with the patient/family.    MEDICATIONS were reviewed and are as follows:   Current Facility-Administered Medications   Medication     acetaminophen (TYLENOL) solution 325 mg     acetaminophen (TYLENOL) Suppository 325 mg     Current Outpatient Medications   Medication     glycerin (LAXATIVE) 1.2 g suppository     ondansetron (ZOFRAN) 4 MG/5ML solution       ALLERGIES:  Patient has no known allergies.    IMMUNIZATIONS:   Sheath inserted into accessed vessel (RFV #1) Up-to-date by report.    SOCIAL HISTORY: Yasmin lives at home with parents.  She does not attend  or .      I have reviewed the Medications, Allergies, Past Medical and Surgical History, and Social History in the Epic system.    Review of Systems  Please see HPI for pertinent positives and negatives.  All other systems reviewed and found to be negative.        Physical Exam   Pulse: 138  Temp: 102.1  F (38.9  C)  Resp: 22  Weight: 21.5 kg (47 lb 6.4 oz)  SpO2: 99 %      Physical Exam   Appearance: Alert and appropriate, well developed, nontoxic, with moist mucous membranes.  HEENT: Head: Normocephalic and atraumatic. Eyes: PERRL, EOM grossly intact, mild conjunctival erythema and sclerae clear. Ears: Tympanic membranes clear bilaterally, without inflammation or effusion. Nose: Clear rhinorrhea.  Mouth/Throat: No oral lesions, pharynx clear with no erythema or exudate.  Neck: Supple, no masses, no meningismus. No significant cervical lymphadenopathy.  Pulmonary: No grunting, flaring, retractions or stridor. Good air entry, clear to auscultation bilaterally, with no rales, rhonchi, or wheezing.  Cardiovascular: Regular rate and rhythm, normal S1 and S2, with no murmurs.  Normal symmetric peripheral pulses and brisk cap refill.  Abdominal: Normal bowel sounds, soft, nontender, nondistended, with no masses and no hepatosplenomegaly.  Neurologic: Alert and oriented, cranial nerves II-XII grossly intact, moving all extremities equally with grossly normal coordination and normal gait.  Skin: No significant rashes, ecchymoses, or lacerations.        ED Course      Procedures    No results found for this or any previous visit (from the past 24 hour(s)).    Medications   acetaminophen (TYLENOL) solution 325 mg (325 mg Oral Not Given 8/2/21 0934)   acetaminophen (TYLENOL) Suppository 325 mg (325 mg Rectal Given 8/2/21 1032)       Old chart from [facility] reviewed, supported history as above.    Critical care  time:  none      Assessments & Plan (with Medical Decision Making)   Patient is a 4 year old female with 1 day history of fever, vomiting, cough, congestion who comes into the ED today with worsening symptoms and inability to keep liquids and her medications down. Well-appearing on exam with mild rhinorrhea. Most likely diagnosis Viral URI. Brother at home has similar symptoms. No concerns at this time for bacterial infection, pneumonia, or acute gastroenteritis due to benign exam and absence of any supporting physical exam findings. Does not appear dehydrated. Received 2 doses of liquid tylenol in the ED which she spit out both times. Refused tylenol suppository. PO zofran given and tolerated well by patient.         I have reviewed the nursing notes.    I have reviewed the findings, diagnosis, plan and need for follow up with the patient.  New Prescriptions    No medications on file       Final diagnoses:   Cough       8/2/2021   [facility] EMERGENCY DEPARTMENT    Patient data was collected by the resident.  Patient was seen and evaluated by me.  I repeated the history and physical exam of the patient.  I have discussed with the resident the diagnosis, management options, and plan as documented in the Resident Note.  The key portions of the note including the entire assessment and plan reflect my documentation.    Patria Constantino MD  Pediatric Emergency Medicine Attending Physician       Patria Constantino MD  08/05/21 6110

## 2024-06-21 ENCOUNTER — READMISSION MANAGEMENT (OUTPATIENT)
Dept: CALL CENTER | Facility: HOSPITAL | Age: 84
End: 2024-06-21
Payer: MEDICARE

## 2024-06-21 ENCOUNTER — TELEPHONE (OUTPATIENT)
Dept: FAMILY MEDICINE CLINIC | Facility: CLINIC | Age: 84
End: 2024-06-21

## 2024-06-21 VITALS
DIASTOLIC BLOOD PRESSURE: 61 MMHG | HEIGHT: 63 IN | SYSTOLIC BLOOD PRESSURE: 133 MMHG | WEIGHT: 183.2 LBS | OXYGEN SATURATION: 95 % | TEMPERATURE: 98 F | BODY MASS INDEX: 32.46 KG/M2 | HEART RATE: 70 BPM | RESPIRATION RATE: 18 BRPM

## 2024-06-21 LAB
QT INTERVAL: 444 MS
QT INTERVAL: 449 MS
QT INTERVAL: 468 MS
QTC INTERVAL: 468 MS
QTC INTERVAL: 480 MS
QTC INTERVAL: 492 MS

## 2024-06-21 PROCEDURE — 63710000001 FERROUS SULFATE 325 (65 FE) MG TABLET: Performed by: INTERNAL MEDICINE

## 2024-06-21 PROCEDURE — 99238 HOSP IP/OBS DSCHRG MGMT 30/<: CPT | Performed by: PHYSICIAN ASSISTANT

## 2024-06-21 PROCEDURE — 93010 ELECTROCARDIOGRAM REPORT: CPT | Performed by: INTERNAL MEDICINE

## 2024-06-21 PROCEDURE — 63710000001 APIXABAN 5 MG TABLET: Performed by: INTERNAL MEDICINE

## 2024-06-21 PROCEDURE — A9270 NON-COVERED ITEM OR SERVICE: HCPCS | Performed by: INTERNAL MEDICINE

## 2024-06-21 PROCEDURE — 63710000001 METFORMIN ER 500 MG TABLET SUSTAINED-RELEASE 24 HOUR: Performed by: INTERNAL MEDICINE

## 2024-06-21 PROCEDURE — 63710000001 POTASSIUM CHLORIDE 10 MEQ TABLET CONTROLLED-RELEASE: Performed by: INTERNAL MEDICINE

## 2024-06-21 PROCEDURE — 63710000001 ALLOPURINOL 300 MG TABLET: Performed by: INTERNAL MEDICINE

## 2024-06-21 PROCEDURE — 93005 ELECTROCARDIOGRAM TRACING: CPT | Performed by: INTERNAL MEDICINE

## 2024-06-21 PROCEDURE — G0378 HOSPITAL OBSERVATION PER HR: HCPCS

## 2024-06-21 PROCEDURE — 63710000001 FUROSEMIDE 40 MG TABLET: Performed by: INTERNAL MEDICINE

## 2024-06-21 RX ADMIN — POTASSIUM CHLORIDE 10 MEQ: 750 TABLET, EXTENDED RELEASE ORAL at 10:06

## 2024-06-21 RX ADMIN — APIXABAN 5 MG: 5 TABLET, FILM COATED ORAL at 10:06

## 2024-06-21 RX ADMIN — ALLOPURINOL 300 MG: 300 TABLET ORAL at 10:05

## 2024-06-21 RX ADMIN — FUROSEMIDE 40 MG: 40 TABLET ORAL at 10:06

## 2024-06-21 RX ADMIN — FERROUS SULFATE TAB 325 MG (65 MG ELEMENTAL FE) 325 MG: 325 (65 FE) TAB at 10:06

## 2024-06-21 RX ADMIN — METFORMIN HYDROCHLORIDE 500 MG: 500 TABLET, EXTENDED RELEASE ORAL at 10:06

## 2024-06-21 NOTE — PLAN OF CARE
Goal Outcome Evaluation:  Plan of Care Reviewed With: patient        Progress: no change  Outcome Evaluation: S/P AV ablation, A&O, no complaints of pain, SBA with walker, VSS, AV paced on  the monitor, possible dc today, continue plan of care.

## 2024-06-21 NOTE — DISCHARGE SUMMARY
84 yof monitored overnight after AV node ablation for episodes of atrial fibrillation with RVR.  She has had no episodes overnight on the monitor and is doing well this morning.  She denies any dizziness, lightheadedness, groin discomfort or chest pain.  Normal pacer function postop.      General: Well-developed, well noursihed, laying in bed, A & O x3  Skin: Warm and dry  Neck: No carotid bruit or visible JVD  Heart: Regular rate and rhythm with no overt murmurs rubs or gallops.  Groin site with hemostasis and no surrounding hematoma  Lungs: Clear to auscultation bilaterally  Extremities: No lower extremity edema      She is stable for discharge from a cardiovascular standpoint and will plan to follow-up in the office in 1 month.    Will arrange for social work to help provide transportation for the patient to go home as she does not have a ride.    She is able to discontinue her metoprolol but will continue to take apixaban 5 mg twice daily

## 2024-06-21 NOTE — OUTREACH NOTE
Prep Survey      Flowsheet Row Responses   [facility] patient discharged from? [place]   Is LACE score < 7 ? Yes   Eligibility [facility]   Date of Admission 06/20/24   Date of Discharge 06/21/24   Discharge Disposition Home or Self Care   Discharge diagnosis Atrial fib with RVR   Does the patient have one of the following disease processes/diagnoses(primary or secondary)? Other   Does the patient have Home health ordered? No   Is there a DME ordered? No   Prep survey completed? Yes            Swati GONZALEZ - Registered Nurse

## 2024-06-21 NOTE — TELEPHONE ENCOUNTER
"  Hub staff attempted to follow warm transfer process and was unsuccessful     Caller: Laure Leung \"Addie\"    Relationship to patient: Self    Best call back number: [phone]     Patient is needing: PATIENT WOULD LIKE TO RESCHEDULE HER HOSPITAL FOLLOW UP APPT ON 6/25 TO ANOTHER DAY WITH AN EARLIER APPOINTMENT. [facility] DOES NOT HAVE AVAILABLY WITHIN 7 DAYS FOR A MORNING APPOINTMENT    PLEASE ADVISE    "
Appt scheduled for 06/26 at 11:00  
non-verbal indicators absent

## 2024-06-21 NOTE — CASE MANAGEMENT/SOCIAL WORK
Case Management Discharge Note      Final Note: CCP spoke with patient, as she needs transportation home. Patient confirms she uses access to care via her [employer] [facility] insurance. CCP called access to care ([phone]) and arranged ride for 11 AM today (trip #[id number]). Patient has her own walker at hospital and her keys to get in home. Patient and RN updated on transport time. Patient to be ready by main entrance A by 11. Patient denied further discharge needs. Kavin OWUSU CCP         Selected Continued Care - Admitted Since 6/20/2024       Destination    No services have been selected for the patient.                Durable Medical Equipment    No services have been selected for the patient.                Dialysis/Infusion    No services have been selected for the patient.                Home Medical Care    No services have been selected for the patient.                Therapy    No services have been selected for the patient.                Community Resources    No services have been selected for the patient.                Community & DME    No services have been selected for the patient.                    Transportation Services  Public Transit/Bus Service: Other (access to care transport)    Final Discharge Disposition Code: 01 - home or self-care

## 2024-06-24 ENCOUNTER — TRANSITIONAL CARE MANAGEMENT TELEPHONE ENCOUNTER (OUTPATIENT)
Dept: CALL CENTER | Facility: HOSPITAL | Age: 84
End: 2024-06-24
Payer: MEDICARE

## 2024-06-24 NOTE — OUTREACH NOTE
Call Center TCM Note      Flowsheet Row Responses   [facility] patient discharged from? [place]   Does the patient have one of the following disease processes/diagnoses(primary or secondary)? Other   TCM attempt successful? Yes   Call start time 1447   Call end time 1458   Discharge diagnosis Atrial fib with RVR   Person spoke with today (if not patient) and relationship Patient   Meds reviewed with patient/caregiver? Yes   Is the patient having any side effects they believe may be caused by any medication additions or changes? No   Does the patient have all medications ordered at discharge? N/A   Is the patient taking all medications as directed (includes completed medication regime)? Yes   Comments PCP hospital f/u appt on 6/26/24 at 11:00 AM with MIRZA Macedo.   Does the patient have an appointment with their PCP within 7-14 days of discharge? Yes   Has home health visited the patient within 72 hours of discharge? N/A   Psychosocial issues? No   Comments Patient states she is doing ok. She is checking her heart rate with pulse oximeter. Varies from 49 in the morning to as high as 109. Patient denies any shortness of breath, chest pain, lightheadedness or dizziness.   Did the patient receive a copy of their discharge instructions? Yes   Nursing interventions Reviewed instructions with patient   What is the patient's perception of their health status since discharge? Improving   Is the patient/caregiver able to teach back signs and symptoms related to disease process for when to call PCP? Yes   Is the patient/caregiver able to teach back signs and symptoms related to disease process for when to call 911? Yes   Is the patient/caregiver able to teach back the hierarchy of who to call/visit for symptoms/problems? PCP, Specialist, Home health nurse, Urgent Care, ED, 911 Yes   If the patient is a current smoker, are they able to teach back resources for cessation? Not a smoker   TCM call completed? Yes    Wrap up additional comments PCP line number given to patient. She is interested in another PCP provider, preferably an MD.   Call end time [id number]   Would this patient benefit from a Referral to [facility] Social Work? No   Is the patient interested in additional calls from an ambulatory ? No            Rebecca Asif RN    6/24/2024, 14:59 EDT

## 2024-06-26 ENCOUNTER — OFFICE VISIT (OUTPATIENT)
Dept: FAMILY MEDICINE CLINIC | Facility: CLINIC | Age: 84
End: 2024-06-26
Payer: MEDICARE

## 2024-06-26 ENCOUNTER — TELEPHONE (OUTPATIENT)
Dept: CARDIOLOGY | Facility: CLINIC | Age: 84
End: 2024-06-26
Payer: MEDICARE

## 2024-06-26 VITALS
OXYGEN SATURATION: 96 % | HEIGHT: 63 IN | SYSTOLIC BLOOD PRESSURE: 158 MMHG | BODY MASS INDEX: 33.06 KG/M2 | HEART RATE: 98 BPM | DIASTOLIC BLOOD PRESSURE: 73 MMHG | WEIGHT: 186.6 LBS | TEMPERATURE: 98.5 F

## 2024-06-26 DIAGNOSIS — E11.9 TYPE 2 DIABETES MELLITUS WITHOUT COMPLICATION, WITHOUT LONG-TERM CURRENT USE OF INSULIN: ICD-10-CM

## 2024-06-26 DIAGNOSIS — N18.32 STAGE 3B CHRONIC KIDNEY DISEASE: ICD-10-CM

## 2024-06-26 DIAGNOSIS — I48.0 PAF (PAROXYSMAL ATRIAL FIBRILLATION): Primary | ICD-10-CM

## 2024-06-26 RX ORDER — DAPAGLIFLOZIN 10 MG/1
10 TABLET, FILM COATED ORAL DAILY
Qty: 90 TABLET | Refills: 1 | Status: SHIPPED | OUTPATIENT
Start: 2024-06-26

## 2024-06-26 RX ORDER — DAPAGLIFLOZIN 10 MG/1
10 TABLET, FILM COATED ORAL DAILY
Qty: 90 TABLET | Refills: 1 | Status: SHIPPED | OUTPATIENT
Start: 2024-06-26 | End: 2024-06-26

## 2024-06-26 NOTE — TELEPHONE ENCOUNTER
I do not think I would start her metoprolol until we do a download.  I hope that her AV node function has not come back.  Perhaps she may the need to have a in clinic visit.  I just want to know if she still has heart block.  And then maybe we need to turn her rate response down a bit if she doesaman

## 2024-06-26 NOTE — TELEPHONE ENCOUNTER
Pt saw PCP today. Her BP was 158/73 ahd HR 98. They wanted her to restart her metoprolol. She said she would call you to see if that is what she needed to do...Yana

## 2024-06-26 NOTE — PROGRESS NOTES
"Transitional Care Follow Up Visit  Subjective     Laure Leung is a 84 y.o. female who presents for a transitional care management visit.    Within 48 business hours after discharge our office contacted her via telephone to coordinate her care and needs.      I reviewed and discussed the details of that call along with the discharge summary, hospital problems, inpatient lab results, inpatient diagnostic studies, and consultation reports with Laure Real.     Current outpatient and discharge medications have been reconciled for the patient.  Reviewed by: MIRZA Krishnamurthy          6/21/2024     5:12 PM   Date of TCM Phone Call   [facility]   Date of Admission 6/20/2024   Date of Discharge 6/21/2024   Discharge Disposition Home or Self Care     Risk for Readmission (LACE) Score: 2 (6/21/2024  6:00 AM)      History of Present Illness   Course During Hospital Stay:      Patient presents for hospital follow-up, she was admitted electively for AV node ablation pr Dr. Salgado on 6/20/2024 due to atrial fibrillation with RVR. On a.m. of 6/21/2024 EKG showed atrial fibrillation with a rate of 79.  Permanent pacemaker function was normal, she was discharged home to continue Eliquis 5 mg daily, Metoprolol was discontinued, reports rate of 114 this am. Today her HR today is 98 and BP elevated.     Diabetes mellitus type II, farxiga was ordered d/t CKD, she was then to stop metformin. Farxiga was too costly and she declined medication. We have [place] with [place], she is eligible and new rx is needed today         Objective   /73 (BP Location: Right arm, Patient Position: Sitting, Cuff Size: Adult)   Pulse 98   Temp 98.5 °F (36.9 °C) (Temporal)   Ht 160 cm (63\")   Wt 84.6 kg (186 lb 9.6 oz)   SpO2 96%   BMI 33.05 kg/m²       Physical Exam  Constitutional:       General: She is not in acute distress.     Appearance: Normal appearance. She is well-developed. She is " not ill-appearing or diaphoretic.   HENT:      Head: Normocephalic.   Eyes:      Conjunctiva/sclera: Conjunctivae normal.      Pupils: Pupils are equal, round, and reactive to light.   Neck:      Thyroid: No thyromegaly.      Vascular: No JVD.   Cardiovascular:      Rate and Rhythm: Regular rhythm. Rhythm irregular.      Heart sounds: Normal heart sounds. No murmur heard.  Pulmonary:      Effort: Pulmonary effort is normal. No respiratory distress.      Breath sounds: Normal breath sounds. No wheezing or rhonchi.   Abdominal:      General: Bowel sounds are normal. There is no distension.      Palpations: Abdomen is soft.      Tenderness: There is no abdominal tenderness.   Musculoskeletal:         General: No swelling or tenderness. Normal range of motion.      Cervical back: Normal range of motion and neck supple. No tenderness.   Lymphadenopathy:      Cervical: No cervical adenopathy.   Skin:     General: Skin is warm and dry.      Coloration: Skin is not jaundiced.      Findings: No erythema or rash.   Neurological:      General: No focal deficit present.      Mental Status: She is alert and oriented to person, place, and time. Mental status is at baseline.      Sensory: No sensory deficit.      Motor: Weakness present.      Gait: Gait abnormal (using walker for mobility).   Psychiatric:         Mood and Affect: Mood normal.         Behavior: Behavior normal.         Thought Content: Thought content normal.         Judgment: Judgment normal.         Assessment & Plan   Diagnoses and all orders for this visit:    1. PAF (paroxysmal atrial fibrillation) (Primary)  Comments:  rate , with HTN, rec she resume metop. I have contacted cardiology with update and rec pt to call cardiology as well.    2. Type 2 diabetes mellitus without complication, without long-term current use of insulin  Comments:  pt [place]iga through [place], new rx sent today.   stop metformin when starts farxiga.  Orders:  -      Discontinue: Farxiga 10 MG tablet; Take 10 mg by mouth Daily.  Dispense: 90 tablet; Refill: 1  -     dapagliflozin Propanediol (Farxiga) 10 MG tablet; Take 10 mg by mouth Daily.  Dispense: 90 tablet; Refill: 1    3. Stage 3b chronic kidney disease  Comments:  will d/c metformin when pt starts farxiga  Orders:  -     Discontinue: Farxiga 10 MG tablet; Take 10 mg by mouth Daily.  Dispense: 90 tablet; Refill: 1  -     dapagliflozin Propanediol (Farxiga) 10 MG tablet; Take 10 mg by mouth Daily.  Dispense: 90 tablet; Refill: 1      Pt will wait for cardiology instruction on metoprolol  Per Molina Morris PAC, she will have Dr. Salgado review and and they will contact pt for sooner appt to review download of ppm        Return for Next scheduled follow up or earlier prn.          EMR Dragon transcription disclaimer:  Part of this note may be an electronic transcription/translation of spoken language to printed text using the Dragon Dictation System.

## 2024-06-27 ENCOUNTER — TELEPHONE (OUTPATIENT)
Dept: FAMILY MEDICINE CLINIC | Facility: CLINIC | Age: 84
End: 2024-06-27

## 2024-06-27 NOTE — TELEPHONE ENCOUNTER
Remote received- no R wave measurements on report. Presenting is [id number].       30 AF on counters burden is 1.5% on eliquis- longest episode is 41 mins on 6/27/24 V rate 70s    Per histograms and episode list V rates seem to be controlled. Wondering if sometimes during AFL she is tracking and going faster due to AF in blanking? I do not see that on any egms in report but could definitely be happening

## 2024-06-27 NOTE — TELEPHONE ENCOUNTER
Tell her no metoprolol.  When she sees Molina we can decide about changing her mode to DDI.  She does have a lot of sinus rhythm and I worry about whether she have AV synchrony.  Is also possible that she was just moving around and heart rate of 98 was a sensor driven rate given that she is probably set at a baseline of 80.  Less likely is that she is occasionally tracking atrial flutter because she is in the blanking period.

## 2024-06-27 NOTE — TELEPHONE ENCOUNTER
Spoke with [place] [place]. They are escalating her process and advised to call back tomorrow to verify it completed the process. Spoke with Maurilio and rober/joão colvin.

## 2024-06-27 NOTE — TELEPHONE ENCOUNTER
"  Caller: Laure Leung \"Addie\"    Relationship: Self    Best call back number: [phone]    Who are you requesting to speak with (clinical staff, provider,  specific staff member): CLINICAL STAFF    What was the call regarding: STATED THAT THEY WERE NEEDING TO GET A PRESCRIPTION NUMBER FOR THEIR   dapagliflozin Propanediol (Farxiga) 10 MG tablet SENT TO [place]. STATED THAT THEY HAD ALSO GOTTEN A MESSAGE FROM [facility] THAT THEY WILL NOT REFILL THE MEDICATION AT THIS TIME AS THEY JUST FILLED IT. PLEASE CALL AND ADVISE   "

## 2024-06-28 NOTE — TELEPHONE ENCOUNTER
Spoke with pt and verbalized understanding to not restart metoprolol and let her know we may make an adjustment when she comes in office in July. Verbalized understanding and agreed to call with any questions

## 2024-07-09 ENCOUNTER — TELEPHONE (OUTPATIENT)
Dept: CARDIOLOGY | Facility: CLINIC | Age: 84
End: 2024-07-09
Payer: MEDICARE

## 2024-07-09 NOTE — TELEPHONE ENCOUNTER
"  Caller: Laure Leung \"Addie\"    Relationship: Self    Best call back number: [phone]    What is the best time to reach you: ANY    What was the call regarding: PATIENT'S PCP PRESCRIBED NEW MEDICATION. PATIENT IS CONCERNED WITH SIDE EFFECTS AND WANTING TO MAKE SURE IT DOES NOT CONFLICT WITH THE MEDICATION SHE IS ALREADY TAKING.        "

## 2024-07-24 ENCOUNTER — CLINICAL SUPPORT NO REQUIREMENTS (OUTPATIENT)
Age: 84
End: 2024-07-24
Payer: MEDICARE

## 2024-07-24 ENCOUNTER — OFFICE VISIT (OUTPATIENT)
Age: 84
End: 2024-07-24
Payer: MEDICARE

## 2024-07-24 VITALS
SYSTOLIC BLOOD PRESSURE: 142 MMHG | DIASTOLIC BLOOD PRESSURE: 80 MMHG | BODY MASS INDEX: 32.25 KG/M2 | WEIGHT: 182 LBS | HEIGHT: 63 IN | HEART RATE: 86 BPM

## 2024-07-24 DIAGNOSIS — Z95.0 PRESENCE OF CARDIAC PACEMAKER: Primary | ICD-10-CM

## 2024-07-24 DIAGNOSIS — E11.69 TYPE 2 DIABETES MELLITUS WITH OTHER SPECIFIED COMPLICATION, WITHOUT LONG-TERM CURRENT USE OF INSULIN: ICD-10-CM

## 2024-07-24 DIAGNOSIS — N18.32 STAGE 3B CHRONIC KIDNEY DISEASE: ICD-10-CM

## 2024-07-24 DIAGNOSIS — Z98.890 S/P AV (ATRIOVENTRICULAR) NODAL ABLATION: ICD-10-CM

## 2024-07-24 DIAGNOSIS — I44.2 AV BLOCK, COMPLETE: Primary | ICD-10-CM

## 2024-07-24 DIAGNOSIS — I48.0 PAF (PAROXYSMAL ATRIAL FIBRILLATION): ICD-10-CM

## 2024-07-24 DIAGNOSIS — I10 ESSENTIAL HYPERTENSION: ICD-10-CM

## 2024-07-24 PROBLEM — N18.30 CKD (CHRONIC KIDNEY DISEASE) STAGE 3, GFR 30-59 ML/MIN: Status: ACTIVE | Noted: 2024-07-24

## 2024-07-24 NOTE — PROGRESS NOTES
ELECTROPHYSIOLOGY   Date of Office Visit: 2024  Patient Name: Laure Leung  : 1940  Encounter Provider: Molina Galvez PA-C  Electrophysiologist: Dr. Salgado  CHIEF COMPLAINT / REASON FOR OFFICE VISIT     AV node ablation follow up  Persistent atrial fibrillation/flutter      HISTORY OF PRESENT ILLNESS     This is a 84 y.o. year old female who presents to [facility] CARDIOLOGY for a for a procedure follow up.     Patient has a history of Afib/atrial flutter with RVR, s/p pacemaker placement 2024, s/p AV node ablation 2024, sick sinus syndrome, and hypertension.    Patient had a dual chamber LB RV lead pacemaker implantation in April of this year following symptoms of dyspnea, fatigue and atrial flutter.      Patient the had an AV node ablation last month to address persistent episodes of atrial flutter and atrial fibrillation with RVR.      Today the patient reports that she has been doing well since her AV node ablation.  She is beginning to garden and work in her yard again.    HR has been 70s-80s at home.  Patient states BP tends to run 120s/50-60s at home.    Patient inquired whether she needs to be on a beta blocker for rate control.  We had a lengthy discussion with the patient that because her HR at home tends to run in the 70s-80s and because she had an AV node ablation that she no longer needs to be rate controlled.    Patient also had questions concerning her kidney disease.  We reviewed her labs and explained that she is in stage 3 chronic kidney disease and that she should follow up with her PCP.    Patient had additional questions regarding her diabetes medication.  She asked if she needed to be taking Metformin.  She was advised not to make any changes to her diabetes medications and to follow up with her PCP for further care.  She has refused to take her Farxiga d/t price and potential side effects.    Device interrogation shows normal testing and  "function.  A pacing 44.3%, V pacing 100%, and AT/AF burden 3.52%. Device shows that she is still experiencing episodes of atrial fibrillation.    Apixaban for anticoagulation.  No bleeding.    PMHx:  Persistent atrial flutter with multiple ablations, sick sinus syndrome s/p pacemaker 4/2024, s/p AV node ablation 6/2024     PHYSICAL EXAMINATION     Vital Signs:  /80   Pulse 86   Ht 160 cm (63\")   Wt 82.6 kg (182 lb)   BMI 32.24 kg/m²   Estimated body mass index is 32.24 kg/m² as calculated from the following:    Height as of this encounter: 160 cm (63\").    Weight as of this encounter: 82.6 kg (182 lb).               Physical Exam  Constitutional:       Appearance: Normal appearance.   Cardiovascular:      Rate and Rhythm: Normal rate and regular rhythm.      Pulses: Normal pulses.      Heart sounds: Normal heart sounds.      Comments: Paced  Pulmonary:      Effort: Pulmonary effort is normal.      Breath sounds: Normal breath sounds.   Musculoskeletal:      Right lower leg: No edema.      Left lower leg: No edema.   Neurological:      Mental Status: She is alert and oriented to person, place, and time.   Psychiatric:         Mood and Affect: Mood normal.         Behavior: Behavior normal.          Cardiac Testing/Results     Cardiac Testing:   - Echo 7/13/23:    Left ventricular systolic function is low normal. Left ventricular ejection fraction appears to be 46 - 50%.    Left ventricular diastolic function was indeterminate.    The left atrial cavity is severely dilated.    Left atrial volume is severely increased.    The right atrial cavity is moderately  dilated.    Estimated right ventricular systolic pressure from tricuspid regurgitation is mildly elevated (35-45 mmHg).    Mild pulmonary hypertension is present.    Result Review :  The following data was reviewed by: Molina Galvez PA-C on 07/24/2024:    Lipid Panel          11/27/2023    11:35 5/28/2024    10:50   Lipid Panel   Total " Cholesterol 194  175    Triglycerides 77  89    HDL Cholesterol 54  46    VLDL Cholesterol 14  16    LDL Cholesterol  126  113    LDL/HDL Ratio 2.31  2.42       Lab Results   Component Value Date     05/28/2024     04/19/2024    K 4.6 05/28/2024    K 4.6 04/19/2024     05/28/2024     04/19/2024    CO2 27.0 05/28/2024    CO2 22.4 04/19/2024    BUN 19 05/28/2024    BUN 29 (H) 04/19/2024    CREATININE 1.23 (H) 05/28/2024    CREATININE 1.15 (H) 04/19/2024    EGFRIFNONA 55 (L) 06/24/2021    EGFRIFNONA 61 06/21/2021    GLUCOSE 126 (H) 05/28/2024    GLUCOSE 169 (H) 04/19/2024    CALCIUM 9.4 05/28/2024    CALCIUM 10.1 04/19/2024    ALBUMIN 4.2 05/28/2024    ALBUMIN 4.7 11/27/2023    AST 15 05/28/2024    AST 20 11/27/2023    ALT 21 05/28/2024    ALT 18 11/27/2023     Lab Results   Component Value Date    WBC 5.77 05/28/2024    WBC 7.34 04/19/2024    HGB 10.2 (L) 05/28/2024    HGB 11.0 (L) 04/19/2024    HCT 31.2 (L) 05/28/2024    HCT 33.7 (L) 04/19/2024    MCV 99.7 (H) 05/28/2024    MCV 93.9 04/19/2024     05/28/2024     04/19/2024     Lab Results   Component Value Date    PROBNP 3,856.0 (H) 04/16/2023    PROBNP 4,000.0 (H) 09/20/2022     Lab Results   Component Value Date    TROPONINT 26 (H) 05/25/2023     Lab Results   Component Value Date    TSH 0.795 05/28/2024    TSH 0.578 05/25/2023                 ECG 12 Lead    Date/Time: 7/24/2024 10:44 AM  Performed by: Molina Steve PA-C    Authorized by: Molina Steve PA-C  Comparison: compared with previous ECG from 6/21/2024  Rhythm: paced  BPM: 86  Comments: AP rhythm with underlying SR, Qtc 446. No T wave changes                ASSESSMENT & PLAN       Diagnoses and all orders for this visit:    1. S/P AV (atrioventricular) darnell ablation  Successful ablation, rate have been better controlled, now off of beta blocker  2. s/p dual chamber LV-RV lead pacemaker 4/2024 (Primary)  Normal device testing and function  A pacing  44.3%, V pacing 100%, and AT/AF burden 3.52%.   Device shows that she is still experiencing episodes of atrial fibrillation. Left mode to DDR as she is feeling well and will keep and eye on Afib burden, if increase will consider changing to DDI mode.  3. PAF (paroxysmal atrial fibrillation)  Continue apixaban for OAC  Discussed whether to resume metoprolol or not, HR in 70s to 80s, a beta blocker is not needed at this time, will continue to monitor, patient advised to continue monitoring at home and contact the office if HR is elevated  D/t AV node ablation patient no longer experiencing Afib symptoms  4. Essential hypertension  Continue furosemide 40 mg daily  BP 120s/50-60s at home  Continue monitoring BP at home  5. Type 2 diabetes mellitus with other specified complication, without long-term current use of insulin  Counseled patient concerning most recent A1C result  Instructed not to make changed to Metformin medication without consulting PCP  Recently prescribed an SGLT2 by her PCP, patient states she does not want to take it, instructed that taking a medication is her decision and briefly explained heart benefits of taking an SGLT2  Informed patient that she can take Metformin and an SGLT2 together, these diabetes medications do not interfere with her current heart medications  6. Stage 3b chronic kidney disease  Advised patient to avoid ibuprofen  Counseled patient on definition of stage 3 kidney disease, advised her to follow up with a nephrology or her PCP regarding long term care  Reviewed labs with the patient  Stay hydrated        Follow Up:  Return in about 3 months (around 10/24/2024) for Dr. Salgado- Routine- as planned .  Patient was given instructions and counseling regarding her condition or for health maintenance advice. Please contact office if worsening symptoms or proceed to ER when appropriate.      Molina aGlvez PA-C  07/24/24  10:44 EDT    MEDICATIONS         Discharge Medications             Accurate as of July 24, 2024 10:44 AM. If you have any questions, ask your nurse or doctor.                Continue These Medications        Instructions Start Date   allopurinol 300 MG tablet  Commonly known as: ZYLOPRIM   300 mg, Oral, Daily      B-D SINGLE USE SWABS REGULAR pads   Use as directed for blood glucose checks      carbamide peroxide 6.5 % otic solution  Commonly known as: Debrox   5 drops, Right Ear, 2 Times Daily, 5- 7 days if needed for ear wax impaction      Eliquis 5 MG tablet tablet  Generic drug: apixaban   5 mg, Oral, Every 12 Hours Scheduled, Start taking the evening of 4/24/2024      furosemide 40 MG tablet  Commonly known as: LASIX   40 mg, Oral, Daily      Ibuprofen 3 %, Gabapentin 10 %, Baclofen 2 %, lidocaine 4 %   1-2 g, Topical, 3 to 4 Times Daily      MACULAR HEALTH FORMULA PO   Oral      Magnesium 200 MG tablet   2 tablets, Oral, Daily      metFORMIN  MG 24 hr tablet  Commonly known as: GLUCOPHAGE-XR   500 mg, Oral, 2 Times Daily      NON FORMULARY   1 tablet, Oral, Daily, Thyroid support      OneTouch Delica Plus [id number] misc   1 each, Topical, Daily, To check blood sugar      OneTouch Verio test strip  Generic drug: glucose blood   Use as instructed to check blood sugar once daily      potassium chloride 10 MEQ CR tablet   10 mEq, Oral, 2 Times Daily      VITAMIN B COMPLEX PO   1 tablet, Oral, Daily      vitamin C 250 MG tablet  Commonly known as: ASCORBIC ACID   1,000 mg, Oral, Daily      VITAMIN D (CHOLECALCIFEROL) PO   5,000 Units, Oral, Daily                   **Dragon Disclaimer: This note was dictated using an electronic transcription. The electronic translation of spoken language may permit erroneous, or at times, nonsensical words or phrases to be inadvertently transcribed. Although I have reviewed the note for such errors, some may still exist.

## 2024-08-12 DIAGNOSIS — E11.69 TYPE 2 DIABETES MELLITUS WITH OTHER SPECIFIED COMPLICATION, UNSPECIFIED WHETHER LONG TERM INSULIN USE: ICD-10-CM

## 2024-08-12 DIAGNOSIS — I10 ESSENTIAL HYPERTENSION: ICD-10-CM

## 2024-08-12 RX ORDER — POTASSIUM CHLORIDE 750 MG/1
10 TABLET, FILM COATED, EXTENDED RELEASE ORAL 2 TIMES DAILY
Qty: 180 TABLET | Refills: 1 | Status: SHIPPED | OUTPATIENT
Start: 2024-08-12

## 2024-08-12 RX ORDER — METFORMIN HYDROCHLORIDE 500 MG/1
500 TABLET, EXTENDED RELEASE ORAL 2 TIMES DAILY
Qty: 180 TABLET | Refills: 1 | Status: SHIPPED | OUTPATIENT
Start: 2024-08-12

## 2024-08-12 NOTE — TELEPHONE ENCOUNTER
"  Caller: Laure Leung \"Addie\"    Relationship: Self    Best call back number: [phone]*    What was the call regarding: PATIENT CALLING STATING THAT SHE HAS 10 DAYS  OF MEDICATION REMAINING AND NEEDS SENT TO PHARMACY ASAP    "

## 2024-09-30 DIAGNOSIS — I10 ESSENTIAL HYPERTENSION: ICD-10-CM

## 2024-09-30 RX ORDER — ALLOPURINOL 300 MG/1
300 TABLET ORAL DAILY
Qty: 90 TABLET | Refills: 0 | Status: SHIPPED | OUTPATIENT
Start: 2024-09-30

## 2024-09-30 RX ORDER — FUROSEMIDE 40 MG
40 TABLET ORAL DAILY
Qty: 90 TABLET | Refills: 1 | Status: SHIPPED | OUTPATIENT
Start: 2024-09-30

## 2024-09-30 NOTE — TELEPHONE ENCOUNTER
"    Caller: Laure Leung \"Addie\"    Relationship: Self    Best call back number: [phone]    Requested Prescriptions:   Requested Prescriptions     Pending Prescriptions Disp Refills    furosemide (LASIX) 40 MG tablet 90 tablet 1     Sig: Take 1 tablet by mouth Daily.    allopurinol (ZYLOPRIM) 300 MG tablet       Sig: Take 1 tablet by mouth Daily.        Pharmacy where request should be sent: [facility], [address] [phone] [place] [phone]      Last office visit with prescribing clinician: 6/26/2024   Last telemedicine visit with prescribing clinician: Visit date not found   Next office visit with prescribing clinician: 12/5/2024     Additional details provided by patient: WILL NEED FILLED     Does the patient have less than a 3 day supply:  [] Yes  [x] No    Would you like a call back once the refill request has been completed: [] Yes [x] No    If the office needs to give you a call back, can they leave a voicemail: [] Yes [x] No    Richard Mahmood   09/30/24 09:09 EDT       "

## 2024-11-13 ENCOUNTER — OFFICE VISIT (OUTPATIENT)
Age: 84
End: 2024-11-13
Payer: MEDICARE

## 2024-11-13 ENCOUNTER — CLINICAL SUPPORT NO REQUIREMENTS (OUTPATIENT)
Age: 84
End: 2024-11-13
Payer: MEDICARE

## 2024-11-13 VITALS
DIASTOLIC BLOOD PRESSURE: 70 MMHG | HEIGHT: 63 IN | WEIGHT: 169 LBS | SYSTOLIC BLOOD PRESSURE: 146 MMHG | BODY MASS INDEX: 29.95 KG/M2 | HEART RATE: 77 BPM

## 2024-11-13 DIAGNOSIS — Z98.890 S/P AV (ATRIOVENTRICULAR) NODAL ABLATION: ICD-10-CM

## 2024-11-13 DIAGNOSIS — I48.0 PAF (PAROXYSMAL ATRIAL FIBRILLATION): Primary | ICD-10-CM

## 2024-11-13 DIAGNOSIS — I44.2 AV BLOCK, COMPLETE: ICD-10-CM

## 2024-11-13 DIAGNOSIS — Z95.0 PRESENCE OF CARDIAC PACEMAKER: ICD-10-CM

## 2024-11-13 DIAGNOSIS — Z95.0 PRESENCE OF CARDIAC PACEMAKER: Primary | ICD-10-CM

## 2024-11-13 PROBLEM — I48.91 ATRIAL FIBRILLATION WITH RAPID VENTRICULAR RESPONSE: Status: RESOLVED | Noted: 2022-09-20 | Resolved: 2024-11-13

## 2024-11-13 PROBLEM — N18.31 STAGE 3A CHRONIC KIDNEY DISEASE: Status: ACTIVE | Noted: 2024-07-24

## 2024-11-13 PROBLEM — I49.9 ARRHYTHMIA: Status: RESOLVED | Noted: 2024-06-20 | Resolved: 2024-11-13

## 2024-11-13 PROBLEM — I48.92 ATRIAL FLUTTER WITH RAPID VENTRICULAR RESPONSE: Status: RESOLVED | Noted: 2022-09-20 | Resolved: 2024-11-13

## 2024-11-13 PROBLEM — I10 ESSENTIAL HYPERTENSION: Status: RESOLVED | Noted: 2019-08-14 | Resolved: 2024-11-13

## 2024-11-13 PROCEDURE — 93000 ELECTROCARDIOGRAM COMPLETE: CPT | Performed by: PHYSICIAN ASSISTANT

## 2024-11-13 PROCEDURE — 99214 OFFICE O/P EST MOD 30 MIN: CPT | Performed by: PHYSICIAN ASSISTANT

## 2024-11-13 PROCEDURE — 1160F RVW MEDS BY RX/DR IN RCRD: CPT | Performed by: PHYSICIAN ASSISTANT

## 2024-11-13 PROCEDURE — 1159F MED LIST DOCD IN RCRD: CPT | Performed by: PHYSICIAN ASSISTANT

## 2024-11-13 NOTE — PROGRESS NOTES
"      ELECTROPHYSIOLOGY   Date of Office Visit: 2024  Patient Name: Laure Leung  : 1940  Encounter Provider: Molina Galvez PA-C  Electrophysiologist: Dr. Salgado  CHIEF COMPLAINT / REASON FOR OFFICE VISIT     AV block, complete; paroxysmal AFIB; and Pacemaker Check  3 month follow up    HISTORY OF PRESENT ILLNESS     This is a 84 y.o. year old female who presents to [facility] for a for a 3 month follow up.     Patient has a history of Afib/atrial flutter with RVR, s/p pacemaker placement 2024, s/p AV node ablation 2024, sick sinus syndrome, and hypertension.     Patient had a dual chamber LB RV lead pacemaker implantation in 2024 following symptoms of dyspnea, fatigue and atrial flutter.     She continued to have episodes of symptomatic atrial fibrillation and had an AVN ablation 2024.     Today the patient reports since her procedure she is doing much better.  She is back to all of her normal activities and has no significant limitations.    She denies any episodes of palpitations, dizziness, lightheadedness or worsening shortness of breath.  She has been continuing to take furosemide 40 mg daily and has not had any increased symptoms of volume overload.    Device interrogation today shows normal device function with 37.7% atrial paced beats and 99 point percent RV paced beats.  Mode set to DDDR 70.  There is been 9 reported episodes of A-fib with overall burden less than 1.1% longest episode was for 28 minutes on 2024.    PMHx:  Persistent atrial flutter with multiple ablations, sick sinus syndrome s/p pacemaker 2024, s/p AV node ablation 2024     PHYSICAL EXAMINATION     Vital Signs:  /70 (BP Location: Right arm, Patient Position: Sitting)   Pulse 77   Ht 160 cm (63\")   Wt 76.7 kg (169 lb)   BMI 29.94 kg/m²   Estimated body mass index is 29.94 kg/m² as calculated from the following:    Height as of this encounter: 160 cm " "(63\").    Weight as of this encounter: 76.7 kg (169 lb).               Physical Exam  Constitutional:       Appearance: Normal appearance.   HENT:      Head: Normocephalic and atraumatic.   Cardiovascular:      Rate and Rhythm: Normal rate and regular rhythm.      Pulses: Normal pulses.      Heart sounds: Normal heart sounds.   Pulmonary:      Effort: Pulmonary effort is normal.      Breath sounds: Normal breath sounds.   Musculoskeletal:      Right lower leg: No edema.      Left lower leg: No edema.   Skin:     General: Skin is warm and dry.   Neurological:      General: No focal deficit present.      Mental Status: She is alert and oriented to person, place, and time.          Cardiac Testing/Results     Cardiac Testing:   - Echo 7/13/23:    Left ventricular systolic function is low normal. Left ventricular ejection fraction appears to be 46 - 50%.    Left ventricular diastolic function was indeterminate.    The left atrial cavity is severely dilated.    Left atrial volume is severely increased.    The right atrial cavity is moderately  dilated.    Estimated right ventricular systolic pressure from tricuspid regurgitation is mildly elevated (35-45 mmHg).    Mild pulmonary hypertension is present.    Result Review :  The following data was reviewed by: Molina Galvez PA-C on 11/13/2024:    Lipid Panel          11/27/2023    11:35 5/28/2024    10:50   Lipid Panel   Total Cholesterol 194  175    Triglycerides 77  89    HDL Cholesterol 54  46    VLDL Cholesterol 14  16    LDL Cholesterol  126  113    LDL/HDL Ratio 2.31  2.42       Lab Results   Component Value Date     05/28/2024     04/19/2024    K 4.6 05/28/2024    K 4.6 04/19/2024     05/28/2024     04/19/2024    CO2 27.0 05/28/2024    CO2 22.4 04/19/2024    BUN 19 05/28/2024    BUN 29 (H) 04/19/2024    CREATININE 1.23 (H) 05/28/2024    CREATININE 1.15 (H) 04/19/2024    EGFRIFNONA 55 (L) 06/24/2021    EGFRIFNONA 61 06/21/2021    " GLUCOSE 126 (H) 05/28/2024    GLUCOSE 169 (H) 04/19/2024    CALCIUM 9.4 05/28/2024    CALCIUM 10.1 04/19/2024    ALBUMIN 4.2 05/28/2024    ALBUMIN 4.7 11/27/2023    AST 15 05/28/2024    AST 20 11/27/2023    ALT 21 05/28/2024    ALT 18 11/27/2023     Lab Results   Component Value Date    WBC 5.77 05/28/2024    WBC 7.34 04/19/2024    HGB 10.2 (L) 05/28/2024    HGB 11.0 (L) 04/19/2024    HCT 31.2 (L) 05/28/2024    HCT 33.7 (L) 04/19/2024    MCV 99.7 (H) 05/28/2024    MCV 93.9 04/19/2024     05/28/2024     04/19/2024     Lab Results   Component Value Date    PROBNP 3,856.0 (H) 04/16/2023    PROBNP 4,000.0 (H) 09/20/2022     Lab Results   Component Value Date    TROPONINT 26 (H) 05/25/2023     Lab Results   Component Value Date    TSH 0.795 05/28/2024    TSH 0.578 05/25/2023                 ECG 12 Lead    Date/Time: 11/13/2024 11:49 AM  Performed by: Molina Steve PA-C    Authorized by: Molina Steve PA-C  Comparison: compared with previous ECG from 7/24/2024  Rhythm: paced  Rate: normal  BPM: 77  ST Segments: ST segments normal  T Waves: T waves normal  QRS axis: normal  Comments: Qtc 484                 ASSESSMENT & PLAN       Diagnoses and all orders for this visit:    1-2. PAF (paroxysmal atrial fibrillation) (Primary) S/P AV (atrioventricular) darnell ablation  She has had overall low burden of A-fib since her AV node ablation but ultimately has been doing well.  Her quality of life has improved  She remains on apixaban 5 mg twice daily, denies bleeding complications  We have home monitoring in place to keep an eye on burden to let us know if she has any new symptoms  3. AV block, complete  Today she has 99.9% RV paced beats with 37.7% atrial paced beats.  She has normal function with no significant events  4. s/p dual chamber LV-RV lead pacemaker 4/2024  Home monitoring is in place.  Will plan to follow-up with her in 6 months for ongoing arrhythmia monitoring.      Follow Up:  Return  in about 6 months (around 5/13/2025) for Dr. Salgado- Routine, Device check.  Patient was given instructions and counseling regarding her condition or for health maintenance advice. Please contact office if worsening symptoms or proceed to ER when appropriate.      Molina Galvez PA-C  11/13/24  11:46 EST    MEDICATIONS         Discharge Medications            Accurate as of November 13, 2024 11:46 AM. If you have any questions, ask your nurse or doctor.                Continue These Medications        Instructions Start Date   allopurinol 300 MG tablet  Commonly known as: ZYLOPRIM   300 mg, Oral, Daily      B-D SINGLE USE SWABS REGULAR pads   Use as directed for blood glucose checks      carbamide peroxide 6.5 % otic solution  Commonly known as: Debrox   5 drops, Right Ear, 2 Times Daily, 5- 7 days if needed for ear wax impaction      Eliquis 5 MG tablet tablet  Generic drug: apixaban   5 mg, Oral, Every 12 Hours Scheduled, Start taking the evening of 4/24/2024      furosemide 40 MG tablet  Commonly known as: LASIX   40 mg, Oral, Daily      Ibuprofen 3 %, Gabapentin 10 %, Baclofen 2 %, lidocaine 4 %   1-2 g, Topical, 3 to 4 Times Daily      MACULAR HEALTH FORMULA PO   Take  by mouth.      Magnesium 200 MG tablet   2 tablets, Daily      metFORMIN  MG 24 hr tablet  Commonly known as: GLUCOPHAGE-XR   500 mg, Oral, 2 Times Daily      NON FORMULARY   1 tablet, Daily      OneTouch Delica Plus [id number] misc   1 each, Topical, Daily, To check blood sugar      OneTouch Verio test strip  Generic drug: glucose blood   Use as instructed to check blood sugar once daily      potassium chloride 10 MEQ CR tablet   10 mEq, Oral, 2 Times Daily      VITAMIN B COMPLEX PO   1 tablet, Daily      vitamin C 250 MG tablet  Commonly known as: ASCORBIC ACID   1,000 mg, Daily      VITAMIN D (CHOLECALCIFEROL) PO   5,000 Units, Daily                   **Dragon Disclaimer: This note was dictated using an electronic transcription. The  electronic translation of spoken language may permit erroneous, or at times, nonsensical words or phrases to be inadvertently transcribed. Although I have reviewed the note for such errors, some may still exist.

## 2024-11-20 DIAGNOSIS — E11.69 TYPE 2 DIABETES MELLITUS WITH OTHER SPECIFIED COMPLICATION, UNSPECIFIED WHETHER LONG TERM INSULIN USE: Primary | ICD-10-CM

## 2024-11-20 DIAGNOSIS — N18.32 STAGE 3B CHRONIC KIDNEY DISEASE: ICD-10-CM

## 2024-11-20 DIAGNOSIS — I10 ESSENTIAL HYPERTENSION: ICD-10-CM

## 2024-11-26 ENCOUNTER — LAB (OUTPATIENT)
Dept: FAMILY MEDICINE CLINIC | Facility: CLINIC | Age: 84
End: 2024-11-26
Payer: MEDICARE

## 2024-11-26 PROCEDURE — 83036 HEMOGLOBIN GLYCOSYLATED A1C: CPT | Performed by: NURSE PRACTITIONER

## 2024-11-26 PROCEDURE — 80061 LIPID PANEL: CPT | Performed by: NURSE PRACTITIONER

## 2024-11-26 PROCEDURE — 80053 COMPREHEN METABOLIC PANEL: CPT | Performed by: NURSE PRACTITIONER

## 2024-11-26 PROCEDURE — 82043 UR ALBUMIN QUANTITATIVE: CPT | Performed by: NURSE PRACTITIONER

## 2024-11-26 PROCEDURE — 85027 COMPLETE CBC AUTOMATED: CPT | Performed by: NURSE PRACTITIONER

## 2024-11-27 LAB
ALBUMIN SERPL-MCNC: 4.5 G/DL (ref 3.5–5.2)
ALBUMIN UR-MCNC: <1.2 MG/DL
ALBUMIN/GLOB SERPL: 2.4 G/DL
ALP SERPL-CCNC: 82 U/L (ref 39–117)
ALT SERPL W P-5'-P-CCNC: 13 U/L (ref 1–33)
ANION GAP SERPL CALCULATED.3IONS-SCNC: 11.9 MMOL/L (ref 5–15)
AST SERPL-CCNC: 17 U/L (ref 1–32)
BILIRUB SERPL-MCNC: 1.3 MG/DL (ref 0–1.2)
BUN SERPL-MCNC: 26 MG/DL (ref 8–23)
BUN/CREAT SERPL: 28 (ref 7–25)
CALCIUM SPEC-SCNC: 10.2 MG/DL (ref 8.6–10.5)
CHLORIDE SERPL-SCNC: 105 MMOL/L (ref 98–107)
CHOLEST SERPL-MCNC: 203 MG/DL (ref 0–200)
CO2 SERPL-SCNC: 26.1 MMOL/L (ref 22–29)
CREAT SERPL-MCNC: 0.93 MG/DL (ref 0.57–1)
DEPRECATED RDW RBC AUTO: 46.5 FL (ref 37–54)
EGFRCR SERPLBLD CKD-EPI 2021: 61.1 ML/MIN/1.73
ERYTHROCYTE [DISTWIDTH] IN BLOOD BY AUTOMATED COUNT: 13.2 % (ref 12.3–15.4)
GLOBULIN UR ELPH-MCNC: 1.9 GM/DL
GLUCOSE SERPL-MCNC: 117 MG/DL (ref 65–99)
HBA1C MFR BLD: 5.7 % (ref 4.8–5.6)
HCT VFR BLD AUTO: 31.6 % (ref 34–46.6)
HDLC SERPL-MCNC: 52 MG/DL (ref 40–60)
HGB BLD-MCNC: 10.3 G/DL (ref 12–15.9)
LDLC SERPL CALC-MCNC: 135 MG/DL (ref 0–100)
LDLC/HDLC SERPL: 2.55 {RATIO}
MCH RBC QN AUTO: 31.4 PG (ref 26.6–33)
MCHC RBC AUTO-ENTMCNC: 32.6 G/DL (ref 31.5–35.7)
MCV RBC AUTO: 96.3 FL (ref 79–97)
PLATELET # BLD AUTO: 314 10*3/MM3 (ref 140–450)
PMV BLD AUTO: 11.1 FL (ref 6–12)
POTASSIUM SERPL-SCNC: 4.5 MMOL/L (ref 3.5–5.2)
PROT SERPL-MCNC: 6.4 G/DL (ref 6–8.5)
RBC # BLD AUTO: 3.28 10*6/MM3 (ref 3.77–5.28)
SODIUM SERPL-SCNC: 143 MMOL/L (ref 136–145)
TRIGL SERPL-MCNC: 91 MG/DL (ref 0–150)
VLDLC SERPL-MCNC: 16 MG/DL (ref 5–40)
WBC NRBC COR # BLD AUTO: 4.8 10*3/MM3 (ref 3.4–10.8)

## 2024-12-05 ENCOUNTER — OFFICE VISIT (OUTPATIENT)
Dept: FAMILY MEDICINE CLINIC | Facility: CLINIC | Age: 84
End: 2024-12-05
Payer: MEDICARE

## 2024-12-05 VITALS
WEIGHT: 171 LBS | OXYGEN SATURATION: 97 % | DIASTOLIC BLOOD PRESSURE: 74 MMHG | BODY MASS INDEX: 30.3 KG/M2 | HEIGHT: 63 IN | HEART RATE: 78 BPM | SYSTOLIC BLOOD PRESSURE: 149 MMHG

## 2024-12-05 DIAGNOSIS — M10.9 GOUT, UNSPECIFIED CAUSE, UNSPECIFIED CHRONICITY, UNSPECIFIED SITE: ICD-10-CM

## 2024-12-05 DIAGNOSIS — M54.31 SCIATICA OF RIGHT SIDE: ICD-10-CM

## 2024-12-05 DIAGNOSIS — M79.672 BILATERAL FOOT PAIN: ICD-10-CM

## 2024-12-05 DIAGNOSIS — I48.0 PAF (PAROXYSMAL ATRIAL FIBRILLATION): ICD-10-CM

## 2024-12-05 DIAGNOSIS — M15.0 PRIMARY OSTEOARTHRITIS INVOLVING MULTIPLE JOINTS: ICD-10-CM

## 2024-12-05 DIAGNOSIS — I10 ESSENTIAL HYPERTENSION: Primary | ICD-10-CM

## 2024-12-05 DIAGNOSIS — E11.9 TYPE 2 DIABETES MELLITUS WITHOUT COMPLICATION, WITHOUT LONG-TERM CURRENT USE OF INSULIN: ICD-10-CM

## 2024-12-05 DIAGNOSIS — M79.671 BILATERAL FOOT PAIN: ICD-10-CM

## 2024-12-05 PROCEDURE — 99214 OFFICE O/P EST MOD 30 MIN: CPT | Performed by: NURSE PRACTITIONER

## 2024-12-05 PROCEDURE — 1126F AMNT PAIN NOTED NONE PRSNT: CPT | Performed by: NURSE PRACTITIONER

## 2024-12-05 RX ORDER — ALLOPURINOL 300 MG/1
300 TABLET ORAL DAILY
Qty: 90 TABLET | Refills: 3 | Status: SHIPPED | OUTPATIENT
Start: 2024-12-05

## 2024-12-05 NOTE — PROGRESS NOTES
Chief Complaint  Chief Complaint   Patient presents with    Follow-up     6 month- labs 11/26/24     Diabetes           Subjective          Laure Leung presents to [facility] for   History of Present Illness    Patient with permanent pacemaker underwent AV node ablation with Dr. Salgado on 6/20/2024, she continues Eliquis 5 mg daily.  She was recommended to resume metoprolol due to heart rate of  and hypertension, but didn't, reports HR and BP stable at home, she has lost about 15 lbs intentionally.    Diabetes mellitus type II, CKD, she was started on Farxiga with plan to discontinue metformin, however she reports having s/e with farxiga and resumed metformin. She started berbine supplement.  She reports blood sugar 102-120 at home. She feels stable on meds, denies any signs/symptoms of hyper/hypoglycemia, blurry vision, polydipsia, polyuria, nocturia, and unintentional weight loss    Gout, she is taking allopurinol 300 mg daily    Chronic anemia, c/o hair loss but improving with hair skin and nails vitamin    R sciatica and hand joint pain, intermittent, stable, got a new mattress, hand joints are most painful in 2 months.        The following portions of the patient's history were reviewed and updated as appropriate: allergies, current medications, past family history, past medical history, past social history, past surgical history and problem list.    Past Medical History:   Diagnosis Date    Arrhythmia     Atrial fibrillation     CHF (congestive heart failure)     Diabetes     Hypertension     Insomnia 2024    Primary osteoarthritis of both knees 05/25/2022     Past Surgical History:   Procedure Laterality Date    CARDIAC ELECTROPHYSIOLOGY PROCEDURE N/A 06/23/2021    Procedure: Ablation atrial flutter;  Surgeon: Antonio Senior MD;  Location: [facility] INVASIVE LOCATION;  Service: Cardiovascular;  Laterality: N/A;    CARDIAC ELECTROPHYSIOLOGY PROCEDURE N/A  2023    Procedure: Ablation atrial fibrillation, cryo, rep emailed;  Surgeon: Claude Leyva MD;  Location:  [facility];  Service: Cardiovascular;  Laterality: N/A;    CARDIAC ELECTROPHYSIOLOGY PROCEDURE N/A 2024    Procedure: AV node ablation---Has Medtronic pacemaker in placed;  Surgeon: Naif Salgado MD;  Location:  [facility];  Service: Cardiovascular;  Laterality: N/A;    CATARACT EXTRACTION WITH INTRAOCULAR LENS IMPLANT Bilateral 2018    PACEMAKER IMPLANTATION N/A 2024    Procedure: PPM SC RV Implant MEDT.;  Surgeon: Naif Salgado MD;  Location:  [facility] INVAS[facility] LOCATION;  Service: Cardiovascular;  Laterality: N/A;     Family History   Problem Relation Age of Onset    No Known Problems Mother     No Known Problems Father     Hypertension Sister     Atrial fibrillation Sister     Diabetes Sister     No Known Problems Maternal Aunt     No Known Problems Maternal Uncle     No Known Problems Paternal Aunt     No Known Problems Paternal Uncle     No Known Problems Maternal Grandmother     No Known Problems Maternal Grandfather     No Known Problems Paternal Grandmother     No Known Problems Paternal Grandfather     Heart attack Neg Hx     Heart disease Neg Hx     Heart failure Neg Hx     Hyperlipidemia Neg Hx      Social History     Tobacco Use    Smoking status: Former     Current packs/day: 0.00     Average packs/day: 0.5 packs/day for 12.0 years (6.0 ttl pk-yrs)     Types: Cigarettes     Start date:      Quit date:      Years since quittin.9     Passive exposure: Past    Smokeless tobacco: Never    Tobacco comments:     Caffeine: 2 cups per day   Substance Use Topics    Alcohol use: No       Current Outpatient Medications:     Alcohol Swabs (B-D SINGLE USE SWABS REGULAR) pads, Use as directed for blood glucose checks, Disp: 100 each, Rfl: 3    allopurinol (ZYLOPRIM) 300 MG tablet, Take 1 tablet by mouth Daily., Disp: 90 tablet, Rfl: 3    " apixaban (ELIQUIS) 5 MG tablet tablet, Take 1 tablet by mouth Every 12 (Twelve) Hours. Start taking the evening of 4/24/2024, Disp: 180 tablet, Rfl: 1    B Complex Vitamins (VITAMIN B COMPLEX PO), Take 1 tablet by mouth Daily., Disp: , Rfl:     Barberry-Oreg Grape-Goldenseal (BERBERINE COMPLEX PO), Take  by mouth., Disp: , Rfl:     furosemide (LASIX) 40 MG tablet, Take 1 tablet by mouth Daily., Disp: 90 tablet, Rfl: 1    Ibuprofen 3 %, Gabapentin 10 %, Baclofen 2 %, lidocaine 4 %, Apply 1-2 g topically to the appropriate area as directed 3 (Three) to 4 (Four) times daily., Disp: 90 g, Rfl: 0    Lancets (OneTouch Delica Plus [id number]) misc, Apply 1 each topically to the appropriate area as directed Daily. To check blood sugar, Disp: 100 each, Rfl: 3    Magnesium 200 MG tablet, Take 2 tablets by mouth Daily., Disp: , Rfl:     metFORMIN ER (GLUCOPHAGE-XR) 500 MG 24 hr tablet, TAKE 1 TABLET BY MOUTH 2 TIMES A DAY., Disp: 180 tablet, Rfl: 1    NON FORMULARY, Take 1 tablet by mouth Daily. Thyroid support, Disp: , Rfl:     OneTouch Verio test strip, Use as instructed to check blood sugar once daily, Disp: 100 each, Rfl: 3    potassium chloride 10 MEQ CR tablet, TAKE ONE TABLET BY MOUTH TWICE A DAY, Disp: 180 tablet, Rfl: 1    vitamin C (ASCORBIC ACID) 250 MG tablet, Take 4 tablets by mouth Daily., Disp: , Rfl:     VITAMIN D, CHOLECALCIFEROL, PO, Take 5,000 Units by mouth Daily., Disp: , Rfl:     Objective   Vital Signs:   Vitals:    12/05/24 1040   BP: 149/74   BP Location: Left arm   Patient Position: Sitting   Cuff Size: Adult   Pulse: 78   SpO2: 97%   Weight: 77.6 kg (171 lb)   Height: 160 cm (63\")   PainSc: 0-No pain       Body mass index is 30.29 kg/m².    Physical Exam  Constitutional:       General: She is not in acute distress.     Appearance: Normal appearance. She is well-developed. She is not ill-appearing or diaphoretic.   HENT:      Head: Normocephalic.   Eyes:      Conjunctiva/sclera: Conjunctivae normal. "      Pupils: Pupils are equal, round, and reactive to light.   Neck:      Thyroid: No thyromegaly.      Vascular: No JVD.   Cardiovascular:      Rate and Rhythm: Regular rhythm.      Heart sounds: Normal heart sounds. No murmur heard.     Comments: PPM left upper chest  Pulmonary:      Effort: Pulmonary effort is normal. No respiratory distress.      Breath sounds: Normal breath sounds. No wheezing or rhonchi.   Abdominal:      General: Bowel sounds are normal. There is no distension.      Palpations: Abdomen is soft.      Tenderness: There is no abdominal tenderness.   Musculoskeletal:         General: Tenderness (mild R sciatica, B knee OA and joints of both hands, mild avila rom) present. No swelling. Normal range of motion.      Cervical back: Normal range of motion and neck supple. No tenderness.   Lymphadenopathy:      Cervical: No cervical adenopathy.   Skin:     General: Skin is warm and dry.      Coloration: Skin is not jaundiced.      Findings: No erythema or rash.   Neurological:      General: No focal deficit present.      Mental Status: She is alert and oriented to person, place, and time. Mental status is at baseline.      Sensory: No sensory deficit.      Motor: Weakness present.      Gait: Gait abnormal (using walker for mobility).   Psychiatric:         Mood and Affect: Mood normal.         Behavior: Behavior normal.         Thought Content: Thought content normal.         Judgment: Judgment normal.          Result Review :     No visits with results within 7 Day(s) from this visit.   Latest known visit with results is:   Orders Only on 11/20/2024   Component Date Value Ref Range Status    WBC 11/26/2024 4.80  3.40 - 10.80 10*3/mm3 Final    RBC 11/26/2024 3.28 (L)  3.77 - 5.28 10*6/mm3 Final    Hemoglobin 11/26/2024 10.3 (L)  12.0 - 15.9 g/dL Final    Hematocrit 11/26/2024 31.6 (L)  34.0 - 46.6 % Final    MCV 11/26/2024 96.3  79.0 - 97.0 fL Final    MCH 11/26/2024 31.4  26.6 - 33.0 pg Final    MCHC  11/26/2024 32.6  31.5 - 35.7 g/dL Final    RDW 11/26/2024 13.2  12.3 - 15.4 % Final    RDW-SD 11/26/2024 46.5  37.0 - 54.0 fl Final    MPV 11/26/2024 11.1  6.0 - 12.0 fL Final    Platelets 11/26/2024 314  140 - 450 10*3/mm3 Final    Glucose 11/26/2024 117 (H)  65 - 99 mg/dL Final    BUN 11/26/2024 26 (H)  8 - 23 mg/dL Final    Creatinine 11/26/2024 0.93  0.57 - 1.00 mg/dL Final    Sodium 11/26/2024 143  136 - 145 mmol/L Final    Potassium 11/26/2024 4.5  3.5 - 5.2 mmol/L Final    Chloride 11/26/2024 105  98 - 107 mmol/L Final    CO2 11/26/2024 26.1  22.0 - 29.0 mmol/L Final    Calcium 11/26/2024 10.2  8.6 - 10.5 mg/dL Final    Total Protein 11/26/2024 6.4  6.0 - 8.5 g/dL Final    Albumin 11/26/2024 4.5  3.5 - 5.2 g/dL Final    ALT (SGPT) 11/26/2024 13  1 - 33 U/L Final    AST (SGOT) 11/26/2024 17  1 - 32 U/L Final    Alkaline Phosphatase 11/26/2024 82  39 - 117 U/L Final    Total Bilirubin 11/26/2024 1.3 (H)  0.0 - 1.2 mg/dL Final    Globulin 11/26/2024 1.9  gm/dL Final    A/G Ratio 11/26/2024 2.4  g/dL Final    BUN/Creatinine Ratio 11/26/2024 28.0 (H)  7.0 - 25.0 Final    Anion Gap 11/26/2024 11.9  5.0 - 15.0 mmol/L Final    eGFR 11/26/2024 61.1  >60.0 mL/min/1.73 Final    Hemoglobin A1C 11/26/2024 5.70 (H)  4.80 - 5.60 % Final    Total Cholesterol 11/26/2024 203 (H)  0 - 200 mg/dL Final    Triglycerides 11/26/2024 91  0 - 150 mg/dL Final    HDL Cholesterol 11/26/2024 52  40 - 60 mg/dL Final    LDL Cholesterol  11/26/2024 135 (H)  0 - 100 mg/dL Final    VLDL Cholesterol 11/26/2024 16  5 - 40 mg/dL Final    LDL/HDL Ratio 11/26/2024 2.55   Final    Microalbumin, Urine 11/26/2024 <1.2  mg/dL Final                              Assessment and Plan    Diagnoses and all orders for this visit:    1. Essential hypertension (Primary)  Comments:  bp stable    2. Type 2 diabetes mellitus without complication, without long-term current use of insulin  Comments:  hgba1c 5.7  continue metformin  s/e with farxiga-d/c  kidney  function significantly improved  cont DM diet and increased water intake  Orders:  -     Ibuprofen 3 %, Gabapentin 10 %, Baclofen 2 %, lidocaine 4 %; Apply 1-2 g topically to the appropriate area as directed 3 (Three) to 4 (Four) times daily.  Dispense: 90 g; Refill: 0    3. Bilateral foot pain  Comments:  Continue and refill Rx alt cream prn to B feet  Orders:  -     Ibuprofen 3 %, Gabapentin 10 %, Baclofen 2 %, lidocaine 4 %; Apply 1-2 g topically to the appropriate area as directed 3 (Three) to 4 (Four) times daily.  Dispense: 90 g; Refill: 0    4. PAF (paroxysmal atrial fibrillation)  Comments:  No recent issues, controlled rate   Follow-up with cardiology as directed  Continue Eliquis    5. Gout, unspecified cause, unspecified chronicity, unspecified site  -     allopurinol (ZYLOPRIM) 300 MG tablet; Take 1 tablet by mouth Daily.  Dispense: 90 tablet; Refill: 3    6. Primary osteoarthritis involving multiple joints      May use Rx alternative cream to right sciatica, both knees or hands if needed  Continue current medication regimen  Praised efforts of weight loss  Labs all improved    I spent 30 minutes caring for Laure Leung on this date of service. This time includes time spent by me in the following activities: preparing for the visit, reviewing tests, performing a medically appropriate examination and/or evaluation , counseling and educating the patient/family/caregiver, ordering medications, tests, or procedures and documenting information in the medical record        Follow Up     Return in about 6 months (around 6/5/2025) for Recheck, Medicare Wellness. DM panel and TSH prior to appt .  Patient was given instructions and counseling regarding her condition or for health maintenance advice. Please see specific information pulled into the AVS if appropriate.        Part of this note may be an electronic transcription/translation of spoken language to printed text using the Dragon Dictation System

## 2024-12-08 DIAGNOSIS — E11.69 TYPE 2 DIABETES MELLITUS WITH OTHER SPECIFIED COMPLICATION, UNSPECIFIED WHETHER LONG TERM INSULIN USE: ICD-10-CM

## 2024-12-09 RX ORDER — LANCETS 30 GAUGE
EACH MISCELLANEOUS
Qty: 100 EACH | Refills: 2 | Status: SHIPPED | OUTPATIENT
Start: 2024-12-09

## 2025-01-22 DIAGNOSIS — I10 ESSENTIAL HYPERTENSION: ICD-10-CM

## 2025-01-22 RX ORDER — POTASSIUM CHLORIDE 750 MG/1
10 TABLET, EXTENDED RELEASE ORAL 2 TIMES DAILY
Qty: 180 TABLET | Refills: 1 | Status: SHIPPED | OUTPATIENT
Start: 2025-01-22

## 2025-01-22 NOTE — TELEPHONE ENCOUNTER
"  Caller: Laure Leung \"Addie\"    Relationship: Self    Best call back number: [phone]     Requested Prescriptions:   Requested Prescriptions     Pending Prescriptions Disp Refills    potassium chloride 10 MEQ CR tablet 180 tablet 1     Sig: Take 1 tablet by mouth 2 (Two) Times a Day.        [facility] [address] [phone] [facility] [phone]      Last office visit with prescribing clinician: 12/5/2024   Last telemedicine visit with prescribing clinician: Visit date not found   Next office visit with prescribing clinician: 6/10/2025     Does the patient have less than a 3 day supply:  [] Yes  [x] No    Would you like a call back once the refill request has been completed: [] Yes [x] No    If the office needs to give you a call back, can they leave a voicemail: [] Yes [x] No    Marilee Forde Rep   01/22/25 11:08 EST           "

## 2025-02-27 DIAGNOSIS — E11.9 TYPE 2 DIABETES MELLITUS WITHOUT COMPLICATION, WITHOUT LONG-TERM CURRENT USE OF INSULIN: ICD-10-CM

## 2025-02-27 RX ORDER — BLOOD SUGAR DIAGNOSTIC
STRIP MISCELLANEOUS
Qty: 100 EACH | Refills: 3 | Status: SHIPPED | OUTPATIENT
Start: 2025-02-27

## 2025-03-09 DIAGNOSIS — E11.69 TYPE 2 DIABETES MELLITUS WITH OTHER SPECIFIED COMPLICATION, UNSPECIFIED WHETHER LONG TERM INSULIN USE: ICD-10-CM

## 2025-03-10 ENCOUNTER — OFFICE VISIT (OUTPATIENT)
Dept: FAMILY MEDICINE CLINIC | Facility: CLINIC | Age: 85
End: 2025-03-10
Payer: MEDICARE

## 2025-03-10 VITALS
DIASTOLIC BLOOD PRESSURE: 78 MMHG | HEART RATE: 87 BPM | BODY MASS INDEX: 30.55 KG/M2 | HEIGHT: 63 IN | OXYGEN SATURATION: 96 % | WEIGHT: 172.4 LBS | TEMPERATURE: 98 F | SYSTOLIC BLOOD PRESSURE: 126 MMHG

## 2025-03-10 DIAGNOSIS — E11.9 TYPE 2 DIABETES MELLITUS WITHOUT COMPLICATION, WITHOUT LONG-TERM CURRENT USE OF INSULIN: ICD-10-CM

## 2025-03-10 DIAGNOSIS — S61.452A CAT BITE OF LEFT HAND, INITIAL ENCOUNTER: Primary | ICD-10-CM

## 2025-03-10 DIAGNOSIS — E11.69 TYPE 2 DIABETES MELLITUS WITH OTHER SPECIFIED COMPLICATION, UNSPECIFIED WHETHER LONG TERM INSULIN USE: ICD-10-CM
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
## 2025-03-10 DIAGNOSIS — R60.0 BILATERAL LOWER EXTREMITY EDEMA: ICD-10-CM

## 2025-03-10 DIAGNOSIS — M10.9 GOUT, UNSPECIFIED CAUSE, UNSPECIFIED CHRONICITY, UNSPECIFIED SITE: ICD-10-CM

## 2025-03-10 PROCEDURE — 1126F AMNT PAIN NOTED NONE PRSNT: CPT | Performed by: NURSE PRACTITIONER

## 2025-03-10 PROCEDURE — 1159F MED LIST DOCD IN RCRD: CPT | Performed by: NURSE PRACTITIONER

## 2025-03-10 PROCEDURE — 99214 OFFICE O/P EST MOD 30 MIN: CPT | Performed by: NURSE PRACTITIONER

## 2025-03-10 PROCEDURE — 1160F RVW MEDS BY RX/DR IN RCRD: CPT | Performed by: NURSE PRACTITIONER

## 2025-03-10 RX ORDER — METFORMIN HYDROCHLORIDE 500 MG/1
500 TABLET, EXTENDED RELEASE ORAL 2 TIMES DAILY
Qty: 30 TABLET | Refills: 0 | Status: SHIPPED | OUTPATIENT
Start: 2025-03-10

## 2025-03-10 RX ORDER — CEPHALEXIN 500 MG/1
500 CAPSULE ORAL 3 TIMES DAILY
Qty: 21 CAPSULE | Refills: 0 | Status: SHIPPED | OUTPATIENT
Start: 2025-03-10 | End: 2025-03-17

## 2025-03-10 RX ORDER — METFORMIN HYDROCHLORIDE 500 MG/1
500 TABLET, EXTENDED RELEASE ORAL 2 TIMES DAILY
Qty: 180 TABLET | Refills: 1 | Status: SHIPPED | OUTPATIENT
Start: 2025-03-10 | End: 2025-03-10 | Stop reason: SDUPTHER

## 2025-03-10 NOTE — PROGRESS NOTES
Chief Complaint  Chief Complaint   Patient presents with    Animal Bite     Left hand, 2nd finger, swollen and red    Follow-up     Pt requesting to cut back on furosemide   Has not been taking allopurinol recently           Subjective          Laure Leung presents to [facility] for   History of Present Illness    Patient presents for evaluation of cat bite of the left hand, first and second finger with erythema and swelling of the posterior knuckle    Gout, has not had any recent episodes, stopped taking allopurinol and would like to decrease furosemide, she denies swelling of the lower extremities.    DMII, ran out of metformin, [facility] will not refill, short supply sent to [facility] in mean time.       The following portions of the patient's history were reviewed and updated as appropriate: allergies, current medications, past family history, past medical history, past social history, past surgical history and problem list.    Past Medical History:   Diagnosis Date    Arrhythmia     Atrial fibrillation     CHF (congestive heart failure)     Diabetes     Hypertension     Insomnia 2024    Primary osteoarthritis of both knees 05/25/2022     Past Surgical History:   Procedure Laterality Date    CARDIAC ELECTROPHYSIOLOGY PROCEDURE N/A 06/23/2021    Procedure: Ablation atrial flutter;  Surgeon: Antonio Senior MD;  Location: [facility] CATH INVASIVE LOCATION;  Service: Cardiovascular;  Laterality: N/A;    CARDIAC ELECTROPHYSIOLOGY PROCEDURE N/A 08/11/2023    Procedure: Ablation atrial fibrillation, cryo, rep emailed;  Surgeon: Claude Leyva MD;  Location: [facility] CATH INVASIVE LOCATION;  Service: Cardiovascular;  Laterality: N/A;    CARDIAC ELECTROPHYSIOLOGY PROCEDURE N/A 6/20/2024    Procedure: AV node ablation---Has Medtronic pacemaker in placed;  Surgeon: Naif Salgado MD;  Location: [facility] CATH INVASIVE LOCATION;  Service: Cardiovascular;  Laterality: N/A;    CATARACT  EXTRACTION WITH INTRAOCULAR LENS IMPLANT Bilateral 2018    PACEMAKER IMPLANTATION N/A 2024    Procedure: PPM SC RV Implant MEDT.;  Surgeon: Naif Salgado MD;  Location: [facility] INVASIVE LOCATION;  Service: Cardiovascular;  Laterality: N/A;     Family History   Problem Relation Age of Onset    No Known Problems Mother     No Known Problems Father     Hypertension Sister     Atrial fibrillation Sister     Diabetes Sister     No Known Problems Maternal Aunt     No Known Problems Maternal Uncle     No Known Problems Paternal Aunt     No Known Problems Paternal Uncle     No Known Problems Maternal Grandmother     No Known Problems Maternal Grandfather     No Known Problems Paternal Grandmother     No Known Problems Paternal Grandfather     Heart attack Neg Hx     Heart disease Neg Hx     Heart failure Neg Hx     Hyperlipidemia Neg Hx      Social History     Tobacco Use    Smoking status: Former     Current packs/day: 0.00     Average packs/day: 0.5 packs/day for 12.0 years (6.0 ttl pk-yrs)     Types: Cigarettes     Start date:      Quit date:      Years since quittin.2     Passive exposure: Past    Smokeless tobacco: Never    Tobacco comments:     Caffeine: 2 cups per day   Substance Use Topics    Alcohol use: No       Current Outpatient Medications:     Alcohol Swabs (B-D SINGLE USE SWABS REGULAR) pads, Use as directed for blood glucose checks, Disp: 100 each, Rfl: 3    apixaban (ELIQUIS) 5 MG tablet tablet, Take 1 tablet by mouth Every 12 (Twelve) Hours. Start taking the evening of 2024, Disp: 180 tablet, Rfl: 1    B Complex Vitamins (VITAMIN B COMPLEX PO), Take 1 tablet by mouth Daily., Disp: , Rfl:     Berberine Chloride (BERBERINE HCI PO), Take  by mouth., Disp: , Rfl:     furosemide (LASIX) 40 MG tablet, Take 1 tablet by mouth Daily., Disp: 90 tablet, Rfl: 1    Ibuprofen 3 %, Gabapentin 10 %, Baclofen 2 %, lidocaine 4 %, Apply 1-2 g topically to the appropriate area as directed 3  "(Three) to 4 (Four) times daily., Disp: 90 g, Rfl: 0    Lancets (OneTouch Delica Plus [id number]) mis, USE AND DISCARD 1 LANCET TOPICALLY TO THE APPROPRIATE AREA AS DIRECTED DAILY TO CHECK BLOOD SUGAR, Disp: 100 each, Rfl: 2    Magnesium 200 MG tablet, Take 2 tablets by mouth Daily., Disp: , Rfl:     metFORMIN ER (GLUCOPHAGE-XR) 500 MG 24 hr tablet, Take 1 tablet by mouth 2 (Two) Times a Day., Disp: 30 tablet, Rfl: 0    NON FORMULARY, Take 1 tablet by mouth Daily. Thyroid support, Disp: , Rfl:     OneTouch Verio test strip, USE AS INSTRUCTED TO CHECK BLOOD SUGAR ONCE DAILY, Disp: 100 each, Rfl: 3    potassium chloride 10 MEQ CR tablet, Take 1 tablet by mouth 2 (Two) Times a Day., Disp: 180 tablet, Rfl: 1    vitamin C (ASCORBIC ACID) 250 MG tablet, Take 4 tablets by mouth Daily., Disp: , Rfl:     VITAMIN D, CHOLECALCIFEROL, PO, Take 5,000 Units by mouth Daily., Disp: , Rfl:     cephalexin (KEFLEX) 500 MG capsule, Take 1 capsule by mouth 3 (Three) Times a Day for 7 days., Disp: 21 capsule, Rfl: 0    Objective   Vital Signs:   Vitals:    03/10/25 1509   BP: 126/78   BP Location: Left arm   Patient Position: Sitting   Cuff Size: Adult   Pulse: 87   Temp: 98 °F (36.7 °C)   TempSrc: Oral   SpO2: 96%   Weight: 78.2 kg (172 lb 6.4 oz)   Height: 160 cm (63\")   PainSc: 0-No pain       Body mass index is 30.54 kg/m².    Physical Exam  Constitutional:       General: She is not in acute distress.     Appearance: Normal appearance. She is well-developed. She is not ill-appearing or diaphoretic.   HENT:      Head: Normocephalic.   Eyes:      Conjunctiva/sclera: Conjunctivae normal.      Pupils: Pupils are equal, round, and reactive to light.   Neck:      Thyroid: No thyromegaly.      Vascular: No JVD.   Cardiovascular:      Rate and Rhythm: Normal rate and regular rhythm.      Heart sounds: Normal heart sounds. No murmur heard.  Pulmonary:      Effort: Pulmonary effort is normal. No respiratory distress.      Breath sounds: Normal " breath sounds. No wheezing or rhonchi.   Abdominal:      General: Bowel sounds are normal. There is no distension.      Palpations: Abdomen is soft.      Tenderness: There is no abdominal tenderness.   Musculoskeletal:         General: No swelling or tenderness. Normal range of motion.      Cervical back: Normal range of motion and neck supple. No tenderness.   Lymphadenopathy:      Cervical: No cervical adenopathy.   Skin:     General: Skin is warm and dry.      Coloration: Skin is not jaundiced.      Findings: Erythema (L first finger mcp joint erythema and swelling 2/2 to cat bite) present. No rash.   Neurological:      General: No focal deficit present.      Mental Status: She is alert and oriented to person, place, and time. Mental status is at baseline.      Sensory: No sensory deficit.      Motor: No weakness.      Gait: Gait normal.   Psychiatric:         Mood and Affect: Mood normal.         Behavior: Behavior normal.         Thought Content: Thought content normal.         Judgment: Judgment normal.          Result Review :     No visits with results within 7 Day(s) from this visit.   Latest known visit with results is:   Orders Only on 11/20/2024   Component Date Value Ref Range Status    WBC 11/26/2024 4.80  3.40 - 10.80 10*3/mm3 Final    RBC 11/26/2024 3.28 (L)  3.77 - 5.28 10*6/mm3 Final    Hemoglobin 11/26/2024 10.3 (L)  12.0 - 15.9 g/dL Final    Hematocrit 11/26/2024 31.6 (L)  34.0 - 46.6 % Final    MCV 11/26/2024 96.3  79.0 - 97.0 fL Final    MCH 11/26/2024 31.4  26.6 - 33.0 pg Final    MCHC 11/26/2024 32.6  31.5 - 35.7 g/dL Final    RDW 11/26/2024 13.2  12.3 - 15.4 % Final    RDW-SD 11/26/2024 46.5  37.0 - 54.0 fl Final    MPV 11/26/2024 11.1  6.0 - 12.0 fL Final    Platelets 11/26/2024 314  140 - 450 10*3/mm3 Final    Glucose 11/26/2024 117 (H)  65 - 99 mg/dL Final    BUN 11/26/2024 26 (H)  8 - 23 mg/dL Final    Creatinine 11/26/2024 0.93  0.57 - 1.00 mg/dL Final    Sodium 11/26/2024 143  136 -  145 mmol/L Final    Potassium 11/26/2024 4.5  3.5 - 5.2 mmol/L Final    Chloride 11/26/2024 105  98 - 107 mmol/L Final    CO2 11/26/2024 26.1  22.0 - 29.0 mmol/L Final    Calcium 11/26/2024 10.2  8.6 - 10.5 mg/dL Final    Total Protein 11/26/2024 6.4  6.0 - 8.5 g/dL Final    Albumin 11/26/2024 4.5  3.5 - 5.2 g/dL Final    ALT (SGPT) 11/26/2024 13  1 - 33 U/L Final    AST (SGOT) 11/26/2024 17  1 - 32 U/L Final    Alkaline Phosphatase 11/26/2024 82  39 - 117 U/L Final    Total Bilirubin 11/26/2024 1.3 (H)  0.0 - 1.2 mg/dL Final    Globulin 11/26/2024 1.9  gm/dL Final    A/G Ratio 11/26/2024 2.4  g/dL Final    BUN/Creatinine Ratio 11/26/2024 28.0 (H)  7.0 - 25.0 Final    Anion Gap 11/26/2024 11.9  5.0 - 15.0 mmol/L Final    eGFR 11/26/2024 61.1  >60.0 mL/min/1.73 Final    Hemoglobin A1C 11/26/2024 5.70 (H)  4.80 - 5.60 % Final    Total Cholesterol 11/26/2024 203 (H)  0 - 200 mg/dL Final    Triglycerides 11/26/2024 91  0 - 150 mg/dL Final    HDL Cholesterol 11/26/2024 52  40 - 60 mg/dL Final    LDL Cholesterol  11/26/2024 135 (H)  0 - 100 mg/dL Final    VLDL Cholesterol 11/26/2024 16  5 - 40 mg/dL Final    LDL/HDL Ratio 11/26/2024 2.55   Final    Microalbumin, Urine 11/26/2024 <1.2  mg/dL Final                              Assessment and Plan    Diagnoses and all orders for this visit:    1. Cat bite of left hand, initial encounter (Primary)  Comments:  Continue soaking in Epsom salt  keep clean and dry  Start Keflex    2. Gout, unspecified cause, unspecified chronicity, unspecified site  Comments:  No recent episodes, okay to hold allopurinol  Resume for gout flare up  also cutting back on loop diuretic    3. Bilateral lower extremity edema  Comments:  Resolved, okay to cut back on furosemide to 20 mg and KCl 10 meq daily  consider change to prn    4. Type 2 diabetes mellitus without complication, without long-term current use of insulin  Comments:  Continue metformin, short supply sent to local pharmacy d/t awaiting  mail order    Other orders  -     cephalexin (KEFLEX) 500 MG capsule; Take 1 capsule by mouth 3 (Three) Times a Day for 7 days.  Dispense: 21 capsule; Refill: 0            I spent 30 minutes caring for Laure Leung on this date of service. This time includes time spent by me in the following activities: preparing for the visit, reviewing tests, performing a medically appropriate examination and/or evaluation , counseling and educating the patient/family/caregiver, ordering medications, tests, or procedures and documenting information in the medical record        Follow Up     Return for Next scheduled follow up or earlier if needed .  Patient was given instructions and counseling regarding her condition or for health maintenance advice. Please see specific information pulled into the AVS if appropriate.        Part of this note may be an electronic transcription/translation of spoken language to printed text using the Dragon Dictation System

## 2025-03-10 NOTE — TELEPHONE ENCOUNTER
"Assessment & Plan   Arthralgia of both hands  Probable early osteoarthritis, no signs of rheumatoid arthritis otherwise and no family history.  Stretching the hands, ice or heat whichever feels good would be fine and intermittent acetaminophen or ibuprofen.  He is interested in trying Celebrex and also wanted some labs done:  - ESR: Erythrocyte sedimentation rate  - CRP, inflammation  - Rheumatoid factor  - celecoxib (CELEBREX) 200 MG capsule  Dispense: 90 capsule; Refill: 3  - ESR: Erythrocyte sedimentation rate  - CRP, inflammation  - Rheumatoid factor    Insomnia, unspecified type  Intermittent and likely situational.  Sleep hygiene discussed as well as referral to sleep psychologist given:  - Testosterone Bioavailable  - MENTAL HEALTH REFERRAL  - Adult; Outpatient Treatment; Sleep Psychology; [facility]: [facility])- [address] [phone]; We will contact you to schedule the appointment or please call with any questions  - Testosterone Bioavailable    Screening for malignant neoplasm of prostate  Desires screening  - PSA, screen  - PSA, screen      BMI:   Estimated body mass index is 25.46 kg/m  as calculated from the following:    Height as of this encounter: 1.854 m (6' 1\").    Weight as of this encounter: 87.5 kg (193 lb).   Weight management plan: Discussed healthy diet and exercise guidelines      Return in about 1 month (around 11/14/2021) for symptoms failing to improve or sooner if worsening.      Mt Lindsey MD      [address] [address]  [email or web address]   Office: [phone]       Waldo George is a 61 year old who presents for the following health issues     HPI     Musculoskeletal problem/pain  Onset/Duration: x 1 year worse in the last 3 months  Description  Location: fingers and hand - bilateral  Joint Swelling: no  Redness: no  Pain: YES- 3-4  Warmth: no  Intensity:  moderate  Progression of " PATIENT CALLED FOR MEDICATION REFILL OF   metFORMIN ER (GLUCOPHAGE-XR) 500 MG 24 hr tablet     SHE IS OUT MEDICATION    [facility] - [place], [place] - [address] [place] [address] - [phone] [facility] [phone]  [phone]       SHE IS REQUESTING A 15 DAY SUPPLY TO HOLD HER OVER UNTIL HER MEDICATION ARRIVES.  [facility] [id number] - [place], [place] - [address] [place] [address] [address]  - [phone] [facility] [phone]  [phone]     CALL BACK NUMBER [phone]   "Symptoms:  worsening  Accompanying signs and symptoms:   Fevers: no  Numbness/tingling/weakness: no  History  Trauma to the area: Over use in Job  Recent illness:  no  Previous similar problem: YES  Previous evaluation:  no  Precipitating or alleviating factors:  Aggravating factors include: overuse  Therapies tried and outcome: ice    He took xrays at his office and his joint spaces looked ok    He wakes often around 3-4 am .  He did some hormone testing that showed increased cortisol levels.  Snores some. Occasional alcohol use. Nocturia x 1  Occasional snoring - no apnea noted. Some stressors but not bad.  No naps.     Tinnitus - no vertigo, hearing is okay.    Dry eyes.    Review of Systems   Constitutional, HEENT, cardiovascular, pulmonary, GI, , musculoskeletal, neuro, skin, endocrine and psych systems are negative, except as otherwise noted.      Objective    /74   Pulse 82   Temp 97.6  F (36.4  C) (Tympanic)   Ht 1.854 m (6' 1\")   Wt 87.5 kg (193 lb)   SpO2 98%   BMI 25.46 kg/m    Body mass index is 25.46 kg/m .  Physical Exam   GENERAL: healthy, alert and no distress  EYES: Eyes grossly normal to inspection, PERRL and conjunctivae and sclerae normal  HENT: ear canals and TM's normal, nose and mouth without ulcers or lesions  NECK: no adenopathy, no asymmetry, masses, or scars and thyroid normal to palpation  RESP: lungs clear to auscultation - no rales, rhonchi or wheezes  CV: regular rate and rhythm, normal S1 S2, no S3 or S4, no murmur, click or rub, no peripheral edema and peripheral pulses strong  ABDOMEN: soft, nontender, no hepatosplenomegaly, no masses and bowel sounds normal  MS: 3 out of 10 fingers with PIP synovitis but good range of motion otherwise no gross musculoskeletal defects noted, no edema  SKIN: no suspicious lesions or rashes  NEURO: Negative Tinel and Phalen sign at bilateral wrists normal strength and tone, mentation intact and speech normal  BACK: no CVA tenderness, no " paralumbar tenderness  LYMPH: no cervical, supraclavicular, axillary, or inguinal adenopathy    Reviewed x-ray of hands repeated at his chiropractic office and it shows some mild DJD signs as noted DIPs

## 2025-04-02 ENCOUNTER — TELEPHONE (OUTPATIENT)
Dept: FAMILY MEDICINE CLINIC | Facility: CLINIC | Age: 85
End: 2025-04-02
Payer: MEDICARE

## 2025-04-02 RX ORDER — SULFAMETHOXAZOLE AND TRIMETHOPRIM 800; 160 MG/1; MG/1
1 TABLET ORAL 2 TIMES DAILY
Qty: 14 TABLET | Refills: 0 | Status: SHIPPED | OUTPATIENT
Start: 2025-04-02

## 2025-04-02 RX ORDER — MUPIROCIN 20 MG/G
1 OINTMENT TOPICAL 3 TIMES DAILY
Qty: 15 G | Refills: 0 | Status: SHIPPED | OUTPATIENT
Start: 2025-04-02

## 2025-04-02 NOTE — TELEPHONE ENCOUNTER
"  Caller: Laure Leung \"Addie\"    Relationship: Self    Best call back number: [phone]     What medication are you requesting: ANTIBIOTIC    What are your current symptoms: CAT BITE IS STILL RED AND SWOLLEN    How long have you been experiencing symptoms:     Have you had these symptoms before:    [x] Yes  [] No    Have you been treated for these symptoms before:   [x] Yes  [] No    If a prescription is needed, what is your preferred pharmacy and phone number: [facility] [id number] - [place] [address] [address] - [phone] ph - [phone] FX     Additional notes: PLEASE CALL AND ADVISE.        "

## 2025-04-07 ENCOUNTER — TELEPHONE (OUTPATIENT)
Dept: CARDIOLOGY | Age: 85
End: 2025-04-07
Payer: MEDICARE

## 2025-04-07 DIAGNOSIS — I10 ESSENTIAL HYPERTENSION: ICD-10-CM

## 2025-04-07 DIAGNOSIS — I48.91 ATRIAL FIBRILLATION WITH RAPID VENTRICULAR RESPONSE: ICD-10-CM

## 2025-04-07 NOTE — TELEPHONE ENCOUNTER
"MAIL ORDER     Caller: Laure Leung \"Addie\"    Relationship: Self    Best call back number:     [phone] (Mobile)       Requested Prescriptions:   Requested Prescriptions     Pending Prescriptions Disp Refills    furosemide (LASIX) 40 MG tablet 90 tablet 1     Sig: Take 1 tablet by mouth Daily.        Pharmacy where request should be sent: [facility], [address] [phone] [facility] [phone]      Last office visit with prescribing clinician: 3/10/2025   Last telemedicine visit with prescribing clinician: Visit date not found   Next office visit with prescribing clinician: 6/12/2025     Additional details provided by patient:     Does the patient have less than a 3 day supply:  [] Yes  [] No    Would you like a call back once the refill request has been completed: [] Yes [] No    If the office needs to give you a call back, can they leave a voicemail: [] Yes [] No    Marilee Lyn   04/07/25 09:15 EDT       "

## 2025-04-07 NOTE — TELEPHONE ENCOUNTER
FRANK pt-LOV 11/2024 with [facility].    Patient is calling because she thinks she has been having more afib the past week with HRs  in the 110s-120s.  She checks on her BP cuff.  Yesterday, her BP was 110/68.    She has what she describes as her normal afib symptoms of palpations, nervousness, SOA with exertion and fatigue.  No chest pain.    I asked her if there was anything different this past week.  She says she is on her second round of antibiotics for a cat bite on her hand.  I let her know infection could exacerbate afib. She said her hand is overall is improving though.  I advised her to make sure she is staying well hydrated.      She is on eliquis 5 mg BID.  She needs a 90 day refill to [facility].  I will make a separate refill request.     I let pt know I will send a message to our pacemaker nurses to see if they can do a remote.      Ilana Plum[facility] Card[facility] Triage  04/07/25 09:19 EDT

## 2025-04-07 NOTE — TELEPHONE ENCOUNTER
Remote received    10 new AF since 3/7/25 report, longest lasting 3/10/25 for 20 mins, avg V rates during AF range 70-86 bpm.     Presenting does show faster resting rate AS/ 120s, possibly tracking a slow AF/Afl vs tracking her atrial rate.     Histrograms show HR has predominantly been 70s-90s over the last month, only occ 100-120 bpm

## 2025-04-07 NOTE — TELEPHONE ENCOUNTER
I called and spoke with patient and let her know what her PPM report showed and Dr. Salgado's recommendations and she verbalizes understanding.    Ilana Morrissey RN  [facility] Triage  04/07/25 14:55 EDT

## 2025-04-08 RX ORDER — FUROSEMIDE 40 MG/1
40 TABLET ORAL DAILY
Qty: 90 TABLET | Refills: 1 | Status: SHIPPED | OUTPATIENT
Start: 2025-04-08

## 2025-04-17 DIAGNOSIS — E11.9 TYPE 2 DIABETES MELLITUS WITHOUT COMPLICATION, WITHOUT LONG-TERM CURRENT USE OF INSULIN: ICD-10-CM

## 2025-04-17 RX ORDER — ISOPROPYL ALCOHOL 0.75 G/1
SWAB TOPICAL
Qty: 100 EACH | Refills: 3 | Status: SHIPPED | OUTPATIENT
Start: 2025-04-17

## 2025-05-14 ENCOUNTER — OFFICE VISIT (OUTPATIENT)
Age: 85
End: 2025-05-14
Payer: MEDICARE

## 2025-05-14 ENCOUNTER — CLINICAL SUPPORT NO REQUIREMENTS (OUTPATIENT)
Age: 85
End: 2025-05-14
Payer: MEDICARE

## 2025-05-14 VITALS
HEART RATE: 86 BPM | BODY MASS INDEX: 29.41 KG/M2 | SYSTOLIC BLOOD PRESSURE: 138 MMHG | HEIGHT: 63 IN | WEIGHT: 166 LBS | DIASTOLIC BLOOD PRESSURE: 74 MMHG

## 2025-05-14 DIAGNOSIS — Z95.0 PRESENCE OF CARDIAC PACEMAKER: Primary | ICD-10-CM

## 2025-05-14 DIAGNOSIS — I48.0 PAF (PAROXYSMAL ATRIAL FIBRILLATION): ICD-10-CM

## 2025-05-14 DIAGNOSIS — Z95.0 PRESENCE OF CARDIAC PACEMAKER: ICD-10-CM

## 2025-05-14 DIAGNOSIS — Z98.890 S/P AV (ATRIOVENTRICULAR) NODAL ABLATION: ICD-10-CM

## 2025-05-14 DIAGNOSIS — I44.2 AV BLOCK, COMPLETE: Primary | ICD-10-CM

## 2025-05-14 DIAGNOSIS — I48.91 ATRIAL FIBRILLATION WITH RAPID VENTRICULAR RESPONSE: ICD-10-CM

## 2025-05-14 PROCEDURE — 93280 PM DEVICE PROGR EVAL DUAL: CPT | Performed by: INTERNAL MEDICINE

## 2025-05-14 PROCEDURE — 93000 ELECTROCARDIOGRAM COMPLETE: CPT | Performed by: INTERNAL MEDICINE

## 2025-05-14 PROCEDURE — 99214 OFFICE O/P EST MOD 30 MIN: CPT | Performed by: INTERNAL MEDICINE

## 2025-05-14 RX ORDER — MV-MN/LUTEIN/ZEAX/BILBER/HB277 5-1-7.5 MG
CAPSULE ORAL
COMMUNITY

## 2025-05-14 RX ORDER — ALLOPURINOL 300 MG/1
300 TABLET ORAL DAILY
COMMUNITY
Start: 2025-04-09

## 2025-05-14 NOTE — PROGRESS NOTES
Date of Office Visit: 2025  Encounter Provider: Naif Salgado MD  Place of Service: [facility] CARDIOLOGY  Patient Name: Laure Leung  : 1940    Subjective:     Encounter Date:2025      Patient ID: Laure Leung is a 85 y.o. female who has a cc of  PAF and multiple LA ablations then AV node ablation in . I did lb area pacer in .     The patient had a good year. Except in April she had some AF (around the time of a cat-bite and infection and abx.)   No anginal chest pain,   No sig patel,   No soa,   No fainting,  No orthostasis.   No edema.   Exercise tolerance: limited. Here with a cane.       There have been no hospital admission since the last visit.     There have been no bleeding events.       Past Medical History:   Diagnosis Date    Arrhythmia     Atrial fibrillation     CHF (congestive heart failure)     Diabetes     Hypertension     Insomnia     falls asleep but can't stay asleep    Primary osteoarthritis of both knees 2022       Social History     Socioeconomic History    Marital status:     Number of children: 0   Tobacco Use    Smoking status: Former     Current packs/day: 0.00     Average packs/day: 0.5 packs/day for 12.0 years (6.0 ttl pk-yrs)     Types: Cigarettes     Start date:      Quit date:      Years since quittin.4     Passive exposure: Past    Smokeless tobacco: Never    Tobacco comments:     Caffeine: 2 cups per day   Vaping Use    Vaping status: Never Used   Substance and Sexual Activity    Alcohol use: No    Drug use: Never    Sexual activity: Defer       Family History   Problem Relation Age of Onset    No Known Problems Mother     No Known Problems Father     Hypertension Sister     Atrial fibrillation Sister     Diabetes Sister     No Known Problems Maternal Aunt     No Known Problems Maternal Uncle     No Known Problems Paternal Aunt     No Known Problems Paternal Uncle     No Known Problems Maternal  "Grandmother     No Known Problems Maternal Grandfather     No Known Problems Paternal Grandmother     No Known Problems Paternal Grandfather     Heart attack Neg Hx     Heart disease Neg Hx     Heart failure Neg Hx     Hyperlipidemia Neg Hx        Review of Systems   Constitutional: Negative for fever and night sweats.   HENT:  Negative for ear pain and stridor.    Eyes:  Negative for discharge and visual halos.   Cardiovascular:  Negative for cyanosis.   Respiratory:  Negative for hemoptysis and sputum production.    Hematologic/Lymphatic: Negative for adenopathy.   Skin:  Negative for nail changes and unusual hair distribution.   Musculoskeletal:  Negative for gout and joint swelling.   Gastrointestinal:  Negative for bowel incontinence and flatus.   Genitourinary:  Negative for dysuria and flank pain.   Neurological:  Negative for seizures and tremors.   Psychiatric/Behavioral:  Negative for altered mental status. The patient is not nervous/anxious.             Objective:     Vitals:    05/14/25 1254   BP: 138/74   BP Location: Right arm   Patient Position: Sitting   Pulse: 86   Weight: 75.3 kg (166 lb)   Height: 160 cm (63\")         Eyes:      General:         Right eye: No discharge.         Left eye: No discharge.   HENT:      Head: Normocephalic and atraumatic.   Neck:      Thyroid: No thyromegaly.      Vascular: No JVD.   Pulmonary:      Effort: Pulmonary effort is normal.      Breath sounds: Normal breath sounds. No rales.   Cardiovascular:      Normal rate. Regular rhythm.      No gallop.    Edema:     Peripheral edema absent.   Abdominal:      General: Bowel sounds are normal.      Palpations: Abdomen is soft.      Tenderness: There is no abdominal tenderness.   Musculoskeletal: Normal range of motion.         General: No deformity. Skin:     General: Skin is warm and dry.      Findings: No erythema.   Neurological:      Mental Status: Alert and oriented to person, place, and time.      Motor: Normal " muscle tone.   Psychiatric:         Behavior: Behavior normal.         Thought Content: Thought content normal.           ECG 12 Lead    Date/Time: 5/14/2025 1:39 PM  Performed by: Naif Salgado MD    Authorized by: Naif Salgado MD  Comparison: compared with previous ECG   Similar to previous ECG  Rhythm: sinus rhythm and paced          Lab Review:       Assessment:          Diagnosis Plan   1. AV block, complete        2. PAF (paroxysmal atrial fibrillation)        3. 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 S/P AV (atrioventricular) darnell ablation               Plan:     I reviewed the pacemaker/ICD tracings and the pacing and sensing parameters are normal.  AF burden is  low. On a-ban.     Paced qrs is gorgeous     She was in a slow atrial flutter at 480 msec so I changed to ddir mode

## 2025-06-03 ENCOUNTER — LAB (OUTPATIENT)
Dept: FAMILY MEDICINE CLINIC | Facility: CLINIC | Age: 85
End: 2025-06-03
Payer: MEDICARE

## 2025-06-03 DIAGNOSIS — I10 ESSENTIAL HYPERTENSION: ICD-10-CM

## 2025-06-03 DIAGNOSIS — E11.9 TYPE 2 DIABETES MELLITUS WITHOUT COMPLICATION, WITHOUT LONG-TERM CURRENT USE OF INSULIN: Primary | ICD-10-CM

## 2025-06-03 PROCEDURE — 80061 LIPID PANEL: CPT | Performed by: NURSE PRACTITIONER

## 2025-06-03 PROCEDURE — 84443 ASSAY THYROID STIM HORMONE: CPT | Performed by: NURSE PRACTITIONER

## 2025-06-03 PROCEDURE — 83036 HEMOGLOBIN GLYCOSYLATED A1C: CPT | Performed by: NURSE PRACTITIONER

## 2025-06-03 PROCEDURE — 36415 COLL VENOUS BLD VENIPUNCTURE: CPT | Performed by: NURSE PRACTITIONER

## 2025-06-03 PROCEDURE — 85027 COMPLETE CBC AUTOMATED: CPT | Performed by: NURSE PRACTITIONER

## 2025-06-03 PROCEDURE — 80053 COMPREHEN METABOLIC PANEL: CPT | Performed by: NURSE PRACTITIONER

## 2025-06-04 LAB
ALBUMIN SERPL-MCNC: 4.4 G/DL (ref 3.5–5.2)
ALBUMIN/GLOB SERPL: 2.2 G/DL
ALP SERPL-CCNC: 84 U/L (ref 39–117)
ALT SERPL W P-5'-P-CCNC: 13 U/L (ref 1–33)
ANION GAP SERPL CALCULATED.3IONS-SCNC: 10.8 MMOL/L (ref 5–15)
AST SERPL-CCNC: 16 U/L (ref 1–32)
BILIRUB SERPL-MCNC: 1.7 MG/DL (ref 0–1.2)
BUN SERPL-MCNC: 33 MG/DL (ref 8–23)
BUN/CREAT SERPL: 33.7 (ref 7–25)
CALCIUM SPEC-SCNC: 10.1 MG/DL (ref 8.6–10.5)
CHLORIDE SERPL-SCNC: 105 MMOL/L (ref 98–107)
CHOLEST SERPL-MCNC: 163 MG/DL (ref 0–200)
CO2 SERPL-SCNC: 25.2 MMOL/L (ref 22–29)
CREAT SERPL-MCNC: 0.98 MG/DL (ref 0.57–1)
DEPRECATED RDW RBC AUTO: 48.4 FL (ref 37–54)
EGFRCR SERPLBLD CKD-EPI 2021: 57 ML/MIN/1.73
ERYTHROCYTE [DISTWIDTH] IN BLOOD BY AUTOMATED COUNT: 13.5 % (ref 12.3–15.4)
GLOBULIN UR ELPH-MCNC: 2 GM/DL
GLUCOSE SERPL-MCNC: 115 MG/DL (ref 65–99)
HBA1C MFR BLD: 5.7 % (ref 4.8–5.6)
HCT VFR BLD AUTO: 30.9 % (ref 34–46.6)
HDLC SERPL-MCNC: 55 MG/DL (ref 40–60)
HGB BLD-MCNC: 10.3 G/DL (ref 12–15.9)
LDLC SERPL CALC-MCNC: 95 MG/DL (ref 0–100)
LDLC/HDLC SERPL: 1.72 {RATIO}
MCH RBC QN AUTO: 33.1 PG (ref 26.6–33)
MCHC RBC AUTO-ENTMCNC: 33.3 G/DL (ref 31.5–35.7)
MCV RBC AUTO: 99.4 FL (ref 79–97)
PLATELET # BLD AUTO: 344 10*3/MM3 (ref 140–450)
PMV BLD AUTO: 10.5 FL (ref 6–12)
POTASSIUM SERPL-SCNC: 4.4 MMOL/L (ref 3.5–5.2)
PROT SERPL-MCNC: 6.4 G/DL (ref 6–8.5)
RBC # BLD AUTO: 3.11 10*6/MM3 (ref 3.77–5.28)
SODIUM SERPL-SCNC: 141 MMOL/L (ref 136–145)
TRIGL SERPL-MCNC: 66 MG/DL (ref 0–150)
TSH SERPL DL<=0.05 MIU/L-ACNC: 0.99 UIU/ML (ref 0.27–4.2)
VLDLC SERPL-MCNC: 13 MG/DL (ref 5–40)
WBC NRBC COR # BLD AUTO: 4.94 10*3/MM3 (ref 3.4–10.8)

## 2025-06-06 ENCOUNTER — TELEPHONE (OUTPATIENT)
Age: 85
End: 2025-06-06

## 2025-06-06 NOTE — TELEPHONE ENCOUNTER
FYI: The pt's MDT pacemaker report shows 1 new VT-NS labeled event on 5/22 09:36 with avg V rate 169 bpm- EGM shows what appears to be 14 beat run of VT-NS. I have copied the EGM below. This appears to be new to pt and I just wanted to send to you as an FYI. Pt has hx of AFL and is set DDIR, they're on AC.     Kindly,   Meg Schotanus Device RN

## 2025-06-12 ENCOUNTER — OFFICE VISIT (OUTPATIENT)
Dept: FAMILY MEDICINE CLINIC | Facility: CLINIC | Age: 85
End: 2025-06-12
Payer: MEDICARE

## 2025-06-12 ENCOUNTER — LAB (OUTPATIENT)
Dept: FAMILY MEDICINE CLINIC | Facility: CLINIC | Age: 85
End: 2025-06-12
Payer: MEDICARE

## 2025-06-12 ENCOUNTER — TELEPHONE (OUTPATIENT)
Dept: FAMILY MEDICINE CLINIC | Facility: CLINIC | Age: 85
End: 2025-06-12

## 2025-06-12 VITALS
HEART RATE: 76 BPM | HEIGHT: 63 IN | DIASTOLIC BLOOD PRESSURE: 64 MMHG | OXYGEN SATURATION: 98 % | SYSTOLIC BLOOD PRESSURE: 132 MMHG | WEIGHT: 167 LBS | BODY MASS INDEX: 29.59 KG/M2

## 2025-06-12 DIAGNOSIS — G89.29 CHRONIC PAIN OF BOTH SHOULDERS: ICD-10-CM

## 2025-06-12 DIAGNOSIS — R82.998 LEUKOCYTES IN URINE: ICD-10-CM

## 2025-06-12 DIAGNOSIS — M25.512 CHRONIC PAIN OF BOTH SHOULDERS: ICD-10-CM

## 2025-06-12 DIAGNOSIS — M10.9 GOUTY ARTHRITIS: ICD-10-CM

## 2025-06-12 DIAGNOSIS — E11.9 TYPE 2 DIABETES MELLITUS WITHOUT COMPLICATION, WITHOUT LONG-TERM CURRENT USE OF INSULIN: ICD-10-CM

## 2025-06-12 DIAGNOSIS — N32.81 OVERACTIVE BLADDER: ICD-10-CM

## 2025-06-12 DIAGNOSIS — I48.0 PAF (PAROXYSMAL ATRIAL FIBRILLATION): ICD-10-CM

## 2025-06-12 DIAGNOSIS — M25.511 CHRONIC PAIN OF BOTH SHOULDERS: ICD-10-CM

## 2025-06-12 DIAGNOSIS — M79.671 BILATERAL FOOT PAIN: ICD-10-CM

## 2025-06-12 DIAGNOSIS — M79.672 BILATERAL FOOT PAIN: ICD-10-CM

## 2025-06-12 DIAGNOSIS — Z00.00 MEDICARE ANNUAL WELLNESS VISIT, SUBSEQUENT: Primary | ICD-10-CM

## 2025-06-12 DIAGNOSIS — M17.0 PRIMARY OSTEOARTHRITIS OF BOTH KNEES: ICD-10-CM

## 2025-06-12 DIAGNOSIS — G89.29 CHRONIC RIGHT SHOULDER PAIN: ICD-10-CM

## 2025-06-12 DIAGNOSIS — M25.511 CHRONIC RIGHT SHOULDER PAIN: ICD-10-CM

## 2025-06-12 DIAGNOSIS — M15.0 PRIMARY OSTEOARTHRITIS INVOLVING MULTIPLE JOINTS: ICD-10-CM

## 2025-06-12 DIAGNOSIS — G47.09 OTHER INSOMNIA: ICD-10-CM

## 2025-06-12 DIAGNOSIS — M54.32 SCIATICA, LEFT SIDE: ICD-10-CM

## 2025-06-12 DIAGNOSIS — N18.31 STAGE 3A CHRONIC KIDNEY DISEASE: ICD-10-CM

## 2025-06-12 DIAGNOSIS — I10 ESSENTIAL HYPERTENSION: ICD-10-CM

## 2025-06-12 LAB
BILIRUB BLD-MCNC: NEGATIVE MG/DL
CLARITY, POC: CLEAR
COLOR UR: YELLOW
GLUCOSE UR STRIP-MCNC: NEGATIVE MG/DL
KETONES UR QL: ABNORMAL
LEUKOCYTE EST, POC: ABNORMAL
NITRITE UR-MCNC: NEGATIVE MG/ML
PH UR: 5 [PH] (ref 5–8)
PROT UR STRIP-MCNC: ABNORMAL MG/DL
RBC # UR STRIP: NEGATIVE /UL
SP GR UR: 1.02 (ref 1–1.03)
UROBILINOGEN UR QL: ABNORMAL

## 2025-06-12 PROCEDURE — 82043 UR ALBUMIN QUANTITATIVE: CPT | Performed by: NURSE PRACTITIONER

## 2025-06-12 PROCEDURE — 82570 ASSAY OF URINE CREATININE: CPT | Performed by: NURSE PRACTITIONER

## 2025-06-12 PROCEDURE — 87086 URINE CULTURE/COLONY COUNT: CPT | Performed by: NURSE PRACTITIONER

## 2025-06-12 RX ORDER — AMITRIPTYLINE HYDROCHLORIDE 10 MG/1
10 TABLET ORAL NIGHTLY
Qty: 30 TABLET | Refills: 0 | Status: SHIPPED | OUTPATIENT
Start: 2025-06-12

## 2025-06-12 NOTE — PROGRESS NOTES
The ABCs of the Annual Wellness Visit  Subsequent Medicare Wellness Visit    Chief Complaint   Patient presents with    Medicare Wellness-subsequent     Labs 6/3/25  Pt doing well today  Patient c/o increased fatigue     Subjective     History of Present Illness:  Laure Leung is a 85 y.o. female who presents for a Subsequent Medicare Wellness Visit and follow up on chronic conditions.  HPI      Gout, she denies any recent issues.  She does use Rx alternative compounded cream for bilateral foot pain. She complains of B knee pain, worse at night, causes insomnia. B shoulders R>L unable to lift over the level of the shoulder. Denies swelling of the lower extremities. She tries to walk and stay active, she works in the yard sitting in a chair, c/o L low back/buttocks pain.     Insomnia, tried melatonin, now taking magnesium, can't stay asleep r/t pain of multi sites, back/knees mostly     Diabetes mellitus type II, controlled well, last A1c 5.7, feels stable on meds, she reports fatigue, denies any signs/symptoms of hyper/hypoglycemia, blurry vision, polydipsia, polyuria, nocturia, and unintentional weight loss    Chronic anemia, CKD GFR 43-61, monitoring. She is taking lasix daily, accidentally took BID at time of labs.     Afib, s/p ablation, AV block with PPM in place.  She follows with cardiology, on Eliquis      The following portions of the patient's history were reviewed and   updated as appropriate: allergies, current medications, past family history, past medical history, past social history, past surgical history, and problem list.      Compared to one year ago, the patient feels her physical   health is worse.    Compared to one year ago, the patient feels her mental   health is worse.    Recent Hospitalizations:  She was admitted within the past 365 days at [facility].       Current Medical Providers:  Patient Care Team:  Sarita Esparza APRN as PCP - General (Nurse Practitioner)  Napoleon Estrada,  MD as Surgeon (Orthopedic Surgery)  Claude Leyva MD as Consulting Physician (Cardiology)    Outpatient Medications Prior to Visit   Medication Sig Dispense Refill    Alcohol Swabs (B-D SINGLE USE SWABS REGULAR) pads USE AS DIRECTED FOR BLOOD GLUCOSE CHECKS 100 each 3    allopurinol (ZYLOPRIM) 300 MG tablet Take 1 tablet by mouth Daily.      apixaban (ELIQUIS) 5 MG tablet tablet Take 1 tablet by mouth Every 12 (Twelve) Hours. 180 tablet 3    B Complex Vitamins (VITAMIN B COMPLEX PO) Take 1 tablet by mouth Daily.      Berberine Chloride (BERBERINE HCI PO) Take  by mouth.      furosemide (LASIX) 40 MG tablet Take 1 tablet by mouth Daily. 90 tablet 1    Ibuprofen 3 %, Gabapentin 10 %, Baclofen 2 %, lidocaine 4 % Apply 1-2 g topically to the appropriate area as directed 3 (Three) to 4 (Four) times daily. 90 g 0    Lancets (OneTouch Delica Plus [id number]) misc USE AND DISCARD 1 LANCET TOPICALLY TO THE APPROPRIATE AREA AS DIRECTED DAILY TO CHECK BLOOD SUGAR 100 each 2    Magnesium 200 MG tablet Take 2 tablets by mouth Daily.      metFORMIN ER (GLUCOPHAGE-XR) 500 MG 24 hr tablet Take 1 tablet by mouth 2 (Two) Times a Day. 30 tablet 0    Multiple Vitamins-Minerals (Macular Health Formula) capsule       OneTouch Verio test strip USE AS INSTRUCTED TO CHECK BLOOD SUGAR ONCE DAILY 100 each 3    potassium chloride 10 MEQ CR tablet Take 1 tablet by mouth 2 (Two) Times a Day. 180 tablet 1    vitamin C (ASCORBIC ACID) 250 MG tablet Take 4 tablets by mouth Daily.      VITAMIN D, CHOLECALCIFEROL, PO Take 5,000 Units by mouth Daily.      mupirocin (BACTROBAN) 2 % ointment Apply 1 Application topically to the appropriate area as directed 3 (Three) Times a Day. 15 g 0     No facility-administered medications prior to visit.       No opioid medication identified on active medication list. I have reviewed chart for other potential  high risk medication/s and harmful drug interactions in the elderly.        Aspirin is not on active  "medication list.  Aspirin use is contraindicated for this patient due to: current use of Eliquis.  .    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  ACP discussion was held with the patient during this visit. Patient does not have an advance directive, information provided.    Reviewed chart for potential of high risk medication in the elderly: yes  Reviewed chart for potential of harmful drug interactions in the elderly:yes       Objective       Vitals:    06/12/25 0959 06/12/25 1020   BP: 148/73 132/64   BP Location: Left arm Left arm   Patient Position: Sitting Sitting   Cuff Size: Adult Adult   Pulse: 76    SpO2: 98%    Weight: 75.8 kg (167 lb)    Height: 160 cm (63\")    PainSc: 5     PainLoc: Back      BMI Readings from Last 1 Encounters:   06/12/25 29.58 kg/m²   BMI is within normal parameters. No follow-up required.  BMI Readings from Last 1 Encounters:   06/12/25 29.58 kg/m²   BMI is above normal parameters. Recommendations include: exercise counseling and nutrition counseling    Does the patient have evidence of cognitive impairment? No    Physical Exam  Constitutional:       General: She is not in acute distress.     Appearance: Normal appearance. She is well-developed. She is not ill-appearing or diaphoretic.   HENT:      Head: Normocephalic.   Eyes:      Conjunctiva/sclera: Conjunctivae normal.      Pupils: Pupils are equal, round, and reactive to light.   Neck:      Thyroid: No thyromegaly.      Vascular: No JVD.   Cardiovascular:      Rate and Rhythm: Normal rate and regular rhythm.      Heart " sounds: Murmur heard.      Comments: PPM MITCHELL  Pulmonary:      Effort: Pulmonary effort is normal. No respiratory distress.      Breath sounds: Normal breath sounds. No wheezing or rhonchi.   Abdominal:      General: Bowel sounds are normal. There is no distension.      Palpations: Abdomen is soft.      Tenderness: There is no abdominal tenderness.   Musculoskeletal:         General: Tenderness (multi joints. B shoulders R>L severe avila rom. B knees mod avila rom. L-spine with sciatica, mild avila rom) present. No swelling. Normal range of motion.      Cervical back: Normal range of motion and neck supple. No tenderness.   Lymphadenopathy:      Cervical: No cervical adenopathy.   Skin:     General: Skin is warm and dry.      Coloration: Skin is not jaundiced.      Findings: No erythema or rash.   Neurological:      General: No focal deficit present.      Mental Status: She is alert and oriented to person, place, and time. Mental status is at baseline.      Sensory: No sensory deficit.      Motor: No weakness.      Gait: Gait abnormal (Uses walker for mobility).   Psychiatric:         Mood and Affect: Mood normal.         Behavior: Behavior normal.         Thought Content: Thought content normal.         Judgment: Judgment normal.       Lab Results   Component Value Date    TRIG 66 2025    HDL 55 2025    LDL 95 2025    VLDL 13 2025    HGBA1C 5.70 (H) 2025          HEALTH RISK ASSESSMENT    Smoking Status:  Social History     Tobacco Use   Smoking Status Former    Current packs/day: 0.00    Average packs/day: 0.5 packs/day for 12.0 years (6.0 ttl pk-yrs)    Types: Cigarettes    Start date:     Quit date:     Years since quittin.4    Passive exposure: Past   Smokeless Tobacco Never   Tobacco Comments    Caffeine: 2 cups per day     Alcohol Consumption:  Social History     Substance and Sexual Activity   Alcohol Use No     Fall Risk Screen:    STEADI Fall Risk Assessment was  completed, and patient is at MODERATE risk for falls. Assessment completed on:2025    Depression Screen:       2025     9:55 AM   PHQ-2/PHQ-9 Depression Screening   Little interest or pleasure in doing things Not at all   Feeling down, depressed, or hopeless Not at all       Health Habits and Functional and Cognitive Screenin/12/2025     9:00 AM   Functional & Cognitive Status   Do you have difficulty preparing food and eating? No   Do you have difficulty bathing yourself, getting dressed or grooming yourself? No   Do you have difficulty using the toilet? No   Do you have difficulty moving around from place to place? No   Do you have trouble with steps or getting out of a bed or a chair? Yes   Current Diet Diabetic Diet   Dental Exam Not up to date   Eye Exam Up to date   Exercise (times per week) 5 times per week   Current Exercises Include House Cleaning;Yard Work   Do you need help using the phone?  No   Are you deaf or do you have serious difficulty hearing?  No   Do you need help to go to places out of walking distance? No   Do you need help shopping? No   Do you need help preparing meals?  Yes   Do you need help with housework?  Yes   Do you need help with laundry? Yes   Do you need help taking your medications? No   Do you need help managing money? No   Do you ever drive or ride in a car without wearing a seat belt? Yes   Have you felt unusual stress, anger or loneliness in the last month? No   Who do you live with? Alone   If you need help, do you have trouble finding someone available to you? Yes   Have you been bothered in the last four weeks by sexual problems? No   Do you have difficulty concentrating, remembering or making decisions? No       ATTENTION  What is the year: correct  What is the month of the year: correct  What is the day of the week?: correct  What is the date?: correct  MEMORY  Repeat address three times, only score third attempt: Erickson Skinner [address] [place] [address]  [place]: 6  HOW MANY ANIMALS DID THE PATIENT NAME  Verbal Fluency -- Animal Names (0-25): 22+  CLOCK DRAWING  Clock Drawing: All Correct  MEMORY RECALL  Tell me what you remember about that name and address we were repeating at the beginnin  ACE TOTAL SCORE  Total ACE Score - <25/30 strongly suggests cognitive impairment; <21/30 almost certainly shows dementia: 28    Age-appropriate Screening Schedule:  Refer to the list below for future screening recommendations based on patient's age, sex and/or medical conditions. Orders for these recommended tests are listed in the plan section. The patient has been provided with a written plan.    Health Maintenance   Topic Date Due    DXA SCAN  Never done    TDAP/TD VACCINES (1 - Tdap) Never done    ZOSTER VACCINE (1 of 2) Never done    RSV Vaccine - Adults (1 - 1-dose 75+ series) Never done    URINE MICROALBUMIN-CREATININE RATIO (uACR)  2024    HEMOGLOBIN A1C  2025    DIABETIC EYE EXAM  2026    ANNUAL WELLNESS VISIT  2026    DIABETIC FOOT EXAM  2026    COVID-19 Vaccine  Discontinued    INFLUENZA VACCINE  Discontinued    Pneumococcal Vaccine 50+  Discontinued            Assessment & Plan      CMS Preventative Services Quick Reference  Risk Factors Identified During Encounter  Chronic Pain: Natural history and expected course discussed. Questions answered.  Immunizations Discussed/Encouraged: Tdap, Shingrix, and RSV (Respiratory Syncytial Virus)  Inactivity/Sedentary: Patient was advised to exercise at least 150 minutes a week per CDC recommendations.  The above risks/problems have been discussed with the patient.  Follow up actions/plans if indicated are seen below in the Assessment/Plan Section.  Pertinent information has been shared with the patient in the After Visit Summary.      Diagnoses and all orders for this visit:    1. Medicare annual wellness visit, subsequent (Primary)    2. Chronic right shoulder pain  -     Ambulatory Referral  to Orthopedic Surgery    3. Type 2 diabetes mellitus without complication, without long-term current use of insulin  -     Microalbumin / Creatinine Urine Ratio - Urine, Clean Catch; Future    4. Essential hypertension    5. PAF (paroxysmal atrial fibrillation)    6. Primary osteoarthritis involving multiple joints  Comments:  Recommend trial Rx alternative compound cream to shoulders knees and feet    7. Gouty arthritis    8. Primary osteoarthritis of both knees  -     Ambulatory Referral to Orthopedic Surgery    9. Chronic pain of both shoulders    10. Other insomnia  Comments:  try amitriptyline for sleep and pain    11. Stage 3a chronic kidney disease    12. Overactive bladder  -     POC Urinalysis Dipstick    13. Sciatica, left side    14. Bilateral foot pain  Comments:  Continue Rx alternative cream to both feet    15. Leukocytes in urine  -     Urine Culture - Urine, Urine, Clean Catch; Future  -     Urine Culture - Urine, Urine, Clean Catch    Other orders  -     amitriptyline (ELAVIL) 10 MG tablet; Take 1 tablet by mouth Every Night.  Dispense: 30 tablet; Refill: 0      Overall doing well  Cont current med regimen, refills when needed   Labs reviewed with pt, all stable/unchanged  Declines PT  Referral to Dr. Amezcua for eval B shoulders/knees  Declines DEXA   Age appropriate preventative counseling provided, including healthy lifestyle modifications and exercise  Eliquis samples provided x2 wks     Follow Up:   Return in about 6 months (around 12/12/2025) for Recheck. DM panel prior to appt .     An After Visit Summary and PPPS were given to the patient.            EMR Dragon transcription disclaimer:  Part of this note may be an electronic transcription/translation of spoken language to printed text using the Dragon Dictation System.

## 2025-06-12 NOTE — TELEPHONE ENCOUNTER
"Provider: MELLISA PORRAS    Caller: Laure Leung \"Addie\"    Relationship to Patient: Self    Phone Number: [phone]     Reason for Call: PATIENT IS CALLING TO REQUEST A COPY OF HER LABS THAT WERE DONE ON 06/10/25.  SHE IS WANTING THE COPY TO BE MAILED TO HER.      PLEASE ADVISE.      "

## 2025-06-13 LAB
ALBUMIN UR-MCNC: 9.9 MG/DL
BACTERIA SPEC AEROBE CULT: NORMAL
CREAT UR-MCNC: 123.6 MG/DL
MICROALBUMIN/CREAT UR: 80.1 MG/G (ref 0–29)

## 2025-06-23 ENCOUNTER — OFFICE VISIT (OUTPATIENT)
Dept: ORTHOPEDIC SURGERY | Facility: CLINIC | Age: 85
End: 2025-06-23
Payer: MEDICARE

## 2025-06-23 VITALS — WEIGHT: 167 LBS | HEIGHT: 63 IN | RESPIRATION RATE: 20 BRPM | BODY MASS INDEX: 29.59 KG/M2 | OXYGEN SATURATION: 99 %

## 2025-06-23 DIAGNOSIS — G89.29 CHRONIC LEFT SHOULDER PAIN: Primary | ICD-10-CM

## 2025-06-23 DIAGNOSIS — G89.29 CHRONIC RIGHT SHOULDER PAIN: ICD-10-CM

## 2025-06-23 DIAGNOSIS — M25.512 CHRONIC LEFT SHOULDER PAIN: Primary | ICD-10-CM

## 2025-06-23 DIAGNOSIS — M25.511 CHRONIC RIGHT SHOULDER PAIN: ICD-10-CM

## 2025-06-23 NOTE — PROGRESS NOTES
Primary Care Sports Medicine Office Visit Note    Patient ID: Laure Leung is a 85 y.o. female.    Chief Complaint:  Chief Complaint   Patient presents with    Left Shoulder - Pain, Initial Evaluation     Pain with ROM for several months   NKI      Right Shoulder - Pain, Initial Evaluation       History of Present Illness  The patient presents for evaluation of bilateral shoulder pain.    She has been experiencing bilateral shoulder discomfort for several months, which is exacerbated when she performs overhead reaching tasks. This pain has also led to a perceived weakness in her arms. She reports that the pain in one shoulder is more severe than the other. She has not received any injections for her shoulder pain.    Additionally, she mentions difficulty in looking upwards.    Supplemental Information  She has been utilizing a walker for approximately 2 years due to knee issues.       Past Medical History:   Diagnosis Date    Arrhythmia     Atrial fibrillation     CHF (congestive heart failure)     Diabetes     Hypertension     Insomnia 2024    falls asleep but can't stay asleep    Primary osteoarthritis of both knees 05/25/2022       Past Surgical History:   Procedure Laterality Date    CARDIAC ELECTROPHYSIOLOGY PROCEDURE N/A 06/23/2021    Procedure: Ablation atrial flutter;  Surgeon: Antonio Senior MD;  Location: [facility] INVASIVE LOCATION;  Service: Cardiovascular;  Laterality: N/A;    CARDIAC ELECTROPHYSIOLOGY PROCEDURE N/A 08/11/2023    Procedure: Ablation atrial fibrillation, cryo, rep emailed;  Surgeon: Claude Leyva MD;  Location: [facility] CATH INVASIVE LOCATION;  Service: Cardiovascular;  Laterality: N/A;    CARDIAC ELECTROPHYSIOLOGY PROCEDURE N/A 6/20/2024    Procedure: AV node ablation---Has [employer] pacemaker in placed;  Surgeon: Naif Salgado MD;  Location: [facility] CATH INVASIVE LOCATION;  Service: Cardiovascular;  Laterality: N/A;    CATARACT EXTRACTION WITH INTRAOCULAR LENS IMPLANT  "Bilateral 2018    PACEMAKER IMPLANTATION N/A 2024    Procedure: PPM SC RV Implant MEDT.;  Surgeon: Naif Salgado MD;  Location: [facility] INVASIVE LOCATION;  Service: Cardiovascular;  Laterality: N/A;       Family History   Problem Relation Age of Onset    No Known Problems Mother     No Known Problems Father     Hypertension Sister     Atrial fibrillation Sister     Diabetes Sister     No Known Problems Maternal Aunt     No Known Problems Maternal Uncle     No Known Problems Paternal Aunt     No Known Problems Paternal Uncle     No Known Problems Maternal Grandmother     No Known Problems Maternal Grandfather     No Known Problems Paternal Grandmother     No Known Problems Paternal Grandfather     Heart attack Neg Hx     Heart disease Neg Hx     Heart failure Neg Hx     Hyperlipidemia Neg Hx      Social History     Occupational History    Occupation: retired   Tobacco Use    Smoking status: Former     Current packs/day: 0.00     Average packs/day: 0.5 packs/day for 12.0 years (6.0 ttl pk-yrs)     Types: Cigarettes     Start date:      Quit date:      Years since quittin.5     Passive exposure: Past    Smokeless tobacco: Never    Tobacco comments:     Caffeine: 2 cups per day   Vaping Use    Vaping status: Never Used   Substance and Sexual Activity    Alcohol use: No    Drug use: Never    Sexual activity: Defer        Review of Systems:  Review of Systems   Constitutional:  Negative for activity change, fatigue and fever.   Musculoskeletal:  Positive for arthralgias.   Skin:  Negative for color change and rash.   Neurological:  Negative for numbness.     Objective:  Physical Exam  Resp 20   Ht 160 cm (63\")   Wt 75.8 kg (167 lb)   SpO2 99%   BMI 29.58 kg/m²   Vitals and nursing note reviewed.   Constitutional:       General: she  is not in acute distress.     Appearance: she is well-developed. she is not diaphoretic.   HENT:      Head: Normocephalic and atraumatic.   Eyes:      " Conjunctiva/sclera: Conjunctivae normal.   Pulmonary:      Effort: Pulmonary effort is normal. No respiratory distress.   Skin:     General: Skin is warm.      Capillary Refill: Capillary refill takes less than 2 seconds.   Neurological:      Mental Status: she is alert.     Ortho Exam:  Physical Exam  Right shoulder evaluation reveals mildly decreased range of motion actively to only about 100 degrees of forward flexion and 80 degrees of lateral abduction, passive range of motion is full upon forceful examination with positive crepitation and pain through the extremes of forward flexion lateral abduction.  Drop arm is positive Abdifatah's positive resisted external rotation is negative belly press is negative contralaterally left shoulder exhibits moderately improved range of motion compared to the previous, with forward flexion to about 140 degrees and lateral abduction at 90 degrees.  Drop arm is negative but Abdifatah is positive to the left.  Resisted external rotation and belly press for the left shoulder are negative however.   strength positive bilaterally.    Results  2+ view XR of bilateral shoulders reveals severe osteoarthritic disease of the glenohumeral acromioclavicular joints both.  And complete loss of rotator cuff intervals both with pseudojoint and osteophytic formation of the now present acromio-humeral articulation.    Diagnoses and all orders for this visit:    1. Chronic left shoulder pain (Primary)  -     XR Shoulder 2+ View Bilateral    2. Chronic right shoulder pain  -     XR Shoulder 2+ View Bilateral      Assessment & Plan  1. Bilateral shoulder pain.  - Bilateral shoulder pain likely due to chronic complete rotator cuff tear, confirmed by x-ray findings.  - Presence of arthritis in the shoulders contributing to discomfort.  - Discussed the option of a steroid shot, which was declined by the patient.  - Prescription for a topical cream containing an anti-inflammatory and nerve pain medication  "to be applied 3 times daily on affected areas, including hips, knees, and shoulders. Recommended to apply the cream before engaging in activities that may exacerbate the pain.  - RTC if pain worsens, could always consider intra-articular corticosteroid.    Manuel TUCKER. \"Chance\" Seven GARCIA DO, CAQSM  06/23/25  12:59 EDT    Disclaimer: Please note that areas of this note were completed with computer voice recognition software.  Quite often unanticipated grammatical, syntax, homophones, and other interpretive errors are inadvertently transcribed by the computer software. Please excuse any errors that have escaped final proofreading.    Patient or patient representative verbalized consent for the use of Ambient Listening during the visit with  Manuel Amezcua II, DO for chart documentation. 12:59 EDT 06/23/25   "

## 2025-06-27 RX ORDER — AMITRIPTYLINE HYDROCHLORIDE 10 MG/1
10 TABLET ORAL NIGHTLY
Qty: 30 TABLET | Refills: 0 | Status: SHIPPED | OUTPATIENT
Start: 2025-06-27

## 2025-06-27 NOTE — TELEPHONE ENCOUNTER
"PATIENT IS LIKING THIS MEDICATION AND WORKS FOR HER     [facility]       Caller:     Laure Leung Addie \"Addie\" (Self) [phone] (Mobile)       Requested Prescriptions:   Requested Prescriptions     Pending Prescriptions Disp Refills    amitriptyline (ELAVIL) 10 MG tablet 30 tablet 0     Sig: Take 1 tablet by mouth Every Night.        Pharmacy where request should be sent: [facility], [address] [phone] [facility] [phone]      Last office visit with prescribing clinician: 6/12/2025   Last telemedicine visit with prescribing clinician: Visit date not found   Next office visit with prescribing clinician: 12/17/2025     Additional details provided by patient:     Does the patient have less than a 3 day supply:  [x] Yes  [] No    Would you like a call back once the refill request has been completed: [] Yes [] No    If the office needs to give you a call back, can they leave a voicemail: [] Yes [] No    Marilee Lyn   06/27/25 14:18 EDT         "

## 2025-08-08 DIAGNOSIS — E11.69 TYPE 2 DIABETES MELLITUS WITH OTHER SPECIFIED COMPLICATION, UNSPECIFIED WHETHER LONG TERM INSULIN USE: ICD-10-CM

## 2025-08-08 DIAGNOSIS — I10 ESSENTIAL HYPERTENSION: ICD-10-CM

## 2025-08-08 RX ORDER — AMITRIPTYLINE HYDROCHLORIDE 10 MG/1
TABLET ORAL
Qty: 30 TABLET | Refills: 0 | Status: SHIPPED | OUTPATIENT
Start: 2025-08-08

## 2025-08-08 RX ORDER — FUROSEMIDE 40 MG/1
40 TABLET ORAL DAILY
Qty: 90 TABLET | Refills: 1 | Status: SHIPPED | OUTPATIENT
Start: 2025-08-08

## 2025-08-08 RX ORDER — POTASSIUM CHLORIDE 750 MG/1
10 TABLET, EXTENDED RELEASE ORAL 2 TIMES DAILY
Qty: 180 TABLET | Refills: 1 | Status: SHIPPED | OUTPATIENT
Start: 2025-08-08

## 2025-08-08 RX ORDER — METFORMIN HYDROCHLORIDE 500 MG/1
500 TABLET, EXTENDED RELEASE ORAL 2 TIMES DAILY
Qty: 180 TABLET | Refills: 1 | Status: SHIPPED | OUTPATIENT
Start: 2025-08-08

## (undated) DEVICE — ST INTRO PERFORMER W/GW J/TP .038IN 14FR

## (undated) DEVICE — CATH ADVISOR HD GRID MAP SENSR ENABLED

## (undated) DEVICE — ELECTRD DEFIB M/FUNC PROPADZ RADIOL 2PK

## (undated) DEVICE — SLITTER CATH GUIDE ATTAIN ADJ

## (undated) DEVICE — INTRO SHEATH PRELUDE SNAP .038 7F 13CM W/SDPRT

## (undated) DEVICE — PINNACLE INTRODUCER SHEATH: Brand: PINNACLE

## (undated) DEVICE — Device: Brand: WEBSTER CS

## (undated) DEVICE — CATH EP STEER DUO-DECAPOLAR SUPERLNG 7F 2-5-2MM 95CM

## (undated) DEVICE — Device: Brand: TORAYGUIDE GUIDEWIRE

## (undated) DEVICE — THE PHOTONBLADE WITH ADAPTIVE SMOKE EVACUATION IS A MONOPOLAR RF DEVICE COUPLED WITH ILLUMINATION THAT IS INDICATED FOR CUTTING AND COAGULATION OF TISSUE DURING GENERAL SURGICAL PROCEDURES AND FOR REMOVING SMOKE GENERATED BY ELECTROSURGERY WHEN USED IN CONJUNCTION WITH AN EFFECTIVE SMOKE EVACUATION SYSTEM.: Brand: PHOTONBLADE WITH ADAPTIVE SMOKE EVACUATION

## (undated) DEVICE — INTRO HEMO FASTCATH CATH/LOCK 7F 12CM

## (undated) DEVICE — Device: Brand: REFERENCE PATCH CARTO 3

## (undated) DEVICE — TBG IV DRIP CHAMBER MACRO SGL 72IN

## (undated) DEVICE — Device: Brand: SMARTABLATE

## (undated) DEVICE — CATH GUIDE RIGHTSITE C315HIS-02

## (undated) DEVICE — NDL TRNSEP BRK XS LNG 18G 98CM A/

## (undated) DEVICE — CABL CONN CATH EP UMB 48IN

## (undated) DEVICE — INTERFACE CABLE: Brand: CARTO 3 SYSTEM ECO INTERFACE CABLE

## (undated) DEVICE — GW INQWIRE FC PTFE J/3MM .035 180

## (undated) DEVICE — Device: Brand: THERMOCOOL SMARTTOUCH SF

## (undated) DEVICE — KT ELECTRD ENSITE PRECISION SURF

## (undated) DEVICE — NDL PERC 1PRT THNWALL W/BASEPLT 18G 7CM

## (undated) DEVICE — PAD E/S GRND SGL/FOIL 9FT/CORD DISP

## (undated) DEVICE — GW TRNSEP SAFESEPT LT/ATRIAL RO 135CM .014IN

## (undated) DEVICE — CLEARSIGHT FINGER CUFF MEDIUM MULTI PACK: Brand: CLEARSIGHT

## (undated) DEVICE — PK TRY HEART CATH 50

## (undated) DEVICE — Device: Brand: EZ STEER NAV

## (undated) DEVICE — CATH EP INQUIRY DECAPLR CRV 6F 2TO5TO2MM 110CM LG

## (undated) DEVICE — LOU EP: Brand: MEDLINE INDUSTRIES, INC.

## (undated) DEVICE — CATH ABL IRR BIDIR TACTICATH/SE DF/CRV 8F 2-2-2MM 3.5MM 115

## (undated) DEVICE — PROVE COVER: Brand: UNBRANDED

## (undated) DEVICE — Device: Brand: CARTO 3

## (undated) DEVICE — INTRO STEER AGILIS NXT MED/CURL 8.5F

## (undated) DEVICE — TBG ABL COOL PT 2.6M 100042049

## (undated) DEVICE — PROB S-CATH TEMP ESOPH 10F

## (undated) DEVICE — SYS TRNSEP ACC BRK1 ACROSS A/ 98CM

## (undated) DEVICE — OCTA,PERSEID,2-2-2-2-2,D-CURVE: Brand: OCTARAY MAPPING CATHETER

## (undated) DEVICE — TBG PRESS/MONITOR FIX M/F LL A/ 24IN STRL

## (undated) DEVICE — BLANKT WARM UNDER/BDY FUL/ACC A/ 90X206CM

## (undated) DEVICE — LOU PACE DEFIB: Brand: MEDLINE INDUSTRIES, INC.

## (undated) DEVICE — CATH EP IMG/CARD VIEWFLEX EXTRA/ICE VF04 120DEG 9F 90CM

## (undated) DEVICE — Device

## (undated) DEVICE — CABL CONN CATH EP COAXL UMB 72IN

## (undated) DEVICE — Device: Brand: VIZIGO

## (undated) DEVICE — Device: Brand: SOUNDSTAR

## (undated) DEVICE — PREF.GUIDING SHEATH W/MULT.CRV: Brand: PREFACE

## (undated) DEVICE — INTRO SHEATH PRELUDE SNAP .038 8F 13CM W/SDPRT